# Patient Record
Sex: MALE | Race: WHITE | Employment: OTHER | URBAN - METROPOLITAN AREA
[De-identification: names, ages, dates, MRNs, and addresses within clinical notes are randomized per-mention and may not be internally consistent; named-entity substitution may affect disease eponyms.]

---

## 2017-05-11 ENCOUNTER — OFFICE VISIT (OUTPATIENT)
Dept: FAMILY MEDICINE CLINIC | Age: 57
End: 2017-05-11

## 2017-05-11 VITALS
TEMPERATURE: 96.9 F | BODY MASS INDEX: 28.19 KG/M2 | RESPIRATION RATE: 18 BRPM | DIASTOLIC BLOOD PRESSURE: 66 MMHG | WEIGHT: 186 LBS | HEIGHT: 68 IN | HEART RATE: 53 BPM | SYSTOLIC BLOOD PRESSURE: 108 MMHG | OXYGEN SATURATION: 96 %

## 2017-05-11 DIAGNOSIS — Z13.220 SCREENING FOR LIPID DISORDERS: ICD-10-CM

## 2017-05-11 DIAGNOSIS — Z86.79 HISTORY OF ABDOMINAL AORTIC ANEURYSM (AAA): ICD-10-CM

## 2017-05-11 DIAGNOSIS — F32.A DEPRESSION, UNSPECIFIED DEPRESSION TYPE: ICD-10-CM

## 2017-05-11 DIAGNOSIS — R60.0 BILATERAL EDEMA OF LOWER EXTREMITY: ICD-10-CM

## 2017-05-11 DIAGNOSIS — I10 ESSENTIAL HYPERTENSION: ICD-10-CM

## 2017-05-11 DIAGNOSIS — E05.90 HYPERTHYROIDISM: ICD-10-CM

## 2017-05-11 DIAGNOSIS — M15.9 OSTEOARTHRITIS OF MULTIPLE JOINTS, UNSPECIFIED OSTEOARTHRITIS TYPE: ICD-10-CM

## 2017-05-11 DIAGNOSIS — B37.0 ORAL THRUSH: ICD-10-CM

## 2017-05-11 DIAGNOSIS — Z12.5 SCREENING FOR PROSTATE CANCER: ICD-10-CM

## 2017-05-11 DIAGNOSIS — R00.1 BRADYCARDIA: Primary | ICD-10-CM

## 2017-05-11 DIAGNOSIS — G89.29 OTHER CHRONIC PAIN: ICD-10-CM

## 2017-05-11 DIAGNOSIS — F17.200 NICOTINE USE DISORDER: ICD-10-CM

## 2017-05-11 DIAGNOSIS — I48.91 ATRIAL FIBRILLATION, UNSPECIFIED TYPE (HCC): ICD-10-CM

## 2017-05-11 DIAGNOSIS — Z82.49 FH: CAD (CORONARY ARTERY DISEASE): ICD-10-CM

## 2017-05-11 DIAGNOSIS — Z82.69 FAMILY HISTORY OF SYSTEMIC LUPUS ERYTHEMATOSUS (SLE) IN MOTHER: ICD-10-CM

## 2017-05-11 DIAGNOSIS — F19.91 HISTORY OF DRUG USE: ICD-10-CM

## 2017-05-11 DIAGNOSIS — F10.11 H/O ALCOHOL ABUSE: ICD-10-CM

## 2017-05-11 RX ORDER — ATENOLOL 100 MG/1
100 TABLET ORAL 2 TIMES DAILY
Status: ON HOLD | COMMUNITY
End: 2017-10-20

## 2017-05-11 RX ORDER — DILTIAZEM HYDROCHLORIDE 180 MG/1
CAPSULE, COATED, EXTENDED RELEASE ORAL 2 TIMES DAILY
COMMUNITY
End: 2017-10-03 | Stop reason: SDUPTHER

## 2017-05-11 RX ORDER — DULOXETIN HYDROCHLORIDE 30 MG/1
30 CAPSULE, DELAYED RELEASE ORAL 2 TIMES DAILY
COMMUNITY
End: 2017-12-22 | Stop reason: SDUPTHER

## 2017-05-11 RX ORDER — BUDESONIDE AND FORMOTEROL FUMARATE DIHYDRATE 160; 4.5 UG/1; UG/1
2 AEROSOL RESPIRATORY (INHALATION) 2 TIMES DAILY
COMMUNITY
End: 2017-05-24 | Stop reason: SDUPTHER

## 2017-05-11 RX ORDER — MAG HYDROX/ALUMINUM HYD/SIMETH 400-400-40
SUSPENSION, ORAL (FINAL DOSE FORM) ORAL
COMMUNITY
End: 2017-08-10

## 2017-05-11 RX ORDER — METHIMAZOLE 5 MG/1
2.5 TABLET ORAL
COMMUNITY
End: 2017-10-20

## 2017-05-11 RX ORDER — OXYCODONE HYDROCHLORIDE 10 MG/1
TABLET ORAL 4 TIMES DAILY
COMMUNITY
End: 2017-06-02 | Stop reason: SDUPTHER

## 2017-05-11 RX ORDER — ALBUTEROL SULFATE 90 UG/1
AEROSOL, METERED RESPIRATORY (INHALATION)
COMMUNITY
End: 2017-07-19 | Stop reason: SDUPTHER

## 2017-05-11 RX ORDER — GABAPENTIN 300 MG/1
300 CAPSULE ORAL 2 TIMES DAILY
COMMUNITY
End: 2017-06-21 | Stop reason: SDUPTHER

## 2017-05-11 RX ORDER — MULTIVIT WITH MINERALS/HERBS
1 TABLET ORAL DAILY
COMMUNITY
End: 2021-02-01

## 2017-05-11 RX ORDER — NYSTATIN 100000 [USP'U]/ML
1 SUSPENSION ORAL 4 TIMES DAILY
Qty: 200 ML | Refills: 0 | Status: SHIPPED | OUTPATIENT
Start: 2017-05-11 | End: 2017-05-24

## 2017-05-11 NOTE — MR AVS SNAPSHOT
Visit Information Date & Time Provider Department Dept. Phone Encounter #  
 5/11/2017  2:00 PM DUANE Baumann [phone] [id number] Follow-up Instructions Return in about 3 weeks (around 6/1/2017) for Bradycardia. Your Appointments 6/1/2017 11:00 AM  
Follow Up with DUANE Baumann ([facility]) Appt Note: Return in about 3 weeks (around 6/1/2017) for Bradycardia. [employer] [address] [address]  
[phone]  
  
   
 [facility] [address] [address] Upcoming Health Maintenance Date Due Hepatitis C Screening 1960 DTaP/Tdap/Td series (1 - Tdap) 3/1/1979 FOBT Q 1 YEAR AGE 50-75 3/1/2008 INFLUENZA AGE 9 TO ADULT 8/1/2017 Allergies as of 5/11/2017  Review Complete On: 5/11/2017 By: Janis Virk, MIGUEL ANGEL, RT, NM, ARRT No Known Allergies Current Immunizations  Never Reviewed No immunizations on file. Not reviewed this visit You Were Diagnosed With   
  
 Codes Comments Bradycardia    -  Primary ICD-10-CM: R00.1 ICD-9-CM: 427.89 Atrial fibrillation, unspecified type ([facility]     ICD-10-CM: I48.91 
ICD-9-CM: 427.31 Osteoarthritis of multiple joints, unspecified osteoarthritis type     ICD-10-CM: M15.9 ICD-9-CM: 715.89 Other chronic pain     ICD-10-CM: G89.29 ICD-9-CM: 338.29 Hyperthyroidism     ICD-10-CM: E05.90 ICD-9-CM: 242.90 History of abdominal aortic aneurysm (AAA)     ICD-10-CM: Z86.79 
ICD-9-CM: V12.59 Oral thrush     ICD-10-CM: B37.0 ICD-9-CM: 112.0 Family history of systemic lupus erythematosus (SLE) in mother     ICD-10-CM: Z80.2 ICD-9-CM: V19.8 History of drug use ([facility]     ICD-10-CM: I84.117 ICD-9-CM: 305.93   
 H/O alcohol abuse     ICD-10-CM: Z87.898 ICD-9-CM: 305.03 Nicotine use disorder     ICD-10-CM: F17.200 ICD-9-CM: 305.1 Depression, unspecified depression type     ICD-10-CM: F32.9 ICD-9-CM: 166 Screening for lipid disorders     ICD-10-CM: Z13.220 ICD-9-CM: V77.91 Essential hypertension     ICD-10-CM: I10 
ICD-9-CM: 401.9 Vitals BP Pulse Temp Resp Height(growth percentile) Weight(growth percentile) 108/66 (BP 1 Location: Right arm, BP Patient Position: Sitting) (!) 53 96.9 °F (36.1 °C) (Oral) 18 5' 8\" (1.727 m) 186 lb (84.4 kg) SpO2 BMI Smoking Status 96% 28.28 kg/m2 Current Every Day Smoker Vitals History BMI and BSA Data Body Mass Index Body Surface Area  
 28.28 kg/m 2 2.01 m 2 Your Updated Medication List  
  
   
This list is accurate as of: 5/11/17  3:35 PM.  Always use your most recent med list.  
  
  
  
  
 atenolol 100 mg tablet Commonly known as:  TENORMIN Take 100 mg by mouth two (2) times a day. b complex vitamins tablet Take 1 Tab by mouth daily. CARTIA  mg ER capsule Generic drug:  dilTIAZem CD Take  by mouth two (2) times a day. CYMBALTA 30 mg capsule Generic drug:  DULoxetine Take 30 mg by mouth two (2) times a day.  
  
 gabapentin 300 mg capsule Commonly known as:  NEURONTIN Take 300 mg by mouth two (2) times a day. multivitamin, tx-iron-ca-min 9 mg iron-400 mcg Tab tablet Commonly known as:  THERA-M w/ IRON Take 1 Tab by mouth daily. oxyCODONE IR 10 mg Tab immediate release tablet Commonly known as:  Mary Carey Take  by mouth four (4) times daily. PROAIR HFA 90 mcg/actuation inhaler Generic drug:  albuterol Take  by inhalation. saw palmetto fruit 450 mg Cap Take  by mouth. SYMBICORT 160-4.5 mcg/actuation HFA inhaler Generic drug:  budesonide-formoterol Take 2 Puffs by inhalation two (2) times a day. TAPAZOLE 5 mg tablet Generic drug:  methIMAzole Take 2.5 mg by mouth every three (3) days. XARELTO 20 mg Tab tablet Generic drug:  rivaroxaban Take  by mouth daily. XTAMPZA ER 9 mg capsule Generic drug:  oxyCODONE ER Take 9 mg by mouth every twelve (12) hours. We Performed the Following AMB POC EKG ROUTINE W/ 12 LEADS, INTER & REP [70595 CPT(R)] Follow-up Instructions Return in about 3 weeks (around 6/1/2017) for Bradycardia. Patient Instructions Atrial Fibrillation: Care Instructions Your Care Instructions Atrial fibrillation is an irregular and often fast heartbeat. Treating this condition is important for several reasons. It can cause blood clots, which can travel from your heart to your brain and cause a stroke. If you have a fast heartbeat, you may feel lightheaded, dizzy, and weak. An irregular heartbeat can also increase your risk for heart failure. Atrial fibrillation is often the result of another heart condition, such as high blood pressure or coronary artery disease. Making changes to improve your heart condition will help you stay healthy and active. Follow-up care is a key part of your treatment and safety. Be sure to make and go to all appointments, and call your doctor if you are having problems. It's also a good idea to know your test results and keep a list of the medicines you take. How can you care for yourself at home? Medicines · Take your medicines exactly as prescribed. Call your doctor if you think you are having a problem with your medicine. You will get more details on the specific medicines your doctor prescribes. · If your doctor has given you a blood thinner to prevent a stroke, be sure you get instructions about how to take your medicine safely. Blood thinners can cause serious bleeding problems. · Do not take any vitamins, over-the-counter drugs, or herbal products without talking to your doctor first. 
Lifestyle changes · Do not smoke.  Smoking can increase your chance of a stroke and heart attack. If you need help quitting, talk to your doctor about stop-smoking programs and medicines. These can increase your chances of quitting for good. · Eat a heart-healthy diet. · Stay at a healthy weight. Lose weight if you need to. · Limit alcohol to 2 drinks a day for men and 1 drink a day for women. Too much alcohol can cause health problems. · Avoid colds and flu. Get a pneumococcal vaccine shot. If you have had one before, ask your doctor whether you need another dose. Get a flu shot every year. If you must be around people with colds or flu, wash your hands often. Activity · If your doctor recommends it, get more exercise. Walking is a good choice. Bit by bit, increase the amount you walk every day. Try for at least 30 minutes on most days of the week. You also may want to swim, bike, or do other activities. Your doctor may suggest that you join a cardiac rehabilitation program so that you can have help increasing your physical activity safely. · Start light exercise if your doctor says it is okay. Even a small amount will help you get stronger, have more energy, and manage stress. Walking is an easy way to get exercise. Start out by walking a little more than you did in the hospital. Gradually increase the amount you walk. · When you exercise, watch for signs that your heart is working too hard. You are pushing too hard if you cannot talk while you are exercising. If you become short of breath or dizzy or have chest pain, sit down and rest immediately. · Check your pulse regularly. Place two fingers on the artery at the palm side of your wrist, in line with your thumb. If your heartbeat seems uneven or fast, talk to your doctor. When should you call for help? Call 911 anytime you think you may need emergency care. For example, call if: 
· You have symptoms of a heart attack. These may include: ¨ Chest pain or pressure, or a strange feeling in the chest. 
¨ Sweating. ¨ Shortness of breath. ¨ Nausea or vomiting. ¨ Pain, pressure, or a strange feeling in the back, neck, jaw, or upper belly or in one or both shoulders or arms. ¨ Lightheadedness or sudden weakness. ¨ A fast or irregular heartbeat. After you call 911, the  may tell you to chew 1 adult-strength or 2 to 4 low-dose aspirin. Wait for an ambulance. Do not try to drive yourself. · You have symptoms of a stroke. These may include: 
¨ Sudden numbness, tingling, weakness, or loss of movement in your face, arm, or leg, especially on only one side of your body. ¨ Sudden vision changes. ¨ Sudden trouble speaking. ¨ Sudden confusion or trouble understanding simple statements. ¨ Sudden problems with walking or balance. ¨ A sudden, severe headache that is different from past headaches. · You passed out (lost consciousness). Call your doctor now or seek immediate medical care if: 
· You have new or increased shortness of breath. · You feel dizzy or lightheaded, or you feel like you may faint. · Your heart rate becomes irregular. · You can feel your heart flutter in your chest or skip heartbeats. Tell your doctor if these symptoms are new or worse. Watch closely for changes in your health, and be sure to contact your doctor if you have any problems. Where can you learn more? Go to http://jeannine-kain.info/. Enter U020 in the search box to learn more about \"Atrial Fibrillation: Care Instructions. \" Current as of: January 27, 2016 Content Version: 11.2 © 9870-7566 [employer]. Care instructions adapted under license by [employer] (which disclaims liability or warranty for this information). If you have questions about a medical condition or this instruction, always ask your healthcare professional. [place] [address] any warranty or liability for your use of this information. Introducing [facility]! Alla Cleverly introduces Mempile patient portal. Now you can access parts of your medical record, email your doctor's office, and request medication refills online. 1. In your internet browser, go to [email or web address] 2. Click on the First Time User? Click Here link in the Sign In box. You will see the New Member Sign Up page. 3. Enter your Mempile Access Code exactly as it appears below. You will not need to use this code after youve completed the sign-up process. If you do not sign up before the expiration date, you must request a new code. · Mempile Access Code: [email or web address] Expires: 8/9/2017  1:33 PM 
 
4. Enter the last four digits of your Social Security Number (xxxx) and Date of Birth (mm/dd/yyyy) as indicated and click Submit. You will be taken to the next sign-up page. 5. Create a Mempile ID. This will be your Mempile login ID and cannot be changed, so think of one that is secure and easy to remember. 6. Create a Mempile password. You can change your password at any time. 7. Enter your Password Reset Question and Answer. This can be used at a later time if you forget your password. 8. Enter your e-mail address. You will receive e-mail notification when new information is available in [address] 9. Click Sign Up. You can now view and download portions of your medical record. 10. Click the Download Summary menu link to download a portable copy of your medical information. If you have questions, please visit the Frequently Asked Questions section of the Mempile website. Remember, Mempile is NOT to be used for urgent needs. For medical emergencies, dial 911. Now available from your iPhone and Android! Please provide this summary of care documentation to your next provider. Your primary care clinician is listed as Zi Durbin. If you have any questions after today's visit, please call [phone].

## 2017-05-11 NOTE — PATIENT INSTRUCTIONS
Atrial Fibrillation: Care Instructions  Your Care Instructions    Atrial fibrillation is an irregular and often fast heartbeat. Treating this condition is important for several reasons. It can cause blood clots, which can travel from your heart to your brain and cause a stroke. If you have a fast heartbeat, you may feel lightheaded, dizzy, and weak. An irregular heartbeat can also increase your risk for heart failure. Atrial fibrillation is often the result of another heart condition, such as high blood pressure or coronary artery disease. Making changes to improve your heart condition will help you stay healthy and active. Follow-up care is a key part of your treatment and safety. Be sure to make and go to all appointments, and call your doctor if you are having problems. It's also a good idea to know your test results and keep a list of the medicines you take. How can you care for yourself at home? Medicines  · Take your medicines exactly as prescribed. Call your doctor if you think you are having a problem with your medicine. You will get more details on the specific medicines your doctor prescribes. · If your doctor has given you a blood thinner to prevent a stroke, be sure you get instructions about how to take your medicine safely. Blood thinners can cause serious bleeding problems. · Do not take any vitamins, over-the-counter drugs, or herbal products without talking to your doctor first.  Lifestyle changes  · Do not smoke. Smoking can increase your chance of a stroke and heart attack. If you need help quitting, talk to your doctor about stop-smoking programs and medicines. These can increase your chances of quitting for good. · Eat a heart-healthy diet. · Stay at a healthy weight. Lose weight if you need to. · Limit alcohol to 2 drinks a day for men and 1 drink a day for women. Too much alcohol can cause health problems. · Avoid colds and flu. Get a pneumococcal vaccine shot.  If you have had one before, ask your doctor whether you need another dose. Get a flu shot every year. If you must be around people with colds or flu, wash your hands often. Activity  · If your doctor recommends it, get more exercise. Walking is a good choice. Bit by bit, increase the amount you walk every day. Try for at least 30 minutes on most days of the week. You also may want to swim, bike, or do other activities. Your doctor may suggest that you join a cardiac rehabilitation program so that you can have help increasing your physical activity safely. · Start light exercise if your doctor says it is okay. Even a small amount will help you get stronger, have more energy, and manage stress. Walking is an easy way to get exercise. Start out by walking a little more than you did in the hospital. Gradually increase the amount you walk. · When you exercise, watch for signs that your heart is working too hard. You are pushing too hard if you cannot talk while you are exercising. If you become short of breath or dizzy or have chest pain, sit down and rest immediately. · Check your pulse regularly. Place two fingers on the artery at the palm side of your wrist, in line with your thumb. If your heartbeat seems uneven or fast, talk to your doctor. When should you call for help? Call 911 anytime you think you may need emergency care. For example, call if:  · You have symptoms of a heart attack. These may include:  ¨ Chest pain or pressure, or a strange feeling in the chest.  ¨ Sweating. ¨ Shortness of breath. ¨ Nausea or vomiting. ¨ Pain, pressure, or a strange feeling in the back, neck, jaw, or upper belly or in one or both shoulders or arms. ¨ Lightheadedness or sudden weakness. ¨ A fast or irregular heartbeat. After you call 911, the  may tell you to chew 1 adult-strength or 2 to 4 low-dose aspirin. Wait for an ambulance. Do not try to drive yourself. · You have symptoms of a stroke.  These may include:  ¨ Sudden numbness, tingling, weakness, or loss of movement in your face, arm, or leg, especially on only one side of your body. ¨ Sudden vision changes. ¨ Sudden trouble speaking. ¨ Sudden confusion or trouble understanding simple statements. ¨ Sudden problems with walking or balance. ¨ A sudden, severe headache that is different from past headaches. · You passed out (lost consciousness). Call your doctor now or seek immediate medical care if:  · You have new or increased shortness of breath. · You feel dizzy or lightheaded, or you feel like you may faint. · Your heart rate becomes irregular. · You can feel your heart flutter in your chest or skip heartbeats. Tell your doctor if these symptoms are new or worse. Watch closely for changes in your health, and be sure to contact your doctor if you have any problems. Where can you learn more? Go to http://jeannine-kain.info/. Enter U020 in the search box to learn more about \"Atrial Fibrillation: Care Instructions. \"  Current as of: January 27, 2016  Content Version: 11.2  © 6177-6324 [employer]. Care instructions adapted under license by "[employer] (which disclaims liability or warranty for this information). If you have questions about a medical condition or this instruction, always ask your healthcare professional. [address] any warranty or liability for your use of this information.

## 2017-05-11 NOTE — PROGRESS NOTES
HISTORY OF PRESENT ILLNESS  Gifty Ott is a 59 y.o. male. HPI  Gifty Ott is a 59 y.o. male who presents to the office today to establish care. He is new to the practice. He moved here from [place] with his wife to live closer to their daughter. He has multiple complaints today. He has a very long hx of chronic pain previously in pain management, DJD of multiple joints, bulging discs in lumbar spine, s/p multiple cervical and lumbar surgeries, hyperthyroidism, rate controlled Afib on Xarelto, AAA 3cm, depression. He has a hx of drug and alcohol abuse. He smokes cigarettes, 1 ppd. He was seeing Pain Management in [place] for 5 years, Dr. Simon Gonzalez. He has been optimally treated with Oxy ER and Oxy IR. This brings his pain level down to a 5/10, which he can live with. He needs a Pain Management clinic here. In office EKG shows bradycardia and Afib, on atenolol and cardizem and xarelto. His HR is quite low and does c/o occasional dizziness and fatigue. He says he was trying to take the trash bins outside and he was so out of breath, which concerned him. He has been experiencing LE edema for about 3 years. He cannot move much because of his chronic pain and DJD. He walks with a cane. He says he will drink a lot during the day but hardly will urinate. At night he will wake up 6 times to urinate. He does admit to drinking 3 Mt Dews in the morning, a couple Iced Teas, a Frapuccino in the afternoon and then juices and water the rest of the day. He has trouble falling asleep. He is overdue for his US of AAA. His brother  from a AAA in his 45s and this makes him very nervous. He has a very strong FH of CAD on his father's side. His mother is Bipolar and his son is Schizo and Bipolar. He does have a hx of Depression and taking cymbalta.        Chief Complaint   Patient presents with    Pain (Chronic)     Current Outpatient Prescriptions   Medication Sig    DULoxetine (CYMBALTA) 30 mg capsule Take 30 mg by mouth two (2) times a day.  methIMAzole (TAPAZOLE) 5 mg tablet Take 2.5 mg by mouth every three (3) days.  atenolol (TENORMIN) 100 mg tablet Take 100 mg by mouth two (2) times a day.  oxyCODONE ER (XTAMPZA ER) 9 mg capsule Take 9 mg by mouth every twelve (12) hours.  gabapentin (NEURONTIN) 300 mg capsule Take 300 mg by mouth two (2) times a day.  oxyCODONE IR (ROXICODONE) 10 mg tab immediate release tablet Take  by mouth four (4) times daily.  dilTIAZem CD (CARTIA XT) 180 mg ER capsule Take  by mouth two (2) times a day.  saw palmetto fruit 450 mg cap Take  by mouth.  b complex vitamins tablet Take 1 Tab by mouth daily.  multivitamin, tx-iron-ca-min (THERA-M W/ IRON) 9 mg iron-400 mcg tab tablet Take 1 Tab by mouth daily.  budesonide-formoterol (SYMBICORT) 160-4.5 mcg/actuation HFA inhaler Take 2 Puffs by inhalation two (2) times a day.  albuterol (PROAIR HFA) 90 mcg/actuation inhaler Take  by inhalation.  nystatin (MYCOSTATIN) 100,000 unit/mL suspension Take 5 mL by mouth four (4) times daily. swish and spit    rivaroxaban (XARELTO) 20 mg tab tablet Take 1 Tab by mouth daily (with breakfast). No current facility-administered medications for this visit.         No Known Allergies  Past Medical History:   Diagnosis Date    [facility]ism ([facility] [address]     Chronic pain     Degenerative disc disease, cervical     Degenerative disc disease, lumbar     Hypertension     Hypokalemia     Pneumonia 2016    caused permanent L lung problems    Thyroid disease      History   Smoking Status    Current Every Day Smoker    Packs/day: 1.00   Smokeless Tobacco    Former User     History   Alcohol Use No     Comment: sober x 5 years     Family History   Problem Relation Age of Onset    Heart Disease Father     Heart Disease Brother     Heart Disease Paternal Aunt     Heart Disease Paternal Uncle     Heart Disease Paternal Grandmother     Lupus Mother      Review of Systems Constitutional: Positive for malaise/fatigue. Negative for diaphoresis and fever. HENT:        Tongue pain   Respiratory: Positive for shortness of breath. Negative for cough and wheezing. Cardiovascular: Positive for leg swelling. Negative for chest pain, palpitations, orthopnea and PND. Gastrointestinal: Negative for abdominal pain, blood in stool, diarrhea, nausea and vomiting. Genitourinary: Negative for dysuria, hematuria and urgency. Nocturia   Musculoskeletal: Positive for back pain, joint pain, myalgias and neck pain. Neurological: Positive for dizziness. Negative for focal weakness and loss of consciousness. Psychiatric/Behavioral: Positive for depression. The patient has insomnia. The patient is not nervous/anxious. Hx drug and alcohol abuse     Visit Vitals    /66 (BP 1 Location: Right arm, BP Patient Position: Sitting)    Pulse (!) 53    Temp 96.9 °F (36.1 °C) (Oral)    Resp 18    Ht 5' 8\" (1.727 m)    Wt 186 lb (84.4 kg)    SpO2 96%    BMI 28.28 kg/m2     Physical Exam   Constitutional: He appears well-developed and well-nourished. Walking slowly with a cane. Appears older than stated age   HENT:   Mouth/Throat: Abnormal dentition. Thrush on tongue  Wears dentures (not at visit today)   Neck: Neck supple. No JVD present. No thyromegaly present. Very little ROM    Cardiovascular: An irregularly irregular rhythm present. Bradycardia present. Pulses:       Radial pulses are 2+ on the right side, and 2+ on the left side. Pulmonary/Chest: Effort normal and breath sounds normal.   Abdominal: Normal appearance, normal aorta and bowel sounds are normal. He exhibits no abdominal bruit. There is no hepatosplenomegaly. There is no tenderness. Lymphadenopathy:     He has no cervical adenopathy. Skin: Ecchymosis noted. Psychiatric: He has a normal mood and affect.  His speech is normal and behavior is normal. Thought content normal.   Nursing note and vitals reviewed. EKG reviewed in the office: Bradycardia with a rate of 52, Atrial fibrillation    ASSESSMENT and PLAN    ICD-10-CM ICD-9-CM    1. Bradycardia R00.1 427.89 AMB POC EKG ROUTINE W/ 12 LEADS, INTER & REP      CBC WITH AUTOMATED DIFF      METABOLIC PANEL, COMPREHENSIVE      TSH AND FREE T4      T3 TOTAL      MAGNESIUM   2. Atrial fibrillation, unspecified type (HCC) I48.91 427.31 AMB POC EKG ROUTINE W/ 12 LEADS, INTER & REP      CBC WITH AUTOMATED DIFF      METABOLIC PANEL, COMPREHENSIVE      TSH AND FREE T4      T3 TOTAL      MAGNESIUM      rivaroxaban (XARELTO) 20 mg tab tablet      2D ECHO COMPLETE ADULT (TTE) W OR WO CONTR   3. Osteoarthritis of multiple joints, unspecified osteoarthritis type M15.9 715.89 VITAMIN D, 25 HYDROXY      REFERRAL TO PAIN MANAGEMENT      REFERRAL TO NEUROSURGERY   4. Other chronic pain G89.29 338.29 VITAMIN D, 25 HYDROXY      REFERRAL TO PAIN MANAGEMENT   5. Hyperthyroidism E05.90 242.90 TSH AND FREE T4      T3 TOTAL   6. History of abdominal aortic aneurysm (AAA) Z86.79 V12.59 US EXAM SCREENING AAA   7. Oral thrush B37.0 112.0 nystatin (MYCOSTATIN) 100,000 unit/mL suspension   8. Family history of systemic lupus erythematosus (SLE) in mother Z80.2 V19.8 ANTINUCLEAR ANTIBODIES, IFA   9. History of drug use ([facility] [address] Z87.898 305.93    10. H/O alcohol abuse Z87.898 305.03    11. Nicotine use disorder F17.200 305.1    12. Depression, unspecified depression type F32.9 311 REFERRAL TO PSYCHIATRY   13. Screening for lipid disorders Z13.220 V77.91 LIPID PANEL   14. Essential hypertension I10 401.9    15. Bilateral edema of lower extremity R60.0 782.3 2D ECHO COMPLETE ADULT (TTE) W OR WO CONTR   16. FH: CAD (coronary artery disease) Z82.49 V17.3 CBC WITH AUTOMATED DIFF      METABOLIC PANEL, COMPREHENSIVE      TSH AND FREE T4      T3 TOTAL      LIPID PANEL   17.  Screening for prostate cancer Z12.5 V76.44 PSA SCREENING (SCREENING)      I am ordering labs, initiating referrals to specialists and ordering imaging studies. Echo to assess LV function and any wall motion abnormalities, Abdominal Aortic US  I will request records from his previous PCP in [place]. I have decreased Atenolol to 100mg once daily due to dizziness and fatigue. Will f/u in 2 weeks to recheck BP and HR and reassess symptoms. Continue Xarelto and Diltiazem. Treating Thrush. Make sure to keep dentures very clean, and always rinse mouth after inhaler use. Reviewed medication and side effects. Patient agrees with the plan and verbalizes understanding. Follow-up Disposition:  Return in about 3 weeks (around 6/1/2017) for Bradycardia.     Jane Rivera PA-C  5/11/2017

## 2017-05-11 NOTE — PROGRESS NOTES
Radha Dockery is a 59 y.o. male new pt establishing care. Chronic pain in R hip and knee. Trouble sleeping because he wakes up constantly to urinate. Swelling in the ankles and legs    Never had a colonoscopy    Just moved here from [place] about 3 weeks.

## 2017-05-12 DIAGNOSIS — Z86.79 HISTORY OF ABDOMINAL AORTIC ANEURYSM (AAA): Primary | ICD-10-CM

## 2017-05-24 ENCOUNTER — CLINICAL SUPPORT (OUTPATIENT)
Dept: FAMILY MEDICINE CLINIC | Age: 57
End: 2017-05-24

## 2017-05-24 ENCOUNTER — HOSPITAL ENCOUNTER (OUTPATIENT)
Dept: LAB | Age: 57
Discharge: HOME | End: 2017-05-24
Payer: COMMERCIAL

## 2017-05-24 ENCOUNTER — OFFICE VISIT (OUTPATIENT)
Dept: FAMILY MEDICINE CLINIC | Age: 57
End: 2017-05-24

## 2017-05-24 VITALS
BODY MASS INDEX: 27.46 KG/M2 | HEIGHT: 68 IN | OXYGEN SATURATION: 96 % | HEART RATE: 66 BPM | DIASTOLIC BLOOD PRESSURE: 90 MMHG | SYSTOLIC BLOOD PRESSURE: 128 MMHG | TEMPERATURE: 97.2 F | WEIGHT: 181.2 LBS | RESPIRATION RATE: 18 BRPM

## 2017-05-24 DIAGNOSIS — Z12.5 SCREENING FOR PROSTATE CANCER: ICD-10-CM

## 2017-05-24 DIAGNOSIS — Z82.69 FAMILY HISTORY OF SYSTEMIC LUPUS ERYTHEMATOSUS (SLE) IN MOTHER: ICD-10-CM

## 2017-05-24 DIAGNOSIS — Z13.220 SCREENING FOR LIPID DISORDERS: ICD-10-CM

## 2017-05-24 DIAGNOSIS — I48.91 ATRIAL FIBRILLATION, UNSPECIFIED TYPE (HCC): ICD-10-CM

## 2017-05-24 DIAGNOSIS — R00.1 BRADYCARDIA: ICD-10-CM

## 2017-05-24 DIAGNOSIS — M15.9 OSTEOARTHRITIS OF MULTIPLE JOINTS, UNSPECIFIED OSTEOARTHRITIS TYPE: ICD-10-CM

## 2017-05-24 DIAGNOSIS — Z82.49 FH: CAD (CORONARY ARTERY DISEASE): ICD-10-CM

## 2017-05-24 DIAGNOSIS — S30.861A: ICD-10-CM

## 2017-05-24 DIAGNOSIS — L29.8 PRURITUS OF GROIN IN MALE: ICD-10-CM
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
## 2017-05-24 DIAGNOSIS — G89.29 OTHER CHRONIC PAIN: ICD-10-CM
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
## 2017-05-24 DIAGNOSIS — E05.90 HYPERTHYROIDISM: ICD-10-CM

## 2017-05-24 DIAGNOSIS — R00.1 BRADYCARDIA: Primary | ICD-10-CM

## 2017-05-24 DIAGNOSIS — L08.9: ICD-10-CM

## 2017-05-24 DIAGNOSIS — I10 ESSENTIAL HYPERTENSION: ICD-10-CM

## 2017-05-24 LAB
ALBUMIN SERPL BCP-MCNC: 4.2 G/DL (ref 3.4–5)
ALBUMIN/GLOB SERPL: 1.3 {RATIO} (ref 0.8–1.7)
ALP SERPL-CCNC: 103 U/L (ref 45–117)
ALT SERPL-CCNC: 19 U/L (ref 16–61)
ANION GAP BLD CALC-SCNC: 8 MMOL/L (ref 3–18)
AST SERPL W P-5'-P-CCNC: 16 U/L (ref 15–37)
BASOPHILS # BLD AUTO: 0.1 K/UL (ref 0–0.06)
BASOPHILS # BLD: 1 % (ref 0–2)
BILIRUB SERPL-MCNC: 0.4 MG/DL (ref 0.2–1)
BUN SERPL-MCNC: 18 MG/DL (ref 7–18)
BUN/CREAT SERPL: 15 (ref 12–20)
CALCIUM SERPL-MCNC: 9.3 MG/DL (ref 8.5–10.1)
CHLORIDE SERPL-SCNC: 103 MMOL/L (ref 100–108)
CHOLEST SERPL-MCNC: 241 MG/DL
CO2 SERPL-SCNC: 28 MMOL/L (ref 21–32)
CREAT SERPL-MCNC: 1.23 MG/DL (ref 0.6–1.3)
DIFFERENTIAL METHOD BLD: ABNORMAL
EOSINOPHIL # BLD: 0.5 K/UL (ref 0–0.4)
EOSINOPHIL NFR BLD: 5 % (ref 0–5)
ERYTHROCYTE [DISTWIDTH] IN BLOOD BY AUTOMATED COUNT: 13.1 % (ref 11.6–14.5)
GLOBULIN SER CALC-MCNC: 3.3 G/DL (ref 2–4)
GLUCOSE SERPL-MCNC: 96 MG/DL (ref 74–99)
HCT VFR BLD AUTO: 49.4 % (ref 36–48)
HDLC SERPL-MCNC: 40 MG/DL (ref 40–60)
HDLC SERPL: 6 {RATIO} (ref 0–5)
HGB BLD-MCNC: 16 G/DL (ref 13–16)
LDLC SERPL CALC-MCNC: 170 MG/DL (ref 0–100)
LIPID PROFILE,FLP: ABNORMAL
LYMPHOCYTES # BLD AUTO: 31 % (ref 21–52)
LYMPHOCYTES # BLD: 3.1 K/UL (ref 0.9–3.6)
MAGNESIUM SERPL-MCNC: 2.3 MG/DL (ref 1.6–2.6)
MCH RBC QN AUTO: 30.8 PG (ref 24–34)
MCHC RBC AUTO-ENTMCNC: 32.4 G/DL (ref 31–37)
MCV RBC AUTO: 95.2 FL (ref 74–97)
MONOCYTES # BLD: 0.5 K/UL (ref 0.05–1.2)
MONOCYTES NFR BLD AUTO: 5 % (ref 3–10)
NEUTS SEG # BLD: 5.8 K/UL (ref 1.8–8)
NEUTS SEG NFR BLD AUTO: 58 % (ref 40–73)
PLATELET # BLD AUTO: 219 K/UL (ref 135–420)
PMV BLD AUTO: 11.3 FL (ref 9.2–11.8)
POTASSIUM SERPL-SCNC: 4.2 MMOL/L (ref 3.5–5.5)
PROT SERPL-MCNC: 7.5 G/DL (ref 6.4–8.2)
PSA SERPL-MCNC: 0.3 NG/ML (ref 0–4)
RBC # BLD AUTO: 5.19 M/UL (ref 4.7–5.5)
SODIUM SERPL-SCNC: 139 MMOL/L (ref 136–145)
T4 FREE SERPL-MCNC: 1.1 NG/DL (ref 0.7–1.5)
TRIGL SERPL-MCNC: 155 MG/DL (ref ?–150)
TSH SERPL DL<=0.05 MIU/L-ACNC: 2.21 UIU/ML (ref 0.36–3.74)
VLDLC SERPL CALC-MCNC: 31 MG/DL
WBC # BLD AUTO: 9.9 K/UL (ref 4.6–13.2)

## 2017-05-24 PROCEDURE — 80053 COMPREHEN METABOLIC PANEL: CPT | Performed by: PHYSICIAN ASSISTANT

## 2017-05-24 PROCEDURE — 84480 ASSAY TRIIODOTHYRONINE (T3): CPT | Performed by: PHYSICIAN ASSISTANT

## 2017-05-24 PROCEDURE — 84439 ASSAY OF FREE THYROXINE: CPT | Performed by: PHYSICIAN ASSISTANT

## 2017-05-24 PROCEDURE — 80061 LIPID PANEL: CPT | Performed by: PHYSICIAN ASSISTANT

## 2017-05-24 PROCEDURE — 84153 ASSAY OF PSA TOTAL: CPT | Performed by: PHYSICIAN ASSISTANT

## 2017-05-24 PROCEDURE — 82306 VITAMIN D 25 HYDROXY: CPT | Performed by: PHYSICIAN ASSISTANT

## 2017-05-24 PROCEDURE — 86038 ANTINUCLEAR ANTIBODIES: CPT | Performed by: PHYSICIAN ASSISTANT

## 2017-05-24 PROCEDURE — 83735 ASSAY OF MAGNESIUM: CPT | Performed by: PHYSICIAN ASSISTANT

## 2017-05-24 PROCEDURE — 85025 COMPLETE CBC W/AUTO DIFF WBC: CPT | Performed by: PHYSICIAN ASSISTANT

## 2017-05-24 RX ORDER — CEPHALEXIN 500 MG/1
500 CAPSULE ORAL 2 TIMES DAILY
Qty: 20 CAP | Refills: 0 | Status: SHIPPED | OUTPATIENT
Start: 2017-05-24 | End: 2017-06-03

## 2017-05-24 RX ORDER — BUDESONIDE AND FORMOTEROL FUMARATE DIHYDRATE 160; 4.5 UG/1; UG/1
2 AEROSOL RESPIRATORY (INHALATION) 2 TIMES DAILY
Qty: 1 INHALER | Refills: 2 | Status: SHIPPED | OUTPATIENT
Start: 2017-05-24 | End: 2018-01-15 | Stop reason: SDUPTHER

## 2017-05-24 RX ORDER — NYSTATIN 100000 U/G
CREAM TOPICAL 2 TIMES DAILY
Qty: 15 G | Refills: 0 | Status: SHIPPED | OUTPATIENT
Start: 2017-05-24 | End: 2017-06-20

## 2017-05-24 NOTE — PROGRESS NOTES
Brnaden Mccoy is a 59 y.o. male here this morning with his daughter for labs only. He tolerated well and left showing no s/s of distress.

## 2017-05-24 NOTE — PROGRESS NOTES
Fili Daniesl is a 59 y.o. male here this morning with c/o tick bite that was first noticed 1 week ago that is on his groin. The tick bit him 4 times and has been removed. All 4 bites are open from scratching. 2 are on the top inner thigh and 2 on the scrotum. The 2 on his legs have red circles and a darker dot. Learning assessment previously completed. 1. Have you been to the ER, urgent care clinic or hospitalized since your last visit? no     2. Have you seen or consulted any other health care providers outside of the [address] [place] [address] since your last visit (Include any pap smears or colon screening)? no     Do you have an Advanced Directive? yes    Would you like information on Advanced Directives?  no

## 2017-05-24 NOTE — PROGRESS NOTES
HISTORY OF PRESENT ILLNESS  Jarrod Sheffield is a 59 y.o. male. HPI  Jarrod Sheffield is a 59 y.o. male who presents to the office today for tick bite. He comes in today with c/o insect bites. This occurred 1.5 weeks ago. He was itching in his groin and noticed a tick crawling up his thigh. He then saw 4-5 bite marks on his inner thigh and scrotum. He says the bite marks are swollen and red, tender to touch, and extremely itchy. His dogs get ticks often and currently have tick prevention medication on them. [place] also [place], in a house that has been vacant for over one year, so he is questioning possibly a spider bite. Denies fevers, chills, bulls eye rash, worsening joint or muscle pain. Chief Complaint   Patient presents with    Insect Bite       Current Outpatient Prescriptions on File Prior to Visit   Medication Sig Dispense Refill    DULoxetine (CYMBALTA) 30 mg capsule Take 30 mg by mouth two (2) times a day.  methIMAzole (TAPAZOLE) 5 mg tablet Take 2.5 mg by mouth every three (3) days.  atenolol (TENORMIN) 100 mg tablet Take 100 mg by mouth daily.  oxyCODONE ER (XTAMPZA ER) 9 mg capsule Take 10 mg by mouth every twelve (12) hours.  gabapentin (NEURONTIN) 300 mg capsule Take 300 mg by mouth two (2) times a day.  oxyCODONE IR (ROXICODONE) 10 mg tab immediate release tablet Take  by mouth four (4) times daily.  dilTIAZem CD (CARTIA XT) 180 mg ER capsule Take  by mouth two (2) times a day.  saw palmetto fruit 450 mg cap Take  by mouth.  b complex vitamins tablet Take 1 Tab by mouth daily.  multivitamin, tx-iron-ca-min (THERA-M W/ IRON) 9 mg iron-400 mcg tab tablet Take 1 Tab by mouth daily.  budesonide-formoterol (SYMBICORT) 160-4.5 mcg/actuation HFA inhaler Take 2 Puffs by inhalation two (2) times a day.  albuterol (PROAIR HFA) 90 mcg/actuation inhaler Take  by inhalation.       rivaroxaban (XARELTO) 20 mg tab tablet Take 1 Tab by mouth daily (with breakfast). 30 Tab 11     No current facility-administered medications on file prior to visit. No Known Allergies  Past Medical History:   Diagnosis Date    [facility]ism ([facility] [address]     Chronic pain     Degenerative disc disease, cervical     Degenerative disc disease, lumbar     Hypertension     Hypokalemia     Pneumonia 2016    caused permanent L lung problems    Thyroid disease      History   Smoking Status    Current Every Day Smoker    Packs/day: 1.00   Smokeless Tobacco    Former User     History   Alcohol Use No     Comment: sober x 5 years     Family History   Problem Relation Age of Onset    Heart Disease Father     Heart Disease Brother     Heart Disease Paternal Aunt     Heart Disease Paternal Uncle     Heart Disease Paternal Grandmother     Lupus Mother      Review of Systems   Constitutional: Negative for chills, fever and malaise/fatigue. Gastrointestinal: Negative for abdominal pain, diarrhea, nausea and vomiting. Musculoskeletal: Positive for back pain, joint pain and myalgias. Chronic pain not worse than usual   Skin: Positive for itching. Negative for rash. groin   Neurological: Negative for headaches. Endo/Heme/Allergies: Does not bruise/bleed easily. Visit Vitals    /90    Pulse 66    Temp 97.2 °F (36.2 °C) (Oral)    Resp 18    Ht 5' 8\" (1.727 m)    Wt 181 lb 3.2 oz (82.2 kg)    SpO2 96%    BMI 27.55 kg/m2     Physical Exam   Constitutional: He is oriented to person, place, and time. He appears well-developed and well-nourished. Slow gait walking with cane   Cardiovascular: Normal rate. Pulmonary/Chest: Effort normal. No respiratory distress. Genitourinary: Testes normal and penis normal.       Right testis shows no mass and no tenderness. Left testis shows no mass and no tenderness. No penile erythema. Genitourinary Comments: 4 bites noted. Two on scrotum and one on each inner thigh. Scrotum lesions healing well.  Two lesions on inner thigh appear red surrounding bite, tender to touch, hard induration under scab. No drainage could be appreciated. Warm to touch   Neurological: He is alert and oriented to person, place, and time. Nursing note and vitals reviewed. ASSESSMENT and PLAN    ICD-10-CM ICD-9-CM    1. Tick bite, initial encounter W57. XXXA 919.4      E906.4    2. Nonvenomous insect bite of groin with infection, initial encounter S30.861A 911.5 cephALEXin (KEFLEX) 500 mg capsule    L08.9 E906.4     W57. XXXA     3. Essential hypertension I10 401.9    4. Pruritus of groin in male L29.8 698.9 nystatin (MYCOSTATIN) topical cream      Started him on abx for infection secondary to insect bite. If there is no improvement, then he will return to the office in 1-2 weeks for I&D. BP has increased since decreasing BB at last visit. Since his symptoms of dizziness and fatigue have not improved, he will go back on BB twice daily to keep BP and HR low with his hx of AAA. Reviewed medication and side effects. Patient agrees with the plan and verbalizes understanding. Follow-up Disposition:  Return in about 2 weeks (around 6/7/2017) for HTN.     Ruma Devries PA-C  5/24/2017

## 2017-05-24 NOTE — PATIENT INSTRUCTIONS
Tick Bite: Care Instructions  Your Care Instructions    Ticks are small spiderlike animals. They bite to fasten themselves onto your skin and feed on your blood. Ticks can carry diseases. But most ticks do not carry diseases, and most tick bites do not cause serious health problems. Some people may have an allergic reaction to a tick bite. This reaction may be mild, with symptoms like itching and swelling. In rare cases, a severe allergic reaction may occur. Most of the time, all you need to do for a tick bite is relieve any symptoms you may have. Follow-up care is a key part of your treatment and safety. Be sure to make and go to all appointments, and call your doctor if you are having problems. It's also a good idea to know your test results and keep a list of the medicines you take. How can you care for yourself at home? · Put ice or a cold pack on the bite for 15 to 20 minutes once an hour. Put a thin cloth between the ice and your skin. · Try an over-the-counter medicine to relieve itching, redness, swelling, and pain. Be safe with medicines. Read and follow all instructions on the label. ¨ Take an antihistamine medicine, such as chlorpheniramine (Chlor-Trimeton) or diphenhydramine (Benadryl). These medicines may help relieve itching, redness, and swelling. ¨ Use a spray of local anesthetic that contains benzocaine, such as Solarcaine. It may help relieve pain. If your skin reacts to the spray, stop using it. ¨ Put calamine lotion on the skin. It may help relieve itching. To avoid tick bites  · Avoid ticks:  ¨ Learn where ticks are found in your community, and stay away from those areas if possible. ¨ Cover as much of your body as possible when you work or play in grassy or wooded areas. ¨ Use insect repellents, such as products containing DEET. You can spray them on your skin. ¨ Take steps to control ticks on your property if you live in an area where Lyme disease occurs.  Clear leaves, brush, tall grasses, woodpiles, and stone fences from around your house and the edges of your yard or garden. This may help get rid of ticks. · When you come in from outdoors, check your body for ticks, including your groin, head, and underarms. The ticks may be about the size of a sesame seed. If no one else can help you check for ticks on your scalp, comb your hair with a fine-tooth comb. · If you find a tick, remove it quickly. Use tweezers to grasp the tick as close to its mouth (the part in your skin) as possible. Slowly pull the tick straight out--do not twist or yank--until its mouth releases from your skin. · Ticks can come into your house on clothing, outdoor gear, and pets. These ticks can fall off and attach to you. ¨ Check your clothing and outdoor gear. Remove any ticks you find. Then put your clothing in a clothes dryer on high heat for 1 hour to kill any ticks that might remain. ¨ Check your pets for ticks after they have been outdoors. · When hiking in the woods, carry a small dry jar or ziplock bag. If you find a tick on your body, remove the tick and put it in the jar or bag. Store the container in the freezer so you can give it to your doctor if symptoms develop. The tick can be tested to learn whether it is carrying the bacteria that cause Lyme disease. When should you call for help? Call 911 anytime you think you may need emergency care. For example, call if:  · You have symptoms of a severe allergic reaction. These may include:  ¨ Sudden raised, red areas (hives) all over your body. ¨ Swelling of the throat, mouth, lips, or tongue. ¨ Trouble breathing. ¨ Passing out (losing consciousness). Or you may feel very lightheaded or suddenly feel weak, confused, or restless. Call your doctor now or seek immediate medical care if:  · You have signs of infection, such as:  ¨ Increased pain, swelling, warmth, or redness around the bite. ¨ Red streaks leading from the bite.   ¨ Pus draining from the bite. ¨ A fever. Watch closely for changes in your health, and be sure to contact your doctor if:  · You develop a new rash. · You have joint pain. · You are very tired. · You have flu-like symptoms. · You have symptoms for more than 1 week. Where can you learn more? Go to http://jeannine-kain.info/. Enter T295 in the search box to learn more about \"Tick Bite: Care Instructions. \"  Current as of: May 27, 2016  Content Version: 11.2  © 6088-4534 [employer]. Care instructions adapted under license by [employer] (which disclaims liability or warranty for this information). If you have questions about a medical condition or this instruction, always ask your healthcare professional. [address] any warranty or liability for your use of this information.

## 2017-05-25 LAB
25(OH)D3 SERPL-MCNC: 22.3 NG/ML (ref 30–100)
ANA TITR SER IF: NEGATIVE {TITER}
T3 SERPL-MCNC: 142 NG/DL (ref 71–180)

## 2017-06-02 ENCOUNTER — TELEPHONE (OUTPATIENT)
Dept: FAMILY MEDICINE CLINIC | Age: 57
End: 2017-06-02

## 2017-06-02 ENCOUNTER — OFFICE VISIT (OUTPATIENT)
Dept: FAMILY MEDICINE CLINIC | Age: 57
End: 2017-06-02

## 2017-06-02 VITALS — WEIGHT: 180 LBS | HEIGHT: 68 IN | BODY MASS INDEX: 27.28 KG/M2

## 2017-06-02 DIAGNOSIS — G89.29 OTHER CHRONIC PAIN: ICD-10-CM

## 2017-06-02 DIAGNOSIS — E78.2 MIXED HYPERLIPIDEMIA: ICD-10-CM

## 2017-06-02 DIAGNOSIS — L73.9 FOLLICULITIS: ICD-10-CM

## 2017-06-02 DIAGNOSIS — I48.20 CHRONIC ATRIAL FIBRILLATION (HCC): ICD-10-CM

## 2017-06-02 DIAGNOSIS — M15.9 OSTEOARTHRITIS OF MULTIPLE JOINTS, UNSPECIFIED OSTEOARTHRITIS TYPE: Primary | ICD-10-CM

## 2017-06-02 DIAGNOSIS — I71.40 ABDOMINAL AORTIC ANEURYSM (AAA) WITHOUT RUPTURE: ICD-10-CM

## 2017-06-02 DIAGNOSIS — I10 ESSENTIAL HYPERTENSION: ICD-10-CM

## 2017-06-02 DIAGNOSIS — E55.9 VITAMIN D INSUFFICIENCY: ICD-10-CM

## 2017-06-02 DIAGNOSIS — F17.200 NICOTINE USE DISORDER: ICD-10-CM

## 2017-06-02 RX ORDER — OXYCODONE HYDROCHLORIDE 10 MG/1
10 TABLET ORAL
Qty: 45 TAB | Refills: 0 | Status: SHIPPED | OUTPATIENT
Start: 2017-06-02 | End: 2017-06-02 | Stop reason: SDUPTHER

## 2017-06-02 RX ORDER — SIMVASTATIN 40 MG/1
40 TABLET, FILM COATED ORAL
Qty: 30 TAB | Refills: 2 | Status: SHIPPED | OUTPATIENT
Start: 2017-06-02 | End: 2017-08-08

## 2017-06-02 RX ORDER — BACITRACIN ZINC 500 UNIT/G
OINTMENT (GRAM) TOPICAL 3 TIMES DAILY
Qty: 15 G | Refills: 0 | Status: SHIPPED | OUTPATIENT
Start: 2017-06-02 | End: 2017-06-20

## 2017-06-02 RX ORDER — ERGOCALCIFEROL 1.25 MG/1
50000 CAPSULE ORAL
Qty: 12 CAP | Refills: 0 | Status: SHIPPED | OUTPATIENT
Start: 2017-06-02 | End: 2018-05-29

## 2017-06-02 RX ORDER — OXYCODONE HYDROCHLORIDE 10 MG/1
10 TABLET ORAL
Qty: 120 TAB | Refills: 0 | Status: SHIPPED | OUTPATIENT
Start: 2017-06-02 | End: 2017-07-06 | Stop reason: SDUPTHER

## 2017-06-02 NOTE — TELEPHONE ENCOUNTER
Pt receives one of his prescriptions from [facility] in [place]. [phone]. The med is Xtampza ER 12HR 9 MG CAP ARAMIS. A refill was called in, left message, it was for QTY: 60, 30 day supply with no refills.

## 2017-06-02 NOTE — LETTER
Name:.Jeffery Cathryne Heimlich NLQ:[birth year] MR #:[id number] Provider Name:Juju Irby PA-C  
*QBYV-464* BSMG-491 (5/16) Page 1 of 5 Initial [facility] CONTROLLED SUBSTANCE AGREEMENT I may be prescribed medications that are controlled substances as part  of my treatment plan for management of my medical condition(s). The goal of my treatment plan is to maintain and/or improve my health and wellbeing. Because controlled substances have an increased risk of abuse or harm, continual re-evaluation is needed determine if the goals of my treatment plan are being met for my safety and the safety of others. Emily Alcantar  am entering into this Controlled Substance Agreement with my provider, Aida Stinson PA-C at [facility] . I understand that successful treatment requires mutual trust and honesty between me and my provider. I understand that there are state and federal laws and regulations which apply to the medications that my provider may prescribe that must be followed. I understand there are risks and benefits ts of taking the medicines that my provider may prescribe. I understand and agree that following this Agreement is necessary in continuing my provider-patient relationship and success of my treatment plan. As a part of my treatment plan, I agree to the following: COMMUNICATION: 
 
1. I will communicate fully with my provider about my medical condition(s), including the effect on my daily life and how well my medications are helping. I will tell my provider all of the medications that I take for any reason, including medications I receive from another health care provider, and will notify my provider about all issues, problems or concerns, including any side effects, which may be related to my medications. I understand that this information allows my provider to adjust my treatment plan to help manage my medical condition.  I understand that this information will become part of my permanent medical record. 2. I will notify my provider if I have a history of alcohol/drug misuse/addiction or if I have had treatment for alcohol/drug addiction in the past, or if I have a new problem with or concern about alcohol/drug use/addiction, because this increases the likelihood of high risk behaviors and may lead to serious medical conditions. 3. Females Only: I will notify my provider if I am or become pregnant, or if I intend to become pregnant, or if I intend to breastfeed. I understand that communication of these issues with my provider is important, due to possible effects my medication could have on an unborn fetus or breastfeeding child. Name:.Madhav Timmons EBB:[birth year] MR #:[id number] Provider Name:Juju Mathew PA-C  
*GWDH-035* BSMG-491 (5/16) Page 2 of 5 Initial [employer] MISUSE OF MEDICATIONS / DRUGS: 
 
1. I agree to take all controlled substances as prescribed, and will not misuse or abuse any controlled substances prescribed by my provider. For my safety, I will not increase the amount of medicine I take without first talking with and getting permission from my provider. 2. If I have a medical emergency, another health care provider may prescribe me medication. If I seek emergency treatment, I will notify my provider within seventy-two (72) hours. 3. I understand that my provider may discuss my use and/or possible misuse/abuse of controlled substances and alcohol, as appropriate, with any health care provider involved in my care, pharmacist or legal authority. ILLEGAL DRUGS: 
 
1. I will not use illegal drugs of any kind, including but not limited to marijuana, heroin, cocaine, or any prescription drug which is not prescribed to me. DRUG DIVERSION / PRESCRIPTION FRAUD: 
 
1. I will not share, sell, trade, give away, or otherwise misuse my prescriptions or medications. 2. I will not alter any prescriptions provided to me by my provider. SINGLE PROVIDER: 
 
1. I agree that all controlled substances that I take will be prescribed only by my provider (or his/her covering provider) under this Agreement. This agreement does not prevent me from seeking emergency medical treatment or receiving pain management related to a surgery. PROTECTING MEDICATIONS: 
 
1. I am responsible for keeping my prescriptions and medications in a safe and secure place including safeguarding them from loss or theft. I understand that lost, stolen or damaged/destroyed prescriptions or medications will not be replaced. Name:.Madhav Jade [facility]:[birth year] MR #:[id number] Provider Name:Juju Talbert PA-C  
*LGXA-218* BSMG-491 (5/16) Page 3 of 5 Initial [employer] PRESCRIPTION RENEWALS/REFILLS: 
 
1. I will follow my controlled substance medication schedule as prescribed by my provider. 2. I understand and agree that I will make any requests for renewals or refills of my prescriptions only at the time of an office visit or during my providers regular office hours subject to the prescription refill requirements of the individual practice. 3. I understand that my provider may not call in prescriptions for controlled substances to my pharmacy. 4. I understand that my provider may adjust or discontinue these medications as deemed appropriate for my medical treatment plan. This Agreement does not guarantee the prescription of controlled medications. 5. I agree that if my medications are adjusted or discontinued, I will properly dispose of any remaining medications. I understand that I will be required to dispose of any remaining controlled medications prior to being provided with any prescriptions for other controlled medications.  
 
 
1. I authorize my provider and my pharmacy to cooperate fully with any local, state, or federal law enforcement agency in the investigation of any possible misuse, sale, or other diversion of my controlled substance prescriptions or medications. RISKS: 
 
 
1. I understand that if I do not adhere to this Agreement in any way, my provider may change my prescriptions, stop prescribing controlled substances or end our provider-patient relationship. 2. If my provider decides to stop prescribing medication, or decides to end our provider-patient relationship,my provider may require that I taper my medications slowly. If necessary, my provider may also provide a prescription for other medications to treat my withdrawal symptoms. UNDERSTANDING THIS AGREEMENT: 
 
I understand that my provider may adjust or stop my prescriptions for controlled substances based on my medical condition and my treatment plan. I understand that this Agreement does not guarantee that I will be prescribed medications or controlled substances. I understand that controlled substances may be just one part 
of my treatment plan. My initial on each page and my signature below shows that I have read each page of this Agreement, I have had an opportunity to ask questions, and all of my questions have been answered to my satisfaction by my provider. By signing below, I agree to comply with this Agreement, and I understand that if I do not follow the Agreements listed above, my provider may stop 
 
 
 
_________________________________________  Date/Time 6/2/2017 12:03 PM   
             (Patient Signature)

## 2017-06-02 NOTE — PROGRESS NOTES
HISTORY OF PRESENT ILLNESS  Barbara Farmer is a 59 y.o. male. HPI  Barbara Farmer is a 59 y.o. male who presents to the office today for test results follow up. He comes in today to review the results of his labs, Echo and AAA US. Thyroid levels are well controlled, CBC and CMP look good. Lipid panel was reviewed and Total cholesterol 241, , HDL 40. He has never been on a statin. Echo showed an EF of 67%, mild LVH, biatrial dilitation, and RVSP 27mmHg. He continues to smoke cigarettes and has been eating more fast food than normal.     AAA: States 6 months ago it was measuring 3.3cm. He is very concerned because his brother  of a ruptured AAA in his 42's. He takes atenolol 100mg BID with good control of BP and HR. His blood pressure and HR are usually lower than today's readings. Indication: History of abdominal aortic aneurysm.     IMPRESSION: 4.2 cm saccular distal aortic aneurysm.     Comment: Sonography of the retroperitoneum was performed.     The aorta harbors a 4 x 4.2 cm saccular distal infrarenal abdominal aortic  aneurysm. There is mural thrombus and plaquing within the wall. The iliac  arteries are patent. The inferior vena cava is patent.     The right kidney measures 10.7 and the left 11.1 cm in the craniocaudal  dimension. The bladder measures 45.7 cc. The prostate is of normal size       Chief Complaint   Patient presents with    Hypertension       Current Outpatient Prescriptions on File Prior to Visit   Medication Sig Dispense Refill    cephALEXin (KEFLEX) 500 mg capsule Take 1 Cap by mouth two (2) times a day for 10 days. 20 Cap 0    nystatin (MYCOSTATIN) topical cream Apply  to affected area two (2) times a day. 15 g 0    budesonide-formoterol (SYMBICORT) 160-4.5 mcg/actuation HFA inhaler Take 2 Puffs by inhalation two (2) times a day. 1 Inhaler 2    DULoxetine (CYMBALTA) 30 mg capsule Take 30 mg by mouth two (2) times a day.       methIMAzole (TAPAZOLE) 5 mg tablet Take 2.5 mg by mouth every three (3) days.  atenolol (TENORMIN) 100 mg tablet Take 100 mg by mouth two (2) times a day.  oxyCODONE ER (XTAMPZA ER) 9 mg capsule Take 10 mg by mouth every twelve (12) hours.  gabapentin (NEURONTIN) 300 mg capsule Take 300 mg by mouth two (2) times a day.  dilTIAZem CD (CARTIA XT) 180 mg ER capsule Take  by mouth two (2) times a day.  saw palmetto fruit 450 mg cap Take  by mouth.  b complex vitamins tablet Take 1 Tab by mouth daily.  multivitamin, tx-iron-ca-min (THERA-M W/ IRON) 9 mg iron-400 mcg tab tablet Take 1 Tab by mouth daily.  albuterol (PROAIR HFA) 90 mcg/actuation inhaler Take  by inhalation.  rivaroxaban (XARELTO) 20 mg tab tablet Take 1 Tab by mouth daily (with breakfast). 30 Tab 11     No current facility-administered medications on file prior to visit. No Known Allergies  Past Medical History:   Diagnosis Date    Alcoholism ([facility] [address]     Chronic pain     Degenerative disc disease, cervical     Degenerative disc disease, lumbar     Hypertension     Hypokalemia     Pneumonia 2016    caused permanent L lung problems    Thyroid disease      History   Smoking Status    Current Every Day Smoker    Packs/day: 1.00   Smokeless Tobacco    Former User     History   Alcohol Use No     Comment: sober x 5 years     Family History   Problem Relation Age of Onset    Heart Disease Father     Heart Disease Brother      AAA-  in his 42's    Heart Disease Paternal Aunt     Heart Disease Paternal Uncle     Heart Disease Paternal Grandmother     Lupus Mother      Review of Systems   Constitutional: Positive for malaise/fatigue. Negative for diaphoresis and fever. Chronic   HENT:        Tongue pain   Respiratory: Positive for shortness of breath. Negative for cough, hemoptysis and wheezing. Cardiovascular: Positive for leg swelling. Negative for chest pain, palpitations, orthopnea and PND.    Gastrointestinal: Negative for abdominal pain, blood in stool, constipation, diarrhea, nausea and vomiting. Genitourinary:        Nocturia   Musculoskeletal: Positive for back pain, joint pain, myalgias and neck pain. Chronic joint pain   Skin: Positive for rash. Neurological: Positive for dizziness. Negative for focal weakness and loss of consciousness. Endo/Heme/Allergies: Bruises/bleeds easily. On xarelto   Psychiatric/Behavioral: Positive for depression. The patient has insomnia. The patient is not nervous/anxious. Hx drug and alcohol abuse     Visit Vitals    BP (P) 129/69 (BP 1 Location: Right arm, BP Patient Position: Sitting)    Pulse (P) 72    Temp (P) 97.4 °F (36.3 °C) (Oral)    Resp (P) 18    Ht 5' 8\" (1.727 m)    Wt 180 lb (81.6 kg)    SpO2 (P) 96%    BMI 27.37 kg/m2     Physical Exam   Constitutional: He is oriented to person, place, and time. He appears well-developed and well-nourished. No distress. Slow walking with cane   HENT:   Mouth/Throat: Abnormal dentition. Thrush on tongue  Wears dentures (not at visit today)   Neck: Neck supple. No JVD present. No thyromegaly present. Very little ROM    Cardiovascular: Normal rate. An irregularly irregular rhythm present. Pulses:       Radial pulses are 2+ on the right side, and 2+ on the left side. Pulmonary/Chest: Effort normal and breath sounds normal.   Abdominal: Normal appearance. He exhibits no abdominal bruit and no pulsatile midline mass. There is no hepatosplenomegaly. There is no tenderness. Musculoskeletal:   Trace LE edema   Neurological: He is alert and oriented to person, place, and time. Skin: Ecchymosis and rash noted. Rash is pustular. Few small red pustular lesions on inner thigh, no drainage, induration or warmth   Psychiatric: He has a normal mood and affect. His speech is normal and behavior is normal. Thought content normal.   Nursing note and vitals reviewed.     Lab Results   Component Value Date/Time Cholesterol, total 241 05/24/2017 08:05 AM    HDL Cholesterol 40 05/24/2017 08:05 AM    LDL, calculated 170 05/24/2017 08:05 AM    VLDL, calculated 31 05/24/2017 08:05 AM    Triglyceride 155 05/24/2017 08:05 AM    CHOL/HDL Ratio 6.0 05/24/2017 08:05 AM     ASSESSMENT and PLAN    ICD-10-CM ICD-9-CM    1. Osteoarthritis of multiple joints, unspecified osteoarthritis type M15.9 715.89 oxyCODONE IR (ROXICODONE) 10 mg tab immediate release tablet      DISCONTINUED: oxyCODONE IR (ROXICODONE) 10 mg tab immediate release tablet   2. Other chronic pain G89.29 338.29 oxyCODONE IR (ROXICODONE) 10 mg tab immediate release tablet      DISCONTINUED: oxyCODONE IR (ROXICODONE) 10 mg tab immediate release tablet   3. Vitamin D insufficiency E55.9 268.9 ergocalciferol (ERGOCALCIFEROL) 50,000 unit capsule   4. Folliculitis P70.6 621.3 bacitracin (BACITRACIN) ointment   5. Abdominal aortic aneurysm (AAA) without rupture (HCC) I71.4 441.4 REFERRAL TO VASCULAR SURGERY   6. Nicotine use disorder F17.200 305.1    7. Essential hypertension I10 401.9    8. Chronic atrial fibrillation (HCC) I48.2 427.31    9. Mixed hyperlipidemia E78.2 272.2 simvastatin (ZOCOR) 40 mg tablet      Will refer him to Vascular for enlarging AAA. He has previous study results at home, and he will bring those to his vascular appt. Continue with BB for good blood pressure and HR control. Do not exert yourself which could result in increased BP and HR. Also counseled on smoking cessation. This is speeding up the process of cardiovascular disease and damage, in addition to his FH. I am starting him on zocor for HLD. His estimated 10-year ASCVD risk is 21%, with a lifetime risk of 69%. Referral to pain management has already been started. He signed a pain management contract today. He understands that I will only refill his pain medication until he can be seen and managed by PM. Also, we are following up on the referral to a Neurosurgeon.   Reviewed medication and side effects. Patient agrees with the plan and verbalizes understanding.      Follow-up Disposition:  Return in about 1 month (around 7/2/2017) for chronic pain, AAA, med eval.    Gilberto Shah PA-C  6/2/2017

## 2017-06-02 NOTE — PROGRESS NOTES
Fili Daniels is a 59 y.o. male here this morning for a follow up on his HTN and heart rate. He says he now takes Atenolol BID which has helped. He also says there are a couple of bites on his legs that are still bothering him. Learning assessment previously completed. 1. Have you been to the ER, urgent care clinic or hospitalized since your last visit? no    2. Have you seen or consulted any other health care providers outside of the [address] [place] [address] since your last visit (Include any pap smears or colon screening)? no      Do you have an Advanced Directive? yes    Would you like information on Advanced Directives?  yes

## 2017-06-02 NOTE — PATIENT INSTRUCTIONS
Abdominal Aortic Aneurysm: Care Instructions  Your Care Instructions  An abdominal aortic aneurysm is a stretched and bulging area of the aorta. The aorta is the large blood vessel that takes oxygen-rich blood from the heart to the rest of the body. This type of aneurysm is in the belly, where the aorta takes blood to the lower body. If an aneurysm gets too big, it can cause serious problems. A bulging aorta is weak and can burst, or rupture. This causes life-threatening bleeding. If your doctor has determined that your aneurysm is small and not growing fast, it is safe to watch the aneurysm carefully and wait on surgery. If the aneurysm is larger, surgery may be the safest choice. In some cases, your doctor may be able to put in a type of graft, called a stent, to fix the aneurysm without doing major surgery. Follow-up care is a key part of your treatment and safety. Be sure to make and go to all appointments, and call your doctor if you are having problems. It's also a good idea to know your test results and keep a list of the medicines you take. How can you care for yourself at home? · Take your medicines exactly as prescribed. Call your doctor if you think you are having a problem with your medicine. You may take a medicine to lower your blood pressure. This lowers stress on your aorta. If you have high cholesterol, you also may take a statin medicine. · Follow a heart-healthy diet. ¨ Eat lots of fruits and vegetables, whole grains, fish, and low-fat or nonfat dairy foods. ¨ Eat lean meats. Limit saturated fat and avoid trans fat. ¨ Limit processed food, sodium, alcohol, and sweets. · If your doctor recommends it, get more exercise. Walking is a good choice. Bit by bit, increase the amount you walk every day. Try for at least 30 minutes on most days of the week. · Talk to your doctor about when you can do more active workouts. · Manage your weight.  Being at a healthy weight will not likely change your aortic aneurysm, but it may lower the chance of complications if you ever need surgery. · Do not smoke or allow others to smoke around you. Smoking can make your condition worse. If you need help quitting, talk to your doctor about stop-smoking programs and medicines. These can increase your chances of quitting for good. When should you call for help? Call 911 anytime you think you may need emergency care. For example, call if:  · You have severe pain in your belly, back, or chest.  · You passed out (lost consciousness). · You have severe trouble breathing. Call your doctor now or seek immediate medical care if:  · You are dizzy or lightheaded, or you feel like you may faint. · One or both feet change color, are painful, feel cool, or burn or tingle. Watch closely for changes in your health, and be sure to contact your doctor if you have any problems. Where can you learn more? Go to http://jeannine-kain.info/. Enter W053 in the search box to learn more about \"Abdominal Aortic Aneurysm: Care Instructions. \"  Current as of: July 21, 2016  Content Version: 11.2  © 5835-2511 [employer]. Care instructions adapted under license by [employer] (which disclaims liability or warranty for this information). If you have questions about a medical condition or this instruction, always ask your healthcare professional. [address] any warranty or liability for your use of this information.

## 2017-06-05 ENCOUNTER — TELEPHONE (OUTPATIENT)
Dept: FAMILY MEDICINE CLINIC | Age: 57
End: 2017-06-05

## 2017-06-05 NOTE — TELEPHONE ENCOUNTER
Spoke with pt's daughter Lyndon Lucero), she states that Mr. Devon Wylie is already taking up to 4 benadryl per night trying to sleep and he has tried Melatonin in the past. She states he is scared to go to sleep because he is scared he will die in his sleep. She would like for us to relay this message to Cheikh Vásquez with the understanding that she will not get the message until tomorrow. Will call her back once Juju responds.  She can be reached at [phone]

## 2017-06-05 NOTE — TELEPHONE ENCOUNTER
Daughter called stating patient just found out that his AAA has gotten larger. This has caused patient increased anxiety and is now having a hard time sleeping at night. Daughter called asking if there is anything that can be called in to the pharmacy for him? He has an appointment with the vascular surgeon on Thursday. She is aware that Lore Pandey is out of the office today and the message will be sent to another provider for review.

## 2017-06-06 ENCOUNTER — TELEPHONE (OUTPATIENT)
Dept: FAMILY MEDICINE CLINIC | Age: 57
End: 2017-06-06

## 2017-06-06 DIAGNOSIS — G47.9 SLEEPING DIFFICULTY: Primary | ICD-10-CM

## 2017-06-06 NOTE — TELEPHONE ENCOUNTER
Will send in a prescription for Belsomra for sleep. Only take as prescribed since he is already taking Opioids for pain. Do not take benadryl in addition to this.

## 2017-06-08 ENCOUNTER — HOSPITAL ENCOUNTER (OUTPATIENT)
Dept: CT IMAGING | Age: 57
Discharge: HOME | End: 2017-06-08
Attending: PHYSICIAN ASSISTANT
Payer: COMMERCIAL

## 2017-06-08 ENCOUNTER — OFFICE VISIT (OUTPATIENT)
Dept: VASCULAR SURGERY | Age: 57
End: 2017-06-08

## 2017-06-08 VITALS
WEIGHT: 180 LBS | DIASTOLIC BLOOD PRESSURE: 70 MMHG | RESPIRATION RATE: 22 BRPM | HEART RATE: 86 BPM | SYSTOLIC BLOOD PRESSURE: 122 MMHG | BODY MASS INDEX: 27.28 KG/M2 | HEIGHT: 68 IN

## 2017-06-08 DIAGNOSIS — F17.200 NICOTINE USE DISORDER: ICD-10-CM

## 2017-06-08 DIAGNOSIS — Z79.01 CURRENT USE OF ANTICOAGULANT THERAPY: ICD-10-CM

## 2017-06-08 DIAGNOSIS — I67.1 SACCULAR ANEURYSM: ICD-10-CM

## 2017-06-08 DIAGNOSIS — I71.40 AAA (ABDOMINAL AORTIC ANEURYSM) WITHOUT RUPTURE: Primary | ICD-10-CM

## 2017-06-08 DIAGNOSIS — I48.20 CHRONIC ATRIAL FIBRILLATION (HCC): ICD-10-CM

## 2017-06-08 DIAGNOSIS — Z82.49 FAMILY HISTORY OF ABDOMINAL AORTIC ANEURYSM: ICD-10-CM

## 2017-06-08 DIAGNOSIS — I71.40 AAA (ABDOMINAL AORTIC ANEURYSM) WITHOUT RUPTURE: ICD-10-CM

## 2017-06-08 PROCEDURE — 74011636320 HC RX REV CODE- 636/320: Performed by: PHYSICIAN ASSISTANT

## 2017-06-08 PROCEDURE — 75635 CT ANGIO ABDOMINAL ARTERIES: CPT

## 2017-06-08 RX ADMIN — IOPAMIDOL 100 ML: 755 INJECTION, SOLUTION INTRAVENOUS at 17:00

## 2017-06-08 NOTE — PROGRESS NOTES
Fili Daniels    Chief Complaint   Patient presents with    New Patient    Abdominal Aortic Aneurysm       History and Physical    Mr Karen Brown is referred by his PCP for evaluation of AAA  He just moved here from [place] about 4 weeks ago and established care here  He had known AAA. It had last been studied in [place] about 18 months ago and was told it was about 3.1 cm. He was not familiar with terms to know if it was saccular or fusiform  Recent ultrasound is characterizing saccular 4.2 cm aneurysm  He has been very anxious hearing of this and has not slept in days  His worry also stems from 5 close relatives who  of ruptured aneurysms, including his father in his early 45s and a brother in his early 46s    Past Medical History:   Diagnosis Date    [facility] [address]     Chronic pain     Degenerative disc disease, cervical     Degenerative disc disease, lumbar     Hypertension     Hypokalemia     Pneumonia 2016    caused permanent L lung problems    Thyroid disease      Patient Active Problem List   Diagnosis Code    Atrial fibrillation ([facility] [address] I48.91    Osteoarthritis of multiple joints M15.9    Chronic pain G89.29    Hyperthyroidism E05.90    History of abdominal aortic aneurysm (AAA) Z86.79    Family history of systemic lupus erythematosus (SLE) in mother Z80.2    History of drug use ([facility] [address] Z87.898    H/O alcohol abuse Z87.898    Nicotine use disorder F17.200    Depression F32.9    Essential hypertension I10    FH: CAD (coronary artery disease) Z82.49     Past Surgical History:   Procedure Laterality Date    HX ORTHOPAEDIC      neck fracture    HX ORTHOPAEDIC      lumbar fusion     Current Outpatient Prescriptions   Medication Sig Dispense Refill    suvorexant (BELSOMRA) 10 mg tablet Take on 30 minutes prior to bedtime as needed for insomnia. 10 Tab 0    bacitracin (BACITRACIN) ointment Apply  to affected area three (3) times daily.  15 g 0    ergocalciferol (ERGOCALCIFEROL) 50,000 unit capsule Take 1 Cap by mouth every seven (7) days. 12 Cap 0    oxyCODONE IR (ROXICODONE) 10 mg tab immediate release tablet Take 1 Tab by mouth every six (6) hours as needed. Max Daily Amount: 40 mg. 120 Tab 0    simvastatin (ZOCOR) 40 mg tablet Take 1 Tab by mouth nightly. 30 Tab 2    oxyCODONE ER (XTAMPZA ER) 9 mg capsule Take 1 Cap by mouth every twelve (12) hours. Max Daily Amount: 18 mg. Indications: Chronic Pain 60 Cap 0    nystatin (MYCOSTATIN) topical cream Apply  to affected area two (2) times a day. 15 g 0    budesonide-formoterol (SYMBICORT) 160-4.5 mcg/actuation HFA inhaler Take 2 Puffs by inhalation two (2) times a day. 1 Inhaler 2    DULoxetine (CYMBALTA) 30 mg capsule Take 30 mg by mouth two (2) times a day.  methIMAzole (TAPAZOLE) 5 mg tablet Take 2.5 mg by mouth every three (3) days.  atenolol (TENORMIN) 100 mg tablet Take 100 mg by mouth two (2) times a day.  gabapentin (NEURONTIN) 300 mg capsule Take 300 mg by mouth two (2) times a day.  dilTIAZem CD (CARTIA XT) 180 mg ER capsule Take  by mouth two (2) times a day.  saw palmetto fruit 450 mg cap Take  by mouth.  b complex vitamins tablet Take 1 Tab by mouth daily.  multivitamin, tx-iron-ca-min (THERA-M W/ IRON) 9 mg iron-400 mcg tab tablet Take 1 Tab by mouth daily.  albuterol (PROAIR HFA) 90 mcg/actuation inhaler Take  by inhalation.  rivaroxaban (XARELTO) 20 mg tab tablet Take 1 Tab by mouth daily (with breakfast). 30 Tab 11     No Known Allergies  Social History     Social History    Marital status:      Spouse name: N/A    Number of children: N/A    Years of education: N/A     Occupational History    Not on file.      Social History Main Topics    Smoking status: Current Every Day Smoker     Packs/day: 1.00    Smokeless tobacco: Former User    Alcohol use No      Comment: sober x 5 years    Drug use: No      Comment: former drug use    Sexual activity: Not on file     Other Topics Concern    Not on file     Social History Narrative      Family History   Problem Relation Age of Onset    Heart Disease Father     Heart Disease Brother      AAA-  in his 42's    Heart Disease Paternal Aunt     Heart Disease Paternal Uncle     Heart Disease Paternal Grandmother     Lupus Mother        Review of Systems    Review of Systems - History obtained from the patient  General ROS: negative  Psychological ROS: positive for - anxiety  Ophthalmic ROS: negative  Respiratory ROS: positive for - wheezing  Cardiovascular ROS: negative  Gastrointestinal ROS: negative  Musculoskeletal ROS: chronic back problems  Neurological ROS: radiculopathy from chronic back problems  Dermatological ROS: negative  Vascular ROS: claudication vs pseudoclaudication? Physical Exam:    Visit Vitals    /70 (BP 1 Location: Left arm, BP Patient Position: Sitting)    Pulse 86    Resp 22    Ht 5' 8\" (1.727 m)    Wt 180 lb (81.6 kg)    BMI 27.37 kg/m2      General:  Alert, cooperative, no distress. Head:  Normocephalic, without obvious abnormality, atraumatic. Eyes:    Conjunctivae/corneas clear. Pupils equal, round, reactive to light. Extraocular movements intact. Neck:         No bruits, anterior neck scars from prior surgery   Lungs:   Expiratory wheezing   Heart:  Regular rate and rhythm, S1, S2 normal   Extremities: Extremities normal, atraumatic, no cyanosis or edema. Pulses: Diminished pulses   Skin: Skin color, texture, turgor normal. No rashes or lesions. Vascular studies:  IMPRESSION: 4.2 cm saccular distal aortic aneurysm.     Comment: Sonography of the retroperitoneum was performed.     The aorta harbors a 4 x 4.2 cm saccular distal infrarenal abdominal aortic  aneurysm. There is mural thrombus and plaquing within the wall. The iliac  arteries are patent. The inferior vena cava is patent.     The right kidney measures 10.7 and the left 11.1 cm in the craniocaudal  dimension.  The bladder measures 45.7 cc. The prostate is of normal size. Impression and Plan:  1. AAA (abdominal aortic aneurysm) without rupture ([address]    2. Saccular aneurysm    3. Nicotine use disorder    4. Family history of abdominal aortic aneurysm    5. Chronic atrial fibrillation (HCC)    6. Current use of anticoagulant therapy      Orders Placed This Encounter    CTA ABD ART W RUNOFF W WO CONT     Definitely concerned about described increase in size in 18 months  And, given family history, as well as described saccular aneurysm, we do need better imaging and will get stat CTA with runoff - I include the runoff to eval for any distal aneurysms, but also given described concern of claudication vs pseudoclaudication, can evaluate distal perfusion. He is a long term tobacco smoker, and so certainly has risks for PAD. But priority is the AAA. If criteria for endovascular repair, we could likely do this urgently. Will have him follow up with Dr Nancy Salas on Monday for surgical planning following the CTA  Literature provided with description of what endovascular repair is, along with brief discussion of post op recovery. Further details pending visit Monday     Of note, he does take xarelto for a fib  I suggested he take his last dose on Sunday in case able to go to surgery as soon as Tuesday    MANISHA Donovan    Portions of this note have been created using voice recognition software.

## 2017-06-08 NOTE — MR AVS SNAPSHOT
Visit Information Date & Time Provider Department Dept. Phone Encounter #  
 6/8/2017  3:00 PM MANISHA Nguyen [facility] [phone] Your Appointments 6/12/2017  1:00 PM  
Follow Up with Gay Camilo MD  
[address] [place] [address] and [facility]ascular Specialists [facility]) Appt Note: AAA DISCUSS PER WILLA  DOUBLE BOOK PER KARLY [address] [facility] [address] [facility]  
[phone] [address]  
  
    
 6/29/2017 11:00 AM  
ROUTINE CARE with Ruma Devries PA-C [address] [facility] Appt Note: Return in about 1 month (around 7/2/2017) for chronic pain, AAA, med eval.  
 [place] [address] [address] [address]  
[phone]  
  
   
 [facility] [address] [address] Upcoming Health Maintenance Date Due Hepatitis C Screening 1960 Pneumococcal 19-64 Medium Risk (1 of 1 - PPSV23) 3/1/1977 DTaP/Tdap/Td series (1 - Tdap) 3/1/1979 FOBT Q 1 YEAR AGE 50-75 3/1/2008 INFLUENZA AGE 9 TO ADULT 8/1/2017 Allergies as of 6/8/2017  Review Complete On: 6/8/2017 By: River Marcum LPN No Known Allergies Current Immunizations  Never Reviewed No immunizations on file. Not reviewed this visit You Were Diagnosed With   
  
 Codes Comments AAA (abdominal aortic aneurysm) without rupture (HCC)    -  Primary ICD-10-CM: I71.4 ICD-9-CM: 441.4 Saccular aneurysm     ICD-10-CM: I67.1 ICD-9-CM: 437.3 Vitals BP Pulse Resp Height(growth percentile) Weight(growth percentile) BMI  
 122/70 (BP 1 Location: Left arm, BP Patient Position: Sitting) 86 22 5' 8\" (1.727 m) 180 lb (81.6 kg) 27.37 kg/m2 Smoking Status Current Every Day Smoker Vitals History BMI and BSA Data  Body Mass Index Body Surface Area  
 27.37 kg/m 2 1.98 m 2  
  
  
 Preferred Pharmacy Pharmacy Name Phone [phone]. [phone] Your Updated Medication List  
  
   
This list is accurate as of: 6/8/17  3:29 PM.  Always use your most recent med list.  
  
  
  
  
 atenolol 100 mg tablet Commonly known as:  TENORMIN Take 100 mg by mouth two (2) times a day. b complex vitamins tablet Take 1 Tab by mouth daily. bacitracin ointment Commonly known as:  BACITRACIN Apply  to affected area three (3) times daily. budesonide-formoterol 160-4.5 mcg/actuation HFA inhaler Commonly known as:  SYMBICORT Take 2 Puffs by inhalation two (2) times a day. CARTIA  mg ER capsule Generic drug:  dilTIAZem CD Take  by mouth two (2) times a day. CYMBALTA 30 mg capsule Generic drug:  DULoxetine Take 30 mg by mouth two (2) times a day. ergocalciferol 50,000 unit capsule Commonly known as:  ERGOCALCIFEROL Take 1 Cap by mouth every seven (7) days. gabapentin 300 mg capsule Commonly known as:  NEURONTIN Take 300 mg by mouth two (2) times a day. multivitamin, tx-iron-ca-min 9 mg iron-400 mcg Tab tablet Commonly known as:  THERA-M w/ IRON Take 1 Tab by mouth daily. nystatin topical cream  
Commonly known as:  MYCOSTATIN Apply  to affected area two (2) times a day. oxyCODONE ER 9 mg capsule Commonly known as:  Loura Dandy ER Take 1 Cap by mouth every twelve (12) hours. Max Daily Amount: 18 mg. Indications: Chronic Pain  
  
 oxyCODONE IR 10 mg Tab immediate release tablet Commonly known as:  Thressa Michaela Take 1 Tab by mouth every six (6) hours as needed. Max Daily Amount: 40 mg. PROAIR HFA 90 mcg/actuation inhaler Generic drug:  albuterol Take  by inhalation. rivaroxaban 20 mg Tab tablet Commonly known as:  Daralyn Edison Take 1 Tab by mouth daily (with breakfast). saw palmetto fruit 450 mg Cap Take  by mouth. simvastatin 40 mg tablet Commonly known as:  ZOCOR Take 1 Tab by mouth nightly. suvorexant 10 mg tablet Commonly known as:  Lafradha Lack Take on 30 minutes prior to bedtime as needed for insomnia. TAPAZOLE 5 mg tablet Generic drug:  methIMAzole Take 2.5 mg by mouth every three (3) days. To-Do List   
 06/08/2017 Imaging:  CTA ABD ART W RUNOFF W WO CONT   
  
 06/08/2017 5:00 PM  
  Appointment with [facility] CT RM 1 at [facility] RAD CT ([phone]) DIET RESTRICTIONS  Nothing to eat or drink 4 hours prior to study May have water to take meds  GENERAL INSTRUCTIONS  If you were given medications to take for a contrast allergy prior to having this study, please arrange to have someone drive you to your appointment. If you have had a creatinine level drawn within the past 30 days, please bring most recent results to your appt. This study does not require you to drink contrast prior to your study. MEDICATIONS  Bring a complete list of all medications you are currently taking to include prescriptions, over-the-counter meds, herbals, vitamins & any dietary supplements. OUTSIDE FILMS  Bring outside films, CDs, and reports related to the study with you on the day of your exam.  QUESTIONS  Notify the CT Department if you have any questions concerning your study. Marino Butler 36 - [phone] [facility] - [phone] [facility]! Dear Radha Thompson: 
Thank you for requesting a fitmob account. Our records indicate that you already have an active fitmob account. You can access your account anytime at [email or web address] Did you know that you can access your hospital and ER discharge instructions at any time in fitmob? You can also review all of your test results from your hospital stay or ER visit. Additional Information If you have questions, please visit the Frequently Asked Questions section of the [employer] website at [email or web address] Remember, [employer] is NOT to be used for urgent needs. For medical emergencies, dial 911. Now available from your iPhone and Android! Please provide this summary of care documentation to your next provider. Your primary care clinician is listed as Tito Burch. If you have any questions after today's visit, please call [phone].

## 2017-06-12 ENCOUNTER — HOSPITAL ENCOUNTER (OUTPATIENT)
Dept: LAB | Age: 57
Discharge: HOME | End: 2017-06-12
Payer: COMMERCIAL

## 2017-06-12 ENCOUNTER — ANESTHESIA EVENT (OUTPATIENT)
Dept: CARDIOTHORACIC SURGERY | Age: 57
DRG: 269 | End: 2017-06-12
Payer: COMMERCIAL

## 2017-06-12 ENCOUNTER — OFFICE VISIT (OUTPATIENT)
Dept: VASCULAR SURGERY | Age: 57
End: 2017-06-12

## 2017-06-12 ENCOUNTER — HOSPITAL ENCOUNTER (OUTPATIENT)
Dept: GENERAL RADIOLOGY | Age: 57
Discharge: HOME | End: 2017-06-12
Payer: COMMERCIAL

## 2017-06-12 ENCOUNTER — TELEPHONE (OUTPATIENT)
Dept: FAMILY MEDICINE CLINIC | Age: 57
End: 2017-06-12

## 2017-06-12 VITALS
BODY MASS INDEX: 27.28 KG/M2 | SYSTOLIC BLOOD PRESSURE: 124 MMHG | HEIGHT: 68 IN | HEART RATE: 88 BPM | WEIGHT: 180 LBS | DIASTOLIC BLOOD PRESSURE: 70 MMHG | RESPIRATION RATE: 18 BRPM

## 2017-06-12 DIAGNOSIS — I71.40 AAA (ABDOMINAL AORTIC ANEURYSM) WITHOUT RUPTURE: Primary | ICD-10-CM

## 2017-06-12 DIAGNOSIS — I71.40 AAA (ABDOMINAL AORTIC ANEURYSM) WITHOUT RUPTURE: ICD-10-CM

## 2017-06-12 DIAGNOSIS — I48.19 PERSISTENT ATRIAL FIBRILLATION (HCC): ICD-10-CM

## 2017-06-12 LAB
ANION GAP BLD CALC-SCNC: 5 MMOL/L (ref 3–18)
BUN SERPL-MCNC: 18 MG/DL (ref 7–18)
BUN/CREAT SERPL: 14 (ref 12–20)
CALCIUM SERPL-MCNC: 9.3 MG/DL (ref 8.5–10.1)
CHLORIDE SERPL-SCNC: 104 MMOL/L (ref 100–108)
CO2 SERPL-SCNC: 32 MMOL/L (ref 21–32)
CREAT SERPL-MCNC: 1.3 MG/DL (ref 0.6–1.3)
ERYTHROCYTE [DISTWIDTH] IN BLOOD BY AUTOMATED COUNT: 12.5 % (ref 11.6–14.5)
GLUCOSE SERPL-MCNC: 90 MG/DL (ref 74–99)
HCT VFR BLD AUTO: 43.2 % (ref 36–48)
HGB BLD-MCNC: 14.6 G/DL (ref 13–16)
MCH RBC QN AUTO: 31.1 PG (ref 24–34)
MCHC RBC AUTO-ENTMCNC: 33.8 G/DL (ref 31–37)
MCV RBC AUTO: 92.1 FL (ref 74–97)
PLATELET # BLD AUTO: 178 K/UL (ref 135–420)
PMV BLD AUTO: 12.2 FL (ref 9.2–11.8)
POTASSIUM SERPL-SCNC: 4.3 MMOL/L (ref 3.5–5.5)
RBC # BLD AUTO: 4.69 M/UL (ref 4.7–5.5)
SODIUM SERPL-SCNC: 141 MMOL/L (ref 136–145)
WBC # BLD AUTO: 9.4 K/UL (ref 4.6–13.2)

## 2017-06-12 PROCEDURE — 80048 BASIC METABOLIC PNL TOTAL CA: CPT | Performed by: SURGERY

## 2017-06-12 PROCEDURE — 71020 XR CHEST PA LAT: CPT

## 2017-06-12 PROCEDURE — 36415 COLL VENOUS BLD VENIPUNCTURE: CPT | Performed by: SURGERY

## 2017-06-12 PROCEDURE — 85027 COMPLETE CBC AUTOMATED: CPT | Performed by: SURGERY

## 2017-06-12 NOTE — TELEPHONE ENCOUNTER
Called daughter back. She states her dad had an appointment with [facility] on Thursday and was told he needs a stint ASAP. He has a meeting with Dr. Carrie Clark today and will have surgery either today or tomorrow. Daughter just wanted us to know.

## 2017-06-12 NOTE — PROGRESS NOTES
Cathryne Check    Chief Complaint   Patient presents with    Abdominal Aortic Aneurysm       History and Physical    Cathryne Check is a 59 y.o. male who is found to have a 4.6 cm abdominal aortic aneurysm. He has had 5 relatives of his brothers uncles and father that have  from either abdominal aortic aneurysm or brain aneurysm 3 AAA's 2 brain aneurysms. All passed away before 60. He does have occasional sharp abdominal pain which is increased in frequency over the past year. No back pain that is not chronic. Patient does have bilateral foot pain but this is more electric in nature and seems to be more from his numerous back surgeries. He also has atrial fibrillation which is chronic takes Xarelto for this but he stopped that for the past 48 hours. In hopes of getting immediate surgery. No fevers or chills no rest pain. Past Medical History:   Diagnosis Date    Alcoholism ([facility] [address]     Chronic pain     Degenerative disc disease, cervical     Degenerative disc disease, lumbar     Hypertension     Hypokalemia     Pneumonia 2016    caused permanent L lung problems    Thyroid disease      Past Surgical History:   Procedure Laterality Date    HX ORTHOPAEDIC      neck fracture    HX ORTHOPAEDIC      lumbar fusion     Patient Active Problem List   Diagnosis Code    Atrial fibrillation (HCC) I48.91    Osteoarthritis of multiple joints M15.9    Chronic pain G89.29    Hyperthyroidism E05.90    History of abdominal aortic aneurysm (AAA) Z86.79    Family history of systemic lupus erythematosus (SLE) in mother Z80.2    History of drug use ([facility] [address] Z87.898    H/O alcohol abuse Z87.898    Nicotine use disorder F17.200    Depression F32.9    Essential hypertension I10    FH: CAD (coronary artery disease) Z82.49     Current Outpatient Prescriptions   Medication Sig Dispense Refill    suvorexant (BELSOMRA) 10 mg tablet Take on 30 minutes prior to bedtime as needed for insomnia.  10 Tab 0    bacitracin (BACITRACIN) ointment Apply  to affected area three (3) times daily. 15 g 0    ergocalciferol (ERGOCALCIFEROL) 50,000 unit capsule Take 1 Cap by mouth every seven (7) days. 12 Cap 0    oxyCODONE IR (ROXICODONE) 10 mg tab immediate release tablet Take 1 Tab by mouth every six (6) hours as needed. Max Daily Amount: 40 mg. 120 Tab 0    simvastatin (ZOCOR) 40 mg tablet Take 1 Tab by mouth nightly. 30 Tab 2    oxyCODONE ER (XTAMPZA ER) 9 mg capsule Take 1 Cap by mouth every twelve (12) hours. Max Daily Amount: 18 mg. Indications: Chronic Pain 60 Cap 0    nystatin (MYCOSTATIN) topical cream Apply  to affected area two (2) times a day. 15 g 0    budesonide-formoterol (SYMBICORT) 160-4.5 mcg/actuation HFA inhaler Take 2 Puffs by inhalation two (2) times a day. 1 Inhaler 2    DULoxetine (CYMBALTA) 30 mg capsule Take 30 mg by mouth two (2) times a day.  methIMAzole (TAPAZOLE) 5 mg tablet Take 2.5 mg by mouth every three (3) days.  atenolol (TENORMIN) 100 mg tablet Take 100 mg by mouth two (2) times a day.  gabapentin (NEURONTIN) 300 mg capsule Take 300 mg by mouth two (2) times a day.  dilTIAZem CD (CARTIA XT) 180 mg ER capsule Take  by mouth two (2) times a day.  saw palmetto fruit 450 mg cap Take  by mouth.  b complex vitamins tablet Take 1 Tab by mouth daily.  multivitamin, tx-iron-ca-min (THERA-M W/ IRON) 9 mg iron-400 mcg tab tablet Take 1 Tab by mouth daily.  albuterol (PROAIR HFA) 90 mcg/actuation inhaler Take  by inhalation.  rivaroxaban (XARELTO) 20 mg tab tablet Take 1 Tab by mouth daily (with breakfast). 30 Tab 11     No Known Allergies  Social History     Social History    Marital status:      Spouse name: N/A    Number of children: N/A    Years of education: N/A     Occupational History    Not on file.      Social History Main Topics    Smoking status: Current Every Day Smoker     Packs/day: 1.00    Smokeless tobacco: Former User    Alcohol use No      Comment: sober x 5 years    Drug use: No      Comment: former drug use    Sexual activity: Not on file     Other Topics Concern    Not on file     Social History Narrative      Family History   Problem Relation Age of Onset    Heart Disease Father     Heart Disease Brother      AAA-  in his 42's    Heart Disease Paternal Aunt     Heart Disease Paternal Uncle     Heart Disease Paternal Grandmother     Lupus Mother        Physical Exam:    Visit Vitals    /70 (BP 1 Location: Left arm, BP Patient Position: Sitting)    Pulse 88    Resp 18    Ht 5' 8\" (1.727 m)    Wt 180 lb (81.6 kg)    BMI 27.37 kg/m2      General: Well-appearing male in no acute distress  HEENT: EOMI no scleral icterus is noted  Pulmonary: No increased work of breathing is noted  Extremities: Warm and well-perfused bilaterally 2+ femoral pulses bilaterally  Neuro: Cranial nerves II through XII grossly intact    Impression and Plan:  Tracie Ling is a 59 y.o. male with abdominal aortic aneurysm which is 4.6 cm in size is normal aorta is 1.7 cm in size he is more than double his normal aortic size. Given this we have recommended repair. Patient is very adamant about repair especially given his extensive family history of rupture. We go over all the risks and benefits of the surgery including but not limited to bleeding infection damage to adjacent structures MI stroke and death as well as leg dysfunction and kidney dysfunction. I do believe that we can perform this percutaneously we talked about hospital stay as well as follow-up and need for further surgery given the endovascular repair. He wants this done as soon as possible as he does have trouble with sleeping and a lot of anxiety especially given all of the people in his family that have  from aneurysms. We will put him on the OR schedule for tomorrow. We reviewed the plan with the patient and the patient understands.   We also gave the patient appropriate instructions on their disease process and when to call back. Follow-up Disposition: Not on [address] [facility], [place]    PLEASE NOTE:  This document has been produced using voice recognition software. Unrecognized errors in transcription may be present.

## 2017-06-12 NOTE — MR AVS SNAPSHOT
Visit Information Date & Time Provider Department Dept. Phone Encounter #  
 6/12/2017  1:00 PM Marichuy Frank, [facility] [phone] [id number] Your Appointments 6/26/2017 11:45 AM  
HOSPITAL DISCHARGE with Marichuy Frank MD  
[facility] Appt Note: DENTON Abraham [address], [address]  
[phone] [address] [place] [address]  
  
    
 6/29/2017 11:00 AM  
ROUTINE CARE with Minna Hallman PA-C [address] [facility] ([facility]) Appt Note: Return in about 1 month (around 7/2/2017) for chronic pain, AAA, med eval.  
 [facility] [address] [address]  
[phone]  
  
   
 [facility] [address] [address] Upcoming Health Maintenance Date Due Hepatitis C Screening 1960 Pneumococcal 19-64 Medium Risk (1 of 1 - PPSV23) 3/1/1977 DTaP/Tdap/Td series (1 - Tdap) 3/1/1979 FOBT Q 1 YEAR AGE 50-75 3/1/2008 INFLUENZA AGE 9 TO ADULT 8/1/2017 Allergies as of 6/12/2017  Review Complete On: 6/12/2017 By: Melvin Palomo RN No Known Allergies Current Immunizations  Never Reviewed No immunizations on file. Not reviewed this visit You Were Diagnosed With   
  
 Codes Comments AAA (abdominal aortic aneurysm) without rupture (HCC)    -  Primary ICD-10-CM: I71.4 ICD-9-CM: 610. 4 Vitals BP Pulse Resp Height(growth percentile) Weight(growth percentile) BMI  
 124/70 (BP 1 Location: Left arm, BP Patient Position: Sitting) 88 18 5' 8\" (1.727 m) 180 lb (81.6 kg) 27.37 kg/m2 Smoking Status Current Every Day Smoker BMI and BSA Data Body Mass Index Body Surface Area  
 27.37 kg/m 2 1.98 m 2 Preferred Pharmacy Pharmacy Name Phone [facility] [address] [facility]. [phone] Your Updated Medication List  
  
   
This list is accurate as of: 6/12/17  1:55 PM.  Always use your most recent med list.  
  
  
  
  
 atenolol 100 mg tablet Commonly known as:  TENORMIN Take 100 mg by mouth two (2) times a day. b complex vitamins tablet Take 1 Tab by mouth daily. bacitracin ointment Commonly known as:  BACITRACIN Apply  to affected area three (3) times daily. budesonide-formoterol 160-4.5 mcg/actuation HFA inhaler Commonly known as:  SYMBICORT Take 2 Puffs by inhalation two (2) times a day. CARTIA  mg ER capsule Generic drug:  dilTIAZem CD Take  by mouth two (2) times a day. CYMBALTA 30 mg capsule Generic drug:  DULoxetine Take 30 mg by mouth two (2) times a day. ergocalciferol 50,000 unit capsule Commonly known as:  ERGOCALCIFEROL Take 1 Cap by mouth every seven (7) days. gabapentin 300 mg capsule Commonly known as:  NEURONTIN Take 300 mg by mouth two (2) times a day. multivitamin, tx-iron-ca-min 9 mg iron-400 mcg Tab tablet Commonly known as:  THERA-M w/ IRON Take 1 Tab by mouth daily. nystatin topical cream  
Commonly known as:  MYCOSTATIN Apply  to affected area two (2) times a day. oxyCODONE ER 9 mg capsule Commonly known as:  Nicolasa Alert ER Take 1 Cap by mouth every twelve (12) hours. Max Daily Amount: 18 mg. Indications: Chronic Pain  
  
 oxyCODONE IR 10 mg Tab immediate release tablet Commonly known as:  Michaell Lunch Take 1 Tab by mouth every six (6) hours as needed. Max Daily Amount: 40 mg. PROAIR HFA 90 mcg/actuation inhaler Generic drug:  albuterol Take  by inhalation. rivaroxaban 20 mg Tab tablet Commonly known as:  Charo First Take 1 Tab by mouth daily (with breakfast). saw palmetto fruit 450 mg Cap Take  by mouth. simvastatin 40 mg tablet Commonly known as:  ZOCOR Take 1 Tab by mouth nightly. suvorexant 10 mg tablet Commonly known as:  Sarahharish Elisabeth Take on 30 minutes prior to bedtime as needed for insomnia. TAPAZOLE 5 mg tablet Generic drug:  methIMAzole Take 2.5 mg by mouth every three (3) days. To-Do List   
 Around 07/12/2017 Lab:  CBC W/O DIFF Around 07/12/2017 Lab:  METABOLIC PANEL, BASIC Around 07/12/2017 Imaging:  XR CHEST PA LAT Introducing hospitals & [facility] SERVICES! Dear Abhishek Donnelly: 
Thank you for requesting a [employer] account. Our records indicate that you already have an active [employer] account. You can access your account anytime at [email or web address] Did you know that you can access your hospital and ER discharge instructions at any time in [employer]? You can also review all of your test results from your hospital stay or ER visit. Additional Information If you have questions, please visit the Frequently Asked Questions section of the [employer] website at [email or web address] Remember, [employer] is NOT to be used for urgent needs. For medical emergencies, dial 911. Now available from your iPhone and Android! Please provide this summary of care documentation to your next provider. Your primary care clinician is listed as Keegan Moyer. If you have any questions after today's visit, please call [phone].

## 2017-06-13 ENCOUNTER — HOSPITAL ENCOUNTER (INPATIENT)
Age: 57
LOS: 1 days | Discharge: HOME | DRG: 269 | End: 2017-06-14
Attending: SURGERY | Admitting: SURGERY
Payer: COMMERCIAL

## 2017-06-13 ENCOUNTER — ANESTHESIA (OUTPATIENT)
Dept: CARDIOTHORACIC SURGERY | Age: 57
DRG: 269 | End: 2017-06-13
Payer: COMMERCIAL

## 2017-06-13 ENCOUNTER — APPOINTMENT (OUTPATIENT)
Dept: GENERAL RADIOLOGY | Age: 57
DRG: 269 | End: 2017-06-13
Attending: SURGERY
Payer: COMMERCIAL

## 2017-06-13 PROBLEM — I71.40 AAA (ABDOMINAL AORTIC ANEURYSM) WITHOUT RUPTURE: Status: ACTIVE | Noted: 2017-06-13

## 2017-06-13 LAB
ABO + RH BLD: NORMAL
BLOOD GROUP ANTIBODIES SERPL: NORMAL
SPECIMEN EXP DATE BLD: NORMAL

## 2017-06-13 PROCEDURE — C1894 INTRO/SHEATH, NON-LASER: HCPCS | Performed by: SURGERY

## 2017-06-13 PROCEDURE — C1760 CLOSURE DEV, VASC: HCPCS | Performed by: SURGERY

## 2017-06-13 PROCEDURE — 74011250636 HC RX REV CODE- 250/636: Performed by: NURSE ANESTHETIST, CERTIFIED REGISTERED

## 2017-06-13 PROCEDURE — 75953 XR ENDOVASCULAR PLACMNT AAA: CPT

## 2017-06-13 PROCEDURE — 77030003390 HC NDL ANGI MRTM -A: Performed by: SURGERY

## 2017-06-13 PROCEDURE — 77030010509 HC AIRWY LMA MSK TELE -A: Performed by: ANESTHESIOLOGY

## 2017-06-13 PROCEDURE — 77030002986 HC SUT PROL J&J -A: Performed by: SURGERY

## 2017-06-13 PROCEDURE — 04VC3DZ RESTRICTION OF RIGHT COMMON ILIAC ARTERY WITH INTRALUMINAL DEVICE, PERCUTANEOUS APPROACH: ICD-10-PCS | Performed by: SURGERY

## 2017-06-13 PROCEDURE — 77030018719 HC DRSG PTCH ANTIMIC J&J -A: Performed by: ANESTHESIOLOGY

## 2017-06-13 PROCEDURE — C1725 CATH, TRANSLUMIN NON-LASER: HCPCS | Performed by: SURGERY

## 2017-06-13 PROCEDURE — 77030019896 HC KT ARTERIAL LN TELE -B: Performed by: SURGERY

## 2017-06-13 PROCEDURE — C1753 CATH, INTRAVAS ULTRASOUND: HCPCS | Performed by: SURGERY

## 2017-06-13 PROCEDURE — C1769 GUIDE WIRE: HCPCS | Performed by: SURGERY

## 2017-06-13 PROCEDURE — 74011636320 HC RX REV CODE- 636/320: Performed by: SURGERY

## 2017-06-13 PROCEDURE — C1892 INTRO/SHEATH,FIXED,PEEL-AWAY: HCPCS | Performed by: SURGERY

## 2017-06-13 PROCEDURE — 77030011640 HC PAD GRND REM COVD -A: Performed by: SURGERY

## 2017-06-13 PROCEDURE — 77030008544 HC TBNG MON PRSS MRTM -B: Performed by: ANESTHESIOLOGY

## 2017-06-13 PROCEDURE — 77030034103 HC GRFT ENDVAS EXT XCLDR WLGO -I3: Performed by: SURGERY

## 2017-06-13 PROCEDURE — 74011000250 HC RX REV CODE- 250

## 2017-06-13 PROCEDURE — 77030002996 HC SUT SLK J&J -A: Performed by: SURGERY

## 2017-06-13 PROCEDURE — 03HY32Z INSERTION OF MONITORING DEVICE INTO UPPER ARTERY, PERCUTANEOUS APPROACH: ICD-10-PCS | Performed by: ANESTHESIOLOGY

## 2017-06-13 PROCEDURE — 77030005401 HC CATH RAD ARRO -A: Performed by: ANESTHESIOLOGY

## 2017-06-13 PROCEDURE — C1751 CATH, INF, PER/CENT/MIDLINE: HCPCS | Performed by: SURGERY

## 2017-06-13 PROCEDURE — 74011250637 HC RX REV CODE- 250/637: Performed by: SURGERY

## 2017-06-13 PROCEDURE — 77030008584 HC TOOL GDWRE DEV TERU -A: Performed by: SURGERY

## 2017-06-13 PROCEDURE — 74011250636 HC RX REV CODE- 250/636: Performed by: SURGERY

## 2017-06-13 PROCEDURE — 74011250636 HC RX REV CODE- 250/636

## 2017-06-13 PROCEDURE — B41D1ZZ FLUOROSCOPY OF AORTA AND BILATERAL LOWER EXTREMITY ARTERIES USING LOW OSMOLAR CONTRAST: ICD-10-PCS | Performed by: SURGERY

## 2017-06-13 PROCEDURE — 77030020782 HC GWN BAIR PAWS FLX 3M -B: Performed by: SURGERY

## 2017-06-13 PROCEDURE — 77030028837 HC SYR ANGI PWR INJ COEU -A: Performed by: SURGERY

## 2017-06-13 PROCEDURE — B440ZZ3 ULTRASONOGRAPHY OF ABDOMINAL AORTA, INTRAVASCULAR: ICD-10-PCS | Performed by: SURGERY

## 2017-06-13 PROCEDURE — 77030034850: Performed by: SURGERY

## 2017-06-13 PROCEDURE — 77030020268 HC MISC GENERAL SUPPLY: Performed by: SURGERY

## 2017-06-13 PROCEDURE — 77030013797 HC KT TRNSDUC PRSSR EDWD -A: Performed by: SURGERY

## 2017-06-13 PROCEDURE — B44HZZ3 ULTRASONOGRAPHY OF BILATERAL LOWER EXTREMITY ARTERIES, INTRAVASCULAR: ICD-10-PCS | Performed by: SURGERY

## 2017-06-13 PROCEDURE — C1768 GRAFT, VASCULAR: HCPCS | Performed by: SURGERY

## 2017-06-13 PROCEDURE — C1887 CATHETER, GUIDING: HCPCS | Performed by: SURGERY

## 2017-06-13 PROCEDURE — C1874 STENT, COATED/COV W/DEL SYS: HCPCS | Performed by: SURGERY

## 2017-06-13 PROCEDURE — 76010000197 HC CV SURG 1.5 TO 2 HR INTENSV-TIER 1: Performed by: SURGERY

## 2017-06-13 PROCEDURE — 77030004565 HC CATH ANGI DX TMPO CARD -B: Performed by: SURGERY

## 2017-06-13 PROCEDURE — 65620000000 HC RM CCU GENERAL

## 2017-06-13 PROCEDURE — 77030010514 HC APPL CLP LIG COVD -B: Performed by: SURGERY

## 2017-06-13 PROCEDURE — 04V03D6 RESTRICT ABD AORTA, BIFURC, W INTRALUM DEV, PERC: ICD-10-PCS | Performed by: SURGERY

## 2017-06-13 PROCEDURE — 77030018673: Performed by: SURGERY

## 2017-06-13 PROCEDURE — 74011250636 HC RX REV CODE- 250/636: Performed by: PHYSICIAN ASSISTANT

## 2017-06-13 PROCEDURE — 77030002933 HC SUT MCRYL J&J -A: Performed by: SURGERY

## 2017-06-13 PROCEDURE — 76060000034 HC ANESTHESIA 1.5 TO 2 HR: Performed by: SURGERY

## 2017-06-13 PROCEDURE — 74011250637 HC RX REV CODE- 250/637: Performed by: NURSE ANESTHETIST, CERTIFIED REGISTERED

## 2017-06-13 PROCEDURE — 77030018836 HC SOL IRR NACL ICUM -A: Performed by: SURGERY

## 2017-06-13 PROCEDURE — 86900 BLOOD TYPING SEROLOGIC ABO: CPT | Performed by: SURGERY

## 2017-06-13 DEVICE — GRAFT ENDOPROS L14CM DST DIA12MM CONTRALATERAL LEG W/ C3: Type: IMPLANTABLE DEVICE | Site: GROIN | Status: FUNCTIONAL

## 2017-06-13 DEVICE — IMPLANTABLE DEVICE: Type: IMPLANTABLE DEVICE | Site: ABDOMEN | Status: FUNCTIONAL

## 2017-06-13 DEVICE — IMPLANTABLE DEVICE: Type: IMPLANTABLE DEVICE | Site: GROIN | Status: FUNCTIONAL

## 2017-06-13 RX ORDER — CEFAZOLIN SODIUM 2 G/50ML
2 SOLUTION INTRAVENOUS EVERY 8 HOURS
Status: DISCONTINUED | OUTPATIENT
Start: 2017-06-13 | End: 2017-06-13

## 2017-06-13 RX ORDER — LIDOCAINE HYDROCHLORIDE 10 MG/ML
INJECTION, SOLUTION EPIDURAL; INFILTRATION; INTRACAUDAL; PERINEURAL
Status: DISCONTINUED
Start: 2017-06-13 | End: 2017-06-13

## 2017-06-13 RX ORDER — MIDAZOLAM HYDROCHLORIDE 1 MG/ML
INJECTION, SOLUTION INTRAMUSCULAR; INTRAVENOUS AS NEEDED
Status: DISCONTINUED | OUTPATIENT
Start: 2017-06-13 | End: 2017-06-13 | Stop reason: HOSPADM

## 2017-06-13 RX ORDER — SODIUM CHLORIDE 0.9 % (FLUSH) 0.9 %
5-10 SYRINGE (ML) INJECTION EVERY 8 HOURS
Status: DISCONTINUED | OUTPATIENT
Start: 2017-06-13 | End: 2017-06-14 | Stop reason: HOSPADM

## 2017-06-13 RX ORDER — IODIXANOL 320 MG/ML
INJECTION, SOLUTION INTRAVASCULAR
Status: DISCONTINUED
Start: 2017-06-13 | End: 2017-06-13

## 2017-06-13 RX ORDER — LIDOCAINE HYDROCHLORIDE 20 MG/ML
INJECTION, SOLUTION EPIDURAL; INFILTRATION; INTRACAUDAL; PERINEURAL AS NEEDED
Status: DISCONTINUED | OUTPATIENT
Start: 2017-06-13 | End: 2017-06-13 | Stop reason: HOSPADM

## 2017-06-13 RX ORDER — DILTIAZEM HYDROCHLORIDE 180 MG/1
180 CAPSULE, COATED, EXTENDED RELEASE ORAL 2 TIMES DAILY
Status: DISCONTINUED | OUTPATIENT
Start: 2017-06-13 | End: 2017-06-14 | Stop reason: HOSPADM

## 2017-06-13 RX ORDER — SODIUM CHLORIDE 0.9 % (FLUSH) 0.9 %
5-10 SYRINGE (ML) INJECTION AS NEEDED
Status: DISCONTINUED | OUTPATIENT
Start: 2017-06-13 | End: 2017-06-14 | Stop reason: HOSPADM

## 2017-06-13 RX ORDER — INSULIN LISPRO 100 [IU]/ML
INJECTION, SOLUTION INTRAVENOUS; SUBCUTANEOUS ONCE
Status: DISCONTINUED | OUTPATIENT
Start: 2017-06-13 | End: 2017-06-13 | Stop reason: HOSPADM

## 2017-06-13 RX ORDER — GABAPENTIN 300 MG/1
300 CAPSULE ORAL 2 TIMES DAILY
Status: DISCONTINUED | OUTPATIENT
Start: 2017-06-13 | End: 2017-06-14 | Stop reason: HOSPADM

## 2017-06-13 RX ORDER — SODIUM CHLORIDE, SODIUM LACTATE, POTASSIUM CHLORIDE, CALCIUM CHLORIDE 600; 310; 30; 20 MG/100ML; MG/100ML; MG/100ML; MG/100ML
INJECTION, SOLUTION INTRAVENOUS
Status: DISCONTINUED | OUTPATIENT
Start: 2017-06-13 | End: 2017-06-13 | Stop reason: HOSPADM

## 2017-06-13 RX ORDER — HEPARIN SODIUM 1000 [USP'U]/ML
INJECTION, SOLUTION INTRAVENOUS; SUBCUTANEOUS AS NEEDED
Status: DISCONTINUED | OUTPATIENT
Start: 2017-06-13 | End: 2017-06-13 | Stop reason: HOSPADM

## 2017-06-13 RX ORDER — OXYCODONE HYDROCHLORIDE 5 MG/1
10 TABLET ORAL
Status: DISCONTINUED | OUTPATIENT
Start: 2017-06-13 | End: 2017-06-14 | Stop reason: HOSPADM

## 2017-06-13 RX ORDER — ZOLPIDEM TARTRATE 5 MG/1
10 TABLET ORAL
Status: DISCONTINUED | OUTPATIENT
Start: 2017-06-13 | End: 2017-06-13

## 2017-06-13 RX ORDER — SODIUM CHLORIDE 9 MG/ML
INJECTION, SOLUTION INTRAVENOUS
Status: DISCONTINUED | OUTPATIENT
Start: 2017-06-13 | End: 2017-06-13 | Stop reason: HOSPADM

## 2017-06-13 RX ORDER — NALOXONE HYDROCHLORIDE 0.4 MG/ML
0.4 INJECTION, SOLUTION INTRAMUSCULAR; INTRAVENOUS; SUBCUTANEOUS AS NEEDED
Status: DISCONTINUED | OUTPATIENT
Start: 2017-06-13 | End: 2017-06-14 | Stop reason: HOSPADM

## 2017-06-13 RX ORDER — HEPARIN SODIUM 5000 [USP'U]/ML
5000 INJECTION, SOLUTION INTRAVENOUS; SUBCUTANEOUS EVERY 8 HOURS
Status: COMPLETED | OUTPATIENT
Start: 2017-06-13 | End: 2017-06-14

## 2017-06-13 RX ORDER — LIDOCAINE HYDROCHLORIDE 10 MG/ML
0.1 INJECTION, SOLUTION EPIDURAL; INFILTRATION; INTRACAUDAL; PERINEURAL AS NEEDED
Status: DISCONTINUED | OUTPATIENT
Start: 2017-06-13 | End: 2017-06-13 | Stop reason: HOSPADM

## 2017-06-13 RX ORDER — KETOROLAC TROMETHAMINE 30 MG/ML
INJECTION, SOLUTION INTRAMUSCULAR; INTRAVENOUS AS NEEDED
Status: DISCONTINUED | OUTPATIENT
Start: 2017-06-13 | End: 2017-06-13 | Stop reason: HOSPADM

## 2017-06-13 RX ORDER — ATENOLOL 50 MG/1
100 TABLET ORAL 2 TIMES DAILY
Status: DISCONTINUED | OUTPATIENT
Start: 2017-06-13 | End: 2017-06-14 | Stop reason: HOSPADM

## 2017-06-13 RX ORDER — MORPHINE SULFATE 10 MG/ML
INJECTION, SOLUTION INTRAMUSCULAR; INTRAVENOUS AS NEEDED
Status: DISCONTINUED | OUTPATIENT
Start: 2017-06-13 | End: 2017-06-13 | Stop reason: HOSPADM

## 2017-06-13 RX ORDER — IBUPROFEN 200 MG
1 TABLET ORAL DAILY
Status: DISCONTINUED | OUTPATIENT
Start: 2017-06-14 | End: 2017-06-13

## 2017-06-13 RX ORDER — SODIUM CHLORIDE 0.9 % (FLUSH) 0.9 %
5-10 SYRINGE (ML) INJECTION EVERY 8 HOURS
Status: DISCONTINUED | OUTPATIENT
Start: 2017-06-13 | End: 2017-06-13 | Stop reason: SDUPTHER

## 2017-06-13 RX ORDER — IODIXANOL 320 MG/ML
INJECTION, SOLUTION INTRAVASCULAR AS NEEDED
Status: DISCONTINUED | OUTPATIENT
Start: 2017-06-13 | End: 2017-06-13 | Stop reason: HOSPADM

## 2017-06-13 RX ORDER — ONDANSETRON 2 MG/ML
INJECTION INTRAMUSCULAR; INTRAVENOUS AS NEEDED
Status: DISCONTINUED | OUTPATIENT
Start: 2017-06-13 | End: 2017-06-13 | Stop reason: HOSPADM

## 2017-06-13 RX ORDER — DULOXETIN HYDROCHLORIDE 30 MG/1
30 CAPSULE, DELAYED RELEASE ORAL 2 TIMES DAILY
Status: DISCONTINUED | OUTPATIENT
Start: 2017-06-13 | End: 2017-06-14 | Stop reason: HOSPADM

## 2017-06-13 RX ORDER — SODIUM CHLORIDE 0.9 % (FLUSH) 0.9 %
5-10 SYRINGE (ML) INJECTION EVERY 8 HOURS
Status: DISCONTINUED | OUTPATIENT
Start: 2017-06-13 | End: 2017-06-13 | Stop reason: HOSPADM

## 2017-06-13 RX ORDER — HEPARIN SODIUM 1000 [USP'U]/ML
INJECTION, SOLUTION INTRAVENOUS; SUBCUTANEOUS
Status: DISCONTINUED
Start: 2017-06-13 | End: 2017-06-13

## 2017-06-13 RX ORDER — SODIUM CHLORIDE 0.9 % (FLUSH) 0.9 %
5-10 SYRINGE (ML) INJECTION AS NEEDED
Status: DISCONTINUED | OUTPATIENT
Start: 2017-06-13 | End: 2017-06-13 | Stop reason: SDUPTHER

## 2017-06-13 RX ORDER — SODIUM CHLORIDE 0.9 % (FLUSH) 0.9 %
5-10 SYRINGE (ML) INJECTION AS NEEDED
Status: DISCONTINUED | OUTPATIENT
Start: 2017-06-13 | End: 2017-06-13 | Stop reason: HOSPADM

## 2017-06-13 RX ORDER — SODIUM CHLORIDE, SODIUM LACTATE, POTASSIUM CHLORIDE, CALCIUM CHLORIDE 600; 310; 30; 20 MG/100ML; MG/100ML; MG/100ML; MG/100ML
100 INJECTION, SOLUTION INTRAVENOUS CONTINUOUS
Status: DISCONTINUED | OUTPATIENT
Start: 2017-06-13 | End: 2017-06-14 | Stop reason: HOSPADM

## 2017-06-13 RX ORDER — HEPARIN SODIUM 200 [USP'U]/100ML
INJECTION, SOLUTION INTRAVENOUS
Status: DISCONTINUED
Start: 2017-06-13 | End: 2017-06-13

## 2017-06-13 RX ORDER — CEFAZOLIN SODIUM 2 G/50ML
2 SOLUTION INTRAVENOUS ONCE
Status: COMPLETED | OUTPATIENT
Start: 2017-06-13 | End: 2017-06-13

## 2017-06-13 RX ORDER — MORPHINE SULFATE 2 MG/ML
2 INJECTION, SOLUTION INTRAMUSCULAR; INTRAVENOUS
Status: DISCONTINUED | OUTPATIENT
Start: 2017-06-13 | End: 2017-06-13

## 2017-06-13 RX ORDER — SIMVASTATIN 40 MG/1
40 TABLET, FILM COATED ORAL
Status: DISCONTINUED | OUTPATIENT
Start: 2017-06-13 | End: 2017-06-14 | Stop reason: HOSPADM

## 2017-06-13 RX ORDER — SODIUM CHLORIDE, SODIUM LACTATE, POTASSIUM CHLORIDE, CALCIUM CHLORIDE 600; 310; 30; 20 MG/100ML; MG/100ML; MG/100ML; MG/100ML
75 INJECTION, SOLUTION INTRAVENOUS CONTINUOUS
Status: DISCONTINUED | OUTPATIENT
Start: 2017-06-13 | End: 2017-06-13 | Stop reason: HOSPADM

## 2017-06-13 RX ORDER — ACETAMINOPHEN 325 MG/1
650 TABLET ORAL
Status: DISCONTINUED | OUTPATIENT
Start: 2017-06-13 | End: 2017-06-14 | Stop reason: HOSPADM

## 2017-06-13 RX ORDER — ONDANSETRON 2 MG/ML
4 INJECTION INTRAMUSCULAR; INTRAVENOUS
Status: DISCONTINUED | OUTPATIENT
Start: 2017-06-13 | End: 2017-06-14 | Stop reason: HOSPADM

## 2017-06-13 RX ORDER — IBUPROFEN 200 MG
1 TABLET ORAL DAILY
Status: DISCONTINUED | OUTPATIENT
Start: 2017-06-13 | End: 2017-06-14 | Stop reason: HOSPADM

## 2017-06-13 RX ORDER — FAMOTIDINE 20 MG/1
20 TABLET, FILM COATED ORAL ONCE
Status: COMPLETED | OUTPATIENT
Start: 2017-06-13 | End: 2017-06-13

## 2017-06-13 RX ORDER — PROPOFOL 10 MG/ML
INJECTION, EMULSION INTRAVENOUS AS NEEDED
Status: DISCONTINUED | OUTPATIENT
Start: 2017-06-13 | End: 2017-06-13 | Stop reason: HOSPADM

## 2017-06-13 RX ORDER — HYDROMORPHONE HYDROCHLORIDE 1 MG/ML
1 INJECTION, SOLUTION INTRAMUSCULAR; INTRAVENOUS; SUBCUTANEOUS
Status: DISCONTINUED | OUTPATIENT
Start: 2017-06-13 | End: 2017-06-14 | Stop reason: HOSPADM

## 2017-06-13 RX ORDER — BUDESONIDE AND FORMOTEROL FUMARATE DIHYDRATE 160; 4.5 UG/1; UG/1
2 AEROSOL RESPIRATORY (INHALATION)
Status: DISCONTINUED | OUTPATIENT
Start: 2017-06-13 | End: 2017-06-14 | Stop reason: HOSPADM

## 2017-06-13 RX ADMIN — FAMOTIDINE 20 MG: 20 TABLET, FILM COATED ORAL at 11:05

## 2017-06-13 RX ADMIN — HEPARIN SODIUM 7000 UNITS: 1000 INJECTION, SOLUTION INTRAVENOUS; SUBCUTANEOUS at 12:46

## 2017-06-13 RX ADMIN — PROPOFOL 50 MG: 10 INJECTION, EMULSION INTRAVENOUS at 12:29

## 2017-06-13 RX ADMIN — MIDAZOLAM HYDROCHLORIDE 2 MG: 1 INJECTION, SOLUTION INTRAMUSCULAR; INTRAVENOUS at 12:04

## 2017-06-13 RX ADMIN — ATENOLOL 100 MG: 50 TABLET ORAL at 22:12

## 2017-06-13 RX ADMIN — SODIUM CHLORIDE, SODIUM LACTATE, POTASSIUM CHLORIDE, AND CALCIUM CHLORIDE 100 ML/HR: 600; 310; 30; 20 INJECTION, SOLUTION INTRAVENOUS at 14:16

## 2017-06-13 RX ADMIN — Medication 10 ML: at 14:16

## 2017-06-13 RX ADMIN — DILTIAZEM HYDROCHLORIDE 180 MG: 180 CAPSULE, COATED, EXTENDED RELEASE ORAL at 17:24

## 2017-06-13 RX ADMIN — HEPARIN SODIUM 5000 UNITS: 5000 INJECTION, SOLUTION INTRAVENOUS; SUBCUTANEOUS at 21:36

## 2017-06-13 RX ADMIN — HEPARIN SODIUM 5000 UNITS: 5000 INJECTION, SOLUTION INTRAVENOUS; SUBCUTANEOUS at 14:24

## 2017-06-13 RX ADMIN — Medication 10 ML: at 11:05

## 2017-06-13 RX ADMIN — ONDANSETRON 4 MG: 2 INJECTION INTRAMUSCULAR; INTRAVENOUS at 14:55

## 2017-06-13 RX ADMIN — KETOROLAC TROMETHAMINE 30 MG: 30 INJECTION, SOLUTION INTRAMUSCULAR; INTRAVENOUS at 13:55

## 2017-06-13 RX ADMIN — SIMVASTATIN 40 MG: 40 TABLET, FILM COATED ORAL at 21:39

## 2017-06-13 RX ADMIN — MORPHINE SULFATE 10 MG: 10 INJECTION, SOLUTION INTRAMUSCULAR; INTRAVENOUS at 12:04

## 2017-06-13 RX ADMIN — CEFAZOLIN SODIUM 2 G: 2 SOLUTION INTRAVENOUS at 12:23

## 2017-06-13 RX ADMIN — SODIUM CHLORIDE: 9 INJECTION, SOLUTION INTRAVENOUS at 12:08

## 2017-06-13 RX ADMIN — Medication 2 MG: at 14:24

## 2017-06-13 RX ADMIN — GABAPENTIN 300 MG: 300 CAPSULE ORAL at 17:25

## 2017-06-13 RX ADMIN — ONDANSETRON 4 MG: 2 INJECTION INTRAMUSCULAR; INTRAVENOUS at 13:55

## 2017-06-13 RX ADMIN — LIDOCAINE HYDROCHLORIDE 100 MG: 20 INJECTION, SOLUTION EPIDURAL; INFILTRATION; INTRACAUDAL; PERINEURAL at 12:08

## 2017-06-13 RX ADMIN — SODIUM CHLORIDE, SODIUM LACTATE, POTASSIUM CHLORIDE, CALCIUM CHLORIDE: 600; 310; 30; 20 INJECTION, SOLUTION INTRAVENOUS at 12:04

## 2017-06-13 RX ADMIN — SODIUM CHLORIDE, SODIUM LACTATE, POTASSIUM CHLORIDE, AND CALCIUM CHLORIDE 75 ML/HR: 600; 310; 30; 20 INJECTION, SOLUTION INTRAVENOUS at 11:06

## 2017-06-13 RX ADMIN — SODIUM CHLORIDE, SODIUM LACTATE, POTASSIUM CHLORIDE, AND CALCIUM CHLORIDE 100 ML/HR: 600; 310; 30; 20 INJECTION, SOLUTION INTRAVENOUS at 17:22

## 2017-06-13 RX ADMIN — PROPOFOL 100 MG: 10 INJECTION, EMULSION INTRAVENOUS at 12:08

## 2017-06-13 RX ADMIN — DULOXETINE HYDROCHLORIDE 30 MG: 30 CAPSULE, DELAYED RELEASE ORAL at 17:25

## 2017-06-13 NOTE — ANESTHESIA PROCEDURE NOTES
Arterial Line Placement    Start time: 6/13/2017 12:15 PM  End time: 6/13/2017 12:21 PM  Performed by: Shaq Gaston  Authorized by: Shaq Gaston     Pre-Procedure  Indications:  Arterial pressure monitoring and blood sampling  Preanesthetic Checklist: patient identified, risks and benefits discussed, anesthesia consent, site marked, patient being monitored, timeout performed and patient being monitored      Procedure:   Prep:  Alcohol and chlorhexidine  Seldinger Technique?: Yes    Orientation:  Right  Location:  Radial artery  Catheter size:  20 G  Number of attempts:  1  Cont Cardiac Output Sensor: No      Assessment:   Post-procedure:  Line secured and sterile dressing applied  Patient Tolerance:  Patient tolerated the procedure well with no immediate complications  Comment:   Arterial Line Procedure Note    Patient: Cathmarvne Check MRN: [id number]  SSN: [id number]   YOB: 1960  Age: 59 y.o. Sex: male   Cardiovascular Function/Vital Signs  [unfilled]  Referring physician:   Cyndie Aguilar MD  Indication:Hemodynamic monitoring    Risks, benefits, alternatives explained and patient agrees to proceed. left wrist placed on armboard. Time out performed, correct patient, side, site, and procedure verified. 7-Step Sterility Protocol followed. (cap, mask sterile gown, sterile gloves, large sterile sheet, hand hygiene, 2% chlorhexidine for cutaneous antisepsis). Active live time ultrasound used with sterile technique, including sterile gel and sterile probe cover. Left radial artery cannulated x 1 attempt(s)with  20 gauge arrow cathter. Flash seen, wire advanced easily. Arterial cannulation confirmed with arterial waveform. Biopatch applied and secured with sterile Tegaderm.        Jearl Bloch, MD  6/13/2017  12:33 PM

## 2017-06-13 NOTE — IP AVS SNAPSHOT
[employer] 
 
 
 [address] [facility] [address] 
[phone] Patient: Shiloh Mckinney MRN: [id number] SPJ:[id number] You are allergic to the following No active allergies Recent Documentation Weight BMI Smoking Status 81.6 kg 27.37 kg/m2 Current Every Day Smoker Emergency Contacts Name Discharge Info Relation Home Work Mobile Demetrice Nicole  Spouse [3] [phone] About your hospitalization You were admitted on:  June 13, 2017 You last received care in the:  [address] [facility] You were discharged on:  June 14, 2017 Unit phone number:  [phone] Why you were hospitalized Your primary diagnosis was:  Not on File Your diagnoses also included:  Aaa (Abdominal Aortic Aneurysm) Without Rupture (Hcc) Providers Seen During Your Hospitalizations Provider Role Specialty Primary office phone Kathleen Lindsey MD Attending Provider Vascular Surgery [phone] Your Primary Care Physician (PCP) Primary Care Physician Office Phone Office Fax Radha Jarquin [phone] Follow-up Information Follow up With Details Comments Contact Info Vy Soni PA-C Go on 6/20/2017 @ 3:00 for follow up [facility] JIM 1 
[facility] [address] [address] [phone] Your Appointments Tuesday June 20, 2017  3:00 PM EDT TRANSITIONAL CARE MANAGEMENT with Vy Soni PA-C [address] [facility] ([address] [facility]) [facility] [address] [address]  
[phone] Monday June 26, 2017 11:45 AM EDT HOSPITAL DISCHARGE with MD [facility] [address] [place] [address]  
[phone] Thursday June 29, 2017 11:00 AM EDT ROUTINE CARE with Vy Soin PA-C  
 [address] [facility] ([address] [place] [address]) [facility] [address] [facility] [address] [address]  
[phone] Current Discharge Medication List  
  
CONTINUE these medications which have CHANGED Dose & Instructions Dispensing Information Comments Morning Noon Evening Bedtime * oxyCODONE IR 10 mg Tab immediate release tablet Commonly known as:  Robyn Romeo What changed:  Another medication with the same name was added. Make sure you understand how and when to take each. Your last dose was: Your next dose is:    
   
   
 Dose:  10 mg Take 1 Tab by mouth every six (6) hours as needed. Max Daily Amount: 40 mg.  
 Quantity:  120 Tab Refills:  0  
     
   
   
   
  
 * oxyCODONE IR 10 mg Tab immediate release tablet Commonly known as:  Robyn Romeo What changed: You were already taking a medication with the same name, and this prescription was added. Make sure you understand how and when to take each. Your last dose was: Your next dose is:    
   
   
 Dose:  10 mg Take 1 Tab by mouth every six (6) hours as needed (moderate pain). Max Daily Amount: 40 mg.  
 Quantity:  30 Tab Refills:  0  
     
   
   
   
  
 * Notice: This list has 2 medication(s) that are the same as other medications prescribed for you. Read the directions carefully, and ask your doctor or other care provider to review them with you. CONTINUE these medications which have NOT CHANGED Dose & Instructions Dispensing Information Comments Morning Noon Evening Bedtime  
 atenolol 100 mg tablet Commonly known as:  TENORMIN Your last dose was: Your next dose is:    
   
   
 Dose:  100 mg Take 100 mg by mouth two (2) times a day. Refills:  0  
     
   
   
   
  
 b complex vitamins tablet Your last dose was: Your next dose is:    
   
   
 Dose:  1 Tab Take 1 Tab by mouth daily. Refills:  0 bacitracin ointment Commonly known as:  BACITRACIN Your last dose was: Your next dose is:    
   
   
 Apply  to affected area three (3) times daily. Quantity:  15 g Refills:  0  
     
   
   
   
  
 budesonide-formoterol 160-4.5 mcg/actuation HFA inhaler Commonly known as:  SYMBICORT Your last dose was: Your next dose is:    
   
   
 Dose:  2 Puff Take 2 Puffs by inhalation two (2) times a day. Quantity:  1 Inhaler Refills:  2 CARTIA  mg ER capsule Generic drug:  dilTIAZem CD Your last dose was: Your next dose is: Take  by mouth two (2) times a day. Refills:  0  
     
   
   
   
  
 CYMBALTA 30 mg capsule Generic drug:  DULoxetine Your last dose was: Your next dose is:    
   
   
 Dose:  30 mg Take 30 mg by mouth two (2) times a day. Refills:  0  
     
   
   
   
  
 ergocalciferol 50,000 unit capsule Commonly known as:  ERGOCALCIFEROL Your last dose was: Your next dose is:    
   
   
 Dose:  [address] Units Take 1 Cap by mouth every seven (7) days. Quantity:  12 Cap Refills:  0  
     
   
   
   
  
 gabapentin 300 mg capsule Commonly known as:  NEURONTIN Your last dose was: Your next dose is:    
   
   
 Dose:  300 mg Take 300 mg by mouth two (2) times a day. Refills:  0  
     
   
   
   
  
 multivitamin, tx-iron-ca-min 9 mg iron-400 mcg Tab tablet Commonly known as:  THERA-M w/ IRON Your last dose was: Your next dose is:    
   
   
 Dose:  1 Tab Take 1 Tab by mouth daily. Refills:  0  
     
   
   
   
  
 nystatin topical cream  
Commonly known as:  MYCOSTATIN Your last dose was: Your next dose is:    
   
   
 Apply  to affected area two (2) times a day. Quantity:  15 g Refills:  0  
     
   
   
   
  
 oxyCODONE ER 9 mg capsule Commonly known as:  Ross Proud ER  
   
 Your last dose was: Your next dose is:    
   
   
 Dose:  9 mg Take 1 Cap by mouth every twelve (12) hours. Max Daily Amount: 18 mg. Indications: Chronic Pain Quantity:  60 Cap Refills:  0 PROAIR HFA 90 mcg/actuation inhaler Generic drug:  albuterol Your last dose was: Your next dose is: Take  by inhalation. Refills:  0  
     
   
   
   
  
 rivaroxaban 20 mg Tab tablet Commonly known as:  Mindi Eduardo Your last dose was: Your next dose is:    
   
   
 Dose:  20 mg Take 1 Tab by mouth daily (with breakfast). Quantity:  30 Tab Refills:  11  
     
   
   
   
  
 saw palmetto fruit 450 mg Cap Your last dose was: Your next dose is: Take  by mouth. Refills:  0  
     
   
   
   
  
 simvastatin 40 mg tablet Commonly known as:  ZOCOR Your last dose was: Your next dose is:    
   
   
 Dose:  40 mg Take 1 Tab by mouth nightly. Quantity:  30 Tab Refills:  2  
     
   
   
   
  
 suvorexant 10 mg tablet Commonly known as:  Cathalene Ivanman Your last dose was: Your next dose is: Take on 30 minutes prior to bedtime as needed for insomnia. Quantity:  10 Tab Refills:  0  
     
   
   
   
  
 TAPAZOLE 5 mg tablet Generic drug:  methIMAzole Your last dose was: Your next dose is:    
   
   
 Dose:  2.5 mg Take 2.5 mg by mouth every three (3) days. Refills:  0 Where to Get Your Medications Information on where to get these meds will be given to you by the nurse or doctor. ! Ask your nurse or doctor about these medications  
  oxyCODONE IR 10 mg Tab immediate release tablet Discharge Instructions DISCHARGE SUMMARY from Nurse The following personal items are in your possession at time of discharge: 
 
Dental Appliances: Lowers, Uppers Visual Aid: Glasses, With patient PATIENT INSTRUCTIONS: 
 
 
F-face looks uneven A-arms unable to move or move unevenly S-speech slurred or non-existent T-time-call 911 as soon as signs and symptoms begin-DO NOT go Back to bed or wait to see if you get better-TIME IS BRAIN. Warning Signs of HEART ATTACK Call 911 if you have these symptoms: 
? Chest discomfort. Most heart attacks involve discomfort in the center of the chest that lasts more than a few minutes, or that goes away and comes back. It can feel like uncomfortable pressure, squeezing, fullness, or pain. ? Discomfort in other areas of the upper body. Symptoms can include pain or discomfort in one or both arms, the back, neck, jaw, or stomach. ? Shortness of breath with or without chest discomfort. ? Other signs may include breaking out in a cold sweat, nausea, or lightheadedness. Don't wait more than five minutes to call [address]! Fast action can save your life. Calling 911 is almost always the fastest way to get lifesaving treatment. Emergency Medical Services staff can begin treatment when they arrive  up to an hour sooner than if someone gets to the hospital by car. The discharge information has been reviewed with the patient and caregiver. The patient and caregiver verbalized understanding. Discharge medications reviewed with the patient and caregiver and appropriate educational materials and side effects teaching were provided. Discharge Instructions Attachments/References MEFS - OXYCODONE, RAPID RELEASE (ETH-OXYDOSE, OXY IR, ROXICODONE) - (BY MOUTH) (ENGLISH) AORTIC ANEURYSM ABDOMINAL OR THORACIC: ENDOVASCULAR REPAIR: POST-OP (ENGLISH) Discharge Orders None [facility] Announcement We are excited to announce that we are making your provider's discharge notes available to you in [employer]. You will see these notes when they are completed and signed by the physician that discharged you from your recent hospital stay. If you have any questions or concerns about any information you see in [employer], please call the Health Information Department where you were seen or reach out to your Primary Care Provider for more information about your plan of care. Introducing hospitals & HEALTH SERVICES! Dear Huey Dillard: 
Thank you for requesting a [employer] account. Our records indicate that you already have an active [employer] account. You can access your account anytime at [email or web address] Did you know that you can access your hospital and ER discharge instructions at any time in [employer]? You can also review all of your test results from your hospital stay or ER visit. Additional Information If you have questions, please visit the Frequently Asked Questions section of the [employer] website at [email or web address] Remember, [employer] is NOT to be used for urgent needs. For medical emergencies, dial 911. Now available from your iPhone and Android! General Information Please provide this summary of care documentation to your next provider. Patient Signature:  ____________________________________________________________ Date:  ____________________________________________________________  
  
Onel Weems Provider Signature:  ____________________________________________________________ Date:  ____________________________________________________________ More Information Oxycodone, Rapid Release (ETH-Oxydose, Oxy IR, Roxicodone) - (By mouth) Why this medicine is used:  
Treats moderate to severe pain. This medicine is a narcotic pain reliever. Contact a nurse or doctor right away if you have: · Fast or slow heart beat, shallow breathing, blue lips, skin or fingernails · Anxiety, restlessness, fever, sweating, muscle spasms, twitching, seeing or hearing things that are not there · Extreme weakness, shallow breathing, slow heartbeat · Severe confusion, lightheadedness, dizziness, fainting · Sweating or cold, clammy skin, seizures · Severe constipation, stomach pain, nausea, vomiting Common side effects: · Mild constipation · Sleepiness, tiredness © 2017 [address] Information is for End User's use only and may not be sold, redistributed or otherwise used for commercial purposes. Endovascular Aortic Aneurysm Repair: What to Expect at Home Your Recovery Endovascular aortic aneurysm repair is a procedure to fix a weak and bulging section of the aorta. The aorta is the large blood vessel (artery) that carries blood from the heart through the belly to the rest of the body. The doctor put a man-made tube called a graft inside the aneurysm. Blood will pass through the graft in the aorta without pushing on the aneurysm. You can expect the cuts (incisions) in your groin to be sore for 1 to 2 weeks. If you have stitches or staples in your incisions, the doctor may need to take them out. You may feel more tired than usual for 1 to 2 weeks after surgery. You may be able to do many of your usual activities after 1 to 2 weeks. But you will probably need up to 4 weeks to fully recover. Strenuous activities will not hurt the graft in your aorta, but they may cause problems with the incisions in your groin. You can be more active when your groin is no longer sore. This care sheet gives you a general idea about how long it will take for you to recover. But each person recovers at a different pace. Follow the steps below to get better as quickly as possible. How can you care for yourself at home? Activity · Rest when you feel tired. Getting enough sleep will help you recover. · Try to walk each day. Start by walking a little more than you did the day before. Bit by bit, increase the amount you walk. Walking boosts blood flow and helps prevent pneumonia and constipation. · Avoid strenuous activities, such as bicycle riding, jogging, weight lifting, or aerobic exercise. Your doctor will tell you when it's okay to do strenuous activity. · Ask your doctor when you can drive again. · You will probably need to take at least 1 to 2 weeks off from work. It depends on the type of work you do and how you feel. · You may shower as usual. Pat the incisions dry. Do not take a bath for the first 2 weeks, or until your doctor tells you it is okay. Diet · You can eat your normal diet. If your stomach is upset, try bland, low-fat foods like plain rice, broiled chicken, toast, and yogurt. · Drink plenty of fluids (unless your doctor tells you not to). · You may notice that your bowel movements are not regular right after your surgery. This is common. Try to avoid constipation and straining with bowel movements. You may want to take a fiber supplement every day. If you have not had a bowel movement after a couple of days, ask your doctor about taking a mild laxative. Medicines · Your doctor will tell you if and when you can restart your medicines. He or she will also give you instructions about taking any new medicines. · If you take blood thinners, such as warfarin (Coumadin), clopidogrel (Plavix), or aspirin, be sure to talk to your doctor. He or she will tell you if and when to start taking those medicines again. Make sure that you understand exactly what your doctor wants you to do. · Be safe with medicines. Take pain medicines exactly as directed. ¨ If the doctor gave you a prescription medicine for pain, take it as prescribed.  
¨ If you are not taking a prescription pain medicine, ask your doctor if you can take an over-the-counter medicine. ¨ Do not take two or more pain medicines at the same time unless the doctor told you to. Many pain medicines have acetaminophen, which is Tylenol. Too much acetaminophen (Tylenol) can be harmful. · If you think your pain medicine is making you sick to your stomach: 
¨ Take your medicine after meals (unless your doctor has told you not to). ¨ Ask your doctor for a different pain medicine. · If your doctor prescribed antibiotics, take them as directed. Do not stop taking them just because you feel better. You need to take the full course of antibiotics. Incision care · If you have strips of tape on the incisions, leave the tape on for a week or until it falls off. · Wash the area daily with water and pat it dry. Other cleaning products, such as hydrogen peroxide, can make the wounds heal more slowly. You may cover the area with a gauze bandage if it weeps or rubs against clothing. Change the bandage every day. · Keep the area clean and dry. Follow-up care is a key part of your treatment and safety. Be sure to make and go to all appointments, and call your doctor if you are having problems. It's also a good idea to know your test results and keep a list of the medicines you take. When should you call for help? Call 911 anytime you think you may need emergency care. For example, call if: 
· You passed out (lost consciousness). · You have severe trouble breathing. · You have sudden chest pain and shortness of breath, or you cough up blood or foamy, pink mucus. · You have a lump that is getting bigger under your skin where the incisions were made in your groin. · You have severe pain in your belly. · You have chest pain or pressure. This may occur with: ¨ Sweating. ¨ Shortness of breath. ¨ Nausea or vomiting. ¨ Pain that spreads from the chest to the neck, jaw, or one or both shoulders or arms. ¨ Dizziness or lightheadedness. ¨ A fast or uneven pulse. After calling 911, chew 1 adult-strength or 2 to 4 low-dose aspirin. Wait for an ambulance. Do not try to drive yourself. Call your doctor now or seek immediate medical care if: 
· You have new or increased shortness of breath. · You are dizzy or lightheaded, or you feel like you may faint. · You are sick to your stomach or cannot keep fluids down. · You have pain that does not get better after you take pain medicine. · You have a fever over 100°F. 
· You have loose stitches, or one of your incisions comes open. · Bright red blood has soaked through the bandage over your incisions. · You have signs of infection, such as: 
¨ Increased pain, swelling, warmth, or redness. ¨ Red streaks leading from the incisions. ¨ Pus draining from the incisions. ¨ Swollen lymph nodes in your neck, armpits, or groin. ¨ A fever. · You have signs of a blood clot, such as: 
¨ Pain in your calf, back of the knee, thigh, or groin. ¨ Redness and swelling in your leg or groin. Watch closely for any changes in your health, and be sure to contact your doctor if: 
· You have sudden weight gain, such as 3 pounds or more in 2 to 3 days. · You have increased swelling in your legs, ankles, or feet. Where can you learn more? Go to http://jeannine-kain.info/. Enter [phone] in the search box to learn more about \"Endovascular Aortic Aneurysm Repair: What to Expect at Home. \" Current as of: July 21, 2016 Content Version: 11.2 © 3282-4565 [employer]. Care instructions adapted under license by [employer] (which disclaims liability or warranty for this information). If you have questions about a medical condition or this instruction, always ask your healthcare professional. [place] [address] any warranty or liability for your use of this information.

## 2017-06-13 NOTE — ROUTINE PROCESS
1350: Received pt from [place] [address], oral airway in place, pt sedated from anesthesia unable to follow commands. Pt in afib with rates varying from 48-50s. Pulses palpable to bilat lower extremities and bilat groin sites well approximated with no signs or symptoms of hematoma. Will continue to monitor. 1410: Oral airway removed, pt lifting head off pillow and moving all extremities x4. Will continue to monitor. 1515: Report given and care assumed by [facility] . Vital signs stable, pt A&O x4 and sleeping with family at bedside.

## 2017-06-13 NOTE — BRIEF OP NOTE
BRIEF OPERATIVE NOTE    Date of Procedure: 6/13/2017   Preoperative Diagnosis: AAA (abdominal aortic aneurysm) without rupture (HCC) [I71.4]  Postoperative Diagnosis: AAA (abdominal aortic aneurysm) without rupture ([address] [I71.4]    Procedure(s):  AORTIC ABDOMINAL ANEURYSM REPAIR ENDOVASCULAR (EVAR)  Surgeon(s) and Role:     * Linda Kraus MD - Primary         Assistant Staff:       Surgical Staff:  Circ-1: Boogie Hernandez RN; Linh Olvera RN  Circ-2: Arlin Flor RN; Marialuisa Huynh RN  Radiology Technician: George Bolden  Scrub Tech-1: Agnes Arita  Scrub Tech-2: Harsh Adams  Surg Asst-1: [place] [address]  Event Time In   Incision Start 1235   Incision Close      Anesthesia: General   Estimated Blood Loss: 100mL  Specimens: * No specimens in log *   Findings: aaa   Complications: none  Implants:   Implant Name Type Inv.  Item Serial No.  Lot No. LRB No. Used Action   STENT [employer] [facility] AAA [id number] -- EXCLUDER C3 - [id number]  [address] [place] [address] AAA [id number] -- EXCLUDER C3 [id number] [employer]  N/A 1 Implanted   GRAFT ENDV EXCLUDER [id number] --  - [id number]  GRAFT ENDV EXCLUDER [id number] --  [id number] [employer]  N/A 1 Implanted   GRAFT EXTDR ILIAC [id number] -- EXCLUDER - [id number]   GRAFT EXTDR ILIAC [id number] -- Lorena Devries [id number] [employer]   N/A 1 Implanted

## 2017-06-13 NOTE — ANESTHESIA PREPROCEDURE EVALUATION
Anesthetic History   No history of anesthetic complications            Review of Systems / Medical History  Patient summary reviewed and pertinent labs reviewed    Pulmonary          Smoker         Neuro/Psych         Psychiatric history     Cardiovascular    Hypertension        Dysrhythmias : atrial fibrillation  PAD    Exercise tolerance: <4 METS  Comments: AAA   GI/Hepatic/Renal           Liver disease     Endo/Other      Hypothyroidism: well controlled  Blood dyscrasia     Other Findings   Comments:   Risk Factors for Postoperative nausea/vomiting:       History of postoperative nausea/vomiting? NO       Female? NO       Motion sickness? NO       Intended opioid administration for postoperative analgesia? YES      Smoking Abstinence  Current Smoker? YES  Elective Surgery? YES  Seen preoperatively by anesthesiologist or proxy prior to day of surgery? YES  Pt abstained from smoking 24 hours prior to anesthesia?  NO           Physical Exam    Airway  Mallampati: II  TM Distance: 4 - 6 cm  Neck ROM: decreased range of motion   Mouth opening: Normal     Cardiovascular  Regular rate and rhythm,  S1 and S2 normal,  no murmur, click, rub, or gallop             Dental    Dentition: Edentulous     Pulmonary  Breath sounds clear to auscultation               Abdominal  GI exam deferred       Other Findings            Anesthetic Plan    ASA: 3  Anesthesia type: general    Monitoring Plan: Arterial line      Induction: Intravenous  Anesthetic plan and risks discussed with: Patient and Family

## 2017-06-13 NOTE — H&P (VIEW-ONLY)
Adalgisa Shirley    Chief Complaint   Patient presents with    Abdominal Aortic Aneurysm       History and Physical    Adalgisa Shirley is a 59 y.o. male who is found to have a 4.6 cm abdominal aortic aneurysm. He has had 5 relatives of his brothers uncles and father that have  from either abdominal aortic aneurysm or brain aneurysm 3 AAA's 2 brain aneurysms. All passed away before 60. He does have occasional sharp abdominal pain which is increased in frequency over the past year. No back pain that is not chronic. Patient does have bilateral foot pain but this is more electric in nature and seems to be more from his numerous back surgeries. He also has atrial fibrillation which is chronic takes Xarelto for this but he stopped that for the past 48 hours. In hopes of getting immediate surgery. No fevers or chills no rest pain. Past Medical History:   Diagnosis Date    Alcoholism ([facility] [address]     Chronic pain     Degenerative disc disease, cervical     Degenerative disc disease, lumbar     Hypertension     Hypokalemia     Pneumonia 2016    caused permanent L lung problems    Thyroid disease      Past Surgical History:   Procedure Laterality Date    HX ORTHOPAEDIC      neck fracture    HX ORTHOPAEDIC      lumbar fusion     Patient Active Problem List   Diagnosis Code    Atrial fibrillation (HCC) I48.91    Osteoarthritis of multiple joints M15.9    Chronic pain G89.29    Hyperthyroidism E05.90    History of abdominal aortic aneurysm (AAA) Z86.79    Family history of systemic lupus erythematosus (SLE) in mother Z80.2    History of drug use ([address] Z87.898    H/O alcohol abuse Z87.898    Nicotine use disorder F17.200    Depression F32.9    Essential hypertension I10    FH: CAD (coronary artery disease) Z82.49     Current Outpatient Prescriptions   Medication Sig Dispense Refill    suvorexant (BELSOMRA) 10 mg tablet Take on 30 minutes prior to bedtime as needed for insomnia.  10 Tab 0    bacitracin (BACITRACIN) ointment Apply  to affected area three (3) times daily. 15 g 0    ergocalciferol (ERGOCALCIFEROL) 50,000 unit capsule Take 1 Cap by mouth every seven (7) days. 12 Cap 0    oxyCODONE IR (ROXICODONE) 10 mg tab immediate release tablet Take 1 Tab by mouth every six (6) hours as needed. Max Daily Amount: 40 mg. 120 Tab 0    simvastatin (ZOCOR) 40 mg tablet Take 1 Tab by mouth nightly. 30 Tab 2    oxyCODONE ER (XTAMPZA ER) 9 mg capsule Take 1 Cap by mouth every twelve (12) hours. Max Daily Amount: 18 mg. Indications: Chronic Pain 60 Cap 0    nystatin (MYCOSTATIN) topical cream Apply  to affected area two (2) times a day. 15 g 0    budesonide-formoterol (SYMBICORT) 160-4.5 mcg/actuation HFA inhaler Take 2 Puffs by inhalation two (2) times a day. 1 Inhaler 2    DULoxetine (CYMBALTA) 30 mg capsule Take 30 mg by mouth two (2) times a day.  methIMAzole (TAPAZOLE) 5 mg tablet Take 2.5 mg by mouth every three (3) days.  atenolol (TENORMIN) 100 mg tablet Take 100 mg by mouth two (2) times a day.  gabapentin (NEURONTIN) 300 mg capsule Take 300 mg by mouth two (2) times a day.  dilTIAZem CD (CARTIA XT) 180 mg ER capsule Take  by mouth two (2) times a day.  saw palmetto fruit 450 mg cap Take  by mouth.  b complex vitamins tablet Take 1 Tab by mouth daily.  multivitamin, tx-iron-ca-min (THERA-M W/ IRON) 9 mg iron-400 mcg tab tablet Take 1 Tab by mouth daily.  albuterol (PROAIR HFA) 90 mcg/actuation inhaler Take  by inhalation.  rivaroxaban (XARELTO) 20 mg tab tablet Take 1 Tab by mouth daily (with breakfast). 30 Tab 11     No Known Allergies  Social History     Social History    Marital status:      Spouse name: N/A    Number of children: N/A    Years of education: N/A     Occupational History    Not on file.      Social History Main Topics    Smoking status: Current Every Day Smoker     Packs/day: 1.00    Smokeless tobacco: Former User    Alcohol use No      Comment: sober x 5 years    Drug use: No      Comment: former drug use    Sexual activity: Not on file     Other Topics Concern    Not on file     Social History Narrative      Family History   Problem Relation Age of Onset    Heart Disease Father     Heart Disease Brother      AAA-  in his 42's    Heart Disease Paternal Aunt     Heart Disease Paternal Uncle     Heart Disease Paternal Grandmother     Lupus Mother        Physical Exam:    Visit Vitals    /70 (BP 1 Location: Left arm, BP Patient Position: Sitting)    Pulse 88    Resp 18    Ht 5' 8\" (1.727 m)    Wt 180 lb (81.6 kg)    BMI 27.37 kg/m2      General: Well-appearing male in no acute distress  HEENT: EOMI no scleral icterus is noted  Pulmonary: No increased work of breathing is noted  Extremities: Warm and well-perfused bilaterally 2+ femoral pulses bilaterally  Neuro: Cranial nerves II through XII grossly intact    Impression and Plan:  Michael Collins is a 59 y.o. male with abdominal aortic aneurysm which is 4.6 cm in size is normal aorta is 1.7 cm in size he is more than double his normal aortic size. Given this we have recommended repair. Patient is very adamant about repair especially given his extensive family history of rupture. We go over all the risks and benefits of the surgery including but not limited to bleeding infection damage to adjacent structures MI stroke and death as well as leg dysfunction and kidney dysfunction. I do believe that we can perform this percutaneously we talked about hospital stay as well as follow-up and need for further surgery given the endovascular repair. He wants this done as soon as possible as he does have trouble with sleeping and a lot of anxiety especially given all of the people in his family that have  from aneurysms. We will put him on the OR schedule for tomorrow. We reviewed the plan with the patient and the patient understands.   We also gave the patient appropriate instructions on their disease process and when to call back. Follow-up Disposition: Not on [facility], MD    PLEASE NOTE:  This document has been produced using voice recognition software. Unrecognized errors in transcription may be present.

## 2017-06-13 NOTE — IP AVS SNAPSHOT
Current Discharge Medication List  
  
CONTINUE these medications which have CHANGED Dose & Instructions Dispensing Information Comments Morning Noon Evening Bedtime * oxyCODONE IR 10 mg Tab immediate release tablet Commonly known as:  Marguerite Lal What changed:  Another medication with the same name was added. Make sure you understand how and when to take each. Your last dose was: Your next dose is:    
   
   
 Dose:  10 mg Take 1 Tab by mouth every six (6) hours as needed. Max Daily Amount: 40 mg.  
 Quantity:  120 Tab Refills:  0  
     
   
   
   
  
 * oxyCODONE IR 10 mg Tab immediate release tablet Commonly known as:  Marguerite Lal What changed: You were already taking a medication with the same name, and this prescription was added. Make sure you understand how and when to take each. Your last dose was: Your next dose is:    
   
   
 Dose:  10 mg Take 1 Tab by mouth every six (6) hours as needed (moderate pain). Max Daily Amount: 40 mg.  
 Quantity:  30 Tab Refills:  0  
     
   
   
   
  
 * Notice: This list has 2 medication(s) that are the same as other medications prescribed for you. Read the directions carefully, and ask your doctor or other care provider to review them with you. CONTINUE these medications which have NOT CHANGED Dose & Instructions Dispensing Information Comments Morning Noon Evening Bedtime  
 atenolol 100 mg tablet Commonly known as:  TENORMIN Your last dose was: Your next dose is:    
   
   
 Dose:  100 mg Take 100 mg by mouth two (2) times a day. Refills:  0  
     
   
   
   
  
 b complex vitamins tablet Your last dose was: Your next dose is:    
   
   
 Dose:  1 Tab Take 1 Tab by mouth daily. Refills:  0  
     
   
   
   
  
 bacitracin ointment Commonly known as:  BACITRACIN Your last dose was: Your next dose is: Apply  to affected area three (3) times daily. Quantity:  15 g Refills:  0  
     
   
   
   
  
 budesonide-formoterol 160-4.5 mcg/actuation HFA inhaler Commonly known as:  SYMBICORT Your last dose was: Your next dose is:    
   
   
 Dose:  2 Puff Take 2 Puffs by inhalation two (2) times a day. Quantity:  1 Inhaler Refills:  2 CARTIA  mg ER capsule Generic drug:  dilTIAZem CD Your last dose was: Your next dose is: Take  by mouth two (2) times a day. Refills:  0  
     
   
   
   
  
 CYMBALTA 30 mg capsule Generic drug:  DULoxetine Your last dose was: Your next dose is:    
   
   
 Dose:  30 mg Take 30 mg by mouth two (2) times a day. Refills:  0  
     
   
   
   
  
 ergocalciferol 50,000 unit capsule Commonly known as:  ERGOCALCIFEROL Your last dose was: Your next dose is:    
   
   
 Dose:  87260 Units Take 1 Cap by mouth every seven (7) days. Quantity:  12 Cap Refills:  0  
     
   
   
   
  
 gabapentin 300 mg capsule Commonly known as:  NEURONTIN Your last dose was: Your next dose is:    
   
   
 Dose:  300 mg Take 300 mg by mouth two (2) times a day. Refills:  0  
     
   
   
   
  
 multivitamin, tx-iron-ca-min 9 mg iron-400 mcg Tab tablet Commonly known as:  THERA-M w/ IRON Your last dose was: Your next dose is:    
   
   
 Dose:  1 Tab Take 1 Tab by mouth daily. Refills:  0  
     
   
   
   
  
 nystatin topical cream  
Commonly known as:  MYCOSTATIN Your last dose was: Your next dose is:    
   
   
 Apply  to affected area two (2) times a day. Quantity:  15 g Refills:  0  
     
   
   
   
  
 oxyCODONE ER 9 mg capsule Commonly known as:  Mindi Jose C ER Your last dose was: Your next dose is:    
   
   
 Dose:  9 mg Take 1 Cap by mouth every twelve (12) hours. Max Daily Amount: 18 mg. Indications: Chronic Pain Quantity:  60 Cap Refills:  0 PROAIR HFA 90 mcg/actuation inhaler Generic drug:  albuterol Your last dose was: Your next dose is: Take  by inhalation. Refills:  0  
     
   
   
   
  
 rivaroxaban 20 mg Tab tablet Commonly known as:  Silvestre Forman Your last dose was: Your next dose is:    
   
   
 Dose:  20 mg Take 1 Tab by mouth daily (with breakfast). Quantity:  30 Tab Refills:  11  
     
   
   
   
  
 saw palmetto fruit 450 mg Cap Your last dose was: Your next dose is: Take  by mouth. Refills:  0  
     
   
   
   
  
 simvastatin 40 mg tablet Commonly known as:  ZOCOR Your last dose was: Your next dose is:    
   
   
 Dose:  40 mg Take 1 Tab by mouth nightly. Quantity:  30 Tab Refills:  2  
     
   
   
   
  
 suvorexant 10 mg tablet Commonly known as:  Haley Arambula Your last dose was: Your next dose is: Take on 30 minutes prior to bedtime as needed for insomnia. Quantity:  10 Tab Refills:  0  
     
   
   
   
  
 TAPAZOLE 5 mg tablet Generic drug:  methIMAzole Your last dose was: Your next dose is:    
   
   
 Dose:  2.5 mg Take 2.5 mg by mouth every three (3) days. Refills:  0 Where to Get Your Medications Information on where to get these meds will be given to you by the nurse or doctor. ! Ask your nurse or doctor about these medications  
  oxyCODONE IR 10 mg Tab immediate release tablet

## 2017-06-13 NOTE — INTERVAL H&P NOTE
H&P Update:  Marina Talbot was seen and examined. History and physical has been reviewed. The patient has been examined.  There have been no significant clinical changes since the completion of the originally dated History and Physical.    Signed By: Aundrea Douglas MD     June 13, 2017 10:56 AM

## 2017-06-13 NOTE — OP NOTES
[address]    Name:  Haim Moore  MR#:  [id number]  :  1960  Account #:  [de-identified]  Date of Adm:  2017  Date of Surgery:  2017      ATTENDING PHYSICIAN: Juan Ramon Rae MD    PREOPERATIVE DIAGNOSIS: Abdominal aortic aneurysm. POSTOPERATIVE DIAGNOSIS: Abdominal aortic aneurysm. PROCEDURES PERFORMED  1. Ultrasound-guided access common femoral arteries bilateral.  2. Aortogram.  3. Catheter in aorta. 4. Intravascular ultrasound of aorta, bilateral common iliac arteries and  external iliac arteries. 5. Endovascular aortic aneurysm repair performed with a bifurcated  modular device. 6. Distal extension of right common iliac artery with another stent. 7. Arterial closure of the right common femoral artery with 2 ProGlide  sutures. 8. Left common femoral artery arterial closure with 2 ProGlide sutures  and a 6-German Angio-Seal.    INDICATIONS FOR PROCEDURE: The patient is a 76-year-old  gentleman with a 4.6 cm abdominal aortic aneurysm. The patient was  recommended for repair. The patient was given the risks and benefits  of the procedure including but not limited to bleeding, infection,  damage to adjacent structures, MI, stroke, and death, as well as loss  of lower extremity, kidney dysfunction. The patient is understanding of  all the risks and underwent the procedure. DESCRIPTION OF PROCEDURE: The patient was correctly identified  in the precath area and taken to the cath lab in stable condition. The  patient had a pre-incision timeout prior to any incision. The patient was  prepped and draped in normal sterile fashion according CDC  guidelines aseptic technique. The patient also had preoperative  antibiotics prior to any incision. We then were able to use an  ultrasound to visualize the bilateral common femoral arteries. A picture  was taken and kept with the patient's chart.  We then took a single  longitudinal needle and gained access into the common femoral artery. Once the needle tip was identified within the artery, a Bentson wire  was then placed. We then made a small skin incision using 11  blade. Placed a 6-[place] short sheath dissected down to the artery  percutaneously on both sides and then placed our ProGlide at 10 and  2 and then placed an 8-[place] sheath in. On the left side we then  performed our IVUS imaging. We then were able to identify where the  renal arteries were. We then upsized on the right side to a 16-[place]  sheath over Amplatz Super Stiff wire and then placed up our main  body device. This was placed with a [place] C3 23 x 14 x 16 graft. This  was then deployed at the level of the renals. We then were able to gain  access from the left side with an angled Glidewire and Barrera  catheter. We then confirmed that we were in the appropriate  positioning with a ContraFlush catheter and IVUS imaging. We then  placed an Amplatz Super Stiff wire on this side and then upsized to a  12-[place] sheath. We then placed our limb on that side with a 12 x 14. Once that was completed, we then completed our deployment on the  right side. Used IVUS imaging on this side to determine where the  gastric artery was, determine the length to the stent and then we  decided on extending with a 10 x 7 extension on the right side. Once  all this was done using a Q50 balloon on both sides we were able to  balloon out the aorta and both limbs. We then on the right side placed  our ContraFlush catheter in place, a shot an aortogram. This showed  bilateral renal arteries are patent without stenosis. The aneurysm is  excluded. Bilateral common iliac arteries, external iliac arteries and  internal iliac arteries are patent, without stenosis. The patient does  have a late type 2 endoleak from lumbar arteries, most prominent on  the left side, as well as an PIPER. Hopefully, these will continue to  dissipate as time goes on.  We then went ahead and placed a Bentson  wire in place on both sides. We were able to remove the sheath,  closed the ProGlide on the right side. We were able to remove our wire  without any difficulty, held pressure for about a minute and had good  hemostasis on the left side. There was still a decent amount of oozing. A 6-Yoruba Angio-Seal was then placed and 2 minutes of manual  compression was held with good hemostasis. The patient tolerated the  procedure well, was then taken to the ICU in stable condition. CULTURES: None. SPECIMENS REMOVED: None. DRAINS: None. ESTIMATED BLOOD LOSS: 100 mL.     ANESTHESIA: MD MALGORAZTA Lantigua / MAHESH  D:  06/13/2017   15:26  T:  06/13/2017   16:56  Job #:  [id number]

## 2017-06-14 VITALS
DIASTOLIC BLOOD PRESSURE: 71 MMHG | RESPIRATION RATE: 25 BRPM | BODY MASS INDEX: 27.37 KG/M2 | WEIGHT: 180 LBS | OXYGEN SATURATION: 97 % | SYSTOLIC BLOOD PRESSURE: 134 MMHG | HEART RATE: 66 BPM | TEMPERATURE: 98.3 F

## 2017-06-14 LAB
ANION GAP BLD CALC-SCNC: 6 MMOL/L (ref 3–18)
BUN SERPL-MCNC: 15 MG/DL (ref 7–18)
BUN/CREAT SERPL: 15 (ref 12–20)
CALCIUM SERPL-MCNC: 8.4 MG/DL (ref 8.5–10.1)
CHLORIDE SERPL-SCNC: 106 MMOL/L (ref 100–108)
CO2 SERPL-SCNC: 30 MMOL/L (ref 21–32)
CREAT SERPL-MCNC: 1 MG/DL (ref 0.6–1.3)
ERYTHROCYTE [DISTWIDTH] IN BLOOD BY AUTOMATED COUNT: 12.3 % (ref 11.6–14.5)
GLUCOSE SERPL-MCNC: 115 MG/DL (ref 74–99)
HCT VFR BLD AUTO: 39.8 % (ref 36–48)
HGB BLD-MCNC: 13.3 G/DL (ref 13–16)
MCH RBC QN AUTO: 30.8 PG (ref 24–34)
MCHC RBC AUTO-ENTMCNC: 33.4 G/DL (ref 31–37)
MCV RBC AUTO: 92.1 FL (ref 74–97)
PLATELET # BLD AUTO: 143 K/UL (ref 135–420)
PMV BLD AUTO: 11.8 FL (ref 9.2–11.8)
POTASSIUM SERPL-SCNC: 4.1 MMOL/L (ref 3.5–5.5)
RBC # BLD AUTO: 4.32 M/UL (ref 4.7–5.5)
SODIUM SERPL-SCNC: 142 MMOL/L (ref 136–145)
WBC # BLD AUTO: 10 K/UL (ref 4.6–13.2)

## 2017-06-14 PROCEDURE — 80048 BASIC METABOLIC PNL TOTAL CA: CPT | Performed by: SURGERY

## 2017-06-14 PROCEDURE — 85027 COMPLETE CBC AUTOMATED: CPT | Performed by: SURGERY

## 2017-06-14 PROCEDURE — 74011250637 HC RX REV CODE- 250/637: Performed by: SURGERY

## 2017-06-14 PROCEDURE — 74011250636 HC RX REV CODE- 250/636: Performed by: PHYSICIAN ASSISTANT

## 2017-06-14 RX ORDER — OXYCODONE HYDROCHLORIDE 10 MG/1
10 TABLET ORAL
Qty: 30 TAB | Refills: 0 | Status: SHIPPED | OUTPATIENT
Start: 2017-06-14 | End: 2017-06-26 | Stop reason: SDUPTHER

## 2017-06-14 RX ORDER — OXYCODONE AND ACETAMINOPHEN 10; 325 MG/1; MG/1
1 TABLET ORAL
Qty: 30 TAB | Refills: 0 | Status: SHIPPED | OUTPATIENT
Start: 2017-06-14 | End: 2017-06-20

## 2017-06-14 RX ADMIN — ACETAMINOPHEN 650 MG: 325 TABLET ORAL at 01:17

## 2017-06-14 RX ADMIN — DULOXETINE HYDROCHLORIDE 30 MG: 30 CAPSULE, DELAYED RELEASE ORAL at 08:14

## 2017-06-14 RX ADMIN — HEPARIN SODIUM 5000 UNITS: 5000 INJECTION, SOLUTION INTRAVENOUS; SUBCUTANEOUS at 06:05

## 2017-06-14 RX ADMIN — GABAPENTIN 300 MG: 300 CAPSULE ORAL at 08:14

## 2017-06-14 RX ADMIN — BUDESONIDE AND FORMOTEROL FUMARATE DIHYDRATE 2 PUFF: 160; 4.5 AEROSOL RESPIRATORY (INHALATION) at 08:00

## 2017-06-14 RX ADMIN — ATENOLOL 100 MG: 50 TABLET ORAL at 08:14

## 2017-06-14 RX ADMIN — DILTIAZEM HYDROCHLORIDE 180 MG: 180 CAPSULE, COATED, EXTENDED RELEASE ORAL at 08:14

## 2017-06-14 RX ADMIN — RIVAROXABAN 20 MG: 20 TABLET, FILM COATED ORAL at 08:14

## 2017-06-14 NOTE — DISCHARGE SUMMARY
Physician Discharge Summary     Patient ID:  Adalgisa Shirley  [id number]  59 y.o.  1960    Admit date: 6/13/2017    Discharge date: 6/14/2017      Admitting Physician: Marilee Salamanca MD     Discharge Physician: Marilee Salamanca MD     Admission Diagnoses: AAA (abdominal aortic aneurysm) without rupture [facility]) [I71.4]    Discharge Diagnoses: No discharge information exists for this patient. Procedures for this admission: Procedure(s):  AORTIC ABDOMINAL ANEURYSM REPAIR ENDOVASCULAR (EVAR)    Discharged Condition: stable    Hospital Course: ISRAEL Shirley  went to the operating room and underwent endovascular repair of his enlarging abdominal aortic aneurysm. Otherwise, he has done well, and his recovery in the cardiovascular intensive care unit has been uneventful. On the morning after surgery, the patient is awake, alert, and oriented. Hemodynamically stable. Lower extremities show no evidence of ischemia. The  abdomen is soft and non tender. Laboratory studies are unremarkable. After ambulating and taking breakfast this morning, he should be ready for hospital discharge. Discharge Exam: GEN: alert, cooperative, no distress, appears stated age  LUNG: clear to auscultation bilaterally  HEART: regular rate and rhythm, S1, S2 normal, no murmur, click, rub or gallop  ABDOMEN:  no change  EXTREMITIES:   warm and well perfused with no edema  Incision:  b/l groins c/d/i    Disposition: home    Patient Instructions:   Current Discharge Medication List      CONTINUE these medications which have CHANGED    Details   !! oxyCODONE IR (ROXICODONE) 10 mg tab immediate release tablet Take 1 Tab by mouth every six (6) hours as needed (moderate pain). Max Daily Amount: 40 mg.  Qty: 30 Tab, Refills: 0       !! - Potential duplicate medications found. Please discuss with provider.       CONTINUE these medications which have NOT CHANGED    Details   suvorexant (BELSOMRA) 10 mg tablet Take on 30 minutes prior to bedtime as needed for insomnia. Qty: 10 Tab, Refills: 0    Associated Diagnoses: Sleeping difficulty      ergocalciferol (ERGOCALCIFEROL) 50,000 unit capsule Take 1 Cap by mouth every seven (7) days. Qty: 12 Cap, Refills: 0    Associated Diagnoses: Vitamin D insufficiency      !! oxyCODONE IR (ROXICODONE) 10 mg tab immediate release tablet Take 1 Tab by mouth every six (6) hours as needed. Max Daily Amount: 40 mg.  Qty: 120 Tab, Refills: 0    Associated Diagnoses: Osteoarthritis of multiple joints, unspecified osteoarthritis type; Other chronic pain      simvastatin (ZOCOR) 40 mg tablet Take 1 Tab by mouth nightly. Qty: 30 Tab, Refills: 2    Associated Diagnoses: Mixed hyperlipidemia      oxyCODONE ER (XTAMPZA ER) 9 mg capsule Take 1 Cap by mouth every twelve (12) hours. Max Daily Amount: 18 mg. Indications: Chronic Pain  Qty: 60 Cap, Refills: 0    Associated Diagnoses: Osteoarthritis of multiple joints, unspecified osteoarthritis type; Other chronic pain      budesonide-formoterol (SYMBICORT) 160-4.5 mcg/actuation HFA inhaler Take 2 Puffs by inhalation two (2) times a day. Qty: 1 Inhaler, Refills: 2      DULoxetine (CYMBALTA) 30 mg capsule Take 30 mg by mouth two (2) times a day. methIMAzole (TAPAZOLE) 5 mg tablet Take 2.5 mg by mouth every three (3) days. atenolol (TENORMIN) 100 mg tablet Take 100 mg by mouth two (2) times a day.      gabapentin (NEURONTIN) 300 mg capsule Take 300 mg by mouth two (2) times a day. dilTIAZem CD (CARTIA XT) 180 mg ER capsule Take  by mouth two (2) times a day. b complex vitamins tablet Take 1 Tab by mouth daily. multivitamin, tx-iron-ca-min (THERA-M W/ IRON) 9 mg iron-400 mcg tab tablet Take 1 Tab by mouth daily. albuterol (PROAIR HFA) 90 mcg/actuation inhaler Take  by inhalation. bacitracin (BACITRACIN) ointment Apply  to affected area three (3) times daily. Qty: 15 g, Refills: 0    Associated Diagnoses:  Folliculitis      nystatin (MYCOSTATIN) topical cream Apply  to affected area two (2) times a day. Qty: 15 g, Refills: 0    Associated Diagnoses: Pruritus of groin in male      saw palmetto fruit 450 mg cap Take  by mouth.      rivaroxaban (XARELTO) 20 mg tab tablet Take 1 Tab by mouth daily (with breakfast). Qty: 30 Tab, Refills: 11    Associated Diagnoses: Atrial fibrillation, unspecified type ([address]       ! ! - Potential duplicate medications found. Please discuss with provider. Reference discharge instructions as provided by nursing for diet, labs, medications, activity, wound care and any outpatient referrals.     Follow-up with dr amaral's office in 10-14 days     Signed:  Cm Patel  6/14/2017  8:08 AM

## 2017-06-14 NOTE — PROGRESS NOTES
VSS. Afebrile. UOP adequate. AVS reviewed and signed. Spouse at bedside. All questions answered. Will discharge.

## 2017-06-14 NOTE — DISCHARGE INSTRUCTIONS
DISCHARGE SUMMARY from Nurse    The following personal items are in your possession at time of discharge:    Dental Appliances: Lowers, Uppers  Visual Aid: Glasses, With patient                            PATIENT INSTRUCTIONS:    After general anesthesia or intravenous sedation, for 24 hours or while taking prescription Narcotics:  · Limit your activities  · Do not drive and operate hazardous machinery  · Do not make important personal or business decisions  · Do  not drink alcoholic beverages  · If you have not urinated within 8 hours after discharge, please contact your surgeon on call. Report the following to your surgeon:  · Excessive pain, swelling, redness or odor of or around the surgical area  · Temperature over 100.5  · Nausea and vomiting lasting longer than 4 hours or if unable to take medications  · Any signs of decreased circulation or nerve impairment to extremity: change in color, persistent  numbness, tingling, coldness or increase pain  · Any questions        What to do at Home:  Recommended activity: Activity as tolerated and no driving for today. *  Please give a list of your current medications to your Primary Care Provider. *  Please update this list whenever your medications are discontinued, doses are      changed, or new medications (including over-the-counter products) are added. *  Please carry medication information at all times in case of emergency situations. These are general instructions for a healthy lifestyle:    No smoking/ No tobacco products/ Avoid exposure to second hand smoke    Surgeon General's Warning:  Quitting smoking now greatly reduces serious risk to your health.     Obesity, smoking, and sedentary lifestyle greatly increases your risk for illness    A healthy diet, regular physical exercise & weight monitoring are important for maintaining a healthy lifestyle    You may be retaining fluid if you have a history of heart failure or if you experience any of the following symptoms:  Weight gain of 3 pounds or more overnight or 5 pounds in a week, increased swelling in our hands or feet or shortness of breath while lying flat in bed. Please call your doctor as soon as you notice any of these symptoms; do not wait until your next office visit. Recognize signs and symptoms of STROKE:    F-face looks uneven    A-arms unable to move or move unevenly    S-speech slurred or non-existent    T-time-call 911 as soon as signs and symptoms begin-DO NOT go       Back to bed or wait to see if you get better-TIME IS BRAIN. Warning Signs of HEART ATTACK     Call 911 if you have these symptoms:   Chest discomfort. Most heart attacks involve discomfort in the center of the chest that lasts more than a few minutes, or that goes away and comes back. It can feel like uncomfortable pressure, squeezing, fullness, or pain.  Discomfort in other areas of the upper body. Symptoms can include pain or discomfort in one or both arms, the back, neck, jaw, or stomach.  Shortness of breath with or without chest discomfort.  Other signs may include breaking out in a cold sweat, nausea, or lightheadedness. Don't wait more than five minutes to call 911 - MINUTES MATTER! Fast action can save your life. Calling 911 is almost always the fastest way to get lifesaving treatment. Emergency Medical Services staff can begin treatment when they arrive -- up to an hour sooner than if someone gets to the hospital by car. The discharge information has been reviewed with the patient and caregiver. The patient and caregiver verbalized understanding. Discharge medications reviewed with the patient and caregiver and appropriate educational materials and side effects teaching were provided.

## 2017-06-14 NOTE — ANESTHESIA POSTPROCEDURE EVALUATION
Post-Anesthesia Evaluation and Assessment    Patient: Milton Sheehan MRN: [id number]  SSN: [id number]    YOB: 1960  Age: 59 y.o. Sex: male       Cardiovascular Function/Vital Signs  Visit Vitals    /67    Pulse 62    Temp 36.6 °C (97.9 °F)    Resp 14    Wt 81.6 kg (180 lb)    SpO2 98%    BMI 27.37 kg/m2       Patient is status post general anesthesia for Procedure(s):  AORTIC ABDOMINAL ANEURYSM REPAIR ENDOVASCULAR (EVAR). Nausea/Vomiting: None    Postoperative hydration reviewed and adequate. Pain:  Pain Scale 1: Numeric (0 - 10) (06/13/17 1600)  Pain Intensity 1: 0 (06/13/17 1600)   Managed    Neurological Status:   Neuro (WDL): Within Defined Limits (06/13/17 1038)  Neuro  Neurologic State: Alert (06/13/17 1600)  Orientation Level: Oriented X4 (06/13/17 1600)  Cognition: Follows commands (06/13/17 1600)  Speech: Clear (06/13/17 1600)  Assessment L Pupil: Brisk;Round (06/13/17 1600)  Size L Pupil (mm): 3 (06/13/17 1600)  Assessment R Pupil: Brisk;Round (06/13/17 1600)  Size R Pupil (mm): 3 (06/13/17 1600)  LUE Motor Response: Purposeful (06/13/17 1600)  LLE Motor Response: Purposeful (06/13/17 1600)  RUE Motor Response: Purposeful (06/13/17 1600)  RLE Motor Response: Purposeful (06/13/17 1600)   At baseline    Mental Status and Level of Consciousness: Arousable    Pulmonary Status:   O2 Device: Nasal cannula (06/13/17 1500)   Adequate oxygenation and airway patent    Complications related to anesthesia: None    Post-anesthesia assessment completed.  No concerns    Signed By: Edilia Veliz MD     June 13, 2017

## 2017-06-15 ENCOUNTER — TELEPHONE (OUTPATIENT)
Dept: FAMILY MEDICINE CLINIC | Age: 57
End: 2017-06-15

## 2017-06-15 NOTE — TELEPHONE ENCOUNTER
Patient was given a savings card for Symbicort on 6-. I received a prior auth from pharmacy so I called the number to [address] this morning to let them know. I spoke with Ivonne Murcia and he is going to call the pharmacy and try to get this matter handled. He told me if I don't hear back from him, that means everything is ok. If I do hear back from him, then more information is required.

## 2017-06-20 ENCOUNTER — OFFICE VISIT (OUTPATIENT)
Dept: FAMILY MEDICINE CLINIC | Age: 57
End: 2017-06-20

## 2017-06-20 VITALS
BODY MASS INDEX: 27.68 KG/M2 | RESPIRATION RATE: 18 BRPM | WEIGHT: 182.6 LBS | DIASTOLIC BLOOD PRESSURE: 55 MMHG | SYSTOLIC BLOOD PRESSURE: 117 MMHG | OXYGEN SATURATION: 97 % | TEMPERATURE: 97.3 F | HEIGHT: 68 IN | HEART RATE: 50 BPM

## 2017-06-20 DIAGNOSIS — G89.29 OTHER CHRONIC PAIN: ICD-10-CM

## 2017-06-20 DIAGNOSIS — I71.40 AAA (ABDOMINAL AORTIC ANEURYSM) WITHOUT RUPTURE: Primary | ICD-10-CM

## 2017-06-20 DIAGNOSIS — G47.9 SLEEPING DIFFICULTY: ICD-10-CM

## 2017-06-20 DIAGNOSIS — Z98.890 S/P AAA (ABDOMINAL AORTIC ANEURYSM) REPAIR: ICD-10-CM

## 2017-06-20 DIAGNOSIS — K59.00 CONSTIPATION, UNSPECIFIED CONSTIPATION TYPE: ICD-10-CM

## 2017-06-20 DIAGNOSIS — I48.19 PERSISTENT ATRIAL FIBRILLATION (HCC): ICD-10-CM

## 2017-06-20 DIAGNOSIS — Z86.79 S/P AAA (ABDOMINAL AORTIC ANEURYSM) REPAIR: ICD-10-CM

## 2017-06-20 NOTE — MR AVS SNAPSHOT
Visit Information Date & Time Provider Department Dept. Phone Encounter #  
 6/20/2017  3:00 PM Bekah Sanchez PA-C [address] [phone] [id number] Follow-up Instructions Return in about 1 month (around 7/20/2017) for pain, sleep disorder. Your Appointments 6/26/2017 11:45 AM  
HOSPITAL DISCHARGE with Adelita Curiel MD  
[address] [place] [address] and [facility] [address] [place] [address]) Appt Note: DC EVAR  
 [address]  
[phone] [address] [place] [address]  
  
    
 6/29/2017 11:00 AM  
ROUTINE CARE with Bekah Sanchez PA-C [address] ([facility]) Appt Note: Return in about 1 month (around 7/2/2017) for chronic pain, AAA, med eval.  
 [place] [address] [address] [address]  
[phone]  
  
   
 [facility] [address] [address] Upcoming Health Maintenance Date Due Hepatitis C Screening 1960 Pneumococcal 19-64 Medium Risk (1 of 1 - PPSV23) 3/1/1977 DTaP/Tdap/Td series (1 - Tdap) 3/1/1979 FOBT Q 1 YEAR AGE 50-75 3/1/2008 INFLUENZA AGE 9 TO ADULT 8/1/2017 Allergies as of 6/20/2017  Review Complete On: 6/20/2017 By: Juliane Rodriguez, MIGUEL ANGEL, RT, NM, ARRT No Known Allergies Current Immunizations  Never Reviewed No immunizations on file. Not reviewed this visit You Were Diagnosed With   
  
 Codes Comments Sleeping difficulty     ICD-10-CM: G47.9 ICD-9-CM: 780.50 Vitals BP Pulse Temp Resp Height(growth percentile) Weight(growth percentile) 117/55 (BP 1 Location: Left arm, BP Patient Position: Sitting) (!) 50 97.3 °F (36.3 °C) (Oral) 18 5' 8\" (1.727 m) 182 lb 9.6 oz (82.8 kg) SpO2 BMI Smoking Status 97% 27.76 kg/m2 Current Every Day Smoker Vitals History BMI and BSA Data Body Mass Index Body Surface Area 27.76 kg/m 2 1.99 m 2 Preferred Pharmacy Pharmacy Name Phone [phone] [phone] Your Updated Medication List  
  
   
This list is accurate as of: 6/20/17  3:57 PM.  Always use your most recent med list.  
  
  
  
  
 atenolol 100 mg tablet Commonly known as:  TENORMIN Take 100 mg by mouth two (2) times a day. b complex vitamins tablet Take 1 Tab by mouth daily. bacitracin ointment Commonly known as:  BACITRACIN Apply  to affected area three (3) times daily. budesonide-formoterol 160-4.5 mcg/actuation HFA inhaler Commonly known as:  SYMBICORT Take 2 Puffs by inhalation two (2) times a day. CARTIA  mg ER capsule Generic drug:  dilTIAZem CD Take  by mouth two (2) times a day. CYMBALTA 30 mg capsule Generic drug:  DULoxetine Take 30 mg by mouth two (2) times a day. ergocalciferol 50,000 unit capsule Commonly known as:  ERGOCALCIFEROL Take 1 Cap by mouth every seven (7) days. gabapentin 300 mg capsule Commonly known as:  NEURONTIN Take 300 mg by mouth two (2) times a day. multivitamin, tx-iron-ca-min 9 mg iron-400 mcg Tab tablet Commonly known as:  THERA-M w/ IRON Take 1 Tab by mouth daily. nystatin topical cream  
Commonly known as:  MYCOSTATIN Apply  to affected area two (2) times a day. oxyCODONE ER 9 mg capsule Commonly known as:  Alexa Garza ER Take 1 Cap by mouth every twelve (12) hours. Max Daily Amount: 18 mg. Indications: Chronic Pain * oxyCODONE IR 10 mg Tab immediate release tablet Commonly known as:  Clista Mate Take 1 Tab by mouth every six (6) hours as needed. Max Daily Amount: 40 mg.  
  
 * oxyCODONE IR 10 mg Tab immediate release tablet Commonly known as:  Clista Mate Take 1 Tab by mouth every six (6) hours as needed (moderate pain). Max Daily Amount: 40 mg. oxyCODONE-acetaminophen  mg per tablet Commonly known as:  PERCOCET 10 Take 1 Tab by mouth every four (4) hours as needed for Pain. Max Daily Amount: 6 Tabs. PROAIR HFA 90 mcg/actuation inhaler Generic drug:  albuterol Take  by inhalation. rivaroxaban 20 mg Tab tablet Commonly known as:  Yan Connors Take 1 Tab by mouth daily (with breakfast). saw palmetto fruit 450 mg Cap Take  by mouth. simvastatin 40 mg tablet Commonly known as:  ZOCOR Take 1 Tab by mouth nightly. suvorexant 10 mg tablet Commonly known as:  Eden Hopkins Take on 30 minutes prior to bedtime as needed for insomnia. TAPAZOLE 5 mg tablet Generic drug:  methIMAzole Take 2.5 mg by mouth every three (3) days. * Notice: This list has 2 medication(s) that are the same as other medications prescribed for you. Read the directions carefully, and ask your doctor or other care provider to review them with you. Prescriptions Printed Refills  
 suvorexant (BELSOMRA) 10 mg tablet 0 Sig: Take on 30 minutes prior to bedtime as needed for insomnia. Class: Print Follow-up Instructions Return in about 1 month (around 7/20/2017) for pain, sleep disorder. Patient Instructions Endovascular Aortic Aneurysm Repair: What to Expect at Home Your Recovery Endovascular aortic aneurysm repair is a procedure to fix a weak and bulging section of the aorta. The aorta is the large blood vessel (artery) that carries blood from the heart through the belly to the rest of the body. The doctor put a man-made tube called a graft inside the aneurysm. Blood will pass through the graft in the aorta without pushing on the aneurysm. You can expect the cuts (incisions) in your groin to be sore for 1 to 2 weeks. If you have stitches or staples in your incisions, the doctor may need to take them out. You may feel more tired than usual for 1 to 2 weeks after surgery.  You may be able to do many of your usual activities after 1 to 2 weeks. But you will probably need up to 4 weeks to fully recover. Strenuous activities will not hurt the graft in your aorta, but they may cause problems with the incisions in your groin. You can be more active when your groin is no longer sore. This care sheet gives you a general idea about how long it will take for you to recover. But each person recovers at a different pace. Follow the steps below to get better as quickly as possible. How can you care for yourself at home? Activity · Rest when you feel tired. Getting enough sleep will help you recover. · Try to walk each day. Start by walking a little more than you did the day before. Bit by bit, increase the amount you walk. Walking boosts blood flow and helps prevent pneumonia and constipation. · Avoid strenuous activities, such as bicycle riding, jogging, weight lifting, or aerobic exercise. Your doctor will tell you when it's okay to do strenuous activity. · Ask your doctor when you can drive again. · You will probably need to take at least 1 to 2 weeks off from work. It depends on the type of work you do and how you feel. · You may shower as usual. Pat the incisions dry. Do not take a bath for the first 2 weeks, or until your doctor tells you it is okay. Diet · You can eat your normal diet. If your stomach is upset, try bland, low-fat foods like plain rice, broiled chicken, toast, and yogurt. · Drink plenty of fluids (unless your doctor tells you not to). · You may notice that your bowel movements are not regular right after your surgery. This is common. Try to avoid constipation and straining with bowel movements. You may want to take a fiber supplement every day. If you have not had a bowel movement after a couple of days, ask your doctor about taking a mild laxative. Medicines · Your doctor will tell you if and when you can restart your medicines.  He or she will also give you instructions about taking any new medicines. · If you take blood thinners, such as warfarin (Coumadin), clopidogrel (Plavix), or aspirin, be sure to talk to your doctor. He or she will tell you if and when to start taking those medicines again. Make sure that you understand exactly what your doctor wants you to do. · Be safe with medicines. Take pain medicines exactly as directed. ¨ If the doctor gave you a prescription medicine for pain, take it as prescribed. ¨ If you are not taking a prescription pain medicine, ask your doctor if you can take an over-the-counter medicine. ¨ Do not take two or more pain medicines at the same time unless the doctor told you to. Many pain medicines have acetaminophen, which is Tylenol. Too much acetaminophen (Tylenol) can be harmful. · If you think your pain medicine is making you sick to your stomach: 
¨ Take your medicine after meals (unless your doctor has told you not to). ¨ Ask your doctor for a different pain medicine. · If your doctor prescribed antibiotics, take them as directed. Do not stop taking them just because you feel better. You need to take the full course of antibiotics. Incision care · If you have strips of tape on the incisions, leave the tape on for a week or until it falls off. · Wash the area daily with water and pat it dry. Other cleaning products, such as hydrogen peroxide, can make the wounds heal more slowly. You may cover the area with a gauze bandage if it weeps or rubs against clothing. Change the bandage every day. · Keep the area clean and dry. Follow-up care is a key part of your treatment and safety. Be sure to make and go to all appointments, and call your doctor if you are having problems. It's also a good idea to know your test results and keep a list of the medicines you take. When should you call for help? Call 911 anytime you think you may need emergency care. For example, call if: · You passed out (lost consciousness). · You have severe trouble breathing. · You have sudden chest pain and shortness of breath, or you cough up blood or foamy, pink mucus. · You have a lump that is getting bigger under your skin where the incisions were made in your groin. · You have severe pain in your belly. · You have chest pain or pressure. This may occur with: ¨ Sweating. ¨ Shortness of breath. ¨ Nausea or vomiting. ¨ Pain that spreads from the chest to the neck, jaw, or one or both shoulders or arms. ¨ Dizziness or lightheadedness. ¨ A fast or uneven pulse. After calling 911, chew 1 adult-strength or 2 to 4 low-dose aspirin. Wait for an ambulance. Do not try to drive yourself. Call your doctor now or seek immediate medical care if: 
· You have new or increased shortness of breath. · You are dizzy or lightheaded, or you feel like you may faint. · You are sick to your stomach or cannot keep fluids down. · You have pain that does not get better after you take pain medicine. · You have a fever over 100°F. 
· You have loose stitches, or one of your incisions comes open. · Bright red blood has soaked through the bandage over your incisions. · You have signs of infection, such as: 
¨ Increased pain, swelling, warmth, or redness. ¨ Red streaks leading from the incisions. ¨ Pus draining from the incisions. ¨ Swollen lymph nodes in your neck, armpits, or groin. ¨ A fever. · You have signs of a blood clot, such as: 
¨ Pain in your calf, back of the knee, thigh, or groin. ¨ Redness and swelling in your leg or groin. Watch closely for any changes in your health, and be sure to contact your doctor if: 
· You have sudden weight gain, such as 3 pounds or more in 2 to 3 days. · You have increased swelling in your legs, ankles, or feet. Where can you learn more? Go to http://kayla.info/.  
Enter L455 in the search box to learn more about \"Endovascular Aortic Aneurysm Repair: What to Expect at Home. \" Current as of: March 20, 2017 Content Version: 11.3 © 9728-6594 [employer]. Care instructions adapted under license by [employer] (which disclaims liability or warranty for this information). If you have questions about a medical condition or this instruction, always ask your healthcare professional. [address] any warranty or liability for your use of this information. Introducing [facility]! Dear Indra Ornelas: 
Thank you for requesting a appssavvy account. Our records indicate that you already have an active appssavvy account. You can access your account anytime at [email or web address] Did you know that you can access your hospital and ER discharge instructions at any time in appssavvy? You can also review all of your test results from your hospital stay or ER visit. Additional Information If you have questions, please visit the Frequently Asked Questions section of the appssavvy website at [email or web address] Remember, appssavvy is NOT to be used for urgent needs. For medical emergencies, dial 911. Now available from your iPhone and Android! Please provide this summary of care documentation to your next provider. Your primary care clinician is listed as Angely Alejandar. If you have any questions after today's visit, please call [phone].

## 2017-06-20 NOTE — PROGRESS NOTES
HISTORY OF PRESENT ILLNESS  Susan Lanes is a 59 y.o. male. HPI  Susan Lanes is a 59 y.o. male who presents to the office today for AAA repair. Mr. Sharri Fisher is s/p AAA repair by Dr. Daiana Orozco on 6/13/2017. Details are described below. He was discharged on 6/14/2017in stable condition. He states he was having pain in both groins following discharge, but this has been improving. Because of this pain, He has needed to take 6 IR gregory, instead of his daily max of 4 tabs. So he will run out of medication prior to his next refill. There is bruising at the incision sites, but no pulsating masses or LE pain, numbness or swelling. He has restarted his medication, including Xarelto. He has a f/u appt with Dr. Khari Celis office on Monday 6/26/17. He can sleep much better at night since the surgery because he is not nervous anymore. He also states the Azalee Mix works well and has completely stopped using benadryl as a sleep aid. He has not heard from pain management yet. Last documentation shows they wanted his previous office notes from his Pain Management doctor in [place], along with prior imaging and lab results. My office has already sent this request.      Chief Complaint   Patient presents with    Abdominal Aortic Aneurysm     Current Outpatient Prescriptions on File Prior to Visit   Medication Sig Dispense Refill    ergocalciferol (ERGOCALCIFEROL) 50,000 unit capsule Take 1 Cap by mouth every seven (7) days. 12 Cap 0    oxyCODONE IR (ROXICODONE) 10 mg tab immediate release tablet Take 1 Tab by mouth every six (6) hours as needed. Max Daily Amount: 40 mg. 120 Tab 0    simvastatin (ZOCOR) 40 mg tablet Take 1 Tab by mouth nightly. 30 Tab 2    oxyCODONE ER (XTAMPZA ER) 9 mg capsule Take 1 Cap by mouth every twelve (12) hours. Max Daily Amount: 18 mg. Indications: Chronic Pain 60 Cap 0    budesonide-formoterol (SYMBICORT) 160-4.5 mcg/actuation HFA inhaler Take 2 Puffs by inhalation two (2) times a day.  1 Inhaler 2    DULoxetine (CYMBALTA) 30 mg capsule Take 30 mg by mouth two (2) times a day.  methIMAzole (TAPAZOLE) 5 mg tablet Take 2.5 mg by mouth every three (3) days.  atenolol (TENORMIN) 100 mg tablet Take 100 mg by mouth two (2) times a day.  gabapentin (NEURONTIN) 300 mg capsule Take 300 mg by mouth two (2) times a day.  dilTIAZem CD (CARTIA XT) 180 mg ER capsule Take  by mouth two (2) times a day.  saw palmetto fruit 450 mg cap Take  by mouth.  b complex vitamins tablet Take 1 Tab by mouth daily.  multivitamin, tx-iron-ca-min (THERA-M W/ IRON) 9 mg iron-400 mcg tab tablet Take 1 Tab by mouth daily.  albuterol (PROAIR HFA) 90 mcg/actuation inhaler Take  by inhalation.  rivaroxaban (XARELTO) 20 mg tab tablet Take 1 Tab by mouth daily (with breakfast). 30 Tab 11    oxyCODONE IR (ROXICODONE) 10 mg tab immediate release tablet Take 1 Tab by mouth every six (6) hours as needed (moderate pain). Max Daily Amount: 40 mg. 30 Tab 0     No current facility-administered medications on file prior to visit.       No Known Allergies  Past Medical History:   Diagnosis Date    AAA (abdominal aortic aneurysm) (HCC)     Alcoholism (HCC)     Atrial fibrillation (HCC)     Bradycardia     Chronic pain     Degenerative disc disease, cervical     Degenerative disc disease, lumbar     Hypertension     Hypokalemia     Pneumonia 2016    caused permanent L lung problems    Thyroid disease      History   Smoking Status    Current Every Day Smoker    Packs/day: 1.00   Smokeless Tobacco    Former User     History   Alcohol Use No     Comment: sober x 5 years     Family History   Problem Relation Age of Onset    Heart Disease Father     Heart Disease Brother      AAA-  in his 42's    Heart Disease Paternal Aunt     Heart Disease Paternal Uncle     Heart Disease Paternal Grandmother     Lupus Mother        Review of Systems   Constitutional: Negative for fever and malaise/fatigue. Respiratory: Negative for cough and shortness of breath. Cardiovascular: Negative for chest pain, palpitations, claudication and leg swelling. Pain at location of femoral incisions   Gastrointestinal: Positive for constipation. Negative for abdominal pain, blood in stool, nausea and vomiting. Musculoskeletal: Positive for joint pain. Chronic joint pain   Neurological: Negative for dizziness. Endo/Heme/Allergies: Does not bruise/bleed easily. Visit Vitals    /55 (BP 1 Location: Left arm, BP Patient Position: Sitting)    Pulse (!) 50    Temp 97.3 °F (36.3 °C) (Oral)    Resp 18    Ht 5' 8\" (1.727 m)    Wt 182 lb 9.6 oz (82.8 kg)    SpO2 97%    BMI 27.76 kg/m2     Physical Exam   Constitutional: He is oriented to person, place, and time. He appears well-developed and well-nourished. No distress. Slow walking with cane   HENT:   Mouth/Throat: Abnormal dentition. Malick Hails on tongue  Wears dentures (not at visit today)   Cardiovascular: Normal heart sounds. An irregularly irregular rhythm present. Bradycardia present. Pulses:       Radial pulses are 2+ on the right side, and 2+ on the left side. Pulmonary/Chest: Effort normal. No respiratory distress. He has wheezes. He has no rales. Abdominal: Normal appearance. He exhibits no abdominal bruit and no pulsatile midline mass. There is no hepatosplenomegaly. Musculoskeletal:   Trace LE edema   Neurological: He is alert and oriented to person, place, and time. Skin: Skin is warm and intact. Ecchymosis noted. Psychiatric: He has a normal mood and affect. His speech is normal and behavior is normal. Thought content normal.   Nursing note and vitals reviewed. Operative Report- Dr. Gabriella Dunlap 6/13/2017    PROCEDURES PERFORMED  1. Ultrasound-guided access common femoral arteries bilateral.  2. Aortogram.  3. Catheter in aorta.   4. Intravascular ultrasound of aorta, bilateral common iliac arteries and  external iliac arteries. 5. Endovascular aortic aneurysm repair performed with a bifurcated  modular device. 6. Distal extension of right common iliac artery with another stent. 7. Arterial closure of the right common femoral artery with 2 ProGlide  sutures. 8. Left common femoral artery arterial closure with 2 ProGlide sutures  and a 6-Maori Angio-Seal.    ASSESSMENT and PLAN    ICD-10-CM ICD-9-CM    1. AAA (abdominal aortic aneurysm) without rupture (HCC) I71.4 441.4    2. S/P AAA (abdominal aortic aneurysm) repair Z98.890 V45.89     Z86.79     3. Sleeping difficulty G47.9 780.50 suvorexant (BELSOMRA) 10 mg tablet   4. Other chronic pain G89.29 338.29    5. Persistent atrial fibrillation (HCC) I48.1 427.31    6. Constipation, unspecified constipation type K59.00 564.00       Healing well from AAA repair. Has f/u with vascular on Monday 6/26/17. I informed him that I cannot refill his Deanna prescription early. Will continue belsomra. I'm glad he does not need to take benadryl. Increase water and fiber intake to avoid straining with bowel movements and help improve constipation. Continue current medication regimen, including xarelto daily for atrial fibrillation. Will follow up on records request from Pain Management. Reviewed medication and side effects. Patient agrees with the plan and verbalizes understanding. Follow-up Disposition:  Return in about 1 month (around 7/20/2017) for pain, sleep disorder.     Ander Luciano PA-C  6/20/2017

## 2017-06-20 NOTE — PATIENT INSTRUCTIONS
Endovascular Aortic Aneurysm Repair: What to Expect at Home  Your Recovery  Endovascular aortic aneurysm repair is a procedure to fix a weak and bulging section of the aorta. The aorta is the large blood vessel (artery) that carries blood from the heart through the belly to the rest of the body. The doctor put a man-made tube called a graft inside the aneurysm. Blood will pass through the graft in the aorta without pushing on the aneurysm. You can expect the cuts (incisions) in your groin to be sore for 1 to 2 weeks. If you have stitches or staples in your incisions, the doctor may need to take them out. You may feel more tired than usual for 1 to 2 weeks after surgery. You may be able to do many of your usual activities after 1 to 2 weeks. But you will probably need up to 4 weeks to fully recover. Strenuous activities will not hurt the graft in your aorta, but they may cause problems with the incisions in your groin. You can be more active when your groin is no longer sore. This care sheet gives you a general idea about how long it will take for you to recover. But each person recovers at a different pace. Follow the steps below to get better as quickly as possible. How can you care for yourself at home? Activity  · Rest when you feel tired. Getting enough sleep will help you recover. · Try to walk each day. Start by walking a little more than you did the day before. Bit by bit, increase the amount you walk. Walking boosts blood flow and helps prevent pneumonia and constipation. · Avoid strenuous activities, such as bicycle riding, jogging, weight lifting, or aerobic exercise. Your doctor will tell you when it's okay to do strenuous activity. · Ask your doctor when you can drive again. · You will probably need to take at least 1 to 2 weeks off from work. It depends on the type of work you do and how you feel. · You may shower as usual. Pat the incisions dry.  Do not take a bath for the first 2 weeks, or until your doctor tells you it is okay. Diet  · You can eat your normal diet. If your stomach is upset, try bland, low-fat foods like plain rice, broiled chicken, toast, and yogurt. · Drink plenty of fluids (unless your doctor tells you not to). · You may notice that your bowel movements are not regular right after your surgery. This is common. Try to avoid constipation and straining with bowel movements. You may want to take a fiber supplement every day. If you have not had a bowel movement after a couple of days, ask your doctor about taking a mild laxative. Medicines  · Your doctor will tell you if and when you can restart your medicines. He or she will also give you instructions about taking any new medicines. · If you take blood thinners, such as warfarin (Coumadin), clopidogrel (Plavix), or aspirin, be sure to talk to your doctor. He or she will tell you if and when to start taking those medicines again. Make sure that you understand exactly what your doctor wants you to do. · Be safe with medicines. Take pain medicines exactly as directed. ¨ If the doctor gave you a prescription medicine for pain, take it as prescribed. ¨ If you are not taking a prescription pain medicine, ask your doctor if you can take an over-the-counter medicine. ¨ Do not take two or more pain medicines at the same time unless the doctor told you to. Many pain medicines have acetaminophen, which is Tylenol. Too much acetaminophen (Tylenol) can be harmful. · If you think your pain medicine is making you sick to your stomach:  ¨ Take your medicine after meals (unless your doctor has told you not to). ¨ Ask your doctor for a different pain medicine. · If your doctor prescribed antibiotics, take them as directed. Do not stop taking them just because you feel better. You need to take the full course of antibiotics.   Incision care  · If you have strips of tape on the incisions, leave the tape on for a week or until it falls off.  · Wash the area daily with water and pat it dry. Other cleaning products, such as hydrogen peroxide, can make the wounds heal more slowly. You may cover the area with a gauze bandage if it weeps or rubs against clothing. Change the bandage every day. · Keep the area clean and dry. Follow-up care is a key part of your treatment and safety. Be sure to make and go to all appointments, and call your doctor if you are having problems. It's also a good idea to know your test results and keep a list of the medicines you take. When should you call for help? Call 911 anytime you think you may need emergency care. For example, call if:  · You passed out (lost consciousness). · You have severe trouble breathing. · You have sudden chest pain and shortness of breath, or you cough up blood or foamy, pink mucus. · You have a lump that is getting bigger under your skin where the incisions were made in your groin. · You have severe pain in your belly. · You have chest pain or pressure. This may occur with:  ¨ Sweating. ¨ Shortness of breath. ¨ Nausea or vomiting. ¨ Pain that spreads from the chest to the neck, jaw, or one or both shoulders or arms. ¨ Dizziness or lightheadedness. ¨ A fast or uneven pulse. After calling 911, chew 1 adult-strength or 2 to 4 low-dose aspirin. Wait for an ambulance. Do not try to drive yourself. Call your doctor now or seek immediate medical care if:  · You have new or increased shortness of breath. · You are dizzy or lightheaded, or you feel like you may faint. · You are sick to your stomach or cannot keep fluids down. · You have pain that does not get better after you take pain medicine. · You have a fever over 100°F.  · You have loose stitches, or one of your incisions comes open. · Bright red blood has soaked through the bandage over your incisions. · You have signs of infection, such as:  ¨ Increased pain, swelling, warmth, or redness.   ¨ Red streaks leading from the incisions. ¨ Pus draining from the incisions. ¨ Swollen lymph nodes in your neck, armpits, or groin. ¨ A fever. · You have signs of a blood clot, such as:  ¨ Pain in your calf, back of the knee, thigh, or groin. ¨ Redness and swelling in your leg or groin. Watch closely for any changes in your health, and be sure to contact your doctor if:  · You have sudden weight gain, such as 3 pounds or more in 2 to 3 days. · You have increased swelling in your legs, ankles, or feet. Where can you learn more? Go to http://jeannine-kain.info/. Enter [phone] in the search box to learn more about \"Endovascular Aortic Aneurysm Repair: What to Expect at Home. \"  Current as of: March 20, 2017  Content Version: 11.3  © 0134-6229 [employer]. Care instructions adapted under license by [employer] (which disclaims liability or warranty for this information). If you have questions about a medical condition or this instruction, always ask your healthcare professional. [place] [address] any warranty or liability for your use of this information.

## 2017-06-20 NOTE — PROGRESS NOTES
Kiki Ramos is a 59 y.o. male here for follow up post surgery on AAA. Pain level is about an 8 right now. Was constipated due to holding it x 4 days before his surgery but it seems to be resolving now.

## 2017-06-21 RX ORDER — GABAPENTIN 300 MG/1
300 CAPSULE ORAL 2 TIMES DAILY
Qty: 60 CAP | Refills: 2 | Status: SHIPPED | OUTPATIENT
Start: 2017-06-21 | End: 2017-08-08

## 2017-06-26 ENCOUNTER — OFFICE VISIT (OUTPATIENT)
Dept: VASCULAR SURGERY | Age: 57
End: 2017-06-26

## 2017-06-26 VITALS
WEIGHT: 182 LBS | HEIGHT: 68 IN | DIASTOLIC BLOOD PRESSURE: 68 MMHG | BODY MASS INDEX: 27.58 KG/M2 | SYSTOLIC BLOOD PRESSURE: 122 MMHG | HEART RATE: 60 BPM | RESPIRATION RATE: 16 BRPM

## 2017-06-26 DIAGNOSIS — I71.40 AAA (ABDOMINAL AORTIC ANEURYSM) WITHOUT RUPTURE: Primary | ICD-10-CM

## 2017-06-26 RX ORDER — OXYCODONE HYDROCHLORIDE 10 MG/1
10 TABLET ORAL
Qty: 40 TAB | Refills: 0 | Status: SHIPPED | OUTPATIENT
Start: 2017-06-26 | End: 2017-08-10

## 2017-06-26 RX ORDER — OXYCODONE HYDROCHLORIDE 10 MG/1
10 TABLET ORAL
Qty: 40 TAB | Refills: 0 | Status: SHIPPED | OUTPATIENT
Start: 2017-06-26 | End: 2017-06-26 | Stop reason: ALTCHOICE

## 2017-06-26 NOTE — PROGRESS NOTES
Moreno Benton    Chief Complaint   Patient presents with    Surgical Follow-up       History and Physical    Moreno Benton is a 59 y.o. male who is now 2 weeks status post endovascular aortic aneurysm repair. Patient has been doing quite well overall. He does have some slight discomfort in bilateral groins but overall seems to be doing well. He did have some constipation after the surgery but this is since resolved. He was unfortunately never able to get any opioids after his surgery. Due to some paperwork mess up. So patient does need to have another opioid prescription. Past Medical History:   Diagnosis Date    AAA (abdominal aortic aneurysm) (HCC)     Alcoholism ([facility] [address]     Atrial fibrillation (formerly [facility])     Bradycardia     Chronic pain     Degenerative disc disease, cervical     Degenerative disc disease, lumbar     Hypertension     Hypokalemia     Pneumonia 2016    caused permanent L lung problems    Thyroid disease      Past Surgical History:   Procedure Laterality Date    HX ORTHOPAEDIC      neck fracture    HX ORTHOPAEDIC      lumbar fusion     Patient Active Problem List   Diagnosis Code    Atrial fibrillation (HCC) I48.91    Osteoarthritis of multiple joints M15.9    Chronic pain G89.29    Hyperthyroidism E05.90    History of abdominal aortic aneurysm (AAA) Z86.79    Family history of systemic lupus erythematosus (SLE) in mother Z80.2    History of drug use ([facility] [address] Z87.898    H/O alcohol abuse Z87.898    Nicotine use disorder F17.200    Depression F32.9    Essential hypertension I10    FH: CAD (coronary artery disease) Z80.55    AAA (abdominal aortic aneurysm) without rupture (formerly [facility]) I71.4     Current Outpatient Prescriptions   Medication Sig Dispense Refill    oxyCODONE IR (ROXICODONE) 10 mg tab immediate release tablet Take 1 Tab by mouth every six (6) hours as needed (moderate pain).  Max Daily Amount: 40 mg. 40 Tab 0    gabapentin (NEURONTIN) 300 mg capsule Take 1 Cap by mouth two (2) times a day. 60 Cap 2    suvorexant (BELSOMRA) 10 mg tablet Take on 30 minutes prior to bedtime as needed for insomnia. 30 Tab 0    ergocalciferol (ERGOCALCIFEROL) 50,000 unit capsule Take 1 Cap by mouth every seven (7) days. 12 Cap 0    oxyCODONE IR (ROXICODONE) 10 mg tab immediate release tablet Take 1 Tab by mouth every six (6) hours as needed. Max Daily Amount: 40 mg. 120 Tab 0    simvastatin (ZOCOR) 40 mg tablet Take 1 Tab by mouth nightly. 30 Tab 2    oxyCODONE ER (XTAMPZA ER) 9 mg capsule Take 1 Cap by mouth every twelve (12) hours. Max Daily Amount: 18 mg. Indications: Chronic Pain 60 Cap 0    budesonide-formoterol (SYMBICORT) 160-4.5 mcg/actuation HFA inhaler Take 2 Puffs by inhalation two (2) times a day. 1 Inhaler 2    DULoxetine (CYMBALTA) 30 mg capsule Take 30 mg by mouth two (2) times a day.  methIMAzole (TAPAZOLE) 5 mg tablet Take 2.5 mg by mouth every three (3) days.  atenolol (TENORMIN) 100 mg tablet Take 100 mg by mouth two (2) times a day.  dilTIAZem CD (CARTIA XT) 180 mg ER capsule Take  by mouth two (2) times a day.  saw palmetto fruit 450 mg cap Take  by mouth.  b complex vitamins tablet Take 1 Tab by mouth daily.  multivitamin, tx-iron-ca-min (THERA-M W/ IRON) 9 mg iron-400 mcg tab tablet Take 1 Tab by mouth daily.  albuterol (PROAIR HFA) 90 mcg/actuation inhaler Take  by inhalation.  rivaroxaban (XARELTO) 20 mg tab tablet Take 1 Tab by mouth daily (with breakfast). 30 Tab 11     No Known Allergies  Social History     Social History    Marital status:      Spouse name: N/A    Number of children: N/A    Years of education: N/A     Occupational History    Not on file.      Social History Main Topics    Smoking status: Current Every Day Smoker     Packs/day: 1.00    Smokeless tobacco: Former User    Alcohol use No      Comment: sober x 5 years    Drug use: No      Comment: former drug use    Sexual activity: Not on file Other Topics Concern    Not on file     Social History Narrative      Family History   Problem Relation Age of Onset    Heart Disease Father     Heart Disease Brother      AAA-  in his 42's    Heart Disease Paternal Aunt     Heart Disease Paternal Uncle     Heart Disease Paternal Grandmother     Lupus Mother        Physical Exam:    Visit Vitals    /68 (BP 1 Location: Left arm, BP Patient Position: Sitting)    Pulse 60    Resp 16    Ht 5' 8\" (1.727 m)    Wt 182 lb (82.6 kg)    BMI 27.67 kg/m2      General: Well-appearing male in no acute distress  HEENT: EOMI no scleral icterus is noted patient is wearing eyeglasses  Pulmonary: No increased work of breathing is noted  Extremities: Warm and well-perfused bilaterally the wounds are well-healed  Neuro: Cranial nerves II through XII are grossly intact    Impression and Plan:  Thom Soto is a 59 y.o. male who is now status post endovascular aortic aneurysm repair. Patient is doing quite well overall. He needs a repeat CTA in 2 weeks this will be to evaluate his stent. Depending on what that shows will depend on his future follow-up. We reviewed the plan with the patient and the patient understands. We also gave the patient appropriate instructions on their disease process and when to call back. Follow-up Disposition: Not on [facility], [place]    PLEASE NOTE:  This document has been produced using voice recognition software. Unrecognized errors in transcription may be present.

## 2017-07-06 DIAGNOSIS — G89.29 OTHER CHRONIC PAIN: ICD-10-CM

## 2017-07-06 DIAGNOSIS — M15.9 OSTEOARTHRITIS OF MULTIPLE JOINTS, UNSPECIFIED OSTEOARTHRITIS TYPE: ICD-10-CM

## 2017-07-06 RX ORDER — OXYCODONE HYDROCHLORIDE 10 MG/1
10 TABLET ORAL
Qty: 120 TAB | Refills: 0 | Status: SHIPPED | OUTPATIENT
Start: 2017-07-06 | End: 2017-08-10

## 2017-07-06 NOTE — TELEPHONE ENCOUNTER
Patient will be out on the 10 or 11 and they know [facility] is out of the office until Tuesday.

## 2017-07-07 ENCOUNTER — HOSPITAL ENCOUNTER (OUTPATIENT)
Dept: LAB | Age: 57
Discharge: HOME | End: 2017-07-07
Payer: COMMERCIAL

## 2017-07-07 ENCOUNTER — CLINICAL SUPPORT (OUTPATIENT)
Dept: FAMILY MEDICINE CLINIC | Age: 57
End: 2017-07-07

## 2017-07-07 DIAGNOSIS — G89.29 OTHER CHRONIC PAIN: ICD-10-CM

## 2017-07-07 DIAGNOSIS — G89.29 OTHER CHRONIC PAIN: Primary | ICD-10-CM

## 2017-07-07 PROCEDURE — 80307 DRUG TEST PRSMV CHEM ANLYZR: CPT | Performed by: FAMILY MEDICINE

## 2017-07-10 ENCOUNTER — HOSPITAL ENCOUNTER (OUTPATIENT)
Dept: CT IMAGING | Age: 57
Discharge: HOME | End: 2017-07-10
Attending: SURGERY
Payer: COMMERCIAL

## 2017-07-10 DIAGNOSIS — I71.40 AAA (ABDOMINAL AORTIC ANEURYSM) WITHOUT RUPTURE: ICD-10-CM

## 2017-07-10 LAB — CREAT UR-MCNC: 1.2 MG/DL (ref 0.6–1.3)

## 2017-07-10 PROCEDURE — 74011636320 HC RX REV CODE- 636/320: Performed by: SURGERY

## 2017-07-10 PROCEDURE — 82565 ASSAY OF CREATININE: CPT

## 2017-07-10 PROCEDURE — 74174 CTA ABD&PLVS W/CONTRAST: CPT

## 2017-07-10 RX ADMIN — IOPAMIDOL 100 ML: 755 INJECTION, SOLUTION INTRAVENOUS at 08:22

## 2017-07-12 ENCOUNTER — OFFICE VISIT (OUTPATIENT)
Dept: VASCULAR SURGERY | Age: 57
End: 2017-07-12

## 2017-07-12 VITALS
HEIGHT: 68 IN | BODY MASS INDEX: 27.58 KG/M2 | WEIGHT: 182 LBS | RESPIRATION RATE: 20 BRPM | DIASTOLIC BLOOD PRESSURE: 70 MMHG | HEART RATE: 64 BPM | SYSTOLIC BLOOD PRESSURE: 132 MMHG

## 2017-07-12 DIAGNOSIS — I71.40 AAA (ABDOMINAL AORTIC ANEURYSM) WITHOUT RUPTURE: Primary | ICD-10-CM

## 2017-07-12 DIAGNOSIS — I73.9 PAD (PERIPHERAL ARTERY DISEASE) (HCC): ICD-10-CM

## 2017-07-12 NOTE — PROGRESS NOTES
Zenon Castellon    Chief Complaint   Patient presents with    Surgical Follow-up       History and Physical    Zenon Castellon is a 59 y.o. male who is now ~1 month status post endovascular aortic aneurysm repair. Patient has been doing quite well overall. He denies any pain outside of his chronic back and neck pain. He is a chronic pain patient due to long history of back issues and multiple surgeries. He states the bruising at the bilateral groin incisions has resolved and incisions are healing well. No fever/chills. He presents today after f/u CTA scan which showed endograft is patent with no signs of endoleak. Past Medical History:   Diagnosis Date    [facility] aortic aneurysm) [facility])     Alcoholism ([facility]     Atrial fibrillation ([facility])     Bradycardia     Chronic pain     Degenerative disc disease, cervical     Degenerative disc disease, lumbar     Hypertension     Hypokalemia     Pneumonia 2016    caused permanent L lung problems    Thyroid disease      Past Surgical History:   Procedure Laterality Date    HX ORTHOPAEDIC      neck fracture    HX ORTHOPAEDIC      lumbar fusion     Patient Active Problem List   Diagnosis Code    Atrial fibrillation (HCC) I48.91    Osteoarthritis of multiple joints M15.9    Chronic pain G89.29    Hyperthyroidism E05.90    History of abdominal aortic aneurysm (AAA) Z86.79    Family history of systemic lupus erythematosus (SLE) in mother Z80.2    History of drug use ([facility] [facility] Z87.898    H/O alcohol abuse Z87.898    Nicotine use disorder F17.200    Depression F32.9    Essential hypertension I10    FH: CAD (coronary artery disease) Z80.55    AAA (abdominal aortic aneurysm) without rupture ([facility]) I71.4     Current Outpatient Prescriptions   Medication Sig Dispense Refill    oxyCODONE IR (ROXICODONE) 10 mg tab immediate release tablet Take 1 Tab by mouth every six (6) hours as needed.  Max Daily Amount: 40 mg. 120 Tab 0    oxyCODONE IR (Monica Bryant) 10 mg tab immediate release tablet Take 1 Tab by mouth every four (4) hours as needed. Max Daily Amount: 60 mg. 40 Tab 0    gabapentin (NEURONTIN) 300 mg capsule Take 1 Cap by mouth two (2) times a day. 60 Cap 2    suvorexant (BELSOMRA) 10 mg tablet Take on 30 minutes prior to bedtime as needed for insomnia. 30 Tab 0    ergocalciferol (ERGOCALCIFEROL) 50,000 unit capsule Take 1 Cap by mouth every seven (7) days. 12 Cap 0    simvastatin (ZOCOR) 40 mg tablet Take 1 Tab by mouth nightly. 30 Tab 2    oxyCODONE ER (XTAMPZA ER) 9 mg capsule Take 1 Cap by mouth every twelve (12) hours. Max Daily Amount: 18 mg. Indications: Chronic Pain 60 Cap 0    budesonide-formoterol (SYMBICORT) 160-4.5 mcg/actuation HFA inhaler Take 2 Puffs by inhalation two (2) times a day. 1 Inhaler 2    DULoxetine (CYMBALTA) 30 mg capsule Take 30 mg by mouth two (2) times a day.  methIMAzole (TAPAZOLE) 5 mg tablet Take 2.5 mg by mouth every three (3) days.  atenolol (TENORMIN) 100 mg tablet Take 100 mg by mouth two (2) times a day.  dilTIAZem CD (CARTIA XT) 180 mg ER capsule Take  by mouth two (2) times a day.  saw palmetto fruit 450 mg cap Take  by mouth.  b complex vitamins tablet Take 1 Tab by mouth daily.  multivitamin, tx-iron-ca-min (THERA-M W/ IRON) 9 mg iron-400 mcg tab tablet Take 1 Tab by mouth daily.  albuterol (PROAIR HFA) 90 mcg/actuation inhaler Take  by inhalation.  rivaroxaban (XARELTO) 20 mg tab tablet Take 1 Tab by mouth daily (with breakfast). 30 Tab 11     No Known Allergies  Social History     Social History    Marital status:      Spouse name: N/A    Number of children: N/A    Years of education: N/A     Occupational History    Not on file.      Social History Main Topics    Smoking status: Current Every Day Smoker     Packs/day: 1.00    Smokeless tobacco: Former User    Alcohol use No      Comment: sober x 5 years    Drug use: No      Comment: former drug use    Sexual activity: Not on file     Other Topics Concern    Not on file     Social History Narrative      Family History   Problem Relation Age of Onset    Heart Disease Father     Heart Disease Brother      AAA-  in his 42's    Heart Disease Paternal Aunt     Heart Disease Paternal Uncle     Heart Disease Paternal Grandmother     Lupus Mother        Physical Exam:    Visit Vitals    /70 (BP 1 Location: Left arm, BP Patient Position: Sitting)    Pulse 64    Resp 20    Ht 5' 8\" (1.727 m)    Wt 182 lb (82.6 kg)    BMI 27.67 kg/m2      General: Well-appearing male in no acute distress. Pt is in wheelchair today. HEENT: EOMI no scleral icterus is noted patient is wearing eyeglasses  Pulmonary: No increased work of breathing is noted  Extremities: Warm and well-perfused bilaterally. No significant BLE edema. Neuro: Cranial nerves II through XII are grossly intact    Impression and Plan:  Judie Moore is a 59 y.o. male who is now status post endovascular aortic aneurysm repair. Patient is doing quite well overall. CTA shows graft is patent with no signs of endoleak. Discussed we will switch him over to ultrasounds at this point for continued surveillance. He will f/u in 6 months with f/u US. Sooner as needed. We reviewed the plan with the patient and his daughter. Both express understanding and agree. Follow-up Disposition:  Return in about 6 months (around 2018). [address], [address]    PLEASE NOTE:  This document has been produced using voice recognition software. Unrecognized errors in transcription may be present.

## 2017-07-15 LAB
DRUGS UR: NORMAL
PDF, 451356: NORMAL

## 2017-07-17 DIAGNOSIS — G47.9 SLEEPING DIFFICULTY: ICD-10-CM

## 2017-07-17 RX ORDER — IBUPROFEN 800 MG/1
800 TABLET ORAL
OUTPATIENT
Start: 2017-07-17

## 2017-07-17 RX ORDER — ALBUTEROL SULFATE 90 UG/1
AEROSOL, METERED RESPIRATORY (INHALATION)
Qty: 1 INHALER | OUTPATIENT
Start: 2017-07-17

## 2017-07-17 NOTE — TELEPHONE ENCOUNTER
Call from pt's daughter, she states pt has had swelling in feet and ankles and it is sore for him to walk, wants to know if he should be on a fluid pill. She is also requesting 800mg Motrin because he is currenlty taking 4 200mg Mortin at a time.

## 2017-07-18 ENCOUNTER — TELEPHONE (OUTPATIENT)
Dept: FAMILY MEDICINE CLINIC | Age: 57
End: 2017-07-18

## 2017-07-18 NOTE — TELEPHONE ENCOUNTER
Daughter is wondering if the med he is taking for his hyperthyroidism may be causing extreme swelling that starts right below the knee to his feet. She states there is redness and she is concerned about blood clots following having stents put in. She also says his hands are swollen too. He is taking Motrin which does seem to help. Please advise.

## 2017-07-18 NOTE — PROGRESS NOTES
Please call and get an updated list of all current medications he is taking, including anything over the counter

## 2017-07-19 RX ORDER — IBUPROFEN 800 MG/1
800 TABLET ORAL
OUTPATIENT
Start: 2017-07-19

## 2017-07-19 RX ORDER — ALBUTEROL SULFATE 90 UG/1
2 AEROSOL, METERED RESPIRATORY (INHALATION)
Qty: 1 INHALER | Refills: 1 | Status: SHIPPED | OUTPATIENT
Start: 2017-07-19

## 2017-07-19 NOTE — TELEPHONE ENCOUNTER
Patient's daughter states the OTC Motrin is helping. She would like a script called in for 1 800 mg tab to cut down on the number of pills he takes.

## 2017-07-19 NOTE — TELEPHONE ENCOUNTER
Spoke with daughter. She says his legs are better if he stays off of them. She was advised if she has concerns of blood clot to go to the ED or to call Dr. César Reece office to see if they can get him in sooner. He currently has a 6 month follow up scheduled.

## 2017-07-20 RX ORDER — NYSTATIN 100000 [USP'U]/ML
1 SUSPENSION ORAL 4 TIMES DAILY
Qty: 150 ML | Refills: 0 | Status: SHIPPED | OUTPATIENT
Start: 2017-07-20 | End: 2017-08-08

## 2017-07-20 NOTE — TELEPHONE ENCOUNTER
Taking any NSAID in addition to the xarelto can increase risk of bleed. He is already taking IR oxycodone for break through pain.

## 2017-07-25 ENCOUNTER — TELEPHONE (OUTPATIENT)
Dept: FAMILY MEDICINE CLINIC | Age: 57
End: 2017-07-25

## 2017-07-25 NOTE — TELEPHONE ENCOUNTER
Patient's spouse Zachery Kelle) called to see if her , Codie Leonard, could have a referral to a pediatrist. Please call the patient back when possible.

## 2017-07-26 DIAGNOSIS — B35.1 ONYCHOMYCOSIS: Primary | ICD-10-CM

## 2017-07-31 DIAGNOSIS — M15.9 OSTEOARTHRITIS OF MULTIPLE JOINTS, UNSPECIFIED OSTEOARTHRITIS TYPE: ICD-10-CM

## 2017-07-31 DIAGNOSIS — G89.29 OTHER CHRONIC PAIN: ICD-10-CM

## 2017-08-01 ENCOUNTER — TELEPHONE (OUTPATIENT)
Dept: FAMILY MEDICINE CLINIC | Age: 57
End: 2017-08-01

## 2017-08-01 NOTE — TELEPHONE ENCOUNTER
Please Ronda Pino let the patient know that his oxycodone was filled on the 8 th of July and it was a 30-day prescription, so it will run out on the 7 th, therefore it is too soon and Hilda Lerma is back in the office on the 8 th. Also I spoke with Hilda Lerma this morning; she told me that patient has a pain contract in the chart and she explained to him that all medication found in the UD test must match those prescribed to him otherwise the contract will be void. The result of the Urine Drug test showed that he had Amphetamine and Oxazepam in his system and none of those medications are on the medication list he provided or the list received from his local pharmacy. Therefore I can not refill his medication. He will wait to see Hilda Lerma when she comes back on Tuesday. Thank you.

## 2017-08-01 NOTE — TELEPHONE ENCOUNTER
Spoke to pt's wife and made her aware of all this. She states that she will speak to her  when he wakes up to see what he has to say about it.

## 2017-08-01 NOTE — TELEPHONE ENCOUNTER
Pt called stated jet had conversations earlier today and wanted to talk to you more, pt didn't want to go into detail of what he wanted talk about. He just asked for you to call him.

## 2017-08-01 NOTE — TELEPHONE ENCOUNTER
Pt called. Gave him the message as well. He does not know what could be showing up because he does not take anything other than what's on his med list. He has taken sudafed and benadryl, explained to pt that neither of these are in the drug category that we are referring to. He admits that he took half of one of his daughter's aderrall's but he does not know what could be the issue with the Oxazepam because he doesn't take anything else. Advised pt that I understand his frustration but there isn't anything I can do in regards to this. He will have to wait until Angel Frank comes back in the office, even then I cannot promise that she will give any refills. Pt states he doesn't know what to do, he is scared that he is going to go through withdrawals and would like to know if he can be given anything to prevent withdrawals.

## 2017-08-02 ENCOUNTER — TELEPHONE (OUTPATIENT)
Dept: FAMILY MEDICINE CLINIC | Age: 57
End: 2017-08-02

## 2017-08-02 NOTE — TELEPHONE ENCOUNTER
Daughter called stating that when her father moved here in April, she gave him one of her Adderall 30 mg and cut it into 1/4. She thought is would help with the business of the move. She was unaware that the patient took 1/4 the day before his urine drug screen. I asked about the Oxazepam and she told me that he had previously been on Xanax for sleep but she was almost certain he took his last one in March. The daughter is very concerned that her father will go into withdraws and with his heart condition, she is worried about him having a possible heart attack. I advised daughter if things get too bad for her father between now and Tuesday when Toya Dinero gets back in the office, to take him to the ER for evaluation. She expressed verbal understanding.

## 2017-08-03 ENCOUNTER — TELEPHONE (OUTPATIENT)
Dept: FAMILY MEDICINE CLINIC | Age: 57
End: 2017-08-03

## 2017-08-03 ENCOUNTER — DOCUMENTATION ONLY (OUTPATIENT)
Dept: FAMILY MEDICINE CLINIC | Age: 57
End: 2017-08-03

## 2017-08-03 NOTE — TELEPHONE ENCOUNTER
Patient's daughter called asking if her dad can be prescribed Clonidine to help with his withdraws. He started with the cold sweats last night. She knows she can't give him Ibuprofen but wanted to know if he can take Tylenol. She was advised that he could have no more than 4,000 mg in a 24 hour period. She expressed verbal understanding. She is aware that I will call be back about the Clonidine.

## 2017-08-08 ENCOUNTER — OFFICE VISIT (OUTPATIENT)
Dept: FAMILY MEDICINE CLINIC | Age: 57
End: 2017-08-08

## 2017-08-08 VITALS
SYSTOLIC BLOOD PRESSURE: 147 MMHG | BODY MASS INDEX: 27.13 KG/M2 | DIASTOLIC BLOOD PRESSURE: 86 MMHG | WEIGHT: 179 LBS | HEART RATE: 67 BPM | TEMPERATURE: 98.2 F | HEIGHT: 68 IN | OXYGEN SATURATION: 100 % | RESPIRATION RATE: 18 BRPM

## 2017-08-08 DIAGNOSIS — M15.9 OSTEOARTHRITIS OF MULTIPLE JOINTS, UNSPECIFIED OSTEOARTHRITIS TYPE: ICD-10-CM

## 2017-08-08 DIAGNOSIS — G89.29 OTHER CHRONIC PAIN: Primary | ICD-10-CM

## 2017-08-08 DIAGNOSIS — R82.5 POSITIVE URINE DRUG SCREEN: ICD-10-CM

## 2017-08-08 RX ORDER — CLONIDINE HYDROCHLORIDE 0.1 MG/1
TABLET ORAL 2 TIMES DAILY
COMMUNITY
End: 2017-10-20

## 2017-08-08 RX ORDER — ASCORBIC ACID 250 MG
240 TABLET ORAL DAILY
COMMUNITY
End: 2018-05-29

## 2017-08-08 RX ORDER — LORAZEPAM 0.5 MG/1
TABLET ORAL
COMMUNITY
End: 2017-08-10

## 2017-08-08 RX ORDER — ACETAMINOPHEN 500 MG
1000 TABLET ORAL
COMMUNITY
End: 2017-10-20

## 2017-08-08 RX ORDER — PROMETHAZINE HYDROCHLORIDE 25 MG/1
25 TABLET ORAL
COMMUNITY
End: 2017-08-10

## 2017-08-08 RX ORDER — GABAPENTIN 300 MG/1
300 CAPSULE ORAL 3 TIMES DAILY
Qty: 90 CAP | Refills: 1 | Status: SHIPPED | OUTPATIENT
Start: 2017-08-08 | End: 2017-10-03 | Stop reason: SDUPTHER

## 2017-08-08 RX ORDER — FISH OIL/DHA/EPA 1200-144MG
1 CAPSULE ORAL DAILY
COMMUNITY
End: 2021-02-01

## 2017-08-08 RX ORDER — ZINC GLUCONATE 50 MG
1 TABLET ORAL DAILY
COMMUNITY
End: 2018-05-29

## 2017-08-08 RX ORDER — CETIRIZINE HCL 10 MG
10 TABLET ORAL DAILY
COMMUNITY
End: 2017-11-29

## 2017-08-08 RX ORDER — HYDROXYZINE PAMOATE 25 MG/1
25 CAPSULE ORAL
COMMUNITY
End: 2017-08-08

## 2017-08-08 RX ORDER — DIPHENHYDRAMINE HCL 25 MG
25 CAPSULE ORAL
COMMUNITY
End: 2017-10-20

## 2017-08-08 NOTE — PATIENT INSTRUCTIONS
Developing a Pain Management Plan: Care Instructions  Your Care Instructions  Some diseases and injuries can cause pain that lasts a long time. You don't need to live with uncontrolled pain. A pain management plan helps you find ways to control pain with side effects you can live with. There are things you can do to help with pain. Only you know how much pain you feel. Constant pain can make you depressed. It can cause stress and make it hard for you to eat and sleep. But there are ways to control the pain. They can help you stay active, improve your mood, and heal faster. Your plan can include many types of pain control. You may take prescription or over-the-counter drugs. You can also try physical treatments and behavioral methods. Some medical treatments also help with pain. For example, radiation can be used to reduce pain from bone cancer. You and your doctor will work to make your plan. Be sure to read it often. Change it if your pain is not under control. Follow-up care is a key part of your treatment and safety. Be sure to make and go to all appointments, and call your doctor if you are having problems. It's also a good idea to know your test results and keep a list of the medicines you take. How can you care for yourself at home? Physical treatments  · Be safe with medicines. Take pain medicines exactly as directed. ¨ If the doctor gave you a prescription medicine for pain, take it exactly as prescribed. ¨ If you are not taking a prescription pain medicine, ask your doctor if you can take an over-the-counter medicine. · If your pain medicine causes side effects such as constipation or nausea, you may need to take other medicines for those problems. Talk to your doctor about any side effects you have. · Hot or cold compresses applied directly to the skin can help sore muscles. Put ice or a cold pack on the painful area for 10 to 20 minutes at a time.  Put a thin cloth between the ice and your skin. You may find that it helps to switch between cold and heat. Try a heating pad set on low or a warm cloth on the area for 10 to 15 minutes at a time. · Hydrotherapy uses flowing water to relax muscles. You may want to sit in a hot tub or a steam bath. Or use a shower or a sitz bath to help your pain. · Massage therapy is rubbing the soft tissues of the body. It eases tension and pain. It also improves blood flow and helps you relax. · Transcutaneous electrical nerve stimulation (TENS) uses a gentle electric current applied to the skin for pain relief. You can use TENS at home after you learn how. · Acupuncture is a form of traditional [facility] medicine. It uses very thin needles inserted into certain points of the body. It is done by a person with special training (acupuncturist). · If you get physical therapy, make sure to do any home exercises or stretching your therapist has prescribed. · Stay as active as you can. Try to get some physical activity every day. Behavioral treatments  · Biofeedback teaches you to control a body function that you normally don't think about. These are things like skin temperature, heart rate, and blood pressure. At first, you will use a machine to give you feedback on the function you want to control. Then a therapist will teach you what to do next. Over time, you can stop using the machine. · Breathing techniques can help you relax and get rid of tension. · Guided imagery is a series of thoughts and images that can focus your attention away from your pain. When you do it, you use all your senses to help your body respond as though what you are imagining is real.  · Hypnosis is a state of focused concentration that makes you less aware of your surroundings. It may cause your brain to release chemicals that relieve pain. You can have a therapist help you through hypnosis. Or you can learn to use it on yourself. · Cognitive behavioral therapy is a type of counseling.  It helps you change your thought patterns. Changes in your thoughts can help change your behavior and the way you perceive your body. Other treatments and ideas  · Aromatherapy uses the scent of oils obtained from plants to help you relax or to relieve stress. · Meditation focuses your attention to give a clear awareness of your life. You sit quietly, focus on one image or sound, and breathe deeply. · Yoga uses stretching and exercises (called postures) to reduce stress and improve flexibility and health. · Keep track of your pain in a pain diary. This can help you understand how the things you do affect your pain. · In rare cases, your doctor may advise you to get a nerve block to help with pain. A nerve block is a shot of medicine to interrupt pain signals to your brain. · Some hospitals have special pain clinics or centers. Your doctor may refer you to a pain clinic or center. Or you can ask your doctor about them. Where can you learn more? Go to http://jeannine-kain.info/. Enter U874 in the search box to learn more about \"Developing a Pain Management Plan: Care Instructions. \"  Current as of: October 14, 2016  Content Version: 11.3  © 4008-1822 "[employer]. Care instructions adapted under license by [employer] (which disclaims liability or warranty for this information). If you have questions about a medical condition or this instruction, always ask your healthcare professional. [address] any warranty or liability for your use of this information.

## 2017-08-08 NOTE — PROGRESS NOTES
HISTORY OF PRESENT ILLNESS  Iain Poole is a 59 y.o. male. HPI  Iain Poole is a 59 y.o. male who presents to the office today for chronic pain. Mr Khai Webb comes in today to discuss the results of his urine drug screen. He tested positive for amphetamines and benzo's. He admits that he did take 1/4 tablet of adderall which is not prescribed to him, but is his daughter's prescription. He denies Benzo use, and says that it was his Gabapentin that caused the positive benzo result. This communication occurred while I was out of the office all last week. Our NP declined oxycodone refills due to the discrepancy in his urine drug screen compared to his updated medication list. She did prescribe him a withdrawal cocktail, and his daughter states this worked very well for him. His last dose of Oxy ER was last Wednesday and he took his last Oxy IR yesterday. He says yesterday he started to feel better, but obviously pain has increased. Today he is asking to repeat his urine drug screen because he has not taken any medication outside of his current med list since his last drug screen. He and his daughter have done research online and have found that Gabapentin can cause Benzo's to come back positive in a UDS, however, my office called [facility] lab last week where the urine drug screen was performed and they informed us that Gabapentin will not cause Benzo to come back positive. We confirmed they had an updated medication list of his current meds and it was accurate. Today we reviewed the pain contract Mr. Khai Webb signed back in June. He was already referred to pain management and has his initial appointment at the end of this month. Chief Complaint   Patient presents with    Hypertension     Current Outpatient Prescriptions   Medication Sig    cloNIDine HCl (CATAPRES) 0.1 mg tablet Take  by mouth two (2) times a day.     acetaminophen (TYLENOL EXTRA STRENGTH) 500 mg tablet Take 1,000 mg by mouth every six (6) hours as needed for Pain.  TURMERIC, BULK, by Does Not Apply route.  ascorbic acid, vitamin C, (VITAMIN C) 250 mg tablet Take 240 mg by mouth.  potassium 99 mg tablet Take 99 mg by mouth daily.  Magnesium Oxide 500 mg cap Take  by mouth.  fish oil-dha-epa 1,200-144-216 mg cap Take  by mouth daily.  cetirizine (ZYRTEC) 10 mg tablet Take  by mouth.  gabapentin (NEURONTIN) 300 mg capsule Take 1 Cap by mouth three (3) times daily.  diphenhydrAMINE (BENADRYL) 25 mg capsule Take 25 mg by mouth every six (6) hours as needed.  albuterol (PROAIR HFA) 90 mcg/actuation inhaler Take 2 Puffs by inhalation every four (4) hours as needed for Wheezing.  ergocalciferol (ERGOCALCIFEROL) 50,000 unit capsule Take 1 Cap by mouth every seven (7) days.  budesonide-formoterol (SYMBICORT) 160-4.5 mcg/actuation HFA inhaler Take 2 Puffs by inhalation two (2) times a day.  DULoxetine (CYMBALTA) 30 mg capsule Take 30 mg by mouth two (2) times a day.  methIMAzole (TAPAZOLE) 5 mg tablet Take 2.5 mg by mouth every three (3) days.  atenolol (TENORMIN) 100 mg tablet Take 100 mg by mouth two (2) times a day.  dilTIAZem CD (CARTIA XT) 180 mg ER capsule Take  by mouth two (2) times a day.  b complex vitamins tablet Take 1 Tab by mouth daily.  multivitamin, tx-iron-ca-min (THERA-M W/ IRON) 9 mg iron-400 mcg tab tablet Take 1 Tab by mouth daily.  rivaroxaban (XARELTO) 20 mg tab tablet Take 1 Tab by mouth daily (with breakfast). No current facility-administered medications for this visit.         No Known Allergies  Past Medical History:   Diagnosis Date    AAA (abdominal aortic aneurysm) (HCC)     Alcoholism (HCC)     Atrial fibrillation (HCC)     Bradycardia     Chronic pain     Degenerative disc disease, cervical     Degenerative disc disease, lumbar     Hypertension     Hypokalemia     Pneumonia 2016    caused permanent L lung problems    Thyroid disease      History Smoking Status    Current Every Day Smoker    Packs/day: 1.00   Smokeless Tobacco    Former User     History   Alcohol Use No     Comment: sober x 5 years     Family History   Problem Relation Age of Onset    Heart Disease Father     Heart Disease Brother      AAA-  in his 42's    Heart Disease Paternal Aunt     Heart Disease Paternal Uncle     Heart Disease Paternal Grandmother     Lupus Mother      Review of Systems   Constitutional: Negative for chills, diaphoresis, fever and malaise/fatigue. Eyes: Negative for blurred vision. Respiratory: Negative for shortness of breath. Cardiovascular: Negative for chest pain and palpitations. Gastrointestinal: Negative for abdominal pain, diarrhea, nausea and vomiting. Musculoskeletal: Positive for back pain, joint pain and myalgias. Negative for falls. Chronic pain   Skin: Negative for itching. Neurological: Negative for dizziness, tremors, seizures, loss of consciousness, weakness and headaches. Psychiatric/Behavioral: Negative for suicidal ideas. The patient is not nervous/anxious and does not have insomnia. Sleeping much better     Visit Vitals    /86 (BP 1 Location: Right arm, BP Patient Position: Sitting)    Pulse 67    Temp 98.2 °F (36.8 °C) (Oral)    Resp 18    Ht 5' 8\" (1.727 m)    Wt 179 lb (81.2 kg)    SpO2 100%    BMI 27.22 kg/m2     Physical Exam   Constitutional: He is oriented to person, place, and time. He appears well-developed and well-nourished. Walking slowly with cane   Cardiovascular: Normal rate. Pulmonary/Chest: Effort normal. No respiratory distress. Neurological: He is alert and oriented to person, place, and time. Psychiatric: He has a normal mood and affect. His behavior is normal. Thought content normal.   Nursing note and vitals reviewed. ASSESSMENT and PLAN    ICD-10-CM ICD-9-CM    1. Other chronic pain G89.29 338.29 gabapentin (NEURONTIN) 300 mg capsule   2.  Positive urine drug screen R82.5 796.0    3. Osteoarthritis of multiple joints, unspecified osteoarthritis type M15.9 715.89       I will call our lab again and speak with someone who will go through each medication on his med list and compare these to his UDS results. I will call Mr. Radha Dsouza as soon as this is accomplished. I will not order a repeat UDS today because this would not be a random test.  was reviewed and no prescriptions from outside providers. At this time I will not refill any controlled substances. I have increased gabapentin to 300mg TID. He needs to avoid NSAID use since he is taking a blood thinner as this will increase his risk of bleed. I have spent over 25 minutes in face to face time with this pt in discussion with respect to the aforementioned problems, diagnoses and management plans. >50% of the time was spent counseling and coordinating care. Reviewed medication and side effects. Patient agrees with the plan and verbalizes understanding. Follow-up Disposition:  Return if symptoms worsen or fail to improve.     Demetri Coker PA-C  8/8/2017

## 2017-08-08 NOTE — PROGRESS NOTES
Palma Turner is a 59 y.o. male here today for a follow up on his HTN and pain. Learning assessment previously completed. 1. Have you been to the ER, urgent care clinic or hospitalized since your last visit? no    2. Have you seen or consulted any other health care providers outside of the [address] [place] [address] since your last visit (Include any pap smears or colon screening)? no     Do you have an Advanced Directive? yes    Would you like information on Advanced Directives?  no

## 2017-10-03 DIAGNOSIS — G89.29 OTHER CHRONIC PAIN: ICD-10-CM

## 2017-10-04 RX ORDER — GABAPENTIN 300 MG/1
300 CAPSULE ORAL 3 TIMES DAILY
Qty: 90 CAP | Refills: 1 | Status: SHIPPED | OUTPATIENT
Start: 2017-10-04 | End: 2017-12-14

## 2017-10-04 RX ORDER — DILTIAZEM HYDROCHLORIDE 180 MG/1
180 CAPSULE, COATED, EXTENDED RELEASE ORAL 2 TIMES DAILY
Qty: 60 CAP | Refills: 1 | Status: SHIPPED | OUTPATIENT
Start: 2017-10-04 | End: 2017-12-22 | Stop reason: SDUPTHER

## 2017-10-05 NOTE — TELEPHONE ENCOUNTER
Spoke with pt's wife, pt does take medication BID. Also, wife states that she and her daughter believe that pt may have narcissistic personality disorder. They are trying to get him into a facility to help him, if they do then they will not be moving out of state as originally planned.

## 2017-10-08 PROBLEM — S36.09XA RUPTURED SPLEEN: Status: ACTIVE | Noted: 2017-10-08

## 2017-10-09 ENCOUNTER — TELEPHONE (OUTPATIENT)
Dept: FAMILY MEDICINE CLINIC | Age: 57
End: 2017-10-09

## 2017-10-25 ENCOUNTER — HOME HEALTH ADMISSION (OUTPATIENT)
Dept: HOME HEALTH SERVICES | Facility: HOME HEALTH | Age: 57
End: 2017-10-25
Payer: COMMERCIAL

## 2017-10-26 ENCOUNTER — TELEPHONE (OUTPATIENT)
Dept: FAMILY MEDICINE CLINIC | Age: 57
End: 2017-10-26

## 2017-10-26 NOTE — TELEPHONE ENCOUNTER
Call from nurse  stating that pt has been put on Pradaxa because it is believed that the xarelto contributed to his ruptured spleen. Pradaxa needs a PA, the prescribing physician cannot do it because he is a hospitalist. PA phone number is [phone]. Called and medication was approved for 12 months, 10/26/17-10/26/18. Will make pt aware of this.

## 2017-10-27 ENCOUNTER — OFFICE VISIT (OUTPATIENT)
Dept: FAMILY MEDICINE CLINIC | Age: 57
End: 2017-10-27

## 2017-10-27 ENCOUNTER — HOSPITAL ENCOUNTER (OUTPATIENT)
Dept: LAB | Age: 57
Discharge: HOME | End: 2017-10-27
Payer: COMMERCIAL

## 2017-10-27 VITALS
WEIGHT: 180.4 LBS | HEART RATE: 58 BPM | DIASTOLIC BLOOD PRESSURE: 50 MMHG | RESPIRATION RATE: 18 BRPM | OXYGEN SATURATION: 100 % | BODY MASS INDEX: 25.83 KG/M2 | SYSTOLIC BLOOD PRESSURE: 142 MMHG | HEIGHT: 70 IN | TEMPERATURE: 98.8 F

## 2017-10-27 DIAGNOSIS — Z87.891 HISTORY OF NICOTINE USE: ICD-10-CM

## 2017-10-27 DIAGNOSIS — R56.9 SEIZURE (HCC): ICD-10-CM

## 2017-10-27 DIAGNOSIS — D62 ANEMIA DUE TO BLOOD LOSS, ACUTE: ICD-10-CM

## 2017-10-27 DIAGNOSIS — I48.19 PERSISTENT ATRIAL FIBRILLATION (HCC): ICD-10-CM

## 2017-10-27 DIAGNOSIS — E05.90 HYPERTHYROIDISM: ICD-10-CM

## 2017-10-27 DIAGNOSIS — S36.09XA RUPTURED SPLEEN: ICD-10-CM

## 2017-10-27 DIAGNOSIS — Z90.81 S/P SPLENECTOMY: ICD-10-CM

## 2017-10-27 DIAGNOSIS — K59.00 CONSTIPATION, UNSPECIFIED CONSTIPATION TYPE: ICD-10-CM

## 2017-10-27 DIAGNOSIS — S36.09XA RUPTURED SPLEEN: Primary | ICD-10-CM

## 2017-10-27 LAB
ALBUMIN SERPL-MCNC: 3.5 G/DL (ref 3.4–5)
ALBUMIN/GLOB SERPL: 1 {RATIO} (ref 0.8–1.7)
ALP SERPL-CCNC: 94 U/L (ref 45–117)
ALT SERPL-CCNC: 34 U/L (ref 16–61)
ANION GAP SERPL CALC-SCNC: 9 MMOL/L (ref 3–18)
AST SERPL-CCNC: 16 U/L (ref 15–37)
BASOPHILS # BLD: 0.1 K/UL (ref 0–0.06)
BASOPHILS NFR BLD: 1 % (ref 0–2)
BILIRUB SERPL-MCNC: 0.3 MG/DL (ref 0.2–1)
BUN SERPL-MCNC: 13 MG/DL (ref 7–18)
BUN/CREAT SERPL: 14 (ref 12–20)
CALCIUM SERPL-MCNC: 9.5 MG/DL (ref 8.5–10.1)
CHLORIDE SERPL-SCNC: 104 MMOL/L (ref 100–108)
CO2 SERPL-SCNC: 29 MMOL/L (ref 21–32)
CREAT SERPL-MCNC: 0.93 MG/DL (ref 0.6–1.3)
DIFFERENTIAL METHOD BLD: ABNORMAL
EOSINOPHIL # BLD: 0.3 K/UL (ref 0–0.4)
EOSINOPHIL NFR BLD: 2 % (ref 0–5)
ERYTHROCYTE [DISTWIDTH] IN BLOOD BY AUTOMATED COUNT: 15.7 % (ref 11.6–14.5)
GLOBULIN SER CALC-MCNC: 3.4 G/DL (ref 2–4)
GLUCOSE SERPL-MCNC: 85 MG/DL (ref 74–99)
HCT VFR BLD AUTO: 37.4 % (ref 36–48)
HGB BLD-MCNC: 11.6 G/DL (ref 13–16)
LYMPHOCYTES # BLD: 2.3 K/UL (ref 0.9–3.6)
LYMPHOCYTES NFR BLD: 19 % (ref 21–52)
MCH RBC QN AUTO: 29.7 PG (ref 24–34)
MCHC RBC AUTO-ENTMCNC: 31 G/DL (ref 31–37)
MCV RBC AUTO: 95.7 FL (ref 74–97)
MONOCYTES # BLD: 0.8 K/UL (ref 0.05–1.2)
MONOCYTES NFR BLD: 6 % (ref 3–10)
NEUTS SEG # BLD: 9.1 K/UL (ref 1.8–8)
NEUTS SEG NFR BLD: 72 % (ref 40–73)
PLATELET # BLD AUTO: 756 K/UL (ref 135–420)
PMV BLD AUTO: 10.1 FL (ref 9.2–11.8)
POTASSIUM SERPL-SCNC: 4.3 MMOL/L (ref 3.5–5.5)
PROT SERPL-MCNC: 6.9 G/DL (ref 6.4–8.2)
RBC # BLD AUTO: 3.91 M/UL (ref 4.7–5.5)
SODIUM SERPL-SCNC: 142 MMOL/L (ref 136–145)
T4 FREE SERPL-MCNC: 1.1 NG/DL (ref 0.7–1.5)
TSH SERPL DL<=0.05 MIU/L-ACNC: 0.95 UIU/ML (ref 0.36–3.74)
WBC # BLD AUTO: 12.7 K/UL (ref 4.6–13.2)

## 2017-10-27 PROCEDURE — 85025 COMPLETE CBC W/AUTO DIFF WBC: CPT | Performed by: PHYSICIAN ASSISTANT

## 2017-10-27 PROCEDURE — 80053 COMPREHEN METABOLIC PANEL: CPT | Performed by: PHYSICIAN ASSISTANT

## 2017-10-27 PROCEDURE — 84439 ASSAY OF FREE THYROXINE: CPT | Performed by: PHYSICIAN ASSISTANT

## 2017-10-27 NOTE — MR AVS SNAPSHOT
Visit Information Date & Time Provider Department Dept. Phone Encounter #  
 10/27/2017 10:00 AM Lesli Poe PA-C [address] [phone] [id number] Follow-up Instructions Return in about 1 month (around 11/27/2017) for s/p hospital f/u splenectomy. Your Appointments 1/15/2018  9:00 AM  
PROCEDURE with [facility] IM[facility] 2 [facility] ([facility]) Appt Note: aaa evar 6 months wild 1212 [facility] [address] [facility] [address]  
[phone] 1212 [facility] 47 [facility]  
  
    
 1/15/2018 10:00 AM  
PROCEDURE with [facility] NONIMAGING [facility] ([facility]) Appt Note: cody 6 months wild [facility] [facility] [address] [facility] [address]  
[phone] 1212 [facility], [address] [place] [address]  
  
    
 1/29/2018  9:00 AM  
Office Visit with Cm [facility] ([facility]) Appt Note: 6 month fu after studies with prep [facility] [facility] [address] [facility] [address]  
[phone] [facility], [place] [address] [facility] [address] Upcoming Health Maintenance Date Due DTaP/Tdap/Td series (1 - Tdap) 3/1/1979 FOBT Q 1 YEAR AGE 50-75 3/1/2008 Allergies as of 10/27/2017  Review Complete On: 10/27/2017 By: Lesli Poe PA-C No Known Allergies Current Immunizations  Reviewed on 10/26/2017 Name Date Influenza Vaccine (Quad) PF 10/20/2017 10:43 AM  
 Pneumococcal Conjugate (PCV-13) 10/20/2017  1:28 PM  
  
 Not reviewed this visit You Were Diagnosed With   
  
 Codes Comments Ruptured spleen    -  Primary ICD-10-CM: M14.09AT ICD-9-CM: 289.59 S/P splenectomy     ICD-10-CM: Z90.81 ICD-9-CM: V45.79 Persistent atrial fibrillation (HCC)     ICD-10-CM: I48.1 ICD-9-CM: 427.31   
 Hyperthyroidism     ICD-10-CM: E05.90 ICD-9-CM: 242.90 Anemia due to blood loss, acute     ICD-10-CM: D62 
ICD-9-CM: 285.1 Constipation, unspecified constipation type     ICD-10-CM: K59.00 ICD-9-CM: 564.00 Seizure ([address]     ICD-10-CM: R56.9 ICD-9-CM: 780.39 Vitals BP Pulse Temp Resp Height(growth percentile) Weight(growth percentile) 142/50 (BP 1 Location: Left arm, BP Patient Position: Sitting) (!) 58 98.8 °F (37.1 °C) (Oral) 18 5' 10\" (1.778 m) 180 lb 6.4 oz (81.8 kg) SpO2 BMI Smoking Status 100% 25.88 kg/m2 Former Smoker Vitals History BMI and BSA Data Body Mass Index Body Surface Area  
 25.88 kg/m 2 2.01 m 2 Preferred Pharmacy Pharmacy Name Phone [address] [phone] Your Updated Medication List  
  
   
This list is accurate as of: 10/27/17 11:22 AM.  Always use your most recent med list.  
  
  
  
  
 albuterol 90 mcg/actuation inhaler Commonly known as:  PROAIR HFA Take 2 Puffs by inhalation every four (4) hours as needed for Wheezing. atenolol 100 mg tablet Commonly known as:  TENORMIN Take 1 Tab by mouth daily. b complex vitamins tablet Take 1 Tab by mouth daily. budesonide-formoterol 160-4.5 mcg/actuation Hfaa Commonly known as:  SYMBICORT Take 2 Puffs by inhalation two (2) times a day. CENTRUM SILVER MEN PO Take  by mouth. CYMBALTA 30 mg capsule Generic drug:  DULoxetine Take 30 mg by mouth two (2) times a day. dabigatran etexilate 150 mg capsule Commonly known as:  PRADAXA Take 1 Cap by mouth every twelve (12) hours. dilTIAZem  mg ER capsule Commonly known as:  CARTIA XT Take 1 Cap by mouth two (2) times a day. ergocalciferol 50,000 unit capsule Commonly known as:  ERGOCALCIFEROL Take 1 Cap by mouth every seven (7) days. fish oil-dha-epa 1,200-144-216 mg Cap Take  by mouth daily. gabapentin 300 mg capsule Commonly known as:  NEURONTIN Take 1 Cap by mouth three (3) times daily. levETIRAcetam 750 mg tablet Commonly known as:  KEPPRA Take 2 Tabs by mouth two (2) times a day. Magnesium Oxide 500 mg Cap Take  by mouth. nicotine 21 mg/24 hr  
Commonly known as:  NICODERM CQ  
1 Patch by TransDERmal route daily for 30 days. oxyCODONE-acetaminophen 5-325 mg per tablet Commonly known as:  PERCOCET Take 1 Tab by mouth every four (4) hours as needed. Max Daily Amount: 6 Tabs. pediatric multivitamins chewable tablet Take 2 Tabs by mouth daily. potassium 99 mg tablet Take 99 mg by mouth daily. VITAMIN C 250 mg tablet Generic drug:  ascorbic acid (vitamin C) Take 240 mg by mouth. ZyrTEC 10 mg tablet Generic drug:  cetirizine Take  by mouth. Follow-up Instructions Return in about 1 month (around 11/27/2017) for s/p hospital f/u splenectomy. To-Do List   
 10/27/2017 Lab:  CBC WITH AUTOMATED DIFF   
  
 10/27/2017 Lab:  METABOLIC PANEL, COMPREHENSIVE   
  
 10/27/2017 Lab:  TSH AND FREE T4   
  
 10/30/2017 To Be Determined Appointment with Jose Maria Soriano RN at [address] [place] [address] Patient Instructions Splenectomy: What to Expect at [facility] Your Recovery After a splenectomy, you are likely to have pain for several days. You may also feel like you have the flu. You may have a low fever and feel tired and nauseated. This is common. You should feel better after a few days and will probably feel much better in about a week. The spleen helps protect against infections. Now that your spleen has been removed, you will need to be careful to prevent certain infections. This care sheet gives you a general idea about how long it will take for you to recover. But each person recovers at a different pace.  Follow the steps below to get better as quickly as possible. How can you care for yourself at home? Activity ? · Rest when you feel tired. Getting enough sleep will help you recover. ? · Try to walk each day. Start by walking a little more than you did the day before. Bit by bit, increase the amount you walk. Walking boosts blood flow and helps prevent pneumonia and constipation. ? · Avoid strenuous activities, such as biking, jogging, weight lifting, or aerobic exercise, until your doctor says it is okay. ? · Avoid lifting anything that would make you strain. This may include heavy grocery bags and milk containers, a heavy briefcase or backpack, cat litter or dog food bags, a vacuum , or a child. ? · Ask your doctor when you can drive again. ? · You will probably need to take 4 to 6 weeks off from work. It depends on the type of work you do and how you feel. ? · You may shower 24 to 48 hours after surgery, if your doctor says it is okay. Pat the cut (incision) dry. Do not take a bath for the first 2 weeks, or until your doctor tells you it is okay. Diet ? · Eat several small meals each day. Slowly increase the amount you eat. It's common to feel full quickly after having this surgery. ? · If your stomach is upset, try bland, low-fat foods like plain rice, broiled chicken, toast, and yogurt. ? · Your doctor may tell you to take iron supplements. ? · Drink plenty of fluids to avoid becoming dehydrated. ? · You may notice that your bowel movements are not regular right after your surgery. This is common. Avoid constipation and straining with bowel movements. You may want to take a fiber supplement every day. If you have not had a bowel movement after a couple of days, ask your doctor about taking a mild laxative. Medicines ? · Your doctor will tell you if and when you can restart your medicines. He or she will also give you instructions about taking any new medicines. ? · If you take blood thinners, such as warfarin (Coumadin), clopidogrel (Plavix), or aspirin, be sure to talk to your doctor. He or she will tell you if and when to start taking those medicines again. Make sure that you understand exactly what your doctor wants you to do. ? · Take pain medicines exactly as directed. ¨ If the doctor gave you a prescription medicine for pain, take it as prescribed. ¨ If you are not taking a prescription pain medicine, ask your doctor if you can take an over-the-counter medicine. ? · You will need to take antibiotics for a while after surgery. Do not stop taking them just because you feel better. You need to take the full course of antibiotics. ? · You may need blood transfusions if the number of blood cells is too low. Incision care ? · If you have strips of tape on the incision, leave the tape on for a week or until it falls off. Or follow your doctor's instructions for removing the tape. ? · Wash the area daily with warm, soapy water and pat it dry. Don't use hydrogen peroxide or alcohol, which can slow healing. ? · Take showers instead of baths for the next 2 weeks. Other instructions ? · You need to take steps to avoid infections. Without a spleen, you have a higher chance of getting very sick with some infections. ¨ Get all the vaccinations your doctor recommends. ¨ Do not travel to areas where you could get serious infections such as malaria. ¨ Avoid contact with people who are sick. ¨ Wash your hands often. ? · You will get a medical alert card letting health professionals know about your splenectomy. Carry this with you. It will tell health care workers that you do not have a spleen, in case you need emergency care. Follow-up care is a key part of your treatment and safety. Be sure to make and go to all appointments, and call your doctor if you are having problems.  It's also a good idea to know your test results and keep a list of the medicines you take. When should you call for help? Call 911 anytime you think you may need emergency care. For example, call if: 
? · You passed out (lost consciousness). ? · You are short of breath. ?Call your doctor now or seek immediate medical care if: 
? · You have pain that does not get better after you take pain medicine. ? · You have loose stitches, or your incision comes open. ? · You have signs of infection, such as: 
¨ Increased pain, swelling, warmth, or redness. ¨ Red streaks leading from the incision. ¨ Pus draining from the incision. ¨ A fever. ? · You are sick to your stomach and cannot drink fluids or keep them down. ? · You have signs of a blood clot in your leg (called a deep vein thrombosis), such as: 
¨ Pain in your calf, back of the knee, thigh, or groin. ¨ Redness and swelling in your leg or groin. ? · Bright red blood has soaked through the bandage. ? · You cannot pass stools or gas. ? Watch closely for changes in your health, and be sure to contact your doctor if you have any problems. Where can you learn more? Go to http://jeannine-kain.info/. Enter A634 in the search box to learn more about \"Splenectomy: What to Expect at Home. \" Current as of: May 12, 2017 Content Version: 11.4 © 4046-8639 [employer]. Care instructions adapted under license by [employer] (which disclaims liability or warranty for this information). If you have questions about a medical condition or this instruction, always ask your healthcare professional. [place] [address] any warranty or liability for your use of this information. Surgery: What to Expect at [facility] Your Recovery This care sheet gives you a general idea about how long it will take for you to recover from your surgery. But each person recovers at a different pace.  
This care sheet gives you a general idea about how long it will take for you to recover from your surgery. But each person recovers at a different pace. How can you care for yourself at home? Activity ? · Allow your body to heal. Don't move quickly or lift anything heavy until you are feeling better. ? · Rest when you feel tired. ? · Your doctor may give you specific instructions on when you can do your normal activities again, such as driving and going back to work. ? · Be active. Walking is a good choice. Diet ? · You can eat your normal diet when you feel well. If your stomach is upset, try bland, low-fat foods like plain rice, broiled chicken, toast, and yogurt. ? · If your bowel movements are not regular right after surgery, try to avoid constipation and straining. Drink plenty of water. Your doctor may suggest fiber, a stool softener, or a mild laxative. Medicines ? · Your doctor will tell you if and when you can restart your medicines. He or she will also give you instructions about taking any new medicines. ? · If you take blood thinners, such as warfarin (Coumadin), clopidogrel (Plavix), or aspirin, be sure to talk to your doctor. He or she will tell you if and when to start taking those medicines again. Make sure that you understand exactly what your doctor wants you to do. ? · Be safe with medicines. Read and follow all instructions on the label. ¨ If the doctor gave you a prescription medicine for pain, take it as prescribed. ¨ If you are not taking a prescription pain medicine, ask your doctor if you can take an over-the-counter medicine. Incision care ? If your doctor told you how to care for your cut (incision), follow your doctor's instructions. If you did not get instructions, follow this general advice: 
? · You will have a dressing over the cut. A dressing helps the incision heal and protects it. Your doctor will tell you how to take care of this.   
? · If you have strips of tape on the cut the doctor made, leave the tape on for a week or until it falls off.  
? · If you had stitches or staples, your doctor will tell you when to come back to have them removed. ? · If you have skin adhesive on the cut, leave it on until it falls off. Skin adhesive is also called liquid stitches. ? · Change the bandage every day. ? · Wash the area daily with warm water, and pat it dry. Don't use hydrogen peroxide or alcohol. They can slow healing. ? · You may cover the area with a gauze bandage if it oozes fluid or rubs against clothing. ? · You may shower 24 to 48 hours after surgery. Pat the incision dry. Don't swim or take a bath for the first 2 weeks, or until your doctor tells you it is okay. Follow-up care is a key part of your treatment and safety. Be sure to make and go to all appointments, and call your doctor if you are having problems. It's also a good idea to know your test results and keep a list of the medicines you take. When should you call for help? Call 911 anytime you think you may need emergency care. For example, call if: 
? · You passed out (lost consciousness). ? · You are short of breath. ?Call your doctor now or seek immediate medical care if: 
? · You have pain that does not get better after you take pain medicine. ? · You cannot pass stool or gas. ? · You are sick to your stomach and cannot drink fluids. ? · You have loose stitches, or your incision comes open. ? · You have signs of a blood clot in your leg (called a deep vein thrombosis), such as: 
¨ Pain in your calf, back of the knee, thigh, or groin. ¨ Redness and swelling in your leg or groin. ? · You have signs of infection, such as: 
¨ Increased pain, swelling, warmth, or redness. ¨ Red streaks leading from the incision. ¨ Pus draining from the incision. ¨ A fever. ? · Bright red blood has soaked through your bandage. ? Watch closely for any changes in your health, and be sure to contact your doctor if you have any problems. Where can you learn more? Go to http://jeannine-kain.info/. Enter D694 in the search box to learn more about \"Surgery: What to Expect at Home. \" Current as of: May 12, 2017 Content Version: 11.4 © 7828-0815 [employer]. Care instructions adapted under license by [employer] (which disclaims liability or warranty for this information). If you have questions about a medical condition or this instruction, always ask your healthcare professional. [address] any warranty or liability for your use of this information. Introducing [facility]! Dear Kay Chadwick: 
Thank you for requesting a ZOOM TV account. Our records indicate that you already have an active ZOOM TV account. You can access your account anytime at [email or web address] Did you know that you can access your hospital and ER discharge instructions at any time in ZOOM TV? You can also review all of your test results from your hospital stay or ER visit. Additional Information If you have questions, please visit the Frequently Asked Questions section of the ZOOM TV website at [email or web address] Remember, ZOOM TV is NOT to be used for urgent needs. For medical emergencies, dial 911. Now available from your iPhone and Android! Please provide this summary of care documentation to your next provider. Your primary care clinician is listed as Louisa Barney. If you have any questions after today's visit, please call [phone].

## 2017-10-27 NOTE — PATIENT INSTRUCTIONS
Splenectomy: What to Expect at [address] [place] [address]    After a splenectomy, you are likely to have pain for several days. You may also feel like you have the flu. You may have a low fever and feel tired and nauseated. This is common. You should feel better after a few days and will probably feel much better in about a week. The spleen helps protect against infections. Now that your spleen has been removed, you will need to be careful to prevent certain infections. This care sheet gives you a general idea about how long it will take for you to recover. But each person recovers at a different pace. Follow the steps below to get better as quickly as possible. How can you care for yourself at home? Activity  ? · Rest when you feel tired. Getting enough sleep will help you recover. ? · Try to walk each day. Start by walking a little more than you did the day before. Bit by bit, increase the amount you walk. Walking boosts blood flow and helps prevent pneumonia and constipation. ? · Avoid strenuous activities, such as biking, jogging, weight lifting, or aerobic exercise, until your doctor says it is okay. ? · Avoid lifting anything that would make you strain. This may include heavy grocery bags and milk containers, a heavy briefcase or backpack, cat litter or dog food bags, a vacuum , or a child. ? · Ask your doctor when you can drive again. ? · You will probably need to take 4 to 6 weeks off from work. It depends on the type of work you do and how you feel. ? · You may shower 24 to 48 hours after surgery, if your doctor says it is okay. Pat the cut (incision) dry. Do not take a bath for the first 2 weeks, or until your doctor tells you it is okay. Diet  ? · Eat several small meals each day. Slowly increase the amount you eat. It's common to feel full quickly after having this surgery. ? · If your stomach is upset, try bland, low-fat foods like plain rice, broiled chicken, toast, and yogurt. ? · Your doctor may tell you to take iron supplements. ? · Drink plenty of fluids to avoid becoming dehydrated. ? · You may notice that your bowel movements are not regular right after your surgery. This is common. Avoid constipation and straining with bowel movements. You may want to take a fiber supplement every day. If you have not had a bowel movement after a couple of days, ask your doctor about taking a mild laxative. Medicines  ? · Your doctor will tell you if and when you can restart your medicines. He or she will also give you instructions about taking any new medicines. ? · If you take blood thinners, such as warfarin (Coumadin), clopidogrel (Plavix), or aspirin, be sure to talk to your doctor. He or she will tell you if and when to start taking those medicines again. Make sure that you understand exactly what your doctor wants you to do. ? · Take pain medicines exactly as directed. ¨ If the doctor gave you a prescription medicine for pain, take it as prescribed. ¨ If you are not taking a prescription pain medicine, ask your doctor if you can take an over-the-counter medicine. ? · You will need to take antibiotics for a while after surgery. Do not stop taking them just because you feel better. You need to take the full course of antibiotics. ? · You may need blood transfusions if the number of blood cells is too low. Incision care  ? · If you have strips of tape on the incision, leave the tape on for a week or until it falls off. Or follow your doctor's instructions for removing the tape. ? · Wash the area daily with warm, soapy water and pat it dry. Don't use hydrogen peroxide or alcohol, which can slow healing. ? · Take showers instead of baths for the next 2 weeks. Other instructions  ? · You need to take steps to avoid infections. Without a spleen, you have a higher chance of getting very sick with some infections. ¨ Get all the vaccinations your doctor recommends.   ¨ Do not travel to areas where you could get serious infections such as malaria. ¨ Avoid contact with people who are sick. ¨ Wash your hands often. ? · You will get a medical alert card letting health professionals know about your splenectomy. Carry this with you. It will tell health care workers that you do not have a spleen, in case you need emergency care. Follow-up care is a key part of your treatment and safety. Be sure to make and go to all appointments, and call your doctor if you are having problems. It's also a good idea to know your test results and keep a list of the medicines you take. When should you call for help? Call 911 anytime you think you may need emergency care. For example, call if:  ? · You passed out (lost consciousness). ? · You are short of breath. ?Call your doctor now or seek immediate medical care if:  ? · You have pain that does not get better after you take pain medicine. ? · You have loose stitches, or your incision comes open. ? · You have signs of infection, such as:  ¨ Increased pain, swelling, warmth, or redness. ¨ Red streaks leading from the incision. ¨ Pus draining from the incision. ¨ A fever. ? · You are sick to your stomach and cannot drink fluids or keep them down. ? · You have signs of a blood clot in your leg (called a deep vein thrombosis), such as:  ¨ Pain in your calf, back of the knee, thigh, or groin. ¨ Redness and swelling in your leg or groin. ? · Bright red blood has soaked through the bandage. ? · You cannot pass stools or gas. ? Watch closely for changes in your health, and be sure to contact your doctor if you have any problems. Where can you learn more? Go to http://jeannine-kain.info/. Enter W825 in the search box to learn more about \"Splenectomy: What to Expect at Home. \"  Current as of: May 12, 2017  Content Version: 11.4  © 4281-0548 [employer].  Care instructions adapted under license by [facility] (which disclaims liability or warranty for this information). If you have questions about a medical condition or this instruction, always ask your healthcare professional. [address] any warranty or liability for your use of this information. Surgery: What to Expect at Home  Your Recovery  This care sheet gives you a general idea about how long it will take for you to recover from your surgery. But each person recovers at a different pace. This care sheet gives you a general idea about how long it will take for you to recover from your surgery. But each person recovers at a different pace. How can you care for yourself at home? Activity  ? · Allow your body to heal. Don't move quickly or lift anything heavy until you are feeling better. ? · Rest when you feel tired. ? · Your doctor may give you specific instructions on when you can do your normal activities again, such as driving and going back to work. ? · Be active. Walking is a good choice. Diet  ? · You can eat your normal diet when you feel well. If your stomach is upset, try bland, low-fat foods like plain rice, broiled chicken, toast, and yogurt. ? · If your bowel movements are not regular right after surgery, try to avoid constipation and straining. Drink plenty of water. Your doctor may suggest fiber, a stool softener, or a mild laxative. Medicines  ? · Your doctor will tell you if and when you can restart your medicines. He or she will also give you instructions about taking any new medicines. ? · If you take blood thinners, such as warfarin (Coumadin), clopidogrel (Plavix), or aspirin, be sure to talk to your doctor. He or she will tell you if and when to start taking those medicines again. Make sure that you understand exactly what your doctor wants you to do. ? · Be safe with medicines. Read and follow all instructions on the label.   ¨ If the doctor gave you a prescription medicine for pain, take it as prescribed. ¨ If you are not taking a prescription pain medicine, ask your doctor if you can take an over-the-counter medicine. Incision care  ? If your doctor told you how to care for your cut (incision), follow your doctor's instructions. If you did not get instructions, follow this general advice:  ? · You will have a dressing over the cut. A dressing helps the incision heal and protects it. Your doctor will tell you how to take care of this. ? · If you have strips of tape on the cut the doctor made, leave the tape on for a week or until it falls off.   ? · If you had stitches or staples, your doctor will tell you when to come back to have them removed. ? · If you have skin adhesive on the cut, leave it on until it falls off. Skin adhesive is also called liquid stitches. ? · Change the bandage every day. ? · Wash the area daily with warm water, and pat it dry. Don't use hydrogen peroxide or alcohol. They can slow healing. ? · You may cover the area with a gauze bandage if it oozes fluid or rubs against clothing. ? · You may shower 24 to 48 hours after surgery. Pat the incision dry. Don't swim or take a bath for the first 2 weeks, or until your doctor tells you it is okay. Follow-up care is a key part of your treatment and safety. Be sure to make and go to all appointments, and call your doctor if you are having problems. It's also a good idea to know your test results and keep a list of the medicines you take. When should you call for help? Call 911 anytime you think you may need emergency care. For example, call if:  ? · You passed out (lost consciousness). ? · You are short of breath. ?Call your doctor now or seek immediate medical care if:  ? · You have pain that does not get better after you take pain medicine. ? · You cannot pass stool or gas. ? · You are sick to your stomach and cannot drink fluids. ? · You have loose stitches, or your incision comes open.    ? · You have signs of a blood clot in your leg (called a deep vein thrombosis), such as:  ¨ Pain in your calf, back of the knee, thigh, or groin. ¨ Redness and swelling in your leg or groin. ? · You have signs of infection, such as:  ¨ Increased pain, swelling, warmth, or redness. ¨ Red streaks leading from the incision. ¨ Pus draining from the incision. ¨ A fever. ? · Bright red blood has soaked through your bandage. ? Watch closely for any changes in your health, and be sure to contact your doctor if you have any problems. Where can you learn more? Go to http://jeannine-kain.info/. Enter C853 in the search box to learn more about \"Surgery: What to Expect at Home. \"  Current as of: May 12, 2017  Content Version: 11.4  © 7042-7302 [employer]. Care instructions adapted under license by [employer] (which disclaims liability or warranty for this information). If you have questions about a medical condition or this instruction, always ask your healthcare professional. [address] any warranty or liability for your use of this information.

## 2017-10-27 NOTE — PROGRESS NOTES
HISTORY OF PRESENT ILLNESS  Konstantin Gale is a 59 y.o. male. HPI  Konstantin Gale is a 59 y.o. male who presents to the office today for hospital follow up. Mr. Opal Arnold is here today for hospital follow up. Reviewed hospital notes and study results. He was admitted to [facility] on 10/8/2017 for abdominal pain and was found to be in hypovolemic shock secondary to ruptured spleen. He underwent emergent splenectomy. He was transfused with several units of FFP and PRBC due to being on xarelto. During hospitalization he developed seizures and started on keppra. Neuro was consulted and he was dx with simple and complex partial seizures. He was eventually restarted on anticoagulation with pradaxa. He was discharged to SNF on 10/20/2017. His WBCs have been trending down. He is doing PT and OT and PT will start coming to his house on Monday. He says he is feeling better each day, but still very tired. He is eating more, 3 meals a day and snacking in between. He has increased his water intake. He is only taking one percocet daily and really does not want to take any narcotics based on his past hx. He comes in today with his wife and daughter. They have several questions. Does he need to be back on his thyroid medication since this was discontinued in the hospital. He was started on Vitamin D and he has not started this yet. Should he be seeing Neuro since he is now Dx with seizures and taking keppra. He has no f/u scheduled with anyone. He denies seizure activity since d/c, but is still having a hard time expressing himself and feels like he is thinking faster than he can talk. Chief Complaint   Patient presents with   [facility] Follow Up       Current Outpatient Prescriptions on File Prior to Visit   Medication Sig Dispense Refill    atenolol (TENORMIN) 100 mg tablet Take 1 Tab by mouth daily. 30 Tab 0    dabigatran etexilate (PRADAXA) 150 mg capsule Take 1 Cap by mouth every twelve (12) hours.  60 Cap 3    levETIRAcetam (KEPPRA) 750 mg tablet Take 2 Tabs by mouth two (2) times a day. 60 Tab 0    nicotine (NICODERM CQ) 21 mg/24 hr 1 Patch by TransDERmal route daily for 30 days. 30 Patch 0    oxyCODONE-acetaminophen (PERCOCET) 5-325 mg per tablet Take 1 Tab by mouth every four (4) hours as needed. Max Daily Amount: 6 Tabs. 10 Tab 0    pediatric multivitamins chewable tablet Take 2 Tabs by mouth daily. 60 Tab 0    gabapentin (NEURONTIN) 300 mg capsule Take 1 Cap by mouth three (3) times daily. 90 Cap 1    dilTIAZem CD (CARTIA XT) 180 mg ER capsule Take 1 Cap by mouth two (2) times a day. 60 Cap 1    ascorbic acid, vitamin C, (VITAMIN C) 250 mg tablet Take 240 mg by mouth.  potassium 99 mg tablet Take 99 mg by mouth daily.  Magnesium Oxide 500 mg cap Take  by mouth.  fish oil-dha-epa 1,200-144-216 mg cap Take  by mouth daily.  cetirizine (ZYRTEC) 10 mg tablet Take  by mouth.  albuterol (PROAIR HFA) 90 mcg/actuation inhaler Take 2 Puffs by inhalation every four (4) hours as needed for Wheezing. 1 Inhaler 1    ergocalciferol (ERGOCALCIFEROL) 50,000 unit capsule Take 1 Cap by mouth every seven (7) days. 12 Cap 0    budesonide-formoterol (SYMBICORT) 160-4.5 mcg/actuation HFA inhaler Take 2 Puffs by inhalation two (2) times a day. 1 Inhaler 2    DULoxetine (CYMBALTA) 30 mg capsule Take 30 mg by mouth two (2) times a day.  b complex vitamins tablet Take 1 Tab by mouth daily. No current facility-administered medications on file prior to visit.       No Known Allergies  Past Medical History:   Diagnosis Date    AAA (abdominal aortic aneurysm) (HCC)     Alcoholism (HCC)     Atrial fibrillation (HCC)     Bradycardia     Chronic pain     Degenerative disc disease, cervical     Degenerative disc disease, lumbar     Hypertension     Hypokalemia     Pneumonia 2016    caused permanent L lung problems    Thyroid disease      History   Smoking Status    Former Smoker    Quit date: 2017   Smokeless Tobacco    Former User     History   Alcohol Use No     Comment: sober x 5 years     Family History   Problem Relation Age of Onset    Heart Disease Father     Heart Disease Brother      AAA-  in his 42's    Heart Disease Paternal Aunt     Heart Disease Paternal Uncle     Heart Disease Paternal Grandmother     Lupus Mother      Review of Systems   Constitutional: Positive for malaise/fatigue. Negative for chills, diaphoresis, fever and weight loss. Respiratory: Negative for cough and shortness of breath. Cardiovascular: Negative for chest pain, palpitations and leg swelling. Gastrointestinal: Positive for abdominal pain, constipation and heartburn. Negative for blood in stool, diarrhea, nausea and vomiting. Constipation much better with stool softeners. Heartburn only after taking afternoon supplements, but resolves with drinking more water. Musculoskeletal: Positive for back pain. Chronic   Skin: Negative for rash. Neurological: Positive for weakness. Negative for dizziness, speech change, focal weakness, seizures, loss of consciousness and headaches. Endo/Heme/Allergies: Does not bruise/bleed easily. Psychiatric/Behavioral: Negative for hallucinations, memory loss, substance abuse and suicidal ideas. The patient is not nervous/anxious and does not have insomnia. Visit Vitals    /50 (BP 1 Location: Left arm, BP Patient Position: Sitting)    Pulse (!) 58    Temp 98.8 °F (37.1 °C) (Oral)    Resp 18    Ht 5' 10\" (1.778 m)    Wt 180 lb 6.4 oz (81.8 kg)    SpO2 100%    BMI 25.88 kg/m2     Physical Exam   Constitutional: He is oriented to person, place, and time. He appears well-developed. No distress. Pale appearing, walking slowly but without assistance   Cardiovascular: An irregularly irregular rhythm present. Bradycardia present. No murmur heard. Pulses:       Radial pulses are 2+ on the right side, and 2+ on the left side. Pulmonary/Chest: Effort normal and breath sounds normal. He has no wheezes. He has no rhonchi. He has no rales. Abdominal: There is generalized tenderness. Appropriately tender s/p splenectomy. Incision healing very nicely, no drainage, very little ecchymosis. Musculoskeletal: He exhibits no edema. Neurological: He is alert and oriented to person, place, and time. Skin: He is not diaphoretic. Psychiatric: He has a normal mood and affect. His speech is normal and behavior is normal. Thought content normal. Cognition and memory are normal. He expresses no homicidal and no suicidal ideation. Nursing note and vitals reviewed. Lab Results   Component Value Date/Time    WBC 20.0 10/20/2017 07:27 AM    HGB 9.5 10/20/2017 07:27 AM    HCT 28.5 10/20/2017 07:27 AM    PLATELET 472 03/55/6977 07:27 AM    MCV 90.8 10/20/2017 07:27 AM   patient reports last WBC when he left SNF was 14. Lab Results   Component Value Date/Time    Sodium 141 10/17/2017 05:15 AM    Potassium 3.9 10/17/2017 05:15 AM    Chloride 107 10/17/2017 05:15 AM    CO2 25 10/17/2017 05:15 AM    Anion gap 6 06/14/2017 06:27 AM    Glucose 89 10/17/2017 05:15 AM    BUN 11 10/17/2017 05:15 AM    Creatinine 0.9 10/17/2017 05:15 AM    BUN/Creatinine ratio 15 06/14/2017 06:27 AM    GFR est AA >60 10/17/2017 05:15 AM    GFR est non-AA >60 10/17/2017 05:15 AM    Calcium 8.7 10/17/2017 05:15 AM    Bilirubin, total 0.6 10/17/2017 05:15 AM    AST (SGOT) 35 10/17/2017 05:15 AM    Alk. phosphatase 72 10/17/2017 05:15 AM    Protein, total 6.7 10/17/2017 05:15 AM    Albumin 2.8 10/17/2017 05:15 AM    Globulin 3.3 05/24/2017 08:05 AM    A-G Ratio 1.3 05/24/2017 08:05 AM    ALT (SGPT) 166 10/17/2017 05:15 AM     Lab Results   Component Value Date/Time    TSH 3.050 10/15/2017 02:55 AM     ASSESSMENT and PLAN    ICD-10-CM ICD-9-CM    1. Ruptured spleen S36. 09XA 289.59 CBC WITH AUTOMATED DIFF      METABOLIC PANEL, COMPREHENSIVE      TSH AND FREE T4   2. S/P splenectomy Z90.81 V45.79 CBC WITH AUTOMATED DIFF      METABOLIC PANEL, COMPREHENSIVE      TSH AND FREE T4   3. Persistent atrial fibrillation (HCC) I48.1 427.31 CBC WITH AUTOMATED DIFF      METABOLIC PANEL, COMPREHENSIVE      TSH AND FREE T4   4. Hyperthyroidism E05.90 242.90 CBC WITH AUTOMATED DIFF      METABOLIC PANEL, COMPREHENSIVE      TSH AND FREE T4   5. Anemia due to blood loss, acute D62 285.1 CBC WITH AUTOMATED DIFF      METABOLIC PANEL, COMPREHENSIVE      TSH AND FREE T4   6. Constipation, unspecified constipation type K59.00 564.00    7. Seizure ([address] R56.9 780.39 CBC WITH AUTOMATED DIFF      METABOLIC PANEL, COMPREHENSIVE      TSH AND FREE T4      REFERRAL TO NEUROLOGY   8. History of nicotine use Z87.891 V15.82       I am very happy to see Mr Rubina Kang and his family today in the office. He has a long road to recovery, and we discussed this in his visit. It is nice to hear that he does not want to take any pain meds other than tylenol. Will recheck his labs today. He will continue all medication he was prescribed at hospital discharge. Pradaxa has been approved by insurance, so continue this daily. No NSAIDs! PT to come to his house on Monday. Afib with rate control on anticoagulation. No change. I am initiating a referral to Neurology since he does not have follow up. Continue Keppra. Follow up with surgery is in 2 weeks. He is healing well. Continue stool softeners to avoid straining. Continue with smoking cessation using nicotine patches. He is doing this along with his wife and daughter! Reviewed medication and side effects. Patient agrees with the plan and verbalizes understanding. I have spent 60 minutes in face to face time with this pt and family in discussion with respect to the aforementioned problems, diagnoses and management plans. >50% of the time was spent counseling and coordinating care.       Follow-up Disposition:  Return in about 1 month (around 11/27/2017) for [facility] f/u splenectomy.     Debo Schulte PA-C  10/27/2017

## 2017-10-27 NOTE — PROGRESS NOTES
Aurea Corbett is a 59 y.o. male here today for a hospital follow up. Learning assessment previously completed. 1. Have you been to the ER, urgent care clinic or hospitalized since your last visit? [facility]    2. Have you seen or consulted any other health care providers outside of the [address] [place] [address] since your last visit (Include any pap smears or colon screening)? Yes during hospitalization      Do you have an Advanced Directive? Yes    Would you like information on Advanced Directives?  no

## 2017-10-30 ENCOUNTER — HOME CARE VISIT (OUTPATIENT)
Dept: SCHEDULING | Facility: HOME HEALTH | Age: 57
End: 2017-10-30
Payer: COMMERCIAL

## 2017-10-30 VITALS
TEMPERATURE: 97.2 F | OXYGEN SATURATION: 95 % | RESPIRATION RATE: 18 BRPM | SYSTOLIC BLOOD PRESSURE: 112 MMHG | HEART RATE: 65 BPM | DIASTOLIC BLOOD PRESSURE: 80 MMHG

## 2017-10-30 PROCEDURE — 400013 HH SOC

## 2017-10-30 PROCEDURE — G0299 HHS/HOSPICE OF RN EA 15 MIN: HCPCS

## 2017-11-01 ENCOUNTER — HOME CARE VISIT (OUTPATIENT)
Dept: HOME HEALTH SERVICES | Facility: HOME HEALTH | Age: 57
End: 2017-11-01
Payer: COMMERCIAL

## 2017-11-01 ENCOUNTER — TELEPHONE (OUTPATIENT)
Dept: FAMILY MEDICINE CLINIC | Age: 57
End: 2017-11-01

## 2017-11-01 NOTE — TELEPHONE ENCOUNTER
Daughter called asking if it is ok for him to take Melatonin 6 mg as he is having a hard time sleeping. Also he is c/o headache since being extubated. He can't take ibuprofen but the Tylenol isn't working. Is there anything else he can take?

## 2017-11-02 ENCOUNTER — HOME CARE VISIT (OUTPATIENT)
Dept: HOME HEALTH SERVICES | Facility: HOME HEALTH | Age: 57
End: 2017-11-02
Payer: COMMERCIAL

## 2017-11-15 ENCOUNTER — OFFICE VISIT (OUTPATIENT)
Dept: NEUROLOGY | Age: 57
End: 2017-11-15

## 2017-11-15 VITALS
OXYGEN SATURATION: 96 % | TEMPERATURE: 97 F | BODY MASS INDEX: 25.77 KG/M2 | WEIGHT: 180 LBS | HEART RATE: 52 BPM | RESPIRATION RATE: 16 BRPM | DIASTOLIC BLOOD PRESSURE: 100 MMHG | HEIGHT: 70 IN | SYSTOLIC BLOOD PRESSURE: 150 MMHG

## 2017-11-15 DIAGNOSIS — R56.9 SEIZURES (HCC): ICD-10-CM

## 2017-11-15 DIAGNOSIS — I48.91 ATRIAL FIBRILLATION, UNSPECIFIED TYPE (HCC): ICD-10-CM

## 2017-11-15 DIAGNOSIS — G89.4 CHRONIC PAIN SYNDROME: ICD-10-CM

## 2017-11-15 DIAGNOSIS — R56.9 SEIZURES (HCC): Primary | ICD-10-CM

## 2017-11-15 DIAGNOSIS — I10 ESSENTIAL HYPERTENSION: ICD-10-CM

## 2017-11-15 RX ORDER — CHOLECALCIFEROL (VITAMIN D3) 125 MCG
CAPSULE ORAL 2 TIMES DAILY
COMMUNITY
End: 2021-02-01

## 2017-11-15 NOTE — PROGRESS NOTES
[facility]             [address] [place] [address] Dr Goodman, [address]    11/15/2017    HPI:  Andrei Velasquez is a 59 y.o., right handed,   male, who presents with history of seizures during hospitalization. Patient was emergently hospitalized for splenic rupture. He was hospitalized on the eighth. He had been on Neurontin for chronic neuropathic pain this was stopped and on the 12th as well as the 13th he was observed to have seizure activity. He and his daughter describes some stuttering than left sided facial drooping then patient was unable to speak then had tonic-clonic activity initially lasting up to 30 seconds started on Keppra then the next day started back on Neurontin with resolution of seizures he is off the [employer] and back on the Neurontin without further episodes. Patient reports that he did not lose consciousness workup to date has been several EEGs with the last one being normal the MRI of the brain showed evidence of small vessel disease not acute. He has felt better every day but notes the recovery is slow and still has significant pain in feet neck and surgical incision area    Current Outpatient Prescriptions   Medication Sig Dispense Refill    cholecalciferol, vitamin D3, (VITAMIN D3) 2,000 unit tab Take  by mouth two (2) times a day.  MULTIVIT-MIN/FA/LYCOPEN/LUTEIN (CENTRUM SILVER MEN PO) Take 1 Tab by mouth daily.  atenolol (TENORMIN) 100 mg tablet Take 1 Tab by mouth daily. 30 Tab 0    dabigatran etexilate (PRADAXA) 150 mg capsule Take 1 Cap by mouth every twelve (12) hours. 60 Cap 3    gabapentin (NEURONTIN) 300 mg capsule Take 1 Cap by mouth three (3) times daily. 90 Cap 1    dilTIAZem CD (CARTIA XT) 180 mg ER capsule Take 1 Cap by mouth two (2) times a day. 60 Cap 1    ascorbic acid, vitamin C, (VITAMIN C) 250 mg tablet Take 240 mg by mouth daily.  potassium 99 mg tablet Take 99 mg by mouth daily.       Magnesium Oxide 500 mg cap Take 1 Tab by mouth daily.  fish oil-dha-epa 1,200-144-216 mg cap Take 1 Tab by mouth daily.  cetirizine (ZYRTEC) 10 mg tablet Take 10 mg by mouth daily.  albuterol (PROAIR HFA) 90 mcg/actuation inhaler Take 2 Puffs by inhalation every four (4) hours as needed for Wheezing. 1 Inhaler 1    budesonide-formoterol (SYMBICORT) 160-4.5 mcg/actuation HFA inhaler Take 2 Puffs by inhalation two (2) times a day. 1 Inhaler 2    DULoxetine (CYMBALTA) 30 mg capsule Take 30 mg by mouth two (2) times a day.  b complex vitamins tablet Take 1 Tab by mouth daily.  levETIRAcetam (KEPPRA) 750 mg tablet Take 2 Tabs by mouth two (2) times a day. 60 Tab 0    nicotine (NICODERM CQ) 21 mg/24 hr 1 Patch by TransDERmal route daily for 30 days. 30 Patch 0    oxyCODONE-acetaminophen (PERCOCET) 5-325 mg per tablet Take 1 Tab by mouth every four (4) hours as needed. Max Daily Amount: 6 Tabs. (Patient taking differently: Take 1 Tab by mouth every four (4) hours as needed for Pain.) 10 Tab 0    pediatric multivitamins chewable tablet Take 2 Tabs by mouth daily. 60 Tab 0    ergocalciferol (ERGOCALCIFEROL) 50,000 unit capsule Take 1 Cap by mouth every seven (7) days.  12 Cap 0       Past Medical History:   Diagnosis Date    AAA (abdominal aortic aneurysm) (HCC)     Alcoholism (HCC)     Atrial fibrillation (HCC)     Bradycardia     Chronic pain     Degenerative disc disease, cervical     Degenerative disc disease, lumbar     Hypertension     Hypokalemia     Pneumonia 2016    caused permanent L lung problems    Thyroid disease        Past Surgical History:   Procedure Laterality Date    ABDOMEN SURGERY PROC UNLISTED  10/08/2017    spleenectomy    HX ORTHOPAEDIC      neck fracture    HX ORTHOPAEDIC      lumbar fusion     Family History   Problem Relation Age of Onset    Heart Disease Father     Heart Disease Brother      AAA-  in his 42's    Heart Disease Paternal Aunt     Heart Disease Paternal Uncle     Heart Disease Paternal Grandmother     Lupus Mother      No Known Allergies    Review of Systems:   Review of Systems - History obtained from child, chart review and the patient  General ROS: negative  Psychological ROS: positive for - memory difficulties  ENT ROS: negative  Hematological and Lymphatic ROS: negative  Endocrine ROS: negative  Respiratory ROS: no cough, shortness of breath, or wheezing  Cardiovascular ROS: no chest pain or dyspnea on exertion  Gastrointestinal ROS: positive for - abdominal pain  Genito-Urinary ROS: no dysuria, trouble voiding, or hematuria  Musculoskeletal ROS: positive for - gait disturbance, joint pain and pain in hand - bilateral, hip - bilateral, knee - bilateral and neck - lower  Neurological ROS: positive for - gait disturbance, impaired coordination/balance and memory loss  Dermatological ROS: negative    PHYSICAL EXAMINATION:    Visit Vitals    BP (!) 150/100    Pulse (!) 52    Temp 97 °F (36.1 °C) (Oral)    Resp 16    Ht 5' 10\" (1.778 m)    Wt 81.6 kg (180 lb)    SpO2 96%    BMI 25.83 kg/m2     General:  Well defined, nourished, and groomed individual in no acute distress. Neck: Supple, nontender, thyroid within normal limits, no JVD, no bruits, no pain with resistance to active range of motion. Heart: Regular rate and rhythm, no murmurs, rub, or gallop. Normal S1S2. Lungs:  Clear to auscultation bilaterally with equal chest expansion, no cough, no wheeze  Musculoskeletal:  Extremities revealed no edema and had full range of motion of joints. Psych:  Good mood and normal affect    NEUROLOGICAL EXAMINATION:     Mental Status:   Alert and oriented to person, place, and time with recent and remote memory intact. Attention span and concentration are normal. Speech is fluent with a full fund of knowledge. Cranial Nerves:    II, III, IV, VI:  Visual acuity grossly intact.  Visual fields are normal.    Pupils are equal, round, and reactive to light and accommodation. Extra-ocular movements are full and fluid. Fundoscopic exam was not well visualized , no ptosis or nystagmus. V-XII: Hearing is grossly intact. Facial features are symmetric, with normal sensation and strength. The palate rises symmetrically and the tongue protrudes midline. Sternocleidomastoids 5/5. Motor Examination: Normal tone, bulk, and strength,muscle strength consistent with effort except distally  No cogwheel rigidity or clonus present. Sensory exam:  Normal throughout to pinprick, temperature,but decreased vibration sense. Coordination:  Heel-to-shin limited due to pain. Finger to nose and rapid arm movement testing was normal.   No resting or intention tremor    Gait and Station: antalgic  walking on toes, heels, and tandem walking deferred. No  pronator drift. No muscle wasting or fasiculations noted. Reflexes:  DTRs absent throughout. Toes downgoing. Assessment and Plan:   Beverly Anderson is a 59 y.o. right handed male whose history and physical are consistent with seizures provoked by neurontin withdrawal.  Beverly Anderson who has risk factors including chronic use neurontin. history of drug use,small vessel disease    Diagnoses and all orders for this visit:    1. Seizures ([address]  -     EEG AWAKE AND ASLEEP; Future    2. Atrial fibrillation, unspecified type ([address]  -     EEG AWAKE AND ASLEEP; Future    3. Essential hypertension  -     EEG AWAKE AND ASLEEP; Future    4. Chronic pain syndrome  -     EEG AWAKE AND ASLEEP; Future      Follow-up Disposition:  Return in about 6 weeks (around 12/27/2017). Reviewed  Extensive workup from [facility] in [address] work up planned need for vit b12 supplementation  I spent 60 minutes with the patient in face-to-face consultation, of which greater than 50% was spent in counseling and coordination of care as described above.

## 2017-11-15 NOTE — MR AVS SNAPSHOT
Visit Information Date & Time Provider Department Dept. Phone Encounter #  
 11/15/2017  9:00 AM Mera Christianson MD [address] at [address] [facility] [id number] Follow-up Instructions Return in about 6 weeks (around 12/27/2017). Your Appointments 11/28/2017 10:00 AM  
Follow Up with Shaista Goetz PA-C [address] [facility] ([address]) Appt Note: Return in about 1 month (around 11/27/2017) for s/p hospital f/u splenectomy. [facility] [address] [address]  
[phone]  
  
   
 [facility] [address] [facility]  
  
    
 1/15/2018  9:00 AM  
PROCEDURE with [facility] 2 [facility] ([address]) Appt Note: aaa evar 6 months wild [address] [facility] [address] [facility] [address]  
[phone] [address] [facility] [address] [facility]  
  
    
 1/15/2018 10:00 AM  
PROCEDURE with [facility] NONIMAGING [facility] ([address]) Appt Note: cody 6 months wild [address] [facility] [address] [facility] [address]  
[phone] [facility] [address]  
  
    
 1/29/2018  9:00 AM  
Office Visit with Cm Madrigal [facility] ([address]) Appt Note: 6 month fu after studies with prep [address] [facility] [address] [facility] [address]  
[phone] [address] [facility] [address] [facility]  
  
    
 2/2/2018  9:45 AM  
Follow Up with Mera Christianson MD  
[address] at [address] [facility] [address]) Appt Note: 1 month fu  EEG  
 [address] [address] [address] [facility] [address] [facility] [address] [facility] [address] Upcoming Health Maintenance Date Due DTaP/Tdap/Td series (1 - Tdap) 3/1/1979 FOBT Q 1 YEAR AGE 50-75 3/1/2008 Pneumococcal 19-64 Highest Risk (2 of 3 - PPSV23) 12/15/2017 Allergies as of 11/15/2017  Review Complete On: 11/15/2017 By: Zhou Rooney MD  
 No Known Allergies Current Immunizations  Reviewed on 10/26/2017 Name Date Influenza Vaccine (Quad) PF 10/20/2017 10:43 AM  
 Pneumococcal Conjugate (PCV-13) 10/20/2017  1:28 PM  
  
 Not reviewed this visit You Were Diagnosed With   
  
 Codes Comments Seizures ([facility] [[email or web address] 75.)    -  Primary ICD-10-CM: R56.9 ICD-9-CM: 780.39 Atrial fibrillation, unspecified type ([facility] [email or web address] 75.)     ICD-10-CM: I48.91 
ICD-9-CM: 427.31 Essential hypertension     ICD-10-CM: I10 
ICD-9-CM: 401.9 Chronic pain syndrome     ICD-10-CM: G89.4 ICD-9-CM: 338. 4 Vitals BP Pulse Temp Resp Height(growth percentile) Weight(growth percentile) (!) 150/100 (!) 52 97 °F (36.1 °C) (Oral) 16 5' 10\" (1.778 m) 180 lb (81.6 kg) SpO2 BMI Smoking Status 96% 25.83 kg/m2 Former Smoker BMI and BSA Data Body Mass Index Body Surface Area  
 25.83 kg/m 2 2.01 m 2 Preferred Pharmacy Pharmacy Name Phone [facility], [address] [place] [address] [phone] Your Updated Medication List  
  
   
This list is accurate as of: 11/15/17 10:11 AM.  Always use your most recent med list.  
  
  
  
  
 albuterol 90 mcg/actuation inhaler Commonly known as:  PROAIR HFA Take 2 Puffs by inhalation every four (4) hours as needed for Wheezing. atenolol 100 mg tablet Commonly known as:  TENORMIN Take 1 Tab by mouth daily. b complex vitamins tablet Take 1 Tab by mouth daily. budesonide-formoterol 160-4.5 mcg/actuation Hfaa Commonly known as:  SYMBICORT Take 2 Puffs by inhalation two (2) times a day. CENTRUM SILVER MEN PO Take 1 Tab by mouth daily. CYMBALTA 30 mg capsule Generic drug:  DULoxetine Take 30 mg by mouth two (2) times a day. dabigatran etexilate 150 mg capsule Commonly known as:  PRADAXA Take 1 Cap by mouth every twelve (12) hours. dilTIAZem  mg ER capsule Commonly known as:  CARTIA XT Take 1 Cap by mouth two (2) times a day. ergocalciferol 50,000 unit capsule Commonly known as:  ERGOCALCIFEROL Take 1 Cap by mouth every seven (7) days. fish oil-dha-epa 1,200-144-216 mg Cap Take 1 Tab by mouth daily. gabapentin 300 mg capsule Commonly known as:  NEURONTIN Take 1 Cap by mouth three (3) times daily. Magnesium Oxide 500 mg Cap Take 1 Tab by mouth daily. nicotine 21 mg/24 hr  
Commonly known as:  NICODERM CQ  
1 Patch by TransDERmal route daily for 30 days. oxyCODONE-acetaminophen 5-325 mg per tablet Commonly known as:  PERCOCET Take 1 Tab by mouth every four (4) hours as needed. Max Daily Amount: 6 Tabs. pediatric multivitamins chewable tablet Take 2 Tabs by mouth daily. potassium 99 mg tablet Take 99 mg by mouth daily. VITAMIN C 250 mg tablet Generic drug:  ascorbic acid (vitamin C) Take 240 mg by mouth daily. VITAMIN D3 2,000 unit Tab Generic drug:  cholecalciferol (vitamin D3) Take  by mouth two (2) times a day. ZyrTEC 10 mg tablet Generic drug:  cetirizine Take 10 mg by mouth daily. Follow-up Instructions Return in about 6 weeks (around 12/27/2017). To-Do List   
 11/15/2017 Neurology:  EEG AWAKE AND ASLEEP Introducing [facility] SERVICES! Dear Gisele Coto: 
Thank you for requesting a [employer] account. Our records indicate that you already have an active [employer] account. You can access your account anytime at [email or web address] Did you know that you can access your hospital and ER discharge instructions at any time in [employer]? You can also review all of your test results from your hospital stay or ER visit. Additional Information If you have questions, please visit the Frequently Asked Questions section of the [employer] website at [email or web address] Remember, [facility] is NOT to be used for urgent needs. For medical emergencies, dial 911. Now available from your iPhone and Android! Please provide this summary of care documentation to your next provider. Your primary care clinician is listed as Shey ePpper. If you have any questions after today's visit, please call [phone].

## 2017-11-22 RX ORDER — DABIGATRAN ETEXILATE 150 MG/1
150 CAPSULE ORAL EVERY 12 HOURS
Qty: 60 CAP | Refills: 11 | Status: SHIPPED | OUTPATIENT
Start: 2017-11-22 | End: 2018-01-10 | Stop reason: SDUPTHER

## 2017-11-28 ENCOUNTER — OFFICE VISIT (OUTPATIENT)
Dept: FAMILY MEDICINE CLINIC | Age: 57
End: 2017-11-28

## 2017-11-28 VITALS
SYSTOLIC BLOOD PRESSURE: 127 MMHG | BODY MASS INDEX: 26.11 KG/M2 | TEMPERATURE: 98.7 F | WEIGHT: 182.4 LBS | RESPIRATION RATE: 18 BRPM | OXYGEN SATURATION: 98 % | DIASTOLIC BLOOD PRESSURE: 74 MMHG | HEART RATE: 64 BPM | HEIGHT: 70 IN

## 2017-11-28 DIAGNOSIS — Z12.11 SCREENING FOR COLON CANCER: ICD-10-CM

## 2017-11-28 DIAGNOSIS — Z23 ENCOUNTER FOR IMMUNIZATION: ICD-10-CM

## 2017-11-28 DIAGNOSIS — I10 ESSENTIAL HYPERTENSION: ICD-10-CM

## 2017-11-28 DIAGNOSIS — G89.4 CHRONIC PAIN SYNDROME: ICD-10-CM

## 2017-11-28 DIAGNOSIS — R56.9 SEIZURE (HCC): ICD-10-CM

## 2017-11-28 DIAGNOSIS — I48.20 CHRONIC ATRIAL FIBRILLATION (HCC): ICD-10-CM

## 2017-11-28 DIAGNOSIS — Z90.81 S/P SPLENECTOMY: Primary | ICD-10-CM

## 2017-11-28 RX ORDER — MAGNESIUM 200 MG
1000 TABLET ORAL 2 TIMES DAILY
COMMUNITY
End: 2018-05-29

## 2017-11-28 NOTE — PROGRESS NOTES
Sanford Willard is a 59 y.o. male here today for a follow up on his splenectomy. He says he is getting stronger every day. Learning assessment previously completed. 1. Have you been to the ER, urgent care clinic or hospitalized since your last visit? no     2. Have you seen or consulted any other health care providers outside of the [address] [place] [address] since your last visit (Include any pap smears or colon screening)? Neuro     Do you have an Advanced Directive? Yes    Would you like information on Advanced Directives?  no

## 2017-11-28 NOTE — PATIENT INSTRUCTIONS
Meningococcal Polysaccharide Vaccine, Diphtheria Conjugate (By injection)   Meningococcal Polysaccharide Vaccine, Diphtheria Conjugate (eh-DDVB-be-emile-marcus qym-cg-DKL-a-ride VAX-een, dif-THEER-ee-a SUT-pig-peph)  Prevents meningitis (meningococcal infection). Brand Name(s): Menactra   There may be other brand names for this medicine. When This Medicine Should Not Be Used: This vaccine may not be right for everyone. You should not receive this vaccine if you have had a severe allergic reaction to meningococcal or diphtheria vaccines. How to Use This Medicine:   Injectable  · A nurse or other trained health professional will give this vaccine as a shot into a muscle. · Adults and children older than 3years of age usually receive this vaccine just once. Children younger of 3years of age will need repeat shots. · It is very important for your child to receive all of the shots for the vaccine. If your child misses a dose, call your child's doctor for another appointment. · You should receive a vaccine information statement. Read the information carefully before this vaccine is given. Ask your doctor if you have any questions. Drugs and Foods to Avoid:   Ask your doctor or pharmacist before using any other medicine, including over-the-counter medicines, vitamins, and herbal products. · Make sure your doctor knows if you are using medicines that weaken your immune system, such as cancer medicines, radiation treatment, or steroids. · Tell your doctor about all other vaccines you have recently received, including a flu shot. Warnings While Using This Medicine:   · Tell your doctor if you are pregnant or breastfeeding, if you have a weak immune system, or if you have a history of Guillain-Barré syndrome. · This vaccine may not protect everyone who receives it.   · This vaccine may cause the following problems:  ¨ Apnea (breathing problems) in premature infants  ¨ Guillain-Barré syndrome  Possible Side Effects While Using This Medicine:   Call your doctor right away if you notice any of these side effects:  · Allergic reaction: Itching or hives, swelling in your face or hands, swelling or tingling in your mouth or throat, chest tightness, trouble breathing  · Fainting or dizziness  · Fever or chills  · Weakness, numbness, or tingling, especially in the legs  If you notice these less serious side effects, talk with your doctor:   · Crying or irritability  · Diarrhea, loss of appetite, or vomiting  · Headache, drowsiness, or sleepiness  · Joint or muscle pain  · Redness, pain, itching, burning, swelling, tenderness, or a lump under the skin where the shot was given  If you notice other side effects that you think are caused by this medicine, tell your doctor. Call your doctor for medical advice about side effects. You may report side effects to FDA at 1-937-FDA-1377  © 2017 [address]  Information is for End User's use only and may not be sold, redistributed or otherwise used for commercial purposes. The above information is an  only. It is not intended as medical advice for individual conditions or treatments. Talk to your doctor, nurse or pharmacist before following any medical regimen to see if it is safe and effective for you. Meningococcal ACWY Vaccines - MenACWY and MPSV4: What You Need to Know  Why get vaccinated? Meningococcal disease is a serious illness caused by a type of bacteria called Neisseria meningitidis. It can lead to meningitis (infection of the lining of the brain and spinal cord) and infections of the blood. Meningococcal disease often occurs without warning-even among people who are otherwise healthy. Meningococcal disease can spread from person to person through close contact (coughing or kissing) or lengthy contact, especially among people living in the same household. There are at least 12 types of N. meningitidis, called \"serogroups. \" Serogroups A, B, C, W, and Y cause most meningococcal disease. Anyone can get meningococcal disease, but certain people are at increased risk, including:  · Infants younger than 3year old. · Adolescents and young adults 12 through 21years old. · People with certain medical conditions that affect the immune system. · Microbiologists who routinely work with isolates of N. meningitidis. · People at risk because of an outbreak in their community. Even when it is treated, meningococcal disease kills 10 to 15 infected people out of 100. And of those who survive, about 10 to 20 out of every 100 will suffer disabilities such as hearing loss, brain damage, kidney damage, amputations, nervous system problems, or severe scars from skin grafts. Meningococcal ACWY vaccines can help prevent meningococcal disease caused by serogroups A, C, W, and Y. A different meningococcal vaccine is available to help protect against serogroup B. Meningococcal ACWY vaccines  There are two kinds of meningococcal vaccines licensed by the Food and Drug Administration (FDA) for protection against serogroups A, C, W, and Y: meningococcal conjugate vaccine (MenACWY) and meningococcal polysaccharide vaccine (MPSV4). Two doses of MenACWY are routinely recommended for adolescents 6 through 25years old: the first dose at 6or 15years old, with a booster dose at age 12. Some adolescents, including those with HIV, should get additional doses. Ask your health care provider for more information.   In addition to routine vaccination for adolescents, MenACWY vaccine is also recommended for certain groups of people:  · People at risk because of a serogroup A, C, W, or Y meningococcal disease outbreak  · Anyone whose spleen is damaged or has been removed  · Anyone with a rare immune system condition called \"persistent complement component deficiency\"  · Anyone taking a drug called eculizumab (also called Soliris®)  · Microbiologists who routinely work with isolates of N. meningitidis  · Anyone traveling to, or living in, a part of the world where meningococcal disease is common, such as parts of [place]  · [employer] freshmen living in dormitories  · [address] recruits  Children between 2 and 22 months old and people with certain medical conditions need multiple doses for adequate protection. Ask your health care provider about the number and timing of doses and the need for booster doses. MenACWY is the preferred vaccine for people in these groups who are 2 months through 54years old, have received MenACWY previously, or anticipate requiring multiple doses. MPSV4 is recommended for adults older than 55 who anticipate requiring only a single dose (travelers, or during community outbreaks). Some people should not get this vaccine  Tell the person who is giving you the vaccine:  · If you have any severe, life-threatening allergies. If you have ever had a life-threatening allergic reaction after a previous dose of meningococcal ACWY vaccine, or if you have a severe allergy to any part of this vaccine, you should not get this vaccine. Your provider can tell you about the vaccine's ingredients. · If you are pregnant or breastfeeding. There is not very much information about the potential risks of this vaccine for a pregnant woman or breastfeeding mother. It should be used during pregnancy only if clearly needed. If you have a mild illness, such as a cold, you can probably get the vaccine today. If you are moderately or severely ill, you should probably wait until you recover. Your doctor can advise you. Risks of a vaccine reaction  With any medicine, including vaccines, there is a chance of side effects. These are usually mild and go away on their own within a few days, but serious reactions are also possible. As many as half of the people who get meningococcal ACWY vaccine have mild problems following vaccination, such as redness or soreness where the shot was given.  If these problems occur, they usually last for 1 or 2 days. They are more common after MenACWY than after MPSV4. A small percentage of people who receive the vaccine develop a mild fever. Problems that could happen after any injected vaccine:  · People sometimes faint after a medical procedure, including vaccination. Sitting or lying down for about 15 minutes can help prevent fainting, and injuries caused by a fall. Tell your doctor if you feel dizzy or have vision changes or ringing in the ears. · Some people get severe pain in the shoulder and have difficulty moving the arm where a shot was given. This happens very rarely. · Any medication can cause a severe allergic reaction. Such reactions from a vaccine are very rare, estimated at about 1 in a million doses, and would happen within a few minutes to a few hours after the vaccination. As with any medicine, there is a very remote chance of a vaccine causing a serious injury or death. The safety of vaccines is always being monitored. For more information, visit: www.cdc.gov/vaccinesafety/. What if there is a serious reaction? What should I look for? · Look for anything that concerns you, such as signs of a severe allergic reaction, very high fever, or behavior changes. Signs of a severe allergic reaction can include hives, swelling of the face and throat, difficulty breathing, a fast heartbeat, dizziness, and weakness-usually within a few minutes to a few hours after the vaccination. What should I do? · If you think it is a severe allergic reaction or other emergency that can't wait, call 911 or get the person to the nearest hospital. Otherwise, call your doctor. · Afterward, the reaction should be reported to the Vaccine Adverse Event Reporting System (VAERS). Your doctor should file this report, or you can do it yourself through the VAERS website at www.vaers. [facility].gov, or by calling 5-112.655.2500. VAERS does not give medical advice.   The [employer] Injury Compensation Program  The National Vaccine Injury Compensation Program (VICP) is a federal program that was created to compensate people who may have been injured by certain vaccines. Persons who believe they may have been injured by a vaccine can learn about the program and about filing a claim by calling 5-504.938.4494 or visiting the [employer] website at www.[email or web address].gov/vaccinecompensation. There is a time limit to file a claim for compensation. How can I learn more? · Ask your health care provider. · Call your local or state health department. · Contact the Centers for Disease Control and Prevention (CDC):  ¨ Call 3-495.473.2366 (1-800-CDC-INFO) or  ¨ Visit CDC's website at www.cdc.gov/vaccines  Vaccine Information Statement  Meningococcal ACWY Vaccines  03-  42 U. The[address] 069AM-20  Department of Health and Human Services  Centers for Disease Control and Prevention  Many Vaccine Information Statements are available in Japanese and other languages. See www.immunize.org/vis. Hojas de Información Sobre Vacunas están disponibles en español y en muchos otros idiomas. Visite www.immunize.org/vis. Care instructions adapted under license by [employer] (which disclaims liability or warranty for this information). If you have questions about a medical condition or this instruction, always ask your healthcare professional. [address] any warranty or liability for your use of this information.

## 2017-11-28 NOTE — PROGRESS NOTES
HISTORY OF PRESENT ILLNESS  Sandy Hoskins is a 59 y.o. male. HPI  Sandy Hoskins is a 59 y.o. male who presents to the office today for HTN, seizures, s/p splenectomy. He comes in today for routine follow up. He continues to heal well s/p emergent splenectomy on 10/8/2017. He was experiencing pain along the sternum but this has finally resolved. He is no longer needing pain medication. He is just treating pain with Tylenol as needed. He still needs the meningococcal vaccine since he did not get this prior to hospital discharge. He went to Neuro for seizures. He was continued on gabapentin and d/c keppra. They also ordered an EEG. He says his memory is improving each day. He has not had a seizure since hospitalization. He is walking much better in the office today. He is dealing with his chronic back pain well with no controlled substances. He is in great spirits. He has no concerns at today's visit. Chief Complaint   Patient presents with    Immunization/Injection       Current Outpatient Prescriptions on File Prior to Visit   Medication Sig Dispense Refill    dabigatran etexilate (PRADAXA) 150 mg capsule Take 1 Cap by mouth every twelve (12) hours. 60 Cap 11    cholecalciferol, vitamin D3, (VITAMIN D3) 2,000 unit tab Take  by mouth two (2) times a day.  MULTIVIT-MIN/FA/LYCOPEN/LUTEIN (CENTRUM SILVER MEN PO) Take 1 Tab by mouth daily.  atenolol (TENORMIN) 100 mg tablet Take 1 Tab by mouth daily. 30 Tab 0    gabapentin (NEURONTIN) 300 mg capsule Take 1 Cap by mouth three (3) times daily. 90 Cap 1    dilTIAZem CD (CARTIA XT) 180 mg ER capsule Take 1 Cap by mouth two (2) times a day. 60 Cap 1    ascorbic acid, vitamin C, (VITAMIN C) 250 mg tablet Take 240 mg by mouth daily.  potassium 99 mg tablet Take 99 mg by mouth daily.  Magnesium Oxide 500 mg cap Take 1 Tab by mouth daily.  fish oil-dha-epa 1,200-144-216 mg cap Take 1 Tab by mouth daily.       albuterol (PROAIR HFA) 90 mcg/actuation inhaler Take 2 Puffs by inhalation every four (4) hours as needed for Wheezing. 1 Inhaler 1    ergocalciferol (ERGOCALCIFEROL) 50,000 unit capsule Take 1 Cap by mouth every seven (7) days. 12 Cap 0    budesonide-formoterol (SYMBICORT) 160-4.5 mcg/actuation HFA inhaler Take 2 Puffs by inhalation two (2) times a day. 1 Inhaler 2    DULoxetine (CYMBALTA) 30 mg capsule Take 30 mg by mouth two (2) times a day.  b complex vitamins tablet Take 1 Tab by mouth daily. No current facility-administered medications on file prior to visit. No Known Allergies  Past Medical History:   Diagnosis Date    AAA (abdominal aortic aneurysm) (HCC)     Alcoholism (HCC)     Atrial fibrillation (HCC)     Bradycardia     Chronic pain     Degenerative disc disease, cervical     Degenerative disc disease, lumbar     Hypertension     Hypokalemia     Pneumonia     caused permanent L lung problems    Thyroid disease      History   Smoking Status    Former Smoker    Quit date: 2017   Smokeless Tobacco    Former User     History   Alcohol Use No     Comment: sober x 5 years     Family History   Problem Relation Age of Onset    Heart Disease Father     Heart Disease Brother      AAA-  in his 42's    Heart Disease Paternal Aunt     Heart Disease Paternal Uncle     Heart Disease Paternal Grandmother     Lupus Mother      Review of Systems   Constitutional: Positive for malaise/fatigue. Negative for chills, diaphoresis, fever and weight loss. Fatigue improving more with time   Respiratory: Negative for cough and shortness of breath. Cardiovascular: Negative for chest pain, palpitations and leg swelling. Gastrointestinal: Negative for abdominal pain, blood in stool, constipation, diarrhea, nausea and vomiting. Musculoskeletal: Positive for back pain. Chronic   Skin: Negative for rash. Neurological: Negative for dizziness, seizures and headaches.    Endo/Heme/Allergies: Does not bruise/bleed easily. Psychiatric/Behavioral: Negative for memory loss, substance abuse and suicidal ideas. The patient is not nervous/anxious. Visit Vitals    /74 (BP 1 Location: Right arm, BP Patient Position: Sitting)    Pulse 64    Temp 98.7 °F (37.1 °C) (Oral)    Resp 18    Ht 5' 10\" (1.778 m)    Wt 182 lb 6.4 oz (82.7 kg)    SpO2 98%    BMI 26.17 kg/m2     Physical Exam   Constitutional: He is oriented to person, place, and time. He appears well-developed and well-nourished. No distress. Walking well without a cane. Cardiovascular: An irregularly irregular rhythm present. Bradycardia present. No murmur heard. Pulses:       Radial pulses are 2+ on the right side, and 2+ on the left side. Pulmonary/Chest: Effort normal and breath sounds normal. No respiratory distress. He has no wheezes. He has no rhonchi. He has no rales. Abdominal: There is no tenderness. Incision healed nicely   Musculoskeletal: He exhibits no edema. Neurological: He is alert and oriented to person, place, and time. Skin: He is not diaphoretic. Psychiatric: He has a normal mood and affect. His speech is normal and behavior is normal. Thought content normal. Cognition and memory are normal. He expresses no homicidal and no suicidal ideation. Nursing note and vitals reviewed. ASSESSMENT and PLAN    ICD-10-CM ICD-9-CM    1. S/P splenectomy Z90.81 V45.79 MENINGOCOCCAL (MENVEO) CONJUGATE VACCINE, SEROGROUPS A, C, Y AND W-135 (TETRAVALENT), IM      CANCELED: MENINGOCOCCAL (MENACTRA) CONJUG VACCINE IM   2. Chronic atrial fibrillation (HCC) I48.2 427.31    3. Chronic pain syndrome G89.4 338.4    4. Essential hypertension I10 401.9    5. Encounter for immunization Z23 V03.89 MENINGOCOCCAL (MENVEO) CONJUGATE VACCINE, SEROGROUPS A, C, Y AND W-135 (TETRAVALENT), IM      CANCELED: MENINGOCOCCAL (MENACTRA) CONJUG VACCINE IM   6.  Screening for colon cancer Z12.11 V76.51 OCCULT BLOOD, IMMUNOASSAY (FIT)   7. Seizure ([facility] [address] R56.9 780.39      Meningococcal vaccine today. He is doing very well s/p emergent splenectomy. BP is well controlled on current regimen. Afib is rate controlled and AC with Pradaxa. He continues gabapentin. Neuro d/c Keppra and ordered an EEG. He continues to have chronic pain, but it is manageable with Tylenol and I am very proud of him that he is dealing with this so well and avoiding controlled substances. He understands not to take NSAIDs since he is taking Pradaxa. Given FIT test today. Reviewed medication and side effects. Patient agrees with the plan and verbalizes understanding. Follow-up Disposition:  Return in about 3 months (around 2/28/2018) for hyperthyroidism, afib.     Marcelino Gottron, PA-C  11/28/2017

## 2017-12-06 ENCOUNTER — TELEPHONE (OUTPATIENT)
Dept: FAMILY MEDICINE CLINIC | Age: 57
End: 2017-12-06
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
## 2017-12-14 DIAGNOSIS — G89.4 CHRONIC PAIN SYNDROME: Primary | ICD-10-CM

## 2017-12-14 RX ORDER — GABAPENTIN 400 MG/1
400 CAPSULE ORAL 3 TIMES DAILY
Qty: 90 CAP | Refills: 1 | Status: SHIPPED | OUTPATIENT
Start: 2017-12-14 | End: 2018-01-10 | Stop reason: SDUPTHER

## 2017-12-14 NOTE — TELEPHONE ENCOUNTER
Wife called to check on Gabapentin. Per Judith Fu, he cannot increase to 4 x a day but she will increase dosage 3 x a day, so it will now be 400mg 3 times daily instead of 300mg 3 times daily. She voiced understanding, pt was with her and he voiced understanding as well. They want to start getting meds sent to [facility] mail order pharmacy due to insurance changes on co-pays. She will call us in January to have refills sent there.

## 2017-12-14 NOTE — TELEPHONE ENCOUNTER
I can increase the dose to 400mg TID.  Also, please contact DMV to see what needs to be done for a replacement tag

## 2018-01-10 DIAGNOSIS — G89.4 CHRONIC PAIN SYNDROME: ICD-10-CM

## 2018-01-10 NOTE — TELEPHONE ENCOUNTER
Patient will run out before mail order is sent to his home. Please send a months supply to hold him over until it arrives.

## 2018-01-11 RX ORDER — DABIGATRAN ETEXILATE 150 MG/1
150 CAPSULE ORAL EVERY 12 HOURS
Qty: 180 CAP | Refills: 3 | Status: SHIPPED | OUTPATIENT
Start: 2018-01-11 | End: 2018-07-18 | Stop reason: SDUPTHER

## 2018-01-11 RX ORDER — DULOXETIN HYDROCHLORIDE 30 MG/1
30 CAPSULE, DELAYED RELEASE ORAL 2 TIMES DAILY
Qty: 180 CAP | Refills: 1 | Status: SHIPPED | OUTPATIENT
Start: 2018-01-11 | End: 2018-06-01 | Stop reason: SDUPTHER

## 2018-01-11 RX ORDER — DILTIAZEM HYDROCHLORIDE 180 MG/1
180 CAPSULE, COATED, EXTENDED RELEASE ORAL 2 TIMES DAILY
Qty: 60 CAP | Refills: 2 | Status: SHIPPED | OUTPATIENT
Start: 2018-01-11 | End: 2018-05-29

## 2018-01-11 RX ORDER — DILTIAZEM HYDROCHLORIDE 180 MG/1
180 CAPSULE, COATED, EXTENDED RELEASE ORAL 2 TIMES DAILY
Qty: 180 CAP | Refills: 3 | Status: SHIPPED | OUTPATIENT
Start: 2018-01-11 | End: 2018-09-25 | Stop reason: SDUPTHER

## 2018-01-11 RX ORDER — GABAPENTIN 400 MG/1
400 CAPSULE ORAL 3 TIMES DAILY
Qty: 270 CAP | Refills: 1 | Status: SHIPPED | OUTPATIENT
Start: 2018-01-11 | End: 2018-07-31 | Stop reason: SDUPTHER

## 2018-01-11 RX ORDER — ATENOLOL 100 MG/1
100 TABLET ORAL DAILY
Qty: 90 TAB | Refills: 1 | Status: SHIPPED | OUTPATIENT
Start: 2018-01-11 | End: 2018-07-11 | Stop reason: SDUPTHER

## 2018-01-25 ENCOUNTER — DOCUMENTATION ONLY (OUTPATIENT)
Dept: NEUROLOGY | Age: 58
End: 2018-01-25

## 2018-02-15 ENCOUNTER — PATIENT OUTREACH (OUTPATIENT)
Dept: FAMILY MEDICINE CLINIC | Age: 58
End: 2018-02-15

## 2018-03-19 ENCOUNTER — PATIENT OUTREACH (OUTPATIENT)
Dept: FAMILY MEDICINE CLINIC | Age: 58
End: 2018-03-19

## 2018-03-20 ENCOUNTER — HOSPITAL ENCOUNTER (OUTPATIENT)
Dept: LAB | Age: 58
Discharge: HOME | End: 2018-03-20
Payer: COMMERCIAL

## 2018-03-20 DIAGNOSIS — Z12.11 SCREENING FOR COLON CANCER: ICD-10-CM

## 2018-03-20 PROCEDURE — 82274 ASSAY TEST FOR BLOOD FECAL: CPT | Performed by: PHYSICIAN ASSISTANT

## 2018-03-24 LAB — HEMOCCULT STL QL IA: POSITIVE

## 2018-03-27 DIAGNOSIS — R19.5 POSITIVE FIT (FECAL IMMUNOCHEMICAL TEST): Primary | ICD-10-CM

## 2018-03-29 ENCOUNTER — TELEPHONE (OUTPATIENT)
Dept: FAMILY MEDICINE CLINIC | Age: 58
End: 2018-03-29

## 2018-03-29 NOTE — PROGRESS NOTES
Spoke with patient's daughter. She is aware of lab results and that a referral has been placed to GI. She expressed verbal understanding.

## 2018-03-29 NOTE — TELEPHONE ENCOUNTER
Left message on patients daughter's voicemail to return call to the office. I attempted to call patient yesterday and left a message for him to return my call. His FIT test came back positive and a referral has been sent to Vandana's office. Please make her aware.

## 2018-04-17 ENCOUNTER — PATIENT OUTREACH (OUTPATIENT)
Dept: FAMILY MEDICINE CLINIC | Age: 58
End: 2018-04-17

## 2018-04-17 NOTE — PROGRESS NOTES
[facility] health screening:    Glad to see Mr Ganga Joyce completed his fit testing but sorry to see it was positive. Also made note of f/u colonoscopy referral placed with Dr. Vivek Thomason. Will attempt to follow this patient who doesn't return my calls as best I can.

## 2018-05-18 ENCOUNTER — PATIENT OUTREACH (OUTPATIENT)
Dept: FAMILY MEDICINE CLINIC | Age: 58
End: 2018-05-18

## 2018-05-18 NOTE — PROGRESS NOTES
[facility] health screening:    Ms Syed Burch explained Mr. Syed Burch has issues with \"working his phone and doesn't check his voicemail like he should. \" She apologized for the difficulty getting in touch with him and suggested we call her in the future. Ms Syed Burch acknowledged the positive fit and what that could mean but stated \"we just can't afford to get that colonoscopy done right now. We have family problems, financial issues, our car is broke down and it just isn't a good time right now. \" Ms Syed Bruch was pleasant and gracious to allow me to explain the importance of follow up colonoscopy, should be covered by insurance etc but still declined. She did say \"I do have that colonoscopy doctor's phone number and we will call if things improve. Could you please let that doctor know we won't be able to do it right now? \" I've informed Dr. Flash Norwood office as well as Ms Reynaga's office staff. Closed this episode of care.

## 2018-05-29 ENCOUNTER — HOSPITAL ENCOUNTER (OUTPATIENT)
Dept: LAB | Age: 58
Discharge: HOME | End: 2018-05-29
Payer: COMMERCIAL

## 2018-05-29 ENCOUNTER — OFFICE VISIT (OUTPATIENT)
Dept: FAMILY MEDICINE CLINIC | Age: 58
End: 2018-05-29

## 2018-05-29 VITALS
HEART RATE: 69 BPM | SYSTOLIC BLOOD PRESSURE: 134 MMHG | TEMPERATURE: 98 F | WEIGHT: 200.6 LBS | BODY MASS INDEX: 28.72 KG/M2 | RESPIRATION RATE: 18 BRPM | OXYGEN SATURATION: 98 % | HEIGHT: 70 IN | DIASTOLIC BLOOD PRESSURE: 97 MMHG

## 2018-05-29 DIAGNOSIS — E05.90 HYPERTHYROIDISM: ICD-10-CM

## 2018-05-29 DIAGNOSIS — R20.0 NUMBNESS OF FINGERS OF BOTH HANDS: ICD-10-CM

## 2018-05-29 DIAGNOSIS — Z86.79 HISTORY OF ABDOMINAL AORTIC ANEURYSM (AAA): ICD-10-CM

## 2018-05-29 DIAGNOSIS — R20.8 BURNING SENSATION OF FEET: ICD-10-CM

## 2018-05-29 DIAGNOSIS — G62.9 NEUROPATHY: Primary | ICD-10-CM

## 2018-05-29 DIAGNOSIS — G62.9 NEUROPATHY: ICD-10-CM

## 2018-05-29 DIAGNOSIS — Z90.81 S/P SPLENECTOMY: ICD-10-CM

## 2018-05-29 DIAGNOSIS — Z87.891 HISTORY OF NICOTINE USE: ICD-10-CM

## 2018-05-29 LAB
ALBUMIN SERPL-MCNC: 4 G/DL (ref 3.4–5)
ALBUMIN/GLOB SERPL: 1.2 {RATIO} (ref 0.8–1.7)
ALP SERPL-CCNC: 89 U/L (ref 45–117)
ALT SERPL-CCNC: 28 U/L (ref 16–61)
ANION GAP SERPL CALC-SCNC: 8 MMOL/L (ref 3–18)
AST SERPL-CCNC: 17 U/L (ref 15–37)
BASOPHILS # BLD: 0.1 K/UL (ref 0–0.06)
BASOPHILS NFR BLD: 1 % (ref 0–2)
BILIRUB SERPL-MCNC: 0.3 MG/DL (ref 0.2–1)
BUN SERPL-MCNC: 25 MG/DL (ref 7–18)
BUN/CREAT SERPL: 23 (ref 12–20)
CALCIUM SERPL-MCNC: 9.5 MG/DL (ref 8.5–10.1)
CHLORIDE SERPL-SCNC: 106 MMOL/L (ref 100–108)
CO2 SERPL-SCNC: 25 MMOL/L (ref 21–32)
CREAT SERPL-MCNC: 1.1 MG/DL (ref 0.6–1.3)
DIFFERENTIAL METHOD BLD: ABNORMAL
EOSINOPHIL # BLD: 0.2 K/UL (ref 0–0.4)
EOSINOPHIL NFR BLD: 2 % (ref 0–5)
ERYTHROCYTE [DISTWIDTH] IN BLOOD BY AUTOMATED COUNT: 13.6 % (ref 11.6–14.5)
EST. AVERAGE GLUCOSE BLD GHB EST-MCNC: 111 MG/DL
GLOBULIN SER CALC-MCNC: 3.4 G/DL (ref 2–4)
GLUCOSE SERPL-MCNC: 71 MG/DL (ref 74–99)
HBA1C MFR BLD: 5.5 % (ref 4.2–5.6)
HCT VFR BLD AUTO: 49 % (ref 36–48)
HGB BLD-MCNC: 16.2 G/DL (ref 13–16)
LYMPHOCYTES # BLD: 2.7 K/UL (ref 0.9–3.6)
LYMPHOCYTES NFR BLD: 19 % (ref 21–52)
MAGNESIUM SERPL-MCNC: 2.5 MG/DL (ref 1.6–2.6)
MCH RBC QN AUTO: 32.1 PG (ref 24–34)
MCHC RBC AUTO-ENTMCNC: 33.1 G/DL (ref 31–37)
MCV RBC AUTO: 97.2 FL (ref 74–97)
MONOCYTES # BLD: 1.5 K/UL (ref 0.05–1.2)
MONOCYTES NFR BLD: 10 % (ref 3–10)
NEUTS SEG # BLD: 9.5 K/UL (ref 1.8–8)
NEUTS SEG NFR BLD: 68 % (ref 40–73)
PLATELET # BLD AUTO: 435 K/UL (ref 135–420)
PMV BLD AUTO: 12 FL (ref 9.2–11.8)
POTASSIUM SERPL-SCNC: 4.3 MMOL/L (ref 3.5–5.5)
PROT SERPL-MCNC: 7.4 G/DL (ref 6.4–8.2)
RBC # BLD AUTO: 5.04 M/UL (ref 4.7–5.5)
SODIUM SERPL-SCNC: 139 MMOL/L (ref 136–145)
T4 FREE SERPL-MCNC: 0.9 NG/DL (ref 0.7–1.5)
TSH SERPL DL<=0.05 MIU/L-ACNC: 1.48 UIU/ML (ref 0.36–3.74)
VIT B12 SERPL-MCNC: 980 PG/ML (ref 211–911)
WBC # BLD AUTO: 14 K/UL (ref 4.6–13.2)

## 2018-05-29 PROCEDURE — 82607 VITAMIN B-12: CPT | Performed by: PHYSICIAN ASSISTANT

## 2018-05-29 PROCEDURE — 84439 ASSAY OF FREE THYROXINE: CPT | Performed by: PHYSICIAN ASSISTANT

## 2018-05-29 PROCEDURE — 83036 HEMOGLOBIN GLYCOSYLATED A1C: CPT | Performed by: PHYSICIAN ASSISTANT

## 2018-05-29 PROCEDURE — 83735 ASSAY OF MAGNESIUM: CPT | Performed by: PHYSICIAN ASSISTANT

## 2018-05-29 PROCEDURE — 85025 COMPLETE CBC W/AUTO DIFF WBC: CPT | Performed by: PHYSICIAN ASSISTANT

## 2018-05-29 PROCEDURE — 80053 COMPREHEN METABOLIC PANEL: CPT | Performed by: PHYSICIAN ASSISTANT

## 2018-05-29 RX ORDER — CHOLECALCIFEROL (VITAMIN D3) 125 MCG
CAPSULE ORAL
COMMUNITY

## 2018-05-29 NOTE — PROGRESS NOTES
Gely Lott is a 59 y.o. male here today with c/o pain,numbness and tingling on his hands and feet. He can't feel with his finger tips and drops things all the time. He says it has gotten worse since his surgery. Learning assessment previously completed. 1. Have you been to the ER, urgent care clinic or hospitalized since your last visit?  no    2. Have you seen or consulted any other health care providers outside of the [address] [place] [address] since your last visit (Include any pap smears or colon screening)? no    Do you have an Advanced Directive? yes    Would you like information on Advanced Directives?  no

## 2018-05-29 NOTE — MR AVS SNAPSHOT
[address] [place] [address] [address] [facility] [address] [address] 
[phone] Patient: Berto Masters MRN: [id number] Visit Information Date & Time Provider Department Dept. Phone Encounter #  
 5/29/2018  1:00 PM Hunter Longoria PA-C [address] [place] [phone] [id number] Follow-up Instructions Return in about 6 weeks (around 7/10/2018) for neuropathy in hands and feet. Upcoming Health Maintenance Date Due  
 MenB Meningococcal topic (1 of 2 - Bexsero 2-Dose Series) 3/1/1968 DTaP/Tdap/Td series (1 - Tdap) 3/1/1979 Pneumococcal 19-64 Highest Risk (2 of 3 - PPSV23) 12/15/2017 ZOSTER VACCINE AGE 60> 1/1/2018 Influenza Age 5 to Adult 8/1/2018 FOBT Q 1 YEAR AGE 50-75 3/20/2019 Allergies as of 5/29/2018  Review Complete On: 5/29/2018 By: Hunter Longoria PA-C No Known Allergies Current Immunizations  Reviewed on 10/26/2017 Name Date Influenza Vaccine (Quad) PF 10/20/2017 10:43 AM  
 Meningococcal (MCV4O) Vaccine 11/28/2017 11:14 AM  
 Pneumococcal Conjugate (PCV-13) 10/20/2017  1:28 PM  
  
 Not reviewed this visit You Were Diagnosed With   
  
 Codes Comments Neuropathy    -  Primary ICD-10-CM: G62.9 ICD-9-CM: 355.9 Numbness of fingers of both hands     ICD-10-CM: R20.0 ICD-9-CM: 782.0 Burning sensation of feet     ICD-10-CM: R20.8 ICD-9-CM: 782.0 Hyperthyroidism     ICD-10-CM: E05.90 ICD-9-CM: 242.90 History of abdominal aortic aneurysm (AAA)     ICD-10-CM: Z86.79 
ICD-9-CM: V12.59 S/P splenectomy     ICD-10-CM: Z90.81 ICD-9-CM: V45.79 History of nicotine use     ICD-10-CM: U36.727 ICD-9-CM: V15.82 Vitals BP Pulse Temp Resp Height(growth percentile) Weight(growth percentile) (!) 134/97 (BP 1 Location: Right arm, BP Patient Position: Sitting) 69 98 °F (36.7 °C) (Oral) 18 5' 10\" (1.778 m) 200 lb 9.6 oz (91 kg) SpO2 BMI Smoking Status 98% 28.78 kg/m2 Former Smoker Vitals History BMI and BSA Data Body Mass Index Body Surface Area 28.78 kg/m 2 2.12 m 2 Your Updated Medication List  
  
   
This list is accurate as of 5/29/18  1:57 PM.  Always use your most recent med list.  
  
  
  
  
 albuterol 90 mcg/actuation inhaler Commonly known as:  PROAIR HFA Take 2 Puffs by inhalation every four (4) hours as needed for Wheezing. atenolol 100 mg tablet Commonly known as:  TENORMIN Take 1 Tab by mouth daily. b complex vitamins tablet Take 1 Tab by mouth daily. CENTRUM SILVER MEN PO Take 1 Tab by mouth daily. dabigatran etexilate 150 mg capsule Commonly known as:  PRADAXA Take 1 Cap by mouth every twelve (12) hours. dilTIAZem  mg ER capsule Commonly known as:  CARTIA XT Take 1 Cap by mouth two (2) times a day. DULoxetine 30 mg capsule Commonly known as:  CYMBALTA Take 1 Cap by mouth two (2) times a day. fish oil-dha-epa 1,200-144-216 mg Cap Take 1 Tab by mouth daily. gabapentin 400 mg capsule Commonly known as:  NEURONTIN Take 1 Cap by mouth three (3) times daily. melatonin Tab tablet Take  by mouth nightly. potassium 99 mg tablet Take 99 mg by mouth daily. SYMBICORT 160-4.5 mcg/actuation Hfaa Generic drug:  budesonide-formoterol  
inhale 2 puffs by mouth twice a day VITAMIN D3 2,000 unit Tab Generic drug:  cholecalciferol (vitamin D3) Take  by mouth two (2) times a day. Follow-up Instructions Return in about 6 weeks (around 7/10/2018) for neuropathy in hands and feet. To-Do List   
 05/29/2018 Lab:  CBC WITH AUTOMATED DIFF   
  
 05/29/2018 Lab:  HEMOGLOBIN A1C WITH EAG   
  
 05/29/2018 Lab:  MAGNESIUM   
  
 05/29/2018 Lab:  METABOLIC PANEL, COMPREHENSIVE   
  
 05/29/2018 Lab:  TSH AND FREE T4   
  
 05/29/2018 Lab: VITAMIN B12 Patient Instructions Please schedule an appointment with the Vascular Doctor to check circulation in lower extremities and \"blood pressure\" in your legs AND Neurologist to check for Neuropathy in your hands and feet. Neuropathic Pain: Care Instructions Your Care Instructions Neuropathic pain is caused by pressure on or damage to your nerves. It's often simply called nerve pain. Some people feel this type of pain all the time. For others, it comes and goes. Diabetes, shingles, or an injury can cause nerve pain. Many people say the pain feels sharp, burning, or stabbing. But some people feel it as a dull ache. In some cases, it makes your skin very sensitive. So touch, pressure, and other sensations that did not hurt before may now cause pain. It's important to know that this kind of pain is real and can affect your quality of life. It's also important to know that treatment can help. Treatment includes pain medicines, exercise, and physical therapy. Medicines can help reduce the number of pain signals that travel over the nerves. This can make the painful areas less sensitive. It can also help you sleep better and improve your mood. But medicines are only one part of successful treatment. Most people do best with more than one kind of treatment. Your doctor may recommend that you try cognitive-behavioral therapy and stress management. Or, if needed, you may decide to try to quit smoking, lower your blood pressure, or better control blood sugar. These kinds of healthy changes can also make a difference. If you feel that your treatment is not working, talk to your doctor. And be sure to tell your doctor if you think you might be depressed or anxious. These are common problems that can also be treated. Follow-up care is a key part of your treatment and safety.  Be sure to make and go to all appointments, and call your doctor if you are having problems. It's also a good idea to know your test results and keep a list of the medicines you take. How can you care for yourself at home? · Be safe with medicines. Read and follow all instructions on the label. ¨ If the doctor gave you a prescription medicine for pain, take it as prescribed. ¨ If you are not taking a prescription pain medicine, ask your doctor if you can take an over-the-counter medicine. · Save hard tasks for days when you have less pain. Follow a hard task with an easy task. And remember to take breaks. · Relax, and reduce stress. You may want to try deep breathing or meditation. These can help. · Keep moving. Gentle, daily exercise can help reduce pain. Your doctor or physical therapist can tell you what type of exercise is best for you. This may include walking, swimming, and stationary biking. It may also include stretches and range-of-motion exercises. · Try heat, cold packs, and massage. · Get enough sleep. Constant pain can make you more tired. If the pain makes it hard to sleep, talk with your doctor. · Think positively. Your thoughts can affect your pain. Do fun things to distract yourself from the pain. See a movie, read a book, listen to music, or spend time with a friend. · Keep a pain diary. Try to write down how strong your pain is and what it feels like. Also try to notice and write down how your moods, thoughts, sleep, activities, and medicine affect your pain. These notes can help you and your doctor find the best ways to treat your pain. Reducing constipation caused by pain medicine Pain medicines often cause constipation. To reduce constipation: 
· Include fruits, vegetables, beans, and whole grains in your diet each day. These foods are high in fiber. · Drink plenty of fluids, enough so that your urine is light yellow or clear like water.  If you have kidney, heart, or liver disease and have to limit fluids, talk with your doctor before you increase the amount of fluids you drink. · Get some exercise every day. Build up slowly to 30 to 60 minutes a day on 5 or more days of the week. · Take a fiber supplement, such as Citrucel or Metamucil, every day if needed. Read and follow all instructions on the label. · Schedule time each day for a bowel movement. Having a daily routine may help. Take your time and do not strain when having a bowel movement. · Ask your doctor about a laxative. The goal is to have one easy bowel movement every 1 to 2 days. Do not let constipation go untreated for more than 3 days. When should you call for help? Call your doctor now or seek immediate medical care if: 
? · You feel sad, anxious, or hopeless for more than a few days. This could mean you are depressed. Depression is common in people who have a lot of pain. But it can be treated. ? · You have trouble with bowel movements, such as: 
¨ No bowel movement in 3 days. ¨ Blood in the anal area, in your stool, or on the toilet paper. ¨ Diarrhea for more than 24 hours. ? Watch closely for changes in your health, and be sure to contact your doctor if: 
? · Your pain is getting worse. ? · You can't sleep because of pain. ? · You are very worried or anxious about your pain. ? · You have trouble taking your pain medicine. ? · You have any concerns about your pain medicine or its side effects. ? · You have vomiting or cramps for more than 2 hours. Where can you learn more? Go to http://jeannine-kain.info/. Enter T078 in the search box to learn more about \"Neuropathic Pain: Care Instructions. \" Current as of: October 14, 2016 Content Version: 11.4 © 2279-6662 [employer]. Care instructions adapted under license by [employer] (which disclaims liability or warranty for this information).  If you have questions about a medical condition or this instruction, always ask your healthcare professional. [employer] disclaims any warranty or liability for your use of this information. Introducing [facility]! Dear Vinetta Lesches: 
Thank you for requesting a [employer] account. Our records indicate that you already have an active [employer] account. You can access your account anytime at [email or web address] Did you know that you can access your hospital and ER discharge instructions at any time in [employer]? You can also review all of your test results from your hospital stay or ER visit. Additional Information If you have questions, please visit the Frequently Asked Questions section of the [employer] website at [email or web address] Remember, [employer] is NOT to be used for urgent needs. For medical emergencies, dial 911. Now available from your iPhone and Android! Please provide this summary of care documentation to your next provider. Your primary care clinician is listed as Trini Berman. If you have any questions after today's visit, please call [phone].

## 2018-05-29 NOTE — PATIENT INSTRUCTIONS
Please schedule an appointment with the Vascular Doctor to check circulation in lower extremities and \"blood pressure\" in your legs  AND Neurologist to check for Neuropathy in your hands and feet. Neuropathic Pain: Care Instructions  Your Care Instructions    Neuropathic pain is caused by pressure on or damage to your nerves. It's often simply called nerve pain. Some people feel this type of pain all the time. For others, it comes and goes. Diabetes, shingles, or an injury can cause nerve pain. Many people say the pain feels sharp, burning, or stabbing. But some people feel it as a dull ache. In some cases, it makes your skin very sensitive. So touch, pressure, and other sensations that did not hurt before may now cause pain. It's important to know that this kind of pain is real and can affect your quality of life. It's also important to know that treatment can help. Treatment includes pain medicines, exercise, and physical therapy. Medicines can help reduce the number of pain signals that travel over the nerves. This can make the painful areas less sensitive. It can also help you sleep better and improve your mood. But medicines are only one part of successful treatment. Most people do best with more than one kind of treatment. Your doctor may recommend that you try cognitive-behavioral therapy and stress management. Or, if needed, you may decide to try to quit smoking, lower your blood pressure, or better control blood sugar. These kinds of healthy changes can also make a difference. If you feel that your treatment is not working, talk to your doctor. And be sure to tell your doctor if you think you might be depressed or anxious. These are common problems that can also be treated. Follow-up care is a key part of your treatment and safety. Be sure to make and go to all appointments, and call your doctor if you are having problems.  It's also a good idea to know your test results and keep a list of the medicines you take. How can you care for yourself at home? · Be safe with medicines. Read and follow all instructions on the label. ¨ If the doctor gave you a prescription medicine for pain, take it as prescribed. ¨ If you are not taking a prescription pain medicine, ask your doctor if you can take an over-the-counter medicine. · Save hard tasks for days when you have less pain. Follow a hard task with an easy task. And remember to take breaks. · Relax, and reduce stress. You may want to try deep breathing or meditation. These can help. · Keep moving. Gentle, daily exercise can help reduce pain. Your doctor or physical therapist can tell you what type of exercise is best for you. This may include walking, swimming, and stationary biking. It may also include stretches and range-of-motion exercises. · Try heat, cold packs, and massage. · Get enough sleep. Constant pain can make you more tired. If the pain makes it hard to sleep, talk with your doctor. · Think positively. Your thoughts can affect your pain. Do fun things to distract yourself from the pain. See a movie, read a book, listen to music, or spend time with a friend. · Keep a pain diary. Try to write down how strong your pain is and what it feels like. Also try to notice and write down how your moods, thoughts, sleep, activities, and medicine affect your pain. These notes can help you and your doctor find the best ways to treat your pain. Reducing constipation caused by pain medicine  Pain medicines often cause constipation. To reduce constipation:  · Include fruits, vegetables, beans, and whole grains in your diet each day. These foods are high in fiber. · Drink plenty of fluids, enough so that your urine is light yellow or clear like water. If you have kidney, heart, or liver disease and have to limit fluids, talk with your doctor before you increase the amount of fluids you drink. · Get some exercise every day.  Build up slowly to 30 to 60 minutes a day on 5 or more days of the week. · Take a fiber supplement, such as Citrucel or Metamucil, every day if needed. Read and follow all instructions on the label. · Schedule time each day for a bowel movement. Having a daily routine may help. Take your time and do not strain when having a bowel movement. · Ask your doctor about a laxative. The goal is to have one easy bowel movement every 1 to 2 days. Do not let constipation go untreated for more than 3 days. When should you call for help? Call your doctor now or seek immediate medical care if:  ? · You feel sad, anxious, or hopeless for more than a few days. This could mean you are depressed. Depression is common in people who have a lot of pain. But it can be treated. ? · You have trouble with bowel movements, such as:  ¨ No bowel movement in 3 days. ¨ Blood in the anal area, in your stool, or on the toilet paper. ¨ Diarrhea for more than 24 hours. ? Watch closely for changes in your health, and be sure to contact your doctor if:  ? · Your pain is getting worse. ? · You can't sleep because of pain. ? · You are very worried or anxious about your pain. ? · You have trouble taking your pain medicine. ? · You have any concerns about your pain medicine or its side effects. ? · You have vomiting or cramps for more than 2 hours. Where can you learn more? Go to http://jeannine-kain.info/. Enter B795 in the search box to learn more about \"Neuropathic Pain: Care Instructions. \"  Current as of: October 14, 2016  Content Version: 11.4  © 9033-5184 [employer]. Care instructions adapted under license by [employer] (which disclaims liability or warranty for this information). If you have questions about a medical condition or this instruction, always ask your healthcare professional. [address] any warranty or liability for your use of this information.

## 2018-05-30 ENCOUNTER — TELEPHONE (OUTPATIENT)
Dept: FAMILY MEDICINE CLINIC | Age: 58
End: 2018-05-30

## 2018-05-30 NOTE — TELEPHONE ENCOUNTER
Patient's wife states that they called [facility] to get him scheduled for an appointment for neuropathy and she was told that he needs a referral. Patient states that he had gone there in the past for something different which is why he needs a referral for this current condition.

## 2018-05-31 ENCOUNTER — OFFICE VISIT (OUTPATIENT)
Dept: VASCULAR SURGERY | Age: 58
End: 2018-05-31

## 2018-05-31 DIAGNOSIS — I73.9 PAD (PERIPHERAL ARTERY DISEASE) (HCC): ICD-10-CM

## 2018-05-31 DIAGNOSIS — I71.40 AAA (ABDOMINAL AORTIC ANEURYSM) WITHOUT RUPTURE: ICD-10-CM

## 2018-05-31 NOTE — PROCEDURES
[facility]  *** AMENDED REPORT ***    Name: Oli Vicente  MRN: [id number]       Outpatient  : 01 Mar 1960  HIS Order #: [id number]  [id number] [facility] Visit #: [id number]  Date: 31 May 2018    TYPE OF TEST: Aorto-Iliac Duplex    REASON FOR TEST  AAA, Limb pain    B-Mode:-                 (cm)   1     2     3  Aortic diameter:         AP:                           TV:  Common iliac diameter:   Right:                           Left:    Duplex:-                           PSV  Stenosis                           ----- --------------------  Aorta: (1)                     Normal         (2)                67.0         (3)                73.0    Right common iliac:       88.0 Normal  Right external iliac:    149.0 Normal    Left common iliac:       115.0 Normal  Left external iliac:     153.0 Normal    AMENDED INTERPRETATION/FINDINGS  Duplex images were obtained using 2-D gray scale, color flow and  spectral doppler analysis. Technically difficult exam due to overlying bowel gas with limited  visualization. 1. 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 The residual aneurysmal sac measures 4.6 x 4.3cm trv.  2. No evidence of sonya endoleak in the areas visualized. Poor  visualization due to bowel gas. 3. Celiac, SMA, left renal artery and PIPER were not visualized  secondary to excessive bowel gas. 4. The right ankle/brachial index is 1.20 and the left ankle/brachial  index is 1. 12. AMENDED COMMENTS  Line #4 updated. DBI values entered in error. MARJ values entered and  report amended. 18 fb    I have personally reviewed the data relevant to the interpretation of  this study. TECHNOLOGIST: Torey Wolf RDMS  Signed: 2018 11:32 AM    PHYSICIAN: Norberto Ferraro.  Do Eaton MD  Signed: 2018 09:01 AM

## 2018-05-31 NOTE — PROCEDURES
[facility]  *** FINAL REPORT ***    Name: Thelma Ku  MRN: [id number]       Outpatient  : 01 Mar 1960  HIS Order #: [id number]  [address] [facility] Visit #: [id number]  Date: 31 May 2018    TYPE OF TEST: Peripheral Arterial Testing    REASON FOR TEST  AAA, Leg weakness    Right Leg  Segmentals: Normal                     mmHg  Brachial         153  High thigh  Low thigh  Calf  Posterior tibial 183  Dorsalis pedis   162  Peroneal  Metatarsal  Toe pressure      68  Doppler:    Normal  Ankle/Brachial: 1.20    Left Leg  Segmentals: Normal                     mmHg  Brachial         148  High thigh  Low thigh  Calf  Posterior tibial 157  Dorsalis pedis   171  Peroneal  Metatarsal  Toe pressure      97  Doppler:    Normal  Ankle/Brachial: 1.12    INTERPRETATION/FINDINGS  Physiologic testing was performed using continuous wave doppler and  segmental pressures. MARJ only:  1. No evidence of significant peripheral arterial disease at rest in  the right leg. 2. No evidence of significant peripheral arterial disease at rest in  the left leg. 3. The right ankle/brachial index is 1.20 and the left ankle/brachial  index is 1.12.  4. The right digital/brachial index is 0.44 and the left  digital/brachial index is 0.63. ADDITIONAL COMMENTS    I have personally reviewed the data relevant to the interpretation of  this  study. TECHNOLOGIST: Tai Faith RDMS  Signed: 2018 11:12 AM    PHYSICIAN: Aftab Mathew.  Manny Hill MD  Signed: 2018 03:54 PM

## 2018-06-01 ENCOUNTER — OFFICE VISIT (OUTPATIENT)
Dept: NEUROLOGY | Age: 58
End: 2018-06-01

## 2018-06-01 VITALS
SYSTOLIC BLOOD PRESSURE: 130 MMHG | BODY MASS INDEX: 28.6 KG/M2 | RESPIRATION RATE: 18 BRPM | HEIGHT: 70 IN | DIASTOLIC BLOOD PRESSURE: 80 MMHG | WEIGHT: 199.8 LBS | TEMPERATURE: 97.7 F | HEART RATE: 63 BPM

## 2018-06-01 DIAGNOSIS — G62.9 NEUROPATHY: Primary | ICD-10-CM

## 2018-06-01 RX ORDER — DULOXETIN HYDROCHLORIDE 60 MG/1
60 CAPSULE, DELAYED RELEASE ORAL 2 TIMES DAILY
Qty: 60 CAP | Refills: 6 | Status: SHIPPED | OUTPATIENT
Start: 2018-06-01 | End: 2018-07-02 | Stop reason: ALTCHOICE

## 2018-06-01 NOTE — MR AVS SNAPSHOT
[address] [facility] 
 
 
 [address] [facility][note 3] [address] Formerly [facility] [id number] 
[phone] Patient: Sathay Galan MRN: [id number] Visit Information Date & Time Provider Department Dept. Phone Encounter #  
 6/1/2018 10:00 AM Missy Quick [facility] [address] [phone] [id number] Follow-up Instructions Return in about 4 weeks (around 6/29/2018). Your Appointments 6/5/2018  3:00 PM  
Follow Up with [facility] ([facility]) Appt Note: pt r/s from jan 2018 has new ioana feet pain, will have ultrasound test on 05/31/18 2300 [facility] [address] [place] [address]  
[phone] janay Zebulon [facility] [address] [place] [address]  
  
    
 6/21/2018 11:40 AM  
EMG with Missy Quick MD  
[address] [place] [address] at [address] [facility]) Appt Note: Dr. Yahaira Mruray; Three extremities; order in cc  
 [address] [facility] [address] [facility] [address] [facility] 630 [facility] [address] [facility] [address]  
  
    
 7/2/2018  1:00 PM  
Follow Up with Missy Quick MD  
[address] [place] [address]) Appt Note: 4wk f/u; EMG & Labs [address] Formerly [facility] [id number] [phone]  
  
   
 [facility] [id number]  
  
    
 7/10/2018  1:00 PM  
Follow Up with Jennifer Subramanian PA-C [address] [facility] ([facility]) Appt Note: Return in about 6 weeks (around 7/10/2018) for neuropathy in hands and feet. [facility] 1 [address] [address]  
[phone]  
  
   
 [facility] [address] [address] Upcoming Health Maintenance Date Due  
 MenB Meningococcal topic (1 of 2 - Bexsero 2-Dose Series) 3/1/1968 DTaP/Tdap/Td series (1 - Tdap) 3/1/1979 Pneumococcal 19-64 Highest Risk (2 of 3 - PPSV23) 12/15/2017 ZOSTER VACCINE AGE 60> 1/1/2018 Influenza Age 5 to Adult 8/1/2018 FOBT Q 1 YEAR AGE 50-75 3/20/2019 Allergies as of 6/1/2018  Review Complete On: 6/1/2018 By: Nelly Sanford LPN No Known Allergies Current Immunizations  Reviewed on 10/26/2017 Name Date Influenza Vaccine (Quad) PF 10/20/2017 10:43 AM  
 Meningococcal (MCV4O) Vaccine 11/28/2017 11:14 AM  
 Pneumococcal Conjugate (PCV-13) 10/20/2017  1:28 PM  
  
 Not reviewed this visit You Were Diagnosed With   
  
 Codes Comments Neuropathy    -  Primary ICD-10-CM: G62.9 ICD-9-CM: 022. 9 Vitals BP Pulse Temp Resp Height(growth percentile) Weight(growth percentile) 130/80 (BP 1 Location: Left arm, BP Patient Position: Sitting) 63 97.7 °F (36.5 °C) (Oral) 18 5' 10\" (1.778 m) 199 lb 12.8 oz (90.6 kg) BMI Smoking Status 28.67 kg/m2 Former Smoker BMI and BSA Data Body Mass Index Body Surface Area  
 28.67 kg/m 2 2.12 m 2 Preferred Pharmacy Pharmacy Name Phone [address], [address] [place] [address] [phone] Your Updated Medication List  
  
   
This list is accurate as of 6/1/18 11:04 AM.  Always use your most recent med list.  
  
  
  
  
 albuterol 90 mcg/actuation inhaler Commonly known as:  PROAIR HFA Take 2 Puffs by inhalation every four (4) hours as needed for Wheezing. atenolol 100 mg tablet Commonly known as:  TENORMIN Take 1 Tab by mouth daily. b complex vitamins tablet Take 1 Tab by mouth daily. CENTRUM SILVER MEN PO Take 1 Tab by mouth daily. dabigatran etexilate 150 mg capsule Commonly known as:  PRADAXA Take 1 Cap by mouth every twelve (12) hours. dilTIAZem  mg ER capsule Commonly known as:  CARTIA XT Take 1 Cap by mouth two (2) times a day. DULoxetine 60 mg capsule Commonly known as:  CYMBALTA Take 1 Cap by mouth two (2) times a day. Indications: NEUROPATHIC PAIN  
  
 fish oil-dha-epa 1,200-144-216 mg Cap Take 1 Tab by mouth daily. gabapentin 400 mg capsule Commonly known as:  NEURONTIN Take 1 Cap by mouth three (3) times daily. melatonin Tab tablet Take  by mouth nightly. potassium 99 mg tablet Take 99 mg by mouth daily. SYMBICORT 160-4.5 mcg/actuation Hfaa Generic drug:  budesonide-formoterol  
inhale 2 puffs by mouth twice a day VITAMIN D3 2,000 unit Tab Generic drug:  cholecalciferol (vitamin D3) Take  by mouth two (2) times a day. Prescriptions Sent to Pharmacy Refills DULoxetine (CYMBALTA) 60 mg capsule 6 Sig: Take 1 Cap by mouth two (2) times a day. Indications: NEUROPATHIC PAIN Class: Normal  
 Pharmacy: [facility], [address] [place] [address] #: [phone] Route: Oral  
  
Follow-up Instructions Return in about 4 weeks (around 6/29/2018). To-Do List   
 06/01/2018 Neurology:  EMG THREE EXTREMITIES   
  
 06/01/2018 Lab:  PROTEIN ELECTROPHORESIS   
  
 06/01/2018 Lab:  SED RATE (ESR) Introducing [facility]! Dear Jamil Cota: 
Thank you for requesting a [employer] account. Our records indicate that you already have an active [employer] account. You can access your account anytime at [email or web address] Did you know that you can access your hospital and ER discharge instructions at any time in [employer]? You can also review all of your test results from your hospital stay or ER visit. Additional Information If you have questions, please visit the Frequently Asked Questions section of the [employer] website at [email or web address] Remember, [employer] is NOT to be used for urgent needs. For medical emergencies, dial 911. Now available from your iPhone and Android! Please provide this summary of care documentation to your next provider. Your primary care clinician is listed as Radha Lucero. If you have any questions after today's visit, please call [phone].

## 2018-06-01 NOTE — PROGRESS NOTES
Gely Lott is a 59 y.o., right handed male, with an established history of hypertension, spontaneous splenic rupture, atrial fibrillation, who noticed onset about 10 years ago of a painful burning sensation. He feels as if the feet are burning and like he's walking on broken glass. He feels like there's sand between his toes. This has been worsening since onset 10 years ago. It started to involve his hands about 8 years ago. He has had cervical spine fusions in the neck, the first one was , and then 2 other surgeries in the last 8 years. He's also had 2 lumber spine surgeries in  and . He states the discomfort in the hands and feet has gotten worse since the spinal surgeries. He feels as if he can't pick small items up any longer. The burning is worse at night. Of note is that he was frostbitten a little back when he was younger while living in [place]. This involved both his hands and feet. Social History; , lives with his wife. Quite smoking 2017, prior was smoking for 40 years, 3 PPD. Quite drinking 8 years ago, was drinking to the point of intoxication at least every other day for 20 years. No illicit drugs currently. Worked as a cabinet buid    Family History; father  from heart disease in his 42's, had hypertension. Mother alive with lupus, hypothyroidism, fibromyalgia. Brother with hypertension, alcoholism. Brother  from aortic aneurysm. Sister  of trauma. Current Outpatient Prescriptions   Medication Sig Dispense Refill    melatonin tab tablet Take  by mouth nightly.  SYMBICORT 160-4.5 mcg/actuation HFAA inhale 2 puffs by mouth twice a day 1 Inhaler 5    dabigatran etexilate (PRADAXA) 150 mg capsule Take 1 Cap by mouth every twelve (12) hours. 180 Cap 3    dilTIAZem CD (CARTIA XT) 180 mg ER capsule Take 1 Cap by mouth two (2) times a day.  180 Cap 3    DULoxetine (CYMBALTA) 30 mg capsule Take 1 Cap by mouth two (2) times a day. 180 Cap 1    gabapentin (NEURONTIN) 400 mg capsule Take 1 Cap by mouth three (3) times daily. (Patient taking differently: Take 400 mg by mouth two (2) times a day.) 270 Cap 1    atenolol (TENORMIN) 100 mg tablet Take 1 Tab by mouth daily. 90 Tab 1    cholecalciferol, vitamin D3, (VITAMIN D3) 2,000 unit tab Take  by mouth two (2) times a day.  MULTIVIT-MIN/FA/LYCOPEN/LUTEIN (CENTRUM SILVER MEN PO) Take 1 Tab by mouth daily.  potassium 99 mg tablet Take 99 mg by mouth daily.  fish oil-dha-epa 1,200-144-216 mg cap Take 1 Tab by mouth daily.  albuterol (PROAIR HFA) 90 mcg/actuation inhaler Take 2 Puffs by inhalation every four (4) hours as needed for Wheezing. 1 Inhaler 1    b complex vitamins tablet Take 1 Tab by mouth daily. Past Medical History:   Diagnosis Date    AAA (abdominal aortic aneurysm) (HCC)     Alcoholism ([address]     Atrial fibrillation (HCC)     Bradycardia     Chronic pain     Degenerative disc disease, cervical     Degenerative disc disease, lumbar     Hypertension     Hypokalemia     Pneumonia 2016    caused permanent L lung problems    Thyroid disease        Past Surgical History:   Procedure Laterality Date    ABDOMEN SURGERY PROC UNLISTED  10/08/2017    spleenectomy    HX ORTHOPAEDIC      neck fracture    HX ORTHOPAEDIC      lumbar fusion       No Known Allergies    Patient Active Problem List   Diagnosis Code    Atrial fibrillation (HCC) I48.91    Osteoarthritis of multiple joints M15.9    Chronic pain G89.29    Hyperthyroidism E05.90    History of abdominal aortic aneurysm (AAA) Z86.79    Family history of systemic lupus erythematosus (SLE) in mother Z80.2    History of drug use Z87.898    H/O alcohol abuse Z87.898    Nicotine use disorder F17.200    Depression F32.9    Essential hypertension I10    FH: CAD (coronary artery disease) Z80.55    AAA (abdominal aortic aneurysm) without rupture (HCC) I71.4    Ruptured spleen S36. 09XA    S/P splenectomy Z90.81    Anemia due to blood loss, acute D62    Seizure ([facility] R56.9         Review of Systems:   As above otherwise 11 point review of systems negative including;   Constitutional no fever or chills  Skin denies rash or itching  HENT  Denies tinnitus, hearing lose  Eyes denies diplopia vision lose  Respiratory denies shortness of breath  Cardiovascular denies chest pain, dyspnea on exertion  Gastrointestinal denies nausea, vomiting, diarrhea, constipation  Genitourinary denies incontinence  Musculoskeletal denies joint pain or swelling  Endocrine denies weight change  Hematology denies easy bruising or bleeding   Neurological as above in HPI      PHYSICAL EXAMINATION:      VITAL SIGNS:    Visit Vitals    /80 (BP 1 Location: Left arm, BP Patient Position: Sitting)    Pulse 63    Temp 97.7 °F (36.5 °C) (Oral)    Resp 18    Ht 5' 10\" (1.778 m)    Wt 90.6 kg (199 lb 12.8 oz)    BMI 28.67 kg/m2       GENERAL: The patient is well developed, well nourished, and in no apparent distress. EXTREMITIES: No clubbing, cyanosis, or edema is identified. Pulses 2+ and symmetrical.  Muscle tone is normal.    HEAD:   Ear, nose, and throat appear to be without trauma. The patient is normocephalic. NEUROLOGIC EXAMINATION    MENTAL STATUS: The patient is awake, alert, and oriented x 4. Fund of knowledge is adequate. Speech is fluent and memory appears to be intact, both long and short term. CRANIAL NERVES: II  Visual fields are full to confrontation. Funduscopic examination reveals flat disks bilaterally. Pupils are both 5 mm and briskly reactive to light and accommodation. III, IV, VI  Extraocular movements are intact and there is no nystagmus. V  Facial sensation is intact to pinprick and light touch. VII  Face is symmetrical.   VIII - Hearing is present. IX, X, [address] rises symmetrically. Gag is present. Tongue is in the midline.       XI - Shoulder shrugging and head turning intact  MOTOR:  The patient is 5/5 in all four limbs without any drift. Fine finger movements are symmetrical.  Isolated motor group testing reveals no focal abnormalities. Tone is normal.  Sensory examination is depressed to pin and light touch to the mid calf and wrist bilaterally. Sensation to vibration and position is intact. Reflexes are 2+ and symmetrical, except the right ankle jerk is absent. Plantars are down going. Cerebellar examination reveals no gross ataxia or dysmetria. Gait is abnormal he walks gingerly off of both feet. CBC:   Lab Results   Component Value Date/Time    WBC 14.0 (H) 05/29/2018 02:10 PM    RBC 5.04 05/29/2018 02:10 PM    HGB 16.2 (H) 05/29/2018 02:10 PM    HCT 49.0 (H) 05/29/2018 02:10 PM    PLATELET 065 (H) 40/49/0961 02:10 PM     BMP:   Lab Results   Component Value Date/Time    Glucose 71 (L) 05/29/2018 02:10 PM    Sodium 139 05/29/2018 02:10 PM    Potassium 4.3 05/29/2018 02:10 PM    Chloride 106 05/29/2018 02:10 PM    CO2 25 05/29/2018 02:10 PM    BUN 25 (H) 05/29/2018 02:10 PM    Creatinine 1.10 05/29/2018 02:10 PM    Calcium 9.5 05/29/2018 02:10 PM     CMP:   Lab Results   Component Value Date/Time    Glucose 71 (L) 05/29/2018 02:10 PM    Sodium 139 05/29/2018 02:10 PM    Potassium 4.3 05/29/2018 02:10 PM    Chloride 106 05/29/2018 02:10 PM    CO2 25 05/29/2018 02:10 PM    BUN 25 (H) 05/29/2018 02:10 PM    Creatinine 1.10 05/29/2018 02:10 PM    Calcium 9.5 05/29/2018 02:10 PM    Anion gap 8 05/29/2018 02:10 PM    BUN/Creatinine ratio 23 (H) 05/29/2018 02:10 PM    Alk.  phosphatase 89 05/29/2018 02:10 PM    Protein, total 7.4 05/29/2018 02:10 PM    Albumin 4.0 05/29/2018 02:10 PM    Globulin 3.4 05/29/2018 02:10 PM    A-G Ratio 1.2 05/29/2018 02:10 PM     Coagulation:   Lab Results   Component Value Date/Time    Prothrombin time 13.6 (H) 10/09/2017 12:45 AM    INR 1.2 (H) 10/09/2017 12:45 AM    aPTT 28.6 10/09/2017 12:45 AM     Cardiac markers: No results found for: CPK, CKND1, RUSSEL     5/29/2018  9:32 PM - [place]   Component Results   Component Value Flag Ref Range Units Status   Hemoglobin A1c 5.5  4.2 - 5.6 % Final   Comment:   (NOTE)   HbA1C Interpretive Ranges   <5.7              Normal   5.7 - 6.4         Consider Prediabetes   >6.5              Consider Diabetes      Est. average glucose 111   mg/dL Final   Comment:     5/29/2018  9:43 PM - [place]   Component Results   Component Value Flag Ref Range Units Status   Vitamin B12 980 (H) 211 - 911 pg/mL Final     5/29/2018  9:43 PM - [place]   Component Results   Component Value Flag Ref Range Units Status   TSH 1.48  0.36 - 3.74 uIU/mL Final   T4, Free 0.9  0.7 - 1.5 NG/DL Final         Impression: Peripheral neuropathy no less than the last 10 years affecting his feet worse than his hands in this patient who has risk factors including severe alcohol use in the past history of being frostbitten as a youngster. I suspect that the combination of these 2 insults have caused him to develop this neuropathy. He actually has had worsening of his symptoms but they started at least 10 years ago. That is around the time that he quit drinking. Plan: He needs to have a serum protein electrophoresis and a sedimentation rate to finish up the neuropathy workup. For now I am going to increase his Cymbalta from 30-60 mg twice a day. Also going to get an EMG nerve conduction to see how bad his nerve problem is. Return visit here in about 4 weeks. PLEASE NOTE:   This document has been produced using voice recognition software. Unrecognized errors in transcription may be present.

## 2018-06-01 NOTE — LETTER
2018 11:40 AM 
 
Patient:  Yenifer Lundberg YOB: 1960 Date of Visit: 2018 Dear DUANE Lopez [facility] [address] [employer] [address] [id number] VIA In Basket 
 : Thank you for referring Mr. Celia Doshi to me for evaluation/treatment. Below are the relevant portions of my assessment and plan of care. Yenifer Lundberg is a 59 y.o., right handed male, with an established history of hypertension, spontaneous splenic rupture, atrial fibrillation, who noticed onset about 10 years ago of a painful burning sensation. He feels as if the feet are burning and like he's walking on broken glass. He feels like there's sand between his toes. This has been worsening since onset 10 years ago. It started to involve his hands about 8 years ago. He has had cervical spine fusions in the neck, the first one was , and then 2 other surgeries in the last 8 years. He's also had 2 lumber spine surgeries in  and . He states the discomfort in the hands and feet has gotten worse since the spinal surgeries. He feels as if he can't pick small items up any longer. The burning is worse at night. Of note is that he was frostbitten a little back when he was younger while living in [place]. This involved both his hands and feet. Social History; , lives with his wife. Quite smoking 2017, prior was smoking for 40 years, 3 PPD. Quite drinking 8 years ago, was drinking to the point of intoxication at least every other day for 20 years. No illicit drugs currently. Worked as a cabinet buid Family History; father  from heart disease in his 42's, had hypertension. Mother alive with lupus, hypothyroidism, fibromyalgia. Brother with hypertension, alcoholism. Brother  from aortic aneurysm. Sister  of trauma. Current Outpatient Prescriptions Medication Sig Dispense Refill  melatonin tab tablet Take  by mouth nightly.  SYMBICORT 160-4.5 mcg/actuation HFAA inhale 2 puffs by mouth twice a day 1 Inhaler 5  
 dabigatran etexilate (PRADAXA) 150 mg capsule Take 1 Cap by mouth every twelve (12) hours. 180 Cap 3  
 dilTIAZem CD (CARTIA XT) 180 mg ER capsule Take 1 Cap by mouth two (2) times a day. 180 Cap 3  
 DULoxetine (CYMBALTA) 30 mg capsule Take 1 Cap by mouth two (2) times a day. 180 Cap 1  
 gabapentin (NEURONTIN) 400 mg capsule Take 1 Cap by mouth three (3) times daily. (Patient taking differently: Take 400 mg by mouth two (2) times a day.) 270 Cap 1  
 atenolol (TENORMIN) 100 mg tablet Take 1 Tab by mouth daily. 90 Tab 1  cholecalciferol, vitamin D3, (VITAMIN D3) 2,000 unit tab Take  by mouth two (2) times a day.  MULTIVIT-MIN/FA/LYCOPEN/LUTEIN (CENTRUM SILVER MEN PO) Take 1 Tab by mouth daily.  potassium 99 mg tablet Take 99 mg by mouth daily.  fish oil-dha-epa 1,200-144-216 mg cap Take 1 Tab by mouth daily.  albuterol (PROAIR HFA) 90 mcg/actuation inhaler Take 2 Puffs by inhalation every four (4) hours as needed for Wheezing. 1 Inhaler 1  
 b complex vitamins tablet Take 1 Tab by mouth daily. Past Medical History:  
Diagnosis Date  AAA (abdominal aortic aneurysm) [address] [address]  Alcoholism ([facility] [address]  Atrial fibrillation ([facility] [address]  Bradycardia  Chronic pain  Degenerative disc disease, cervical   
 Degenerative disc disease, lumbar  Hypertension  Hypokalemia  Pneumonia 2016  
 caused permanent L lung problems  Thyroid disease Past Surgical History:  
Procedure Laterality Date  ABDOMEN SURGERY PROC UNLISTED  10/08/2017  
 spleenectomy  HX ORTHOPAEDIC    
 neck fracture  HX ORTHOPAEDIC    
 lumbar fusion No Known Allergies Patient Active Problem List  
Diagnosis Code  Atrial fibrillation (HCC) I48.91  
 Osteoarthritis of multiple joints M15.9  Chronic pain G89.29  
  Hyperthyroidism E05.90  
 History of abdominal aortic aneurysm (AAA) Z86.79  
 Family history of systemic lupus erythematosus (SLE) in mother Z80.2  History of drug use Z87.898  
 H/O alcohol abuse Z87.898  
 Nicotine use disorder F17.200  Depression F32.9  
 Essential hypertension I10  
 FH: CAD (coronary artery disease) Z80.55  
 AAA (abdominal aortic aneurysm) without rupture (HCC) I71.4  Ruptured spleen S36. 09XA  S/P splenectomy Z90.81  
 Anemia due to blood loss, acute D62  Seizure ([facility] [address] R56.9 Review of Systems: As above otherwise 11 point review of systems negative including;  
Constitutional no fever or chills Skin denies rash or itching HENT  Denies tinnitus, hearing lose Eyes denies diplopia vision lose Respiratory denies shortness of breath Cardiovascular denies chest pain, dyspnea on exertion Gastrointestinal denies nausea, vomiting, diarrhea, constipation Genitourinary denies incontinence Musculoskeletal denies joint pain or swelling Endocrine denies weight change Hematology denies easy bruising or bleeding Neurological as above in HPI PHYSICAL EXAMINATION:   
 
VITAL SIGNS:   
Visit Vitals  /80 (BP 1 Location: Left arm, BP Patient Position: Sitting)  Pulse 63  Temp 97.7 °F (36.5 °C) (Oral)  Resp 18  Ht 5' 10\" (1.778 m)  Wt 90.6 kg (199 lb 12.8 oz)  BMI 28.67 kg/m2 GENERAL: The patient is well developed, well nourished, and in no apparent distress. EXTREMITIES: No clubbing, cyanosis, or edema is identified. Pulses 2+ and symmetrical.  Muscle tone is normal.   
HEAD:   Ear, nose, and throat appear to be without trauma. The patient is normocephalic. NEUROLOGIC EXAMINATION 
 
MENTAL STATUS: The patient is awake, alert, and oriented x 4. Fund of knowledge is adequate. Speech is fluent and memory appears to be intact, both long and short term. CRANIAL NERVES: II  Visual fields are full to confrontation. Funduscopic examination reveals flat disks bilaterally. Pupils are both 5 mm and briskly reactive to light and accommodation. III, IV, VI  Extraocular movements are intact and there is no nystagmus. V  Facial sensation is intact to pinprick and light touch. VII  Face is symmetrical.  
VIII - Hearing is present. IX, X, [address] rises symmetrically. Gag is present. Tongue is in the midline. XI - Shoulder shrugging and head turning intact MOTOR:  The patient is 5/5 in all four limbs without any drift. Fine finger movements are symmetrical.  Isolated motor group testing reveals no focal abnormalities. Tone is normal.  Sensory examination is depressed to pin and light touch to the mid calf and wrist bilaterally. Sensation to vibration and position is intact. Reflexes are 2+ and symmetrical, except the right ankle jerk is absent. Plantars are down going. Cerebellar examination reveals no gross ataxia or dysmetria. Gait is abnormal he walks gingerly off of both feet. CBC:  
Lab Results Component Value Date/Time WBC 14.0 (H) 05/29/2018 02:10 PM  
 RBC 5.04 05/29/2018 02:10 PM  
 HGB 16.2 (H) 05/29/2018 02:10 PM  
 HCT 49.0 (H) 05/29/2018 02:10 PM  
 PLATELET 777 (H) 52/37/2565 02:10 PM  
 
BMP:  
Lab Results Component Value Date/Time Glucose 71 (L) 05/29/2018 02:10 PM  
 Sodium 139 05/29/2018 02:10 PM  
 Potassium 4.3 05/29/2018 02:10 PM  
 Chloride 106 05/29/2018 02:10 PM  
 CO2 25 05/29/2018 02:10 PM  
 BUN 25 (H) 05/29/2018 02:10 PM  
 Creatinine 1.10 05/29/2018 02:10 PM  
 Calcium 9.5 05/29/2018 02:10 PM  
 
CMP:  
Lab Results Component Value Date/Time  Glucose 71 (L) 05/29/2018 02:10 PM  
 Sodium 139 05/29/2018 02:10 PM  
 Potassium 4.3 05/29/2018 02:10 PM  
 Chloride 106 05/29/2018 02:10 PM  
 CO2 25 05/29/2018 02:10 PM  
 BUN 25 (H) 05/29/2018 02:10 PM  
 Creatinine 1.10 05/29/2018 02:10 PM  
 Calcium 9.5 05/29/2018 02:10 PM  
 Anion gap 8 05/29/2018 02:10 PM  
 BUN/Creatinine ratio 23 (H) 05/29/2018 02:10 PM  
 Alk. phosphatase 89 05/29/2018 02:10 PM  
 Protein, total 7.4 05/29/2018 02:10 PM  
 Albumin 4.0 05/29/2018 02:10 PM  
 Globulin 3.4 05/29/2018 02:10 PM  
 A-G Ratio 1.2 05/29/2018 02:10 PM  
 
Coagulation:  
Lab Results Component Value Date/Time Prothrombin time 13.6 (H) 10/09/2017 12:45 AM  
 INR 1.2 (H) 10/09/2017 12:45 AM  
 aPTT 28.6 10/09/2017 12:45 AM  
 
Cardiac markers: No results found for: CPK, CKND1, RUSSEL  
 
5/29/2018  9:32 PM - [facility]  
Component Results Component Value Flag Ref Range Units Status Hemoglobin A1c 5.5  4.2 - 5.6 % Final  
Comment:  
(NOTE) HbA1C Interpretive Ranges <5.7              Normal  
5.7 - 6.4         Consider Prediabetes >6.5              Consider Diabetes Est. average glucose 111   mg/dL Final  
Comment:  
 
5/29/2018  9:43 PM - [facility]  
Component Results Component Value Flag Ref Range Units Status Vitamin B12 980 (H) 211 - 911 pg/mL Final  
 
5/29/2018  9:43 PM - [facility]  
Component Results Component Value Flag Ref Range Units Status TSH 1.48  0.36 - 3.74 uIU/mL Final  
T4, Free 0.9  0.7 - 1.5 NG/DL Final  
 
 
 
Impression: Peripheral neuropathy no less than the last 10 years affecting his feet worse than his hands in this patient who has risk factors including severe alcohol use in the past history of being frostbitten as a youngster. I suspect that the combination of these 2 insults have caused him to develop this neuropathy. He actually has had worsening of his symptoms but they started at least 10 years ago. That is around the time that he quit drinking. Plan: He needs to have a serum protein electrophoresis and a sedimentation rate to finish up the neuropathy workup. For now I am going to increase his Cymbalta from 30-60 mg twice a day.   Also going to get an EMG nerve conduction to see how bad his nerve problem is. Return visit here in about 4 weeks. PLEASE NOTE:  
This document has been produced using voice recognition software. Unrecognized errors in transcription may be present. If you have questions, please do not hesitate to call me. I look forward to following Mr. Jael Scott along with you.  
 
 
 
Sincerely, 
 
 
Marina Jiang MD

## 2018-06-01 NOTE — PROGRESS NOTES
Blood pressure 130/80, pulse 63, temperature 97.7 °F (36.5 °C), temperature source Oral, resp. rate 18, height 5' 10\" (1.778 m), weight 90.6 kg (199 lb 12.8 oz). Sathya Galan is a 59 y.o. male presents in office for    Chief Complaint   Patient presents with    Neurologic Problem     Neuropathy, numbness in fingers, burning in bilateral feet and fingers        Health Maintenance Due   Topic Date Due    MenB Meningococcal topic (1 of 2 - Bexsero 2-Dose Series) 03/01/1968    DTaP/Tdap/Td series (1 - Tdap) 03/01/1979    Pneumococcal 19-64 Highest Risk (2 of 3 - PPSV23) 12/15/2017    ZOSTER VACCINE AGE 60>  01/01/2018       1. Have you been to the ER, urgent care clinic since your last visit? Hospitalized since your last visit? No    2. Have you seen or consulted any other health care providers outside of the [address] [place] [address] since your last visit? Include any pap smears or colon screening. No     Patient states he fell in shower about 1 month ago. No injuries noted. Palpated bilateral dorsalis pedis and bilateral posterior tibialis, pulses present. Bilateral feet cold to touch. Elevated feet due to discoloration. PHQ over the last two weeks 6/1/2018   PHQ Not Done -   Little interest or pleasure in doing things Several days   Feeling down, depressed or hopeless Several days   Total Score PHQ 2 2     Fall Risk Assessment, last 12 mths 6/1/2018   Able to walk? Yes   Fall in past 12 months? Yes   Fall with injury? No   Number of falls in past 12 months 1   Fall Risk Score 1     Abuse Screening Questionnaire 6/1/2018   Do you ever feel afraid of your partner? N   Are you in a relationship with someone who physically or mentally threatens you? N   Is it safe for you to go home?  Y     Learning Assessment 6/1/2018   PRIMARY LEARNER Patient   PRIMARY LANGUAGE ENGLISH   LEARNER PREFERENCE PRIMARY LISTENING   ANSWERED BY patient   RELATIONSHIP SELF

## 2018-06-01 NOTE — COMMUNICATION BODY
Ann Alvarez is a 59 y.o., right handed male, with an established history of hypertension, spontaneous splenic rupture, atrial fibrillation, who noticed onset about 10 years ago of a painful burning sensation. He feels as if the feet are burning and like he's walking on broken glass. He feels like there's sand between his toes. This has been worsening since onset 10 years ago. It started to involve his hands about 8 years ago. He has had cervical spine fusions in the neck, the first one was , and then 2 other surgeries in the last 8 years. He's also had 2 lumber spine surgeries in  and . He states the discomfort in the hands and feet has gotten worse since the spinal surgeries. He feels as if he can't pick small items up any longer. The burning is worse at night. Of note is that he was frostbitten a little back when he was younger while living in [place]. This involved both his hands and feet. Social History; , lives with his wife. Quite smoking 2017, prior was smoking for 40 years, 3 PPD. Quite drinking 8 years ago, was drinking to the point of intoxication at least every other day for 20 years. No illicit drugs currently. Worked as a cabinet buid    Family History; father  from heart disease in his 42's, had hypertension. Mother alive with lupus, hypothyroidism, fibromyalgia. Brother with hypertension, alcoholism. Brother  from aortic aneurysm. Sister  of trauma. Current Outpatient Prescriptions   Medication Sig Dispense Refill    melatonin tab tablet Take  by mouth nightly.  SYMBICORT 160-4.5 mcg/actuation HFAA inhale 2 puffs by mouth twice a day 1 Inhaler 5    dabigatran etexilate (PRADAXA) 150 mg capsule Take 1 Cap by mouth every twelve (12) hours. 180 Cap 3    dilTIAZem CD (CARTIA XT) 180 mg ER capsule Take 1 Cap by mouth two (2) times a day.  180 Cap 3    DULoxetine (CYMBALTA) 30 mg capsule Take 1 Cap by mouth two (2) times a day. 180 Cap 1    gabapentin (NEURONTIN) 400 mg capsule Take 1 Cap by mouth three (3) times daily. (Patient taking differently: Take 400 mg by mouth two (2) times a day.) 270 Cap 1    atenolol (TENORMIN) 100 mg tablet Take 1 Tab by mouth daily. 90 Tab 1    cholecalciferol, vitamin D3, (VITAMIN D3) 2,000 unit tab Take  by mouth two (2) times a day.  MULTIVIT-MIN/FA/LYCOPEN/LUTEIN (CENTRUM SILVER MEN PO) Take 1 Tab by mouth daily.  potassium 99 mg tablet Take 99 mg by mouth daily.  fish oil-dha-epa 1,200-144-216 mg cap Take 1 Tab by mouth daily.  albuterol (PROAIR HFA) 90 mcg/actuation inhaler Take 2 Puffs by inhalation every four (4) hours as needed for Wheezing. 1 Inhaler 1    b complex vitamins tablet Take 1 Tab by mouth daily. Past Medical History:   Diagnosis Date    AAA (abdominal aortic aneurysm) (HCC)     Alcoholism ([address]     Atrial fibrillation (HCC)     Bradycardia     Chronic pain     Degenerative disc disease, cervical     Degenerative disc disease, lumbar     Hypertension     Hypokalemia     Pneumonia 2016    caused permanent L lung problems    Thyroid disease        Past Surgical History:   Procedure Laterality Date    ABDOMEN SURGERY PROC UNLISTED  10/08/2017    spleenectomy    HX ORTHOPAEDIC      neck fracture    HX ORTHOPAEDIC      lumbar fusion       No Known Allergies    Patient Active Problem List   Diagnosis Code    Atrial fibrillation (HCC) I48.91    Osteoarthritis of multiple joints M15.9    Chronic pain G89.29    Hyperthyroidism E05.90    History of abdominal aortic aneurysm (AAA) Z86.79    Family history of systemic lupus erythematosus (SLE) in mother Z80.2    History of drug use Z87.898    H/O alcohol abuse Z87.898    Nicotine use disorder F17.200    Depression F32.9    Essential hypertension I10    FH: CAD (coronary artery disease) Z80.55    AAA (abdominal aortic aneurysm) without rupture (HCC) I71.4    Ruptured spleen S36. 09XA    S/P splenectomy Z90.81    Anemia due to blood loss, acute D62    Seizure ([facility] [address] R56.9         Review of Systems:   As above otherwise 11 point review of systems negative including;   Constitutional no fever or chills  Skin denies rash or itching  HENT  Denies tinnitus, hearing lose  Eyes denies diplopia vision lose  Respiratory denies shortness of breath  Cardiovascular denies chest pain, dyspnea on exertion  Gastrointestinal denies nausea, vomiting, diarrhea, constipation  Genitourinary denies incontinence  Musculoskeletal denies joint pain or swelling  Endocrine denies weight change  Hematology denies easy bruising or bleeding   Neurological as above in HPI      PHYSICAL EXAMINATION:      VITAL SIGNS:    Visit Vitals    /80 (BP 1 Location: Left arm, BP Patient Position: Sitting)    Pulse 63    Temp 97.7 °F (36.5 °C) (Oral)    Resp 18    Ht 5' 10\" (1.778 m)    Wt 90.6 kg (199 lb 12.8 oz)    BMI 28.67 kg/m2       GENERAL: The patient is well developed, well nourished, and in no apparent distress. EXTREMITIES: No clubbing, cyanosis, or edema is identified. Pulses 2+ and symmetrical.  Muscle tone is normal.    HEAD:   Ear, nose, and throat appear to be without trauma. The patient is normocephalic. NEUROLOGIC EXAMINATION    MENTAL STATUS: The patient is awake, alert, and oriented x 4. Fund of knowledge is adequate. Speech is fluent and memory appears to be intact, both long and short term. CRANIAL NERVES: II  Visual fields are full to confrontation. Funduscopic examination reveals flat disks bilaterally. Pupils are both 5 mm and briskly reactive to light and accommodation. III, IV, VI  Extraocular movements are intact and there is no nystagmus. V  Facial sensation is intact to pinprick and light touch. VII  Face is symmetrical.   VIII - Hearing is present. IX, X, [address] rises symmetrically. Gag is present. Tongue is in the midline.       XI - Shoulder shrugging and head turning intact  MOTOR:  The patient is 5/5 in all four limbs without any drift. Fine finger movements are symmetrical.  Isolated motor group testing reveals no focal abnormalities. Tone is normal.  Sensory examination is depressed to pin and light touch to the mid calf and wrist bilaterally. Sensation to vibration and position is intact. Reflexes are 2+ and symmetrical, except the right ankle jerk is absent. Plantars are down going. Cerebellar examination reveals no gross ataxia or dysmetria. Gait is abnormal he walks gingerly off of both feet. CBC:   Lab Results   Component Value Date/Time    WBC 14.0 (H) 05/29/2018 02:10 PM    RBC 5.04 05/29/2018 02:10 PM    HGB 16.2 (H) 05/29/2018 02:10 PM    HCT 49.0 (H) 05/29/2018 02:10 PM    PLATELET 107 (H) 11/71/8586 02:10 PM     BMP:   Lab Results   Component Value Date/Time    Glucose 71 (L) 05/29/2018 02:10 PM    Sodium 139 05/29/2018 02:10 PM    Potassium 4.3 05/29/2018 02:10 PM    Chloride 106 05/29/2018 02:10 PM    CO2 25 05/29/2018 02:10 PM    BUN 25 (H) 05/29/2018 02:10 PM    Creatinine 1.10 05/29/2018 02:10 PM    Calcium 9.5 05/29/2018 02:10 PM     CMP:   Lab Results   Component Value Date/Time    Glucose 71 (L) 05/29/2018 02:10 PM    Sodium 139 05/29/2018 02:10 PM    Potassium 4.3 05/29/2018 02:10 PM    Chloride 106 05/29/2018 02:10 PM    CO2 25 05/29/2018 02:10 PM    BUN 25 (H) 05/29/2018 02:10 PM    Creatinine 1.10 05/29/2018 02:10 PM    Calcium 9.5 05/29/2018 02:10 PM    Anion gap 8 05/29/2018 02:10 PM    BUN/Creatinine ratio 23 (H) 05/29/2018 02:10 PM    Alk.  phosphatase 89 05/29/2018 02:10 PM    Protein, total 7.4 05/29/2018 02:10 PM    Albumin 4.0 05/29/2018 02:10 PM    Globulin 3.4 05/29/2018 02:10 PM    A-G Ratio 1.2 05/29/2018 02:10 PM     Coagulation:   Lab Results   Component Value Date/Time    Prothrombin time 13.6 (H) 10/09/2017 12:45 AM    INR 1.2 (H) 10/09/2017 12:45 AM    aPTT 28.6 10/09/2017 12:45 AM     Cardiac markers: No results found for: CPK, CKND1, RUSSEL     5/29/2018  9:32 PM - [facility]   Component Results   Component Value Flag Ref Range Units Status   Hemoglobin A1c 5.5  4.2 - 5.6 % Final   Comment:   (NOTE)   HbA1C Interpretive Ranges   <5.7              Normal   5.7 - 6.4         Consider Prediabetes   >6.5              Consider Diabetes      Est. average glucose 111   mg/dL Final   Comment:     5/29/2018  9:43 PM - [facility]   Component Results   Component Value Flag Ref Range Units Status   Vitamin B12 980 (H) 211 - 911 pg/mL Final     5/29/2018  9:43 PM - [place]   Component Results   Component Value Flag Ref Range Units Status   TSH 1.48  0.36 - 3.74 uIU/mL Final   T4, Free 0.9  0.7 - 1.5 NG/DL Final         Impression: Peripheral neuropathy no less than the last 10 years affecting his feet worse than his hands in this patient who has risk factors including severe alcohol use in the past history of being frostbitten as a youngster. I suspect that the combination of these 2 insults have caused him to develop this neuropathy. He actually has had worsening of his symptoms but they started at least 10 years ago. That is around the time that he quit drinking. Plan: He needs to have a serum protein electrophoresis and a sedimentation rate to finish up the neuropathy workup. For now I am going to increase his Cymbalta from 30-60 mg twice a day. Also going to get an EMG nerve conduction to see how bad his nerve problem is. Return visit here in about 4 weeks. PLEASE NOTE:   This document has been produced using voice recognition software. Unrecognized errors in transcription may be present.

## 2018-06-21 ENCOUNTER — HOSPITAL ENCOUNTER (OUTPATIENT)
Dept: LAB | Age: 58
Discharge: HOME | End: 2018-06-21
Payer: COMMERCIAL

## 2018-06-21 ENCOUNTER — OFFICE VISIT (OUTPATIENT)
Dept: NEUROLOGY | Age: 58
End: 2018-06-21

## 2018-06-21 DIAGNOSIS — G62.9 NEUROPATHY: Primary | ICD-10-CM

## 2018-06-21 DIAGNOSIS — G62.9 NEUROPATHY: ICD-10-CM

## 2018-06-21 LAB — ERYTHROCYTE [SEDIMENTATION RATE] IN BLOOD: 8 MM/HR (ref 0–20)

## 2018-06-21 PROCEDURE — 36415 COLL VENOUS BLD VENIPUNCTURE: CPT | Performed by: PSYCHIATRY & NEUROLOGY

## 2018-06-21 PROCEDURE — 85652 RBC SED RATE AUTOMATED: CPT | Performed by: PSYCHIATRY & NEUROLOGY

## 2018-06-21 PROCEDURE — 84155 ASSAY OF PROTEIN SERUM: CPT | Performed by: PSYCHIATRY & NEUROLOGY

## 2018-06-21 NOTE — LETTER
6/21/2018 1:21 PM 
 
Patient:  Yan Yoder YOB: 1960 Date of Visit: 6/21/2018 Dear DUANE Leary [facility] [employer] [address] [address] VIA In Basket 
 : Thank you for referring Mr. Desmond Amaya to me for evaluation/treatment. Below are the relevant portions of my assessment and plan of care. [facility] [address] [address] [phone] Neurophysiology Report Patient: Desmond Amaya ID: [id number] Physician: Frankie Ware. Particling Moon MD  
Gender: Male Ref Phys: Frankie Ware. Particling Moon MD  
Handedness:     
Study Date: June 21, 2018 Patient History: This 78-year-old gentleman has been complaining of pain radiating from his neck into the shoulders and a burning sensation in his legs for the past several months. Nerve Conduction Studies Anti Sensory Summary Table Stim Site NR Peak (ms) Norm Peak (ms) O-P Amp (µV) Norm O-P Amp Dist (cm) Trey (m/s) Left Median 2nd Digit Anti Sensory (2nd Digit) Wrist    3.0 <3.5 25.8 >20 13.0 61.9 Site 2    3.0  26.1 Left Sural Anti Sensory (Ankle) Calf    4.3 <4.4 0.7 >6.0 14.0 37.8 Site 2    4.7  1.9 Site 3    9.7  5.5 Right Sural Anti Sensory (Ankle) Calf    3.8 <4.4 6.9 >6.0 14.0 48.3 Site 2    4.1  11.3     
    3.9  21.3 Left Ulnar Anti Sensory (5th Digit ) Wrist    2.9 <3.1 23.7 >17.0 11.0 50.0 Site 2    2.9  23.4 Motor Summary Table Stim Site NR Onset (ms) Norm Onset (ms) O-P Amp (mV) Norm O-P Amp Dist (cm) Trey (m/s) Norm Trey (m/s) Left Median Motor (Abd Poll Brev) Wrist    2.9 <4.4 10.7 >4.0 26.0 59.1 >49 Elbow    7.3  9.4 Left Peroneal Motor (EDB) Ankle    3.8 <6.5 2.8 >2.0 46.0 62.2 >44 Fibula (Head)    11.2  2.6  10.0 45.5 Pop Fossa    13.4  4.8 Right Peroneal Motor (EDB) Ankle    13.8 <6.5 0.0 >2.0 Left Tibial Motor (Abd Barkre) Ankle    5.8 <5.8 14.6 >4.0 42.0 42.0 >41 Knee    15.8  9.6 Left Ulnar Motor (Abd Dig Minimi ) Wrist    3.2 <3.3 4.8 >6.0 20.0 51.3 >49 B Elbow    7.1  4.0  14.0 43.7 >50 A Elbow    10.3  4.3 EMG Side Muscle Nerve Root Ins Act Fibs Psw Fasc Amp Dur Poly Recrt Int Earnie Dotter Comment Left AntTibialis Dp Br Fibular L4-5 Nml Nml Nml None Nml Nml 0 Nml Nml Left MedGastroc   Nml Nml Nml None Nml Nml 0 Nml Nml Left VastusMed Femoral L2-4 Nml Nml Nml None Nml Nml 0 Nml Nml Left ExtHallLong Dp Br Fibular L5, S1 Nml Nml Nml None Nml Nml 0 Nml Nml Left BicepsFemS Sciatic L5-S1 Nml Nml Nml None Nml Nml 0 Nml Nml Left Lumbo Parasp Up Rami L1-2 Nml Nml Nml Left Lumbo Parasp Mid Rami L3-4 Nml Nml Nml Left Lumbo Parasp Low Rami L5-S1 Nml Nml Nml NCS/EMG FINDINGS: 
 
? Evaluation of the Left median motor, the Left peroneal motor, the Left tibial motor, the Left Median 2nd Digit sensory, the Right sural sensory, and the Left ulnar sensory nerves were unremarkable. ? The Right peroneal motor nerve showed prolonged distal onset latency (13.8 ms) and reduced amplitude (0.0 mV). ? The Left ulnar motor nerve showed reduced amplitude (4.8 mV) and decreased conduction velocity (A Elbow-B Elbow, 43.7 m/s). ? The Left sural sensory nerve showed reduced amplitude (0.7 µV) and decreased conduction velocity (Calf-Ankle, 37.8 m/s). 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 ___________________________ Cristobal Iglesias MD 
 
 
 
 
Waveforms: If you have questions, please do not hesitate to call me. I look forward to following Mr. Funesreji Rowe along with you.  
 
 
 
Sincerely, 
 
 
Sumanth Valle MD

## 2018-06-21 NOTE — PROGRESS NOTES
[facility] Neuroscience  [facility] [address] [facility] [address]  [facility] [phone]    Neurophysiology Report    Patient: Aliyah Seymour     ID: [id number] Physician: Jamilah Alegre MD   Gender: Male Ref Phys: Jamilah Kelly. David Alegre MD   Handedness:      Study Date: June 21, 2018         Patient History: This 78-year-old gentleman has been complaining of pain radiating from his neck into the shoulders and a burning sensation in his legs for the past several months.     Nerve Conduction Studies  Anti Sensory Summary Table     Stim Site NR Peak (ms) Norm Peak (ms) O-P Amp (µV) Norm O-P Amp Dist (cm) Trey (m/s)   Left Median 2nd Digit Anti Sensory (2nd Digit)   Wrist    3.0 <3.5 25.8 >20 13.0 61.9   Site 2    3.0  26.1      Left Sural Anti Sensory (Ankle)   Calf    4.3 <4.4 0.7 >6.0 14.0 37.8   Site 2    4.7  1.9      Site 3    9.7  5.5      Right Sural Anti Sensory (Ankle)   Calf    3.8 <4.4 6.9 >6.0 14.0 48.3   Site 2    4.1  11.3          3.9  21.3      Left Ulnar Anti Sensory (5th Digit )   Wrist    2.9 <3.1 23.7 >17.0 11.0 50.0   Site 2    2.9  23.4        Motor Summary Table     Stim Site NR Onset (ms) Norm Onset (ms) O-P Amp (mV) Norm O-P Amp Dist (cm) Trey (m/s) Norm Trey (m/s)   Left Median Motor (Abd Poll Brev)   Wrist    2.9 <4.4 10.7 >4.0 26.0 59.1 >49   Elbow    7.3  9.4       Left Peroneal Motor (EDB)   Ankle    3.8 <6.5 2.8 >2.0 46.0 62.2 >44   Fibula (Head)    11.2  2.6  10.0 45.5    Pop Fossa    13.4  4.8       Right Peroneal Motor (EDB)   Ankle    13.8 <6.5 0.0 >2.0      Left Tibial Motor (Abd Hill Brev)   Ankle    5.8 <5.8 14.6 >4.0 42.0 42.0 >41   Knee    15.8  9.6       Left Ulnar Motor (Abd Dig Minimi )   Wrist    3.2 <3.3 4.8 >6.0 20.0 51.3 >49   B Elbow    7.1  4.0  14.0 43.7 >50   A Elbow    10.3  4.3           EMG     Side Muscle Nerve Root Ins Act Fibs Psw Fasc Amp Dur Poly Recrt Int Andrey Dilan Comment   Left AntTibialis Dp Br Fibular L4-5 Nml Nml Nml None Nml Nml 0 Nml Nml Left MedGastroc   Nml Nml Nml None Nml Nml 0 Nml Nml    Left VastusMed Femoral L2-4 Nml Nml Nml None Nml Nml 0 Nml Nml    Left ExtHallLong Dp Br Fibular L5, S1 Nml Nml Nml None Nml Nml 0 Nml Nml    Left BicepsFemS Sciatic L5-S1 Nml Nml Nml None Nml Nml 0 Nml Nml    Left Lumbo Parasp Up Rami L1-2 Nml Nml Nml          Left Lumbo Parasp Mid Rami L3-4 Nml Nml Nml          Left Lumbo Parasp Low Rami L5-S1 Nml Nml Nml            NCS/EMG FINDINGS:     Evaluation of the Left median motor, the Left peroneal motor, the Left tibial motor, the Left Median 2nd Digit sensory, the Right sural sensory, and the Left ulnar sensory nerves were unremarkable.  The Right peroneal motor nerve showed prolonged distal onset latency (13.8 ms) and reduced amplitude (0.0 mV).  The Left ulnar motor nerve showed reduced amplitude (4.8 mV) and decreased conduction velocity (A Elbow-B Elbow, 43.7 m/s).  The Left sural sensory nerve showed reduced amplitude (0.7 µV) and decreased conduction velocity (Calf-Ankle, 37.8 m/s). 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 ___________________________  Michael James MD          Waveforms:

## 2018-06-21 NOTE — PROGRESS NOTES
[address] AT [facility]  OFFICE PROCEDURE PROGRESS NOTE        Chart reviewed for the following:   Orville Dillard MD, have reviewed the History, Physical and updated the Allergic reactions for [address] performed immediately prior to start of procedure:   Orville Dillard MD, have performed the following reviews on Reticlyde Hernandez prior to the start of the procedure:            * Patient was identified by name and date of birth   * Agreement on procedure being performed was verified  * Risks and Benefits explained to the patient  * Procedure site verified and marked as necessary  * Patient was positioned for comfort  * Consent was signed and verified     Time: 1:22 PM    Date of procedure: 6/21/2018    Procedure performed by:  Marina Jiang MD    Provider assisted by: Sb Cruz MA    Patient assisted by: self    How tolerated by patient: tolerated the procedure well with no complications    Post Procedural Pain Scale: 0 - No Hurt    Comments: none

## 2018-06-21 NOTE — COMMUNICATION BODY
[facility] Neuroscience  [facility] [address] [facility] [address]  [facility] [phone]    Neurophysiology Report    Patient: Madhuri Walden     ID: [id number] Physician: Ashley Dugan. Marion Bernstein MD   Gender: Male Ref Phys: Ashley Dugan. aMrion Bernstein MD   Handedness:      Study Date: June 21, 2018         Patient History: This 57-year-old gentleman has been complaining of pain radiating from his neck into the shoulders and a burning sensation in his legs for the past several months.     Nerve Conduction Studies  Anti Sensory Summary Table     Stim Site NR Peak (ms) Norm Peak (ms) O-P Amp (µV) Norm O-P Amp Dist (cm) Trey (m/s)   Left Median 2nd Digit Anti Sensory (2nd Digit)   Wrist    3.0 <3.5 25.8 >20 13.0 61.9   Site 2    3.0  26.1      Left Sural Anti Sensory (Ankle)   Calf    4.3 <4.4 0.7 >6.0 14.0 37.8   Site 2    4.7  1.9      Site 3    9.7  5.5      Right Sural Anti Sensory (Ankle)   Calf    3.8 <4.4 6.9 >6.0 14.0 48.3   Site 2    4.1  11.3          3.9  21.3      Left Ulnar Anti Sensory (5th Digit )   Wrist    2.9 <3.1 23.7 >17.0 11.0 50.0   Site 2    2.9  23.4        Motor Summary Table     Stim Site NR Onset (ms) Norm Onset (ms) O-P Amp (mV) Norm O-P Amp Dist (cm) Trey (m/s) Norm Trey (m/s)   Left Median Motor (Abd Poll Brev)   Wrist    2.9 <4.4 10.7 >4.0 26.0 59.1 >49   Elbow    7.3  9.4       Left Peroneal Motor (EDB)   Ankle    3.8 <6.5 2.8 >2.0 46.0 62.2 >44   Fibula (Head)    11.2  2.6  10.0 45.5    Pop Fossa    13.4  4.8       Right Peroneal Motor (EDB)   Ankle    13.8 <6.5 0.0 >2.0      Left Tibial Motor (Abd Hill Brev)   Ankle    5.8 <5.8 14.6 >4.0 42.0 42.0 >41   Knee    15.8  9.6       Left Ulnar Motor (Abd Dig Minimi )   Wrist    3.2 <3.3 4.8 >6.0 20.0 51.3 >49   B Elbow    7.1  4.0  14.0 43.7 >50   A Elbow    10.3  4.3           EMG     Side Muscle Nerve Root Ins Act Fibs Psw Fasc Amp Dur Poly Recrt Int [place] Comment   Left AntTibialis Dp Br Fibular L4-5 Nml Nml Nml None Nml Nml 0 Nml Nml Left MedGastroc   Nml Nml Nml None Nml Nml 0 Nml Nml    Left VastusMed Femoral L2-4 Nml Nml Nml None Nml Nml 0 Nml Nml    Left ExtHallLong Dp Br Fibular L5, S1 Nml Nml Nml None Nml Nml 0 Nml Nml    Left BicepsFemS Sciatic L5-S1 Nml Nml Nml None Nml Nml 0 Nml Nml    Left Lumbo Parasp Up Rami L1-2 Nml Nml Nml          Left Lumbo Parasp Mid Rami L3-4 Nml Nml Nml          Left Lumbo Parasp Low Rami L5-S1 Nml Nml Nml            NCS/EMG FINDINGS:     Evaluation of the Left median motor, the Left peroneal motor, the Left tibial motor, the Left Median 2nd Digit sensory, the Right sural sensory, and the Left ulnar sensory nerves were unremarkable.  The Right peroneal motor nerve showed prolonged distal onset latency (13.8 ms) and reduced amplitude (0.0 mV).  The Left ulnar motor nerve showed reduced amplitude (4.8 mV) and decreased conduction velocity (A Elbow-B Elbow, 43.7 m/s).  The Left sural sensory nerve showed reduced amplitude (0.7 µV) and decreased conduction velocity (Calf-Ankle, 37.8 m/s). 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 ___________________________  Raji Nathan MD          Waveforms:

## 2018-06-22 LAB
ALBUMIN SERPL ELPH-MCNC: 3.9 G/DL (ref 2.9–4.4)
ALBUMIN/GLOB SERPL: 1.2 {RATIO} (ref 0.7–1.7)
ALPHA1 GLOB SERPL ELPH-MCNC: 0.2 G/DL (ref 0–0.4)
ALPHA2 GLOB SERPL ELPH-MCNC: 0.9 G/DL (ref 0.4–1)
B-GLOBULIN SERPL ELPH-MCNC: 1.3 G/DL (ref 0.7–1.3)
GAMMA GLOB SERPL ELPH-MCNC: 0.7 G/DL (ref 0.4–1.8)
GLOBULIN SER CALC-MCNC: 3.2 G/DL (ref 2.2–3.9)
M PROTEIN SERPL ELPH-MCNC: NORMAL G/DL
PROT SERPL-MCNC: 7.1 G/DL (ref 6–8.5)

## 2018-07-02 ENCOUNTER — OFFICE VISIT (OUTPATIENT)
Dept: NEUROLOGY | Age: 58
End: 2018-07-02

## 2018-07-02 VITALS
SYSTOLIC BLOOD PRESSURE: 132 MMHG | TEMPERATURE: 98.3 F | BODY MASS INDEX: 28.92 KG/M2 | RESPIRATION RATE: 16 BRPM | WEIGHT: 202 LBS | HEIGHT: 70 IN | HEART RATE: 70 BPM | DIASTOLIC BLOOD PRESSURE: 78 MMHG | OXYGEN SATURATION: 98 %

## 2018-07-02 DIAGNOSIS — G62.9 NEUROPATHY: Primary | ICD-10-CM

## 2018-07-02 RX ORDER — NORTRIPTYLINE HYDROCHLORIDE 50 MG/1
50 CAPSULE ORAL
Qty: 60 CAP | Refills: 6 | Status: SHIPPED | OUTPATIENT
Start: 2018-07-02 | End: 2018-09-25

## 2018-07-02 NOTE — MR AVS SNAPSHOT
[address] Merged with [facility] [id number] [phone] Patient: Darby Kaiser MRN: [id number] PBT:6[birth year]8/[birth year] Visit Information Date & Time Provider Department Dept. Phone Encounter #  
 7/2/2018  1:00 PM Cami Crowder [facility] [phone] [id number] Follow-up Instructions Return in about 6 weeks (around 8/13/2018). Follow-up and Disposition History Your Appointments 7/10/2018  1:00 PM  
Follow Up with Mike Herrera PA-C [address] ([facility]) Appt Note: Return in about 6 weeks (around 7/10/2018) for neuropathy in hands and feet. Where I've Been [address] [address]  
[phone]  
  
   
 [facility] [facility]  
  
    
 8/20/2018  1:00 PM  
Follow Up with Cami Crowder MD  
[facility]) Appt Note: 6 wk f/u   [address] M[facility] with [facility] [id number]  
[phone]  
  
   
 [facility] [address] Upcoming Health Maintenance Date Due  
 MenB Meningococcal topic (1 of 2 - Bexsero 2-Dose Series) 3/1/1968 DTaP/Tdap/Td series (1 - Tdap) 3/1/1979 Pneumococcal 19-64 Highest Risk (2 of 3 - PPSV23) 12/15/2017 ZOSTER VACCINE AGE 60> 1/1/2018 Influenza Age 5 to Adult 8/1/2018 FOBT Q 1 YEAR AGE 50-75 3/20/2019 Allergies as of 7/2/2018  Review Complete On: 7/2/2018 By: Karen Parker LPN No Known Allergies Current Immunizations  Reviewed on 10/26/2017 Name Date Influenza Vaccine (Quad) PF 10/20/2017 10:43 AM  
 Meningococcal (MCV4O) Vaccine 11/28/2017 11:14 AM  
 Pneumococcal Conjugate (PCV-13) 10/20/2017  1:28 PM  
  
 Not reviewed this visit You Were Diagnosed With   
  
 Codes Comments Neuropathy    -  Primary ICD-10-CM: G62.9 ICD-9-CM: 262. 9 Vitals BP Pulse Temp Resp Height(growth percentile) Weight(growth percentile) 132/78 (BP 1 Location: Left arm, BP Patient Position: Sitting) 70 98.3 °F (36.8 °C) (Oral) 16 5' 10\" (1.778 m) 202 lb (91.6 kg) SpO2 BMI Smoking Status 98% 28.98 kg/m2 Former Smoker Vitals History BMI and BSA Data Body Mass Index Body Surface Area  
 28.98 kg/m 2 2.13 m 2 Preferred Pharmacy Pharmacy Name Phone [address], [address] [place] [address] [phone] Your Updated Medication List  
  
   
This list is accurate as of 7/2/18  2:11 PM.  Always use your most recent med list.  
  
  
  
  
 albuterol 90 mcg/actuation inhaler Commonly known as:  PROAIR HFA Take 2 Puffs by inhalation every four (4) hours as needed for Wheezing. atenolol 100 mg tablet Commonly known as:  TENORMIN Take 1 Tab by mouth daily. b complex vitamins tablet Take 1 Tab by mouth daily. CENTRUM SILVER MEN PO Take 1 Tab by mouth daily. dabigatran etexilate 150 mg capsule Commonly known as:  PRADAXA Take 1 Cap by mouth every twelve (12) hours. dilTIAZem  mg ER capsule Commonly known as:  CARTIA XT Take 1 Cap by mouth two (2) times a day. fish oil-dha-epa 1,200-144-216 mg Cap Take 1 Tab by mouth daily. gabapentin 400 mg capsule Commonly known as:  NEURONTIN Take 1 Cap by mouth three (3) times daily. melatonin Tab tablet Take  by mouth nightly. nortriptyline 50 mg capsule Commonly known as:  PAMELOR Take 1 Cap by mouth nightly. May increase to 2 caps in 1 week  
  
 potassium 99 mg tablet Take 99 mg by mouth daily. SYMBICORT 160-4.5 mcg/actuation Hfaa Generic drug:  budesonide-formoterol  
inhale 2 puffs by mouth twice a day VITAMIN D3 2,000 unit Tab Generic drug:  cholecalciferol (vitamin D3) Take  by mouth two (2) times a day. Prescriptions Sent to Pharmacy Refills  
 nortriptyline (PAMELOR) 50 mg capsule 6 Sig: Take 1 Cap by mouth nightly. May increase to 2 caps in 1 week Class: Normal  
 Pharmacy: [facility], [address] [place] [address] #: [phone] Route: Oral  
  
Follow-up Instructions Return in about 6 weeks (around 8/13/2018). Introducing [facility]! Dear Alejo Parham: 
Thank you for requesting a [employer] account. Our records indicate that you already have an active [employer] account. You can access your account anytime at [email or web address] Did you know that you can access your hospital and ER discharge instructions at any time in [employer]? You can also review all of your test results from your hospital stay or ER visit. Additional Information If you have questions, please visit the Frequently Asked Questions section of the [employer] website at [email or web address] Remember, D[employer] is NOT to be used for urgent needs. For medical emergencies, dial 911. Now available from your iPhone and Android! Please provide this summary of care documentation to your next provider. Your primary care clinician is listed as Stevie Jose. If you have any questions after today's visit, please call [phone].

## 2018-07-02 NOTE — PROGRESS NOTES
Chief Complaint   Patient presents with    Follow-up    Neurologic Problem     Neuropathy, EMG done, Labs done. C/o pain in hands/feet- states it is a conatant, severe, prickling pain, with an occ stabbing pain      1. Have you been to the ER, urgent care clinic since your last visit? Hospitalized since your last visit? No    2. Have you seen or consulted any other health care providers outside of the [address] [place] [address] since your last visit? Include any pap smears or colon screening.  No

## 2018-07-02 NOTE — PROGRESS NOTES
Re:  Mateo Aceves up visit     7/2/2018 1:53 PM    SSN: [id number]    Subjective:   Kana Fenton returns for follow up of his painful neuropathy. He's noted no change in his symptoms on high dose Cymbalta. There's no change what so ever in the nerve pain. Medications:    Current Outpatient Prescriptions   Medication Sig Dispense Refill    DULoxetine (CYMBALTA) 60 mg capsule Take 1 Cap by mouth two (2) times a day. Indications: NEUROPATHIC PAIN 60 Cap 6    melatonin tab tablet Take  by mouth nightly.  SYMBICORT 160-4.5 mcg/actuation HFAA inhale 2 puffs by mouth twice a day 1 Inhaler 5    dabigatran etexilate (PRADAXA) 150 mg capsule Take 1 Cap by mouth every twelve (12) hours. 180 Cap 3    dilTIAZem CD (CARTIA XT) 180 mg ER capsule Take 1 Cap by mouth two (2) times a day. 180 Cap 3    gabapentin (NEURONTIN) 400 mg capsule Take 1 Cap by mouth three (3) times daily. (Patient taking differently: Take 400 mg by mouth two (2) times a day.) 270 Cap 1    atenolol (TENORMIN) 100 mg tablet Take 1 Tab by mouth daily. 90 Tab 1    cholecalciferol, vitamin D3, (VITAMIN D3) 2,000 unit tab Take  by mouth two (2) times a day.  MULTIVIT-MIN/FA/LYCOPEN/LUTEIN (CENTRUM SILVER MEN PO) Take 1 Tab by mouth daily.  potassium 99 mg tablet Take 99 mg by mouth daily.  fish oil-dha-epa 1,200-144-216 mg cap Take 1 Tab by mouth daily.  albuterol (PROAIR HFA) 90 mcg/actuation inhaler Take 2 Puffs by inhalation every four (4) hours as needed for Wheezing. 1 Inhaler 1    b complex vitamins tablet Take 1 Tab by mouth daily.          Vital signs:    Visit Vitals    /78 (BP 1 Location: Left arm, BP Patient Position: Sitting)    Pulse 70    Temp 98.3 °F (36.8 °C) (Oral)    Resp 16    Ht 5' 10\" (1.778 m)    Wt 91.6 kg (202 lb)    SpO2 98%    BMI 28.98 kg/m2       Review of Systems:   As above otherwise 11 point review of systems negative including;   Constitutional no fever or chills  Skin denies rash or itching  HEENT  Denies tinnitus, hearing lose  Eyes denies diplopia vision lose  Respiratory denies sortness of breath  Cardiovascular denies chest pain, dyspnea on exertion  Gastrointestinal denies nausea, vomiting, diarrhea, constipation  Genitourinary denies incontinence  Musculoskeletal denies joint pain or swelling  Endocrine denies weight change  Hematology denies easy bruising or bleeding   Neurological as above in HPI      Patient Active Problem List   Diagnosis Code    Atrial fibrillation (HCC) I48.91    Osteoarthritis of multiple joints M15.9    Chronic pain G89.29    Hyperthyroidism E05.90    History of abdominal aortic aneurysm (AAA) Z86.79    Family history of systemic lupus erythematosus (SLE) in mother Z80.2    History of drug use Z87.898    H/O alcohol abuse Z87.898    Nicotine use disorder F17.200    Depression F32.9    Essential hypertension I10    FH: CAD (coronary artery disease) Z80.55    AAA (abdominal aortic aneurysm) without rupture ([facility]) I71.4    Ruptured spleen S36. 09XA    S/P splenectomy Z90.81    Anemia due to blood loss, acute D62    Seizure ([address] R56.9    Neuropathy G62.9         Objective: The patient is awake, alert, and oriented x 4. Fund of knowledge is adequate. Speech is fluent and memory is intact. Cranial Nerves: II - Visual fields are full to confrontation. III, IV, VI - Extraocular movements are intact. There is no nystagmus. V - Facial sensation is intact to pinprick. VII - Face is symmetrical.  VIII - Hearing is present. IX, X, XII - Palate is symmetrical.   XI - Shoulder shrugging and head turning intact  Motor: The patient moves all four limbs fairly well and symmetrically. Tone is normal. Reflexes are trace and symmetrical. Plantars are down going. Gait is antalgic, he walks gingerly on his feet.       Claudette Merrill MD   Neurology      []Hide copied text  [address] [facility] [address]  [facility] [phone]     Neurophysiology Report     Patient: Marine Segovia       ID: [id number] Physician: Padmini De Luna MD   Gender: Male Ref Phys: Padmini De Luna MD   Handedness:         Study Date: June 21, 2018             Patient History: This 75-year-old gentleman has been complaining of pain radiating from his neck into the shoulders and a burning sensation in his legs for the past several months.     Nerve Conduction Studies  Anti Sensory Summary Table      Stim Site NR Peak (ms) Norm Peak (ms) O-P Amp (µV) Norm O-P Amp Dist (cm) Trey (m/s)   Left Median 2nd Digit Anti Sensory (2nd Digit)   Wrist    3.0 <3.5 25.8 >20 13.0 61.9   Site 2    3.0   26.1         Left Sural Anti Sensory (Ankle)   Calf    4.3 <4.4 0.7 >6.0 14.0 37.8   Site 2    4.7   1.9         Site 3    9.7   5.5         Right Sural Anti Sensory (Ankle)   Calf    3.8 <4.4 6.9 >6.0 14.0 48.3   Site 2    4.1   11. 3              3.9   21. 3         Left Ulnar Anti Sensory (5th Digit )   Wrist    2.9 <3.1 23.7 >17.0 11.0 50.0   Site 2    2.9   23. 4            Motor Summary Table      Stim Site NR Onset (ms) Norm Onset (ms) O-P Amp (mV) Norm O-P Amp Dist (cm) Trey (m/s) Norm Trey (m/s)   Left Median Motor (Abd Poll Brev)   Wrist    2.9 <4.4 10.7 >4.0 26.0 59.1 >49   Elbow    7.3   9.4           Left Peroneal Motor (EDB)   Ankle    3.8 <6.5 2.8 >2.0 46.0 62.2 >44   Fibula (Head)    11.2   2.6   10.0 45.5     Pop Fossa    13.4   4.8           Right Peroneal Motor (EDB)   Ankle    13.8 <6.5 0.0 >2.0         Left Tibial Motor (Abd Hill Brev)   Ankle    5.8 <5.8 14.6 >4.0 42.0 42.0 >41   Knee    15.8   9.6           Left Ulnar Motor (Abd Dig Minimi )   Wrist    3.2 <3.3 4.8 >6.0 20.0 51.3 >49   B Elbow    7.1   4.0   14.0 43.7 >50   A Elbow    10.3   4.3                 EMG      Side Muscle Nerve Root Ins Act Fibs Psw Fasc Amp Dur Poly Recrt Int Pat Comment   Left AntTibialis Dp Br Fibular L4-5 Nml Nml Nml None Nml Nml 0 Nml Nml     Left MedGastroc     Nml Nml Nml None Nml Nml 0 Nml Nml     Left VastusMed Femoral L2-4 Nml Nml Nml None Nml Nml 0 Nml Nml     Left ExtHallLong Dp Br Fibular L5, S1 Nml Nml Nml None Nml Nml 0 Nml Nml     Left BicepsFemS Sciatic L5-S1 Nml Nml Nml None Nml Nml 0 Nml Nml     Left Lumbo Parasp Up Rami L1-2 Nml Nml Nml                 Left Lumbo Parasp Mid Rami L3-4 Nml Nml Nml                 Left Lumbo Parasp Low Rami L5-S1 Nml Nml Nml                    NCS/EMG FINDINGS:     · Evaluation of the Left median motor, the Left peroneal motor, the Left tibial motor, the Left Median 2nd Digit sensory, the Right sural sensory, and the Left ulnar sensory nerves were unremarkable. · The Right peroneal motor nerve showed prolonged distal onset latency (13.8 ms) and reduced amplitude (0.0 mV). · The Left ulnar motor nerve showed reduced amplitude (4.8 mV) and decreased conduction velocity (A Elbow-B Elbow, 43.7 m/s). · The Left sural sensory nerve showed reduced amplitude (0.7 µV) and decreased conduction velocity (Calf-Ankle, 37.8 m/s).       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        ___________________________  Nicolle Garibay. Duane Perrin MD            I have reviewed the above imagines myself.     CBC:   Lab Results   Component Value Date/Time    WBC 14.0 (H) 05/29/2018 02:10 PM    RBC 5.04 05/29/2018 02:10 PM    HGB 16.2 (H) 05/29/2018 02:10 PM    HCT 49.0 (H) 05/29/2018 02:10 PM    PLATELET 501 (H) 35/58/4420 02:10 PM     BMP:   Lab Results   Component Value Date/Time    Glucose 71 (L) 05/29/2018 02:10 PM    Sodium 139 05/29/2018 02:10 PM    Potassium 4.3 05/29/2018 02:10 PM    Chloride 106 05/29/2018 02:10 PM    CO2 25 05/29/2018 02:10 PM    BUN 25 (H) 05/29/2018 02:10 PM    Creatinine 1.10 05/29/2018 02:10 PM    Calcium 9.5 05/29/2018 02:10 PM     CMP:   Lab Results   Component Value Date/Time    Glucose 71 (L) 05/29/2018 02:10 PM    Sodium 139 05/29/2018 02:10 PM    Potassium 4.3 05/29/2018 02:10 PM    Chloride 106 05/29/2018 02:10 PM    CO2 25 05/29/2018 02:10 PM    BUN 25 (H) 05/29/2018 02:10 PM    Creatinine 1.10 05/29/2018 02:10 PM    Calcium 9.5 05/29/2018 02:10 PM    Anion gap 8 05/29/2018 02:10 PM    BUN/Creatinine ratio 23 (H) 05/29/2018 02:10 PM    Alk.  phosphatase 89 05/29/2018 02:10 PM    Protein, total 7.1 06/21/2018 01:17 PM    Albumin 4.0 05/29/2018 02:10 PM    Globulin 3.4 05/29/2018 02:10 PM    A-G Ratio 1.2 05/29/2018 02:10 PM     Coagulation:   Lab Results   Component Value Date/Time    Prothrombin time 13.6 (H) 10/09/2017 12:45 AM    INR 1.2 (H) 10/09/2017 12:45 AM    aPTT 28.6 10/09/2017 12:45 AM     Cardiac markers: No results found for: CPK, CKND1, RUSSEL    5/29/2018  9:43 PM - [place] [facility]   Component Results   Component Value Flag Ref Range Units Status   Vitamin B12 980 (H) 211 - 911 pg/mL Final     5/29/2018  9:32 PM - [place] [facility]   Component Results   Component Value Flag Ref Range Units Status   Hemoglobin A1c 5.5  4.2 - 5.6 % Final   Comment:   (NOTE)   HbA1C Interpretive Ranges   <5.7              Normal   5.7 - 6.4         Consider Prediabetes   >6.5              Consider Diabetes      Est. average glucose 111   mg/dL Final     5/29/2018  9:43 PM - [place]   Component Results   Component Value Flag Ref Range Units Status   TSH 1.48  0.36 - 3.74 uIU/mL Final   T4, Free 0.9  0.7 - 1.5 NG/DL Final     6/22/2018  4:40 PM - [place] [employer] [facility]   Component Results   Component Value Flag Ref Range Units Status   Protein, total 7.1  6.0 - 8.5 g/dL Final   Albumin 3.9  2.9 - 4.4 g/dL Final   Alpha-1-globulin 0.2  0.0 - 0.4 g/dL Final   ALPHA-2 GLOBULIN 0.9  0.4 - 1.0 g/dL Final   Beta globulin 1.3  0.7 - 1.3 g/dL Final   Gamma globulin 0.7  0.4 - 1.8 g/dL Final   M-Richard Not Observed Not Observed g/dL Final   Globulin, total 3.2  2.2 - 3.9 g/dL Final   A/G ratio 1.2  0.7 - 1.7   Final     6/21/2018  5:11 PM - [place]   Component Results   Component Value Flag Ref Range Units Status   Sed rate, automated 8  0 - 20 mm/hr Final     5/29/2018  9:43 PM - [place]   Component Results   Component Value Flag Ref Range Units Status   Magnesium 2.5  1.6 - 2.6 mg/dL Final         Assessment:  Severe idiopathic neuropathy in this man who has risk factors including none. I haven't been able to identify a specific cause for this man's neuropathy. Plan:  DC Cymbalta and switch over to Pamelor for nerve pain. Return here in 6 weeks. Sincerely,        Lauren Bowie.  Kelechi Vivar M.D.

## 2018-07-02 NOTE — LETTER
7/2/2018 2:07 PM 
 
Patient:  Stew Daniel YOB: 1960 Date of Visit: 7/2/2018 Dear Marcelino Gottron, PA-C [facility] [address] [employer] [address] [address] VIA In Basket 
 : Thank you for referring Mr. Francy Lainez to me for evaluation/treatment. Below are the relevant portions of my assessment and plan of care. Re:  Brigitte Bedoya up visit     7/2/2018 1:53 PM 
 
SSN: [id number] Subjective:   Stew Daniel returns for follow up of his painful neuropathy. He's noted no change in his symptoms on high dose Cymbalta. There's no change what so ever in the nerve pain. Medications:   
Current Outpatient Prescriptions Medication Sig Dispense Refill  DULoxetine (CYMBALTA) 60 mg capsule Take 1 Cap by mouth two (2) times a day. Indications: NEUROPATHIC PAIN 60 Cap 6  
 melatonin tab tablet Take  by mouth nightly.  SYMBICORT 160-4.5 mcg/actuation HFAA inhale 2 puffs by mouth twice a day 1 Inhaler 5  
 dabigatran etexilate (PRADAXA) 150 mg capsule Take 1 Cap by mouth every twelve (12) hours. 180 Cap 3  
 dilTIAZem CD (CARTIA XT) 180 mg ER capsule Take 1 Cap by mouth two (2) times a day. 180 Cap 3  
 gabapentin (NEURONTIN) 400 mg capsule Take 1 Cap by mouth three (3) times daily. (Patient taking differently: Take 400 mg by mouth two (2) times a day.) 270 Cap 1  
 atenolol (TENORMIN) 100 mg tablet Take 1 Tab by mouth daily. 90 Tab 1  cholecalciferol, vitamin D3, (VITAMIN D3) 2,000 unit tab Take  by mouth two (2) times a day.  MULTIVIT-MIN/FA/LYCOPEN/LUTEIN (CENTRUM SILVER MEN PO) Take 1 Tab by mouth daily.  potassium 99 mg tablet Take 99 mg by mouth daily.  fish oil-dha-epa 1,200-144-216 mg cap Take 1 Tab by mouth daily.  albuterol (PROAIR HFA) 90 mcg/actuation inhaler Take 2 Puffs by inhalation every four (4) hours as needed for Wheezing.  1 Inhaler 1  
  b complex vitamins tablet Take 1 Tab by mouth daily. Vital signs:   
Visit Vitals  /78 (BP 1 Location: Left arm, BP Patient Position: Sitting)  Pulse 70  Temp 98.3 °F (36.8 °C) (Oral)  Resp 16  
 Ht 5' 10\" (1.778 m)  Wt 91.6 kg (202 lb)  SpO2 98%  BMI 28.98 kg/m2 Review of Systems: As above otherwise 11 point review of systems negative including;  
Constitutional no fever or chills Skin denies rash or itching HEENT  Denies tinnitus, hearing lose Eyes denies diplopia vision lose Respiratory denies sortness of breath Cardiovascular denies chest pain, dyspnea on exertion Gastrointestinal denies nausea, vomiting, diarrhea, constipation Genitourinary denies incontinence Musculoskeletal denies joint pain or swelling Endocrine denies weight change Hematology denies easy bruising or bleeding Neurological as above in HPI Patient Active Problem List  
Diagnosis Code  Atrial fibrillation (HCC) I48.91  
 Osteoarthritis of multiple joints M15.9  Chronic pain G89.29  
 Hyperthyroidism E05.90  
 History of abdominal aortic aneurysm (AAA) Z86.79  
 Family history of systemic lupus erythematosus (SLE) in mother Z80.2  History of drug use Z87.898  
 H/O alcohol abuse Z87.898  
 Nicotine use disorder F17.200  Depression F32.9  
 Essential hypertension I10  
 FH: CAD (coronary artery disease) Z80.55  
 AAA (abdominal aortic aneurysm) without rupture (HCC) I71.4  Ruptured spleen S36. 09XA  S/P splenectomy Z90.81  
 Anemia due to blood loss, acute D62  Seizure ([facility] [address] R56.9  Neuropathy G62.9 Objective: The patient is awake, alert, and oriented x 4. Fund of knowledge is adequate. Speech is fluent and memory is intact. Cranial Nerves: II  Visual fields are full to confrontation. III, IV, VI  Extraocular movements are intact. There is no nystagmus. V  Facial sensation is intact to pinprick.   VII  Face is symmetrical.  VIII - Hearing is present. IX, X, XII  Palate is symmetrical.   XI - Shoulder shrugging and head turning intact Motor: The patient moves all four limbs fairly well and symmetrically. Tone is normal. Reflexes are trace and symmetrical. Plantars are down going. Gait is antalgic, he walks gingerly on his feet. Carli Dyer MD  
Neurology []Hide copied text [facility] [address] [facility] [address] 
[phone]      [phone] 
  
Neurophysiology Report 
  
Patient: Miranda Menon      
ID: [id number] Physician: Armando Freitas. lAverto Jara MD  
Gender: Male Ref Phys: Armando Freitas. Alverto Jara MD  
Handedness:        
Study Date: June 21, 2018      
  
  
Patient History: This 53-year-old gentleman has been complaining of pain radiating from his neck into the shoulders and a burning sensation in his legs for the past several months. 
  
Nerve Conduction Studies Anti Sensory Summary Table 
  
 Stim Site NR Peak (ms) Norm Peak (ms) O-P Amp (µV) Norm O-P Amp Dist (cm) Trey (m/s) Left Median 2nd Digit Anti Sensory (2nd Digit) Wrist    3.0 <3.5 25.8 >20 13.0 61.9 Site 2    3.0   26.1        
Left Sural Anti Sensory (Ankle) Calf    4.3 <4.4 0.7 >6.0 14.0 37.8 Site 2    4.7   1.9        
Site 3    9.7   5.5        
Right Sural Anti Sensory (Ankle) Calf    3.8 <4.4 6.9 >6.0 14.0 48.3 Site 2    4.1   11. 3        
     3.9   21. 3        
Left Ulnar Anti Sensory (5th Digit ) Wrist    2.9 <3.1 23.7 >17.0 11.0 50.0 Site 2    2.9   23. 4        
  
Motor Summary Table 
  
 Stim Site NR Onset (ms) Norm Onset (ms) O-P Amp (mV) Norm O-P Amp Dist (cm) Trey (m/s) Norm Trey (m/s) Left Median Motor (Abd Poll Brev) Wrist    2.9 <4.4 10.7 >4.0 26.0 59.1 >49 Elbow    7.3   9.4          
Left Peroneal Motor (EDB) Ankle    3.8 <6.5 2.8 >2.0 46.0 62.2 >44 Fibula (Head)    11.2   2.6   10.0 45.5    
Pop Fossa    13.4   4.8          
Right Peroneal Motor (EDB) Ankle    13.8 <6.5 0.0 >2.0        
Left Tibial Motor (Abd Barker) Ankle    5.8 <5.8 14.6 >4.0 42.0 42.0 >41 Knee    15.8   9.6          
Left Ulnar Motor (Abd Dig Minimi ) Wrist    3.2 <3.3 4.8 >6.0 20.0 51.3 >49 B Elbow    7.1   4.0   14.0 43.7 >50 A Elbow    10.3   4.3          
  
  
EMG 
  
 Side Muscle Nerve Root Ins Act Fibs Psw Fasc Amp Dur Poly Recrt Int Bonnie Medico Comment Left AntTibialis Dp Br Fibular L4-5 Nml Nml Nml None Nml Nml 0 Nml Nml    
Left MedGastroc     Nml Nml Nml None Nml Nml 0 Nml Nml    
Left VastusMed Femoral L2-4 Nml Nml Nml None Nml Nml 0 Nml Nml    
Left ExtHallLong Dp Br Fibular L5, S1 Nml Nml Nml None Nml Nml 0 Nml Nml    
Left BicepsFemS Sciatic L5-S1 Nml Nml Nml None Nml Nml 0 Nml Nml    
Left Lumbo Parasp Up Rami L1-2 Nml Nml Nml                
Left Lumbo Parasp Mid Rami L3-4 Nml Nml Nml                
Left Lumbo Parasp Low Rami L5-S1 Nml Nml Nml                
  
NCS/EMG FINDINGS: 
  
· Evaluation of the Left median motor, the Left peroneal motor, the Left tibial motor, the Left Median 2nd Digit sensory, the Right sural sensory, and the Left ulnar sensory nerves were unremarkable. · The Right peroneal motor nerve showed prolonged distal onset latency (13.8 ms) and reduced amplitude (0.0 mV). · The Left ulnar motor nerve showed reduced amplitude (4.8 mV) and decreased conduction velocity (A Elbow-B Elbow, 43.7 m/s). · The Left sural sensory nerve showed reduced amplitude (0.7 µV) and decreased conduction velocity (Calf-Ankle, 37.8 m/s).   
  
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 
  
  
___________________________ Hattie Yoon MD  
  
 
 
 
I have reviewed the above imagines myself. CBC:  
Lab Results Component Value Date/Time  WBC 14.0 (H) 05/29/2018 02:10 PM  
 RBC 5.04 05/29/2018 02:10 PM  
 HGB 16.2 (H) 05/29/2018 02:10 PM  
 HCT 49.0 (H) 05/29/2018 02:10 PM  
 PLATELET 341 (H) 89/60/7996 02:10 PM  
 
BMP:  
Lab Results Component Value Date/Time Glucose 71 (L) 05/29/2018 02:10 PM  
 Sodium 139 05/29/2018 02:10 PM  
 Potassium 4.3 05/29/2018 02:10 PM  
 Chloride 106 05/29/2018 02:10 PM  
 CO2 25 05/29/2018 02:10 PM  
 BUN 25 (H) 05/29/2018 02:10 PM  
 Creatinine 1.10 05/29/2018 02:10 PM  
 Calcium 9.5 05/29/2018 02:10 PM  
 
CMP:  
Lab Results Component Value Date/Time Glucose 71 (L) 05/29/2018 02:10 PM  
 Sodium 139 05/29/2018 02:10 PM  
 Potassium 4.3 05/29/2018 02:10 PM  
 Chloride 106 05/29/2018 02:10 PM  
 CO2 25 05/29/2018 02:10 PM  
 BUN 25 (H) 05/29/2018 02:10 PM  
 Creatinine 1.10 05/29/2018 02:10 PM  
 Calcium 9.5 05/29/2018 02:10 PM  
 Anion gap 8 05/29/2018 02:10 PM  
 BUN/Creatinine ratio 23 (H) 05/29/2018 02:10 PM  
 Alk. phosphatase 89 05/29/2018 02:10 PM  
 Protein, total 7.1 06/21/2018 01:17 PM  
 Albumin 4.0 05/29/2018 02:10 PM  
 Globulin 3.4 05/29/2018 02:10 PM  
 A-G Ratio 1.2 05/29/2018 02:10 PM  
 
Coagulation:  
Lab Results Component Value Date/Time Prothrombin time 13.6 (H) 10/09/2017 12:45 AM  
 INR 1.2 (H) 10/09/2017 12:45 AM  
 aPTT 28.6 10/09/2017 12:45 AM  
 
Cardiac markers: No results found for: CPK, CKND1, RUSSEL 
 
5/29/2018  9:43 PM - [place]  
Component Results Component Value Flag Ref Range Units Status Vitamin B12 980 (H) 211 - 911 pg/mL Final  
 
5/29/2018  9:32 PM - [place]  
Component Results Component Value Flag Ref Range Units Status Hemoglobin A1c 5.5  4.2 - 5.6 % Final  
Comment:  
(NOTE) HbA1C Interpretive Ranges <5.7              Normal  
5.7 - 6.4         Consider Prediabetes >6.5              Consider Diabetes Est. average glucose 111   mg/dL Final  
 
5/29/2018  9:43 PM - [place]  
Component Results Component Value Flag Ref Range Units Status TSH 1.48  0.36 - 3.74 uIU/mL Final  
T4, Free 0.9  0.7 - 1.5 NG/DL Final  
 
6/22/2018  4:40 PM - [place]  
Component Results Component Value Flag Ref Range Units Status Protein, total 7.1  6.0 - 8.5 g/dL Final  
Albumin 3.9  2.9 - 4.4 g/dL Final  
Alpha-1-globulin 0.2  0.0 - 0.4 g/dL Final  
ALPHA-2 GLOBULIN 0.9  0.4 - 1.0 g/dL Final  
Beta globulin 1.3  0.7 - 1.3 g/dL Final  
Gamma globulin 0.7  0.4 - 1.8 g/dL Final  
M-Richard Not Observed  Not Observed g/dL Final  
Globulin, total 3.2  2.2 - 3.9 g/dL Final  
A/G ratio 1.2  0.7 - 1.7   Final  
 
6/21/2018  5:11 PM - [place]  
Component Results Component Value Flag Ref Range Units Status Sed rate, automated 8  0 - 20 mm/hr Final  
 
5/29/2018  9:43 PM - [place]  
Component Results Component Value Flag Ref Range Units Status Magnesium 2.5  1.6 - 2.6 mg/dL Final  
 
 
 
Assessment:  Severe idiopathic neuropathy in this man who has risk factors including none. I haven't been able to identify a specific cause for this man's neuropathy. Plan:  DC Cymbalta and switch over to Pamelor for nerve pain. Return here in 6 weeks. Sincerely, 
 
 
 
Jie Pina. Lorenza Litten, M.D. If you have questions, please do not hesitate to call me. I look forward to following Mr. Theo Balbuena along with you.  
 
 
 
Sincerely, 
 
 
Tony Santiago MD

## 2018-07-02 NOTE — COMMUNICATION BODY
Re:  Sulema Henson up visit     7/2/2018 1:53 PM    SSN: [id number]    Subjective:   Beverly Anderson returns for follow up of his painful neuropathy. He's noted no change in his symptoms on high dose Cymbalta. There's no change what so ever in the nerve pain. Medications:    Current Outpatient Prescriptions   Medication Sig Dispense Refill    DULoxetine (CYMBALTA) 60 mg capsule Take 1 Cap by mouth two (2) times a day. Indications: NEUROPATHIC PAIN 60 Cap 6    melatonin tab tablet Take  by mouth nightly.  SYMBICORT 160-4.5 mcg/actuation HFAA inhale 2 puffs by mouth twice a day 1 Inhaler 5    dabigatran etexilate (PRADAXA) 150 mg capsule Take 1 Cap by mouth every twelve (12) hours. 180 Cap 3    dilTIAZem CD (CARTIA XT) 180 mg ER capsule Take 1 Cap by mouth two (2) times a day. 180 Cap 3    gabapentin (NEURONTIN) 400 mg capsule Take 1 Cap by mouth three (3) times daily. (Patient taking differently: Take 400 mg by mouth two (2) times a day.) 270 Cap 1    atenolol (TENORMIN) 100 mg tablet Take 1 Tab by mouth daily. 90 Tab 1    cholecalciferol, vitamin D3, (VITAMIN D3) 2,000 unit tab Take  by mouth two (2) times a day.  MULTIVIT-MIN/FA/LYCOPEN/LUTEIN (CENTRUM SILVER MEN PO) Take 1 Tab by mouth daily.  potassium 99 mg tablet Take 99 mg by mouth daily.  fish oil-dha-epa 1,200-144-216 mg cap Take 1 Tab by mouth daily.  albuterol (PROAIR HFA) 90 mcg/actuation inhaler Take 2 Puffs by inhalation every four (4) hours as needed for Wheezing. 1 Inhaler 1    b complex vitamins tablet Take 1 Tab by mouth daily.          Vital signs:    Visit Vitals    /78 (BP 1 Location: Left arm, BP Patient Position: Sitting)    Pulse 70    Temp 98.3 °F (36.8 °C) (Oral)    Resp 16    Ht 5' 10\" (1.778 m)    Wt 91.6 kg (202 lb)    SpO2 98%    BMI 28.98 kg/m2       Review of Systems:   As above otherwise 11 point review of systems negative including;   Constitutional no fever or chills  Skin denies rash or itching  HEENT  Denies tinnitus, hearing lose  Eyes denies diplopia vision lose  Respiratory denies sortness of breath  Cardiovascular denies chest pain, dyspnea on exertion  Gastrointestinal denies nausea, vomiting, diarrhea, constipation  Genitourinary denies incontinence  Musculoskeletal denies joint pain or swelling  Endocrine denies weight change  Hematology denies easy bruising or bleeding   Neurological as above in HPI      Patient Active Problem List   Diagnosis Code    Atrial fibrillation (HCC) I48.91    Osteoarthritis of multiple joints M15.9    Chronic pain G89.29    Hyperthyroidism E05.90    History of abdominal aortic aneurysm (AAA) Z86.79    Family history of systemic lupus erythematosus (SLE) in mother Z80.2    History of drug use Z87.898    H/O alcohol abuse Z87.898    Nicotine use disorder F17.200    Depression F32.9    Essential hypertension I10    FH: CAD (coronary artery disease) Z80.55    AAA (abdominal aortic aneurysm) without rupture ([facility]) I71.4    Ruptured spleen S36. 09XA    S/P splenectomy Z90.81    Anemia due to blood loss, acute D62    Seizure ([address] R56.9    Neuropathy G62.9         Objective: The patient is awake, alert, and oriented x 4. Fund of knowledge is adequate. Speech is fluent and memory is intact. Cranial Nerves: II - Visual fields are full to confrontation. III, IV, VI - Extraocular movements are intact. There is no nystagmus. V - Facial sensation is intact to pinprick. VII - Face is symmetrical.  VIII - Hearing is present. IX, X, XII - Palate is symmetrical.   XI - Shoulder shrugging and head turning intact  Motor: The patient moves all four limbs fairly well and symmetrically. Tone is normal. Reflexes are trace and symmetrical. Plantars are down going. Gait is antalgic, he walks gingerly on his feet.       Fito Monreal MD   Neurology      []Hide copied text  [address] [facility] [address]  [facility] [phone]     Neurophysiology Report     Patient: Suzy Stiles       ID: [id number] Physician: Veronica Diez. Yeimi Yoon MD   Gender: Male Ref Phys: Veronica Diez. Yeimi Yoon MD   Handedness:         Study Date: June 21, 2018             Patient History: This 58-year-old gentleman has been complaining of pain radiating from his neck into the shoulders and a burning sensation in his legs for the past several months.     Nerve Conduction Studies  Anti Sensory Summary Table      Stim Site NR Peak (ms) Norm Peak (ms) O-P Amp (µV) Norm O-P Amp Dist (cm) Trey (m/s)   Left Median 2nd Digit Anti Sensory (2nd Digit)   Wrist    3.0 <3.5 25.8 >20 13.0 61.9   Site 2    3.0   26.1         Left Sural Anti Sensory (Ankle)   Calf    4.3 <4.4 0.7 >6.0 14.0 37.8   Site 2    4.7   1.9         Site 3    9.7   5.5         Right Sural Anti Sensory (Ankle)   Calf    3.8 <4.4 6.9 >6.0 14.0 48.3   Site 2    4.1   11. 3              3.9   21. 3         Left Ulnar Anti Sensory (5th Digit )   Wrist    2.9 <3.1 23.7 >17.0 11.0 50.0   Site 2    2.9   23. 4            Motor Summary Table      Stim Site NR Onset (ms) Norm Onset (ms) O-P Amp (mV) Norm O-P Amp Dist (cm) Trey (m/s) Norm Trey (m/s)   Left Median Motor (Abd Poll Brev)   Wrist    2.9 <4.4 10.7 >4.0 26.0 59.1 >49   Elbow    7.3   9.4           Left Peroneal Motor (EDB)   Ankle    3.8 <6.5 2.8 >2.0 46.0 62.2 >44   Fibula (Head)    11.2   2.6   10.0 45.5     Pop Fossa    13.4   4.8           Right Peroneal Motor (EDB)   Ankle    13.8 <6.5 0.0 >2.0         Left Tibial Motor (Abd Hill Brev)   Ankle    5.8 <5.8 14.6 >4.0 42.0 42.0 >41   Knee    15.8   9.6           Left Ulnar Motor (Abd Dig Minimi )   Wrist    3.2 <3.3 4.8 >6.0 20.0 51.3 >49   B Elbow    7.1   4.0   14.0 43.7 >50   A Elbow    10.3   4.3                 EMG      Side Muscle Nerve Root Ins Act Fibs Psw Fasc Amp Dur Poly Recrt Int Pat Comment   Left AntTibialis Dp Br Fibular L4-5 Nml Nml Nml None Nml Nml 0 Nml Nml     Left MedGastroc     Nml Nml Nml None Nml Nml 0 Nml Nml     Left VastusMed Femoral L2-4 Nml Nml Nml None Nml Nml 0 Nml Nml     Left ExtHallLong Dp Br Fibular L5, S1 Nml Nml Nml None Nml Nml 0 Nml Nml     Left BicepsFemS Sciatic L5-S1 Nml Nml Nml None Nml Nml 0 Nml Nml     Left Lumbo Parasp Up Rami L1-2 Nml Nml Nml                 Left Lumbo Parasp Mid Rami L3-4 Nml Nml Nml                 Left Lumbo Parasp Low Rami L5-S1 Nml Nml Nml                    NCS/EMG FINDINGS:     · Evaluation of the Left median motor, the Left peroneal motor, the Left tibial motor, the Left Median 2nd Digit sensory, the Right sural sensory, and the Left ulnar sensory nerves were unremarkable. · The Right peroneal motor nerve showed prolonged distal onset latency (13.8 ms) and reduced amplitude (0.0 mV). · The Left ulnar motor nerve showed reduced amplitude (4.8 mV) and decreased conduction velocity (A Elbow-B Elbow, 43.7 m/s). · The Left sural sensory nerve showed reduced amplitude (0.7 µV) and decreased conduction velocity (Calf-Ankle, 37.8 m/s).       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        ___________________________  Anice Coad. Kaylyn Muro MD            I have reviewed the above imagines myself.     CBC:   Lab Results   Component Value Date/Time    WBC 14.0 (H) 05/29/2018 02:10 PM    RBC 5.04 05/29/2018 02:10 PM    HGB 16.2 (H) 05/29/2018 02:10 PM    HCT 49.0 (H) 05/29/2018 02:10 PM    PLATELET 441 (H) 70/78/1165 02:10 PM     BMP:   Lab Results   Component Value Date/Time    Glucose 71 (L) 05/29/2018 02:10 PM    Sodium 139 05/29/2018 02:10 PM    Potassium 4.3 05/29/2018 02:10 PM    Chloride 106 05/29/2018 02:10 PM    CO2 25 05/29/2018 02:10 PM    BUN 25 (H) 05/29/2018 02:10 PM    Creatinine 1.10 05/29/2018 02:10 PM    Calcium 9.5 05/29/2018 02:10 PM     CMP:   Lab Results   Component Value Date/Time    Glucose 71 (L) 05/29/2018 02:10 PM    Sodium 139 05/29/2018 02:10 PM    Potassium 4.3 05/29/2018 02:10 PM    Chloride 106 05/29/2018 02:10 PM    CO2 25 05/29/2018 02:10 PM    BUN 25 (H) 05/29/2018 02:10 PM    Creatinine 1.10 05/29/2018 02:10 PM    Calcium 9.5 05/29/2018 02:10 PM    Anion gap 8 05/29/2018 02:10 PM    BUN/Creatinine ratio 23 (H) 05/29/2018 02:10 PM    Alk.  phosphatase 89 05/29/2018 02:10 PM    Protein, total 7.1 06/21/2018 01:17 PM    Albumin 4.0 05/29/2018 02:10 PM    Globulin 3.4 05/29/2018 02:10 PM    A-G Ratio 1.2 05/29/2018 02:10 PM     Coagulation:   Lab Results   Component Value Date/Time    Prothrombin time 13.6 (H) 10/09/2017 12:45 AM    INR 1.2 (H) 10/09/2017 12:45 AM    aPTT 28.6 10/09/2017 12:45 AM     Cardiac markers: No results found for: CPK, CKND1, RUSSEL    5/29/2018  9:43 PM - [place]   Component Results   Component Value Flag Ref Range Units Status   Vitamin B12 980 (H) 211 - 911 pg/mL Final     5/29/2018  9:32 PM - [place]   Component Results   Component Value Flag Ref Range Units Status   Hemoglobin A1c 5.5  4.2 - 5.6 % Final   Comment:   (NOTE)   HbA1C Interpretive Ranges   <5.7              Normal   5.7 - 6.4         Consider Prediabetes   >6.5              Consider Diabetes      Est. average glucose 111   mg/dL Final     5/29/2018  9:43 PM - [place]   Component Results   Component Value Flag Ref Range Units Status   TSH 1.48  0.36 - 3.74 uIU/mL Final   T4, Free 0.9  0.7 - 1.5 NG/DL Final     6/22/2018  4:40 PM - [place]   Component Results   Component Value Flag Ref Range Units Status   Protein, total 7.1  6.0 - 8.5 g/dL Final   Albumin 3.9  2.9 - 4.4 g/dL Final   Alpha-1-globulin 0.2  0.0 - 0.4 g/dL Final   ALPHA-2 GLOBULIN 0.9  0.4 - 1.0 g/dL Final   Beta globulin 1.3  0.7 - 1.3 g/dL Final   Gamma globulin 0.7  0.4 - 1.8 g/dL Final   M-Richard Not Observed Not Observed g/dL Final   Globulin, total 3.2  2.2 - 3.9 g/dL Final   A/G ratio 1.2  0.7 - 1.7   Final     6/21/2018  5:11 PM - [place]   Component Results   Component Value Flag Ref Range Units Status   Sed rate, automated 8  0 - 20 mm/hr Final     5/29/2018  9:43 PM - [place]   Component Results   Component Value Flag Ref Range Units Status   Magnesium 2.5  1.6 - 2.6 mg/dL Final         Assessment:  Severe idiopathic neuropathy in this man who has risk factors including none. I haven't been able to identify a specific cause for this man's neuropathy. Plan:  DC Cymbalta and switch over to Pamelor for nerve pain. Return here in 6 weeks. Sincerely,        Aquilino Castle.  Germania Kevin M.D.

## 2018-07-16 ENCOUNTER — TELEPHONE (OUTPATIENT)
Dept: NEUROLOGY | Age: 58
End: 2018-07-16

## 2018-07-16 NOTE — TELEPHONE ENCOUNTER
Patient's daughter calling with concerns of new medication Pamelor. She reports med is making the patient feeling tired, weak and dizzy as well as lingering over into the next day. He is taking the med at approximately 6/7pm in the evening. Please advise.

## 2018-07-17 NOTE — TELEPHONE ENCOUNTER
Minnie Gonzalez MD                  Try to take a little earlier or go to every other day (Routing comment)         Left message for MsDarryl Mooney with Dr. Xu Bonilla instructions.

## 2018-07-18 RX ORDER — BUDESONIDE AND FORMOTEROL FUMARATE DIHYDRATE 160; 4.5 UG/1; UG/1
AEROSOL RESPIRATORY (INHALATION)
Qty: 1 INHALER | Refills: 5 | Status: SHIPPED | OUTPATIENT
Start: 2018-07-18 | End: 2018-09-25 | Stop reason: SDUPTHER

## 2018-07-20 RX ORDER — DABIGATRAN ETEXILATE 150 MG/1
150 CAPSULE ORAL EVERY 12 HOURS
Qty: 180 CAP | Refills: 3 | Status: SHIPPED | OUTPATIENT
Start: 2018-07-20 | End: 2019-01-08 | Stop reason: SDUPTHER

## 2018-07-30 ENCOUNTER — TELEPHONE (OUTPATIENT)
Dept: NEUROLOGY | Age: 58
End: 2018-07-30

## 2018-07-30 DIAGNOSIS — G89.4 CHRONIC PAIN SYNDROME: ICD-10-CM

## 2018-07-30 NOTE — TELEPHONE ENCOUNTER
Patients daughter called stating medication prescribed by provider is causing patient to be extremely fatigued and in pain. Patient is sleeping 15 hours a day. They are also requesting provider to prescribe Gabapentin. PCP advised patient to have that prescribed by neurologist now that he is established. Please advise.  [phone]

## 2018-07-31 RX ORDER — GABAPENTIN 400 MG/1
400 CAPSULE ORAL 3 TIMES DAILY
Qty: 270 CAP | Refills: 1 | Status: SHIPPED | OUTPATIENT
Start: 2018-07-31 | End: 2018-09-25 | Stop reason: SDUPTHER

## 2018-07-31 NOTE — TELEPHONE ENCOUNTER
Spoke with Ms. Mooney- informed her of RX approved and to refer back to PCP for swelling per Dr. Alverto Jara. She verbalized understanding.

## 2018-07-31 NOTE — TELEPHONE ENCOUNTER
Spoke with Ms. Mooney- patient has already stopped the Pamelor for the past week. She would like to know if Dr. Prosper Styles will take over witting patient's Gabapentin. Ms. Sandra Thurston would prefer if neuro wrote for it. Patient will be out of medication on Saturday. Currently taking 400mg TID. Ms. Campos Holley also states that Mr. Rafaela Rice is having a lot of swelling in his legs and hands. Attempted to make a sooner appointment- there aren't any spots available.  Will add to wait list.

## 2018-09-25 ENCOUNTER — OFFICE VISIT (OUTPATIENT)
Dept: FAMILY MEDICINE CLINIC | Age: 58
End: 2018-09-25

## 2018-09-25 ENCOUNTER — HOSPITAL ENCOUNTER (OUTPATIENT)
Dept: LAB | Age: 58
Discharge: HOME | End: 2018-09-25
Payer: COMMERCIAL

## 2018-09-25 VITALS
HEART RATE: 72 BPM | OXYGEN SATURATION: 98 % | RESPIRATION RATE: 18 BRPM | TEMPERATURE: 98.4 F | SYSTOLIC BLOOD PRESSURE: 150 MMHG | HEIGHT: 70 IN | BODY MASS INDEX: 30.06 KG/M2 | WEIGHT: 210 LBS | DIASTOLIC BLOOD PRESSURE: 84 MMHG

## 2018-09-25 DIAGNOSIS — R20.9 COLD EXTREMITY WITHOUT PERIPHERAL VASCULAR DISEASE: ICD-10-CM

## 2018-09-25 DIAGNOSIS — M79.604 CHRONIC PAIN OF BOTH LOWER EXTREMITIES: ICD-10-CM

## 2018-09-25 DIAGNOSIS — I71.40 AAA (ABDOMINAL AORTIC ANEURYSM) WITHOUT RUPTURE: ICD-10-CM

## 2018-09-25 DIAGNOSIS — I48.20 CHRONIC ATRIAL FIBRILLATION (HCC): ICD-10-CM

## 2018-09-25 DIAGNOSIS — M79.671 BILATERAL FOOT PAIN: Primary | ICD-10-CM

## 2018-09-25 DIAGNOSIS — G89.29 CHRONIC PAIN OF BOTH LOWER EXTREMITIES: ICD-10-CM

## 2018-09-25 DIAGNOSIS — Z82.69 FAMILY HISTORY OF SYSTEMIC LUPUS ERYTHEMATOSUS (SLE) IN MOTHER: ICD-10-CM

## 2018-09-25 DIAGNOSIS — M25.512 CHRONIC LEFT SHOULDER PAIN: ICD-10-CM

## 2018-09-25 DIAGNOSIS — G89.29 CHRONIC LEFT SHOULDER PAIN: ICD-10-CM

## 2018-09-25 DIAGNOSIS — M79.605 CHRONIC PAIN OF BOTH LOWER EXTREMITIES: ICD-10-CM

## 2018-09-25 DIAGNOSIS — G62.9 NEUROPATHY: ICD-10-CM

## 2018-09-25 DIAGNOSIS — G89.4 CHRONIC PAIN SYNDROME: ICD-10-CM

## 2018-09-25 DIAGNOSIS — F32.A DEPRESSION, UNSPECIFIED DEPRESSION TYPE: ICD-10-CM

## 2018-09-25 DIAGNOSIS — M25.619 LIMITED RANGE OF MOTION (ROM) OF SHOULDER: ICD-10-CM

## 2018-09-25 DIAGNOSIS — Z23 ENCOUNTER FOR IMMUNIZATION: ICD-10-CM

## 2018-09-25 DIAGNOSIS — I10 ESSENTIAL HYPERTENSION: ICD-10-CM

## 2018-09-25 DIAGNOSIS — M79.672 BILATERAL FOOT PAIN: Primary | ICD-10-CM

## 2018-09-25 LAB
CRP SERPL-MCNC: 1.1 MG/DL (ref 0–0.3)
ERYTHROCYTE [SEDIMENTATION RATE] IN BLOOD: 7 MM/HR (ref 0–20)

## 2018-09-25 PROCEDURE — 86140 C-REACTIVE PROTEIN: CPT | Performed by: PHYSICIAN ASSISTANT

## 2018-09-25 PROCEDURE — 85652 RBC SED RATE AUTOMATED: CPT | Performed by: PHYSICIAN ASSISTANT

## 2018-09-25 PROCEDURE — 86038 ANTINUCLEAR ANTIBODIES: CPT | Performed by: PHYSICIAN ASSISTANT

## 2018-09-25 RX ORDER — GABAPENTIN 400 MG/1
400 CAPSULE ORAL 3 TIMES DAILY
Qty: 270 CAP | Refills: 0 | Status: SHIPPED | OUTPATIENT
Start: 2018-09-25 | End: 2018-09-25 | Stop reason: SDUPTHER

## 2018-09-25 RX ORDER — AMITRIPTYLINE HYDROCHLORIDE 25 MG/1
25 TABLET, FILM COATED ORAL
Qty: 30 TAB | Refills: 1 | Status: SHIPPED | OUTPATIENT
Start: 2018-09-25 | End: 2018-11-20 | Stop reason: SDUPTHER

## 2018-09-25 RX ORDER — BUDESONIDE AND FORMOTEROL FUMARATE DIHYDRATE 160; 4.5 UG/1; UG/1
AEROSOL RESPIRATORY (INHALATION)
Qty: 1 INHALER | Refills: 5 | Status: SHIPPED | OUTPATIENT
Start: 2018-09-25 | End: 2018-10-09 | Stop reason: SDUPTHER

## 2018-09-25 RX ORDER — ATENOLOL 100 MG/1
100 TABLET ORAL DAILY
Qty: 90 TAB | Refills: 2 | Status: SHIPPED | OUTPATIENT
Start: 2018-09-25 | End: 2019-03-27 | Stop reason: SDUPTHER

## 2018-09-25 RX ORDER — GABAPENTIN 400 MG/1
400 CAPSULE ORAL 3 TIMES DAILY
Qty: 90 CAP | Refills: 0 | Status: SHIPPED | OUTPATIENT
Start: 2018-09-25 | End: 2018-10-16 | Stop reason: SDUPTHER

## 2018-09-25 RX ORDER — DILTIAZEM HYDROCHLORIDE 180 MG/1
180 CAPSULE, COATED, EXTENDED RELEASE ORAL 2 TIMES DAILY
Qty: 180 CAP | Refills: 2 | Status: SHIPPED | OUTPATIENT
Start: 2018-09-25 | End: 2019-03-27 | Stop reason: SDUPTHER

## 2018-09-25 NOTE — PROGRESS NOTES
Bijan Winchester is a 59 y.o. male here for neuropathic pain.  He was given meds by neurology that are not helping and make him sleep

## 2018-09-25 NOTE — PATIENT INSTRUCTIONS

## 2018-09-25 NOTE — PROGRESS NOTES
HISTORY OF PRESENT ILLNESS  Opal Elizabeth is a 59 y.o. male. HPI  Opal Elizabeth is a 59 y.o. male who presents to the office today for follow up. He comes in today for follow up. Since his last visit he was seen by Dr. Shadia Vogt a few times. His last visit was in July. At that time his Cymbalta was discontinued and he was started on pamelor. This was causing side effects such as dizziness and excessive fatigue. He was told to stop and restart gabapentin 400mg TID and follow up. Somehow Mr. Jose Angel Salas and his wife were under the impression that \"Neurology dropped us. \" I reviewed encounters and do not see where they would get this impression. But I did inform them that Dr. Shadia Vogt wanted them to follow up after the changes to his medication and they need to call and schedule an appointment. He is becoming more depressed with the chronic pain and upset that no one has answers as to why he has this pain. He has c/o bilateral foot pain and swelling. He's concerned that the bottoms of his feet are swollen and his toes are swollen. There is significant pain on the arch and into his heels. Both feet are cool to touch. He is having a hard time walking due to the pain. He also has chronic left shoulder pain. This has been going on for many years. Lately he has loss of ROM. 20+ years ago he had a fall and braced himself by placing his left hand behind him to catch his fall. Chief Complaint   Patient presents with    Peripheral Neuropathy    Immunization/Injection     flu       Current Outpatient Prescriptions on File Prior to Visit   Medication Sig Dispense Refill    dabigatran etexilate (PRADAXA) 150 mg capsule Take 1 Cap by mouth every twelve (12) hours. 180 Cap 3    melatonin tab tablet Take  by mouth nightly.  cholecalciferol, vitamin D3, (VITAMIN D3) 2,000 unit tab Take  by mouth two (2) times a day.  MULTIVIT-MIN/FA/LYCOPEN/LUTEIN (CENTRUM SILVER MEN PO) Take 1 Tab by mouth daily.       potassium 99 mg tablet Take 99 mg by mouth daily.  fish oil-dha-epa 1,200-144-216 mg cap Take 1 Tab by mouth daily.  b complex vitamins tablet Take 1 Tab by mouth daily.  albuterol (PROAIR HFA) 90 mcg/actuation inhaler Take 2 Puffs by inhalation every four (4) hours as needed for Wheezing. 1 Inhaler 1     No current facility-administered medications on file prior to visit. No Known Allergies  Past Medical History:   Diagnosis Date    AAA (abdominal aortic aneurysm) (HCC)     Alcoholism (HCC)     Atrial fibrillation (HCC)     Bradycardia     Chronic pain     Degenerative disc disease, cervical     Degenerative disc disease, lumbar     Hypertension     Hypokalemia     Pneumonia 2016    caused permanent L lung problems    Thyroid disease      History   Smoking Status    Former Smoker    Quit date: 2017   Smokeless Tobacco    Former User     History   Alcohol Use No     Comment: sober x 5 years     Family History   Problem Relation Age of Onset    Heart Disease Father     Heart Disease Brother      AAA-  in his 42's    Heart Disease Paternal Aunt     Heart Disease Paternal Uncle     Heart Disease Paternal Grandmother     Lupus Mother      Review of Systems   Constitutional: Positive for malaise/fatigue. Negative for chills, fever and weight loss. Cardiovascular: Negative for chest pain, claudication and leg swelling. Musculoskeletal: Positive for joint pain and myalgias. Negative for falls. Skin: Negative for rash. Neurological: Positive for tingling and sensory change. Negative for seizures. Psychiatric/Behavioral: Positive for depression. Negative for substance abuse and suicidal ideas. The patient has insomnia.       Visit Vitals    /84 (BP 1 Location: Right arm, BP Patient Position: Sitting)    Pulse 72    Temp 98.4 °F (36.9 °C) (Oral)    Resp 18    Ht 5' 10\" (1.778 m)    Wt 210 lb (95.3 kg)    SpO2 98%    BMI 30.13 kg/m2     Physical Exam Constitutional: He is oriented to person, place, and time. He appears well-developed and well-nourished. No distress. Pain with walking   Cardiovascular:   Pulses:       Radial pulses are 2+ on the right side, and 2+ on the left side. Posterior tibial pulses are 1+ on the right side, and 1+ on the left side. Pulmonary/Chest: Effort normal. No respiratory distress. Musculoskeletal: He exhibits no edema. Right foot: There is tenderness and swelling. Left foot: There is tenderness and swelling. Feet:    Walking with assistance of cane   Neurological: He is alert and oriented to person, place, and time. Skin: Skin is intact. Mottling of both lower legs, no hair growth on lower half of calves. Cool to touch. Psychiatric: He has a normal mood and affect. His behavior is normal. Thought content normal.   Nursing note and vitals reviewed. ASSESSMENT and PLAN    ICD-10-CM ICD-9-CM    1. Bilateral foot pain M79.671 729.5 XR FOOT LT MIN 3 V    M79.672  XR FOOT RT MIN 3 V      REFERRAL TO PODIATRY      ANKLE BRACHIAL INDEX   2. Encounter for immunization Z23 V03.89 DE IMMUNIZ ADMIN,1 SINGLE/COMB VAC/TOXOID      INFLUENZA VIRUS VAC QUAD,SPLIT,PRESV FREE SYRINGE IM      TETANUS, DIPHTHERIA TOXOIDS AND ACELLULAR PERTUSSIS VACCINE (TDAP), IN INDIVIDS. >=7, IM      PNEUMOCOCCAL POLYSACCHARIDE VACCINE, 23-VALENT, ADULT OR IMMUNOSUPPRESSED PT DOSE,      ZOSTER VACC RECOMBINANT ADJUVANTED   3. Chronic left shoulder pain M25.512 719.41 XR SHOULDER LT AP/LAT MIN 2 V    G89.29 338.29    4. Limited range of motion (ROM) of shoulder M25.619 719.51 XR SHOULDER LT AP/LAT MIN 2 V   5. Chronic pain syndrome G89.4 338.4 JULIO QL, W/REFLEX CASCADE      SED RATE (ESR)      C REACTIVE PROTEIN, QT      amitriptyline (ELAVIL) 25 mg tablet      gabapentin (NEURONTIN) 400 mg capsule      DISCONTINUED: gabapentin (NEURONTIN) 400 mg capsule   6.  AAA (abdominal aortic aneurysm) without rupture (HCC) I71.4 441.4 atenolol (TENORMIN) 100 mg tablet      dilTIAZem CD (CARTIA XT) 180 mg ER capsule   7. Chronic atrial fibrillation (HCC) I48.2 427.31 atenolol (TENORMIN) 100 mg tablet      dilTIAZem CD (CARTIA XT) 180 mg ER capsule   8. Essential hypertension I10 401.9 atenolol (TENORMIN) 100 mg tablet      dilTIAZem CD (CARTIA XT) 180 mg ER capsule   9. Family history of systemic lupus erythematosus (SLE) in mother Z80.2 V19.8 JULIO QL, W/REFLEX CASCADE      SED RATE (ESR)      C REACTIVE PROTEIN, QT   10. Depression, unspecified depression type F32.9 311 amitriptyline (ELAVIL) 25 mg tablet   11. Neuropathy G62.9 355.9 amitriptyline (ELAVIL) 25 mg tablet      ANKLE BRACHIAL INDEX   12. Cold extremity without peripheral vascular disease R20.9 782. 9 ANKLE BRACHIAL INDEX   13. Chronic pain of both lower extremities M79.604 729.5 ANKLE BRACHIAL INDEX    M79.605 338.29     G89.29        Xrays done in the office today. Will call with results. I am also checking JULIO, ESR and CRP. I am sending him to podiatry for bilateral foot pain. I also would like order ABIs of the LE's. He will call and schedule an appt with Dr. Larry Hoang for follow up. He is continuing gabapentin. I will add elavil at bedtime to help with depressive symptoms. Refilled meds today. BP uncontrolled but in pain. Reviewed medication and side effects. Patient agrees with the plan and verbalizes understanding. Follow-up Disposition:  Return in about 2 months (around 11/25/2018) for foot pain, neuropathy, HTN.     Miles Crockett PA-C  9/25/2018

## 2018-09-26 ENCOUNTER — DOCUMENTATION ONLY (OUTPATIENT)
Dept: FAMILY MEDICINE CLINIC | Age: 58
End: 2018-09-26

## 2018-09-26 DIAGNOSIS — M25.619 LIMITED RANGE OF MOTION (ROM) OF SHOULDER: ICD-10-CM

## 2018-09-26 DIAGNOSIS — G89.29 CHRONIC LEFT SHOULDER PAIN: Primary | ICD-10-CM

## 2018-09-26 DIAGNOSIS — M25.512 CHRONIC LEFT SHOULDER PAIN: Primary | ICD-10-CM

## 2018-09-26 LAB
ANA SER QL: NEGATIVE
SEE BELOW:, 164879: NORMAL

## 2018-09-26 NOTE — PROGRESS NOTES
Pt's wife called yesterday after visit stating that they wanted to the Gabapentin sent to a local pharmacy. I called it in and canceled it with the mail order pharmacy per [facility] verbal order.

## 2018-10-05 ENCOUNTER — HOSPITAL ENCOUNTER (OUTPATIENT)
Dept: MRI IMAGING | Age: 58
Discharge: HOME | End: 2018-10-05
Attending: PHYSICIAN ASSISTANT
Payer: COMMERCIAL

## 2018-10-05 DIAGNOSIS — G89.29 CHRONIC LEFT SHOULDER PAIN: ICD-10-CM

## 2018-10-05 DIAGNOSIS — M25.619 LIMITED RANGE OF MOTION (ROM) OF SHOULDER: ICD-10-CM

## 2018-10-05 DIAGNOSIS — M25.512 CHRONIC LEFT SHOULDER PAIN: ICD-10-CM

## 2018-10-05 PROCEDURE — 73221 MRI JOINT UPR EXTREM W/O DYE: CPT

## 2018-10-09 DIAGNOSIS — G89.29 CHRONIC LEFT SHOULDER PAIN: Primary | ICD-10-CM

## 2018-10-09 DIAGNOSIS — M75.52 SUBDELTOID BURSITIS OF LEFT SHOULDER JOINT: ICD-10-CM

## 2018-10-09 DIAGNOSIS — M25.512 CHRONIC LEFT SHOULDER PAIN: Primary | ICD-10-CM

## 2018-10-09 DIAGNOSIS — M67.814 TENDINOSIS OF LEFT SHOULDER: ICD-10-CM

## 2018-10-09 RX ORDER — BUDESONIDE AND FORMOTEROL FUMARATE DIHYDRATE 160; 4.5 UG/1; UG/1
AEROSOL RESPIRATORY (INHALATION)
Qty: 1 INHALER | Refills: 5 | Status: SHIPPED | OUTPATIENT
Start: 2018-10-09 | End: 2019-03-27 | Stop reason: SDUPTHER

## 2018-10-09 NOTE — PROGRESS NOTES
MRI of the left shoulder shows rotator cuff tendinosis and probable small tear, mild arthritis at the [facility] joint.  Will send to ortho

## 2018-10-16 ENCOUNTER — OFFICE VISIT (OUTPATIENT)
Dept: NEUROLOGY | Age: 58
End: 2018-10-16

## 2018-10-16 DIAGNOSIS — G62.9 NEUROPATHY: Primary | ICD-10-CM

## 2018-10-16 DIAGNOSIS — G89.4 CHRONIC PAIN SYNDROME: ICD-10-CM

## 2018-10-16 RX ORDER — GABAPENTIN 800 MG/1
1600 TABLET ORAL 3 TIMES DAILY
Qty: 180 TAB | Refills: 5 | Status: SHIPPED | OUTPATIENT
Start: 2018-10-16 | End: 2019-03-11 | Stop reason: SDUPTHER

## 2018-10-16 NOTE — PROGRESS NOTES
Re:  Jose Fill up visit     10/16/2018 11:18 AM      SSN: [id number]    Subjective:   Mendel Dolly returns for follow up of his painful neuropathy. He's noted no change in his symptoms on high dose Cymbalta. The Pamelor was very sedating, so he stopped it. He's been switched to Elavil 25 mg at night only which is more tolerable. He self increased the Neurontin to 1600 mg BID which helped a little, but not dramatically. His memory suffered with the high dose Neurontin. Medications:    Current Outpatient Prescriptions   Medication Sig Dispense Refill    budesonide-formoterol (SYMBICORT) 160-4.5 mcg/actuation HFAA inhale 2 puffs by mouth twice a day 1 Inhaler 5    atenolol (TENORMIN) 100 mg tablet Take 1 Tab by mouth daily. 90 Tab 2    dilTIAZem CD (CARTIA XT) 180 mg ER capsule Take 1 Cap by mouth two (2) times a day. 180 Cap 2    amitriptyline (ELAVIL) 25 mg tablet Take 1 Tab by mouth nightly. 30 Tab 1    gabapentin (NEURONTIN) 400 mg capsule Take 1 Cap by mouth three (3) times daily. 90 Cap 0    dabigatran etexilate (PRADAXA) 150 mg capsule Take 1 Cap by mouth every twelve (12) hours. 180 Cap 3    melatonin tab tablet Take  by mouth nightly.  cholecalciferol, vitamin D3, (VITAMIN D3) 2,000 unit tab Take  by mouth two (2) times a day.  MULTIVIT-MIN/FA/LYCOPEN/LUTEIN (CENTRUM SILVER MEN PO) Take 1 Tab by mouth daily.  potassium 99 mg tablet Take 99 mg by mouth daily.  fish oil-dha-epa 1,200-144-216 mg cap Take 1 Tab by mouth daily.  albuterol (PROAIR HFA) 90 mcg/actuation inhaler Take 2 Puffs by inhalation every four (4) hours as needed for Wheezing. 1 Inhaler 1    b complex vitamins tablet Take 1 Tab by mouth daily. Vital signs: There were no vitals taken for this visit.     Review of Systems:   As above otherwise 11 point review of systems negative including;   Constitutional no fever or chills  Skin denies rash or itching  HEENT  Denies tinnitus, hearing lose  Eyes denies diplopia vision lose  Respiratory denies sortness of breath  Cardiovascular denies chest pain, dyspnea on exertion  Gastrointestinal denies nausea, vomiting, diarrhea, constipation  Genitourinary denies incontinence  Musculoskeletal denies joint pain or swelling  Endocrine denies weight change  Hematology denies easy bruising or bleeding   Neurological as above in HPI      Patient Active Problem List   Diagnosis Code    Atrial fibrillation (HCC) I48.91    Osteoarthritis of multiple joints M15.9    Chronic pain G89.29    Hyperthyroidism E05.90    History of abdominal aortic aneurysm (AAA) Z86.79    Family history of systemic lupus erythematosus (SLE) in mother Z80.2    History of drug use Z87.898    H/O alcohol abuse Z87.898    Nicotine use disorder F17.200    Depression F32.9    Essential hypertension I10    FH: CAD (coronary artery disease) Z80.55    AAA (abdominal aortic aneurysm) without rupture ([facility]) I71.4    Ruptured spleen S36. 09XA    S/P splenectomy Z90.81    Anemia due to blood loss, acute D62    Seizure ([facility] [address] R56.9    Neuropathy G62.9         Objective: The patient is awake, alert, and oriented x 4. Fund of knowledge is adequate. Speech is fluent and memory is intact. Cranial Nerves: II - Visual fields are full to confrontation. III, IV, VI - Extraocular movements are intact. There is no nystagmus. V - Facial sensation is intact to pinprick. VII - Face is symmetrical.  VIII - Hearing is present. IX, X, XII - Palate is symmetrical.   XI - Shoulder shrugging and head turning intact  Motor: The patient moves all four limbs fairly well and symmetrically. Tone is normal. Reflexes are trace and symmetrical. Plantars are down going. Gait is antalgic, he walks gingerly on his feet.       CBC:   Lab Results   Component Value Date/Time    WBC 14.0 (H) 05/29/2018 02:10 PM    RBC 5.04 05/29/2018 02:10 PM    HGB 16.2 (H) 05/29/2018 02:10 PM    HCT 49.0 (H) 05/29/2018 02:10 PM    PLATELET 573 (H) 39/59/2115 02:10 PM     BMP:   Lab Results   Component Value Date/Time    Glucose 71 (L) 05/29/2018 02:10 PM    Sodium 139 05/29/2018 02:10 PM    Potassium 4.3 05/29/2018 02:10 PM    Chloride 106 05/29/2018 02:10 PM    CO2 25 05/29/2018 02:10 PM    BUN 25 (H) 05/29/2018 02:10 PM    Creatinine 1.10 05/29/2018 02:10 PM    Calcium 9.5 05/29/2018 02:10 PM     CMP:   Lab Results   Component Value Date/Time    Glucose 71 (L) 05/29/2018 02:10 PM    Sodium 139 05/29/2018 02:10 PM    Potassium 4.3 05/29/2018 02:10 PM    Chloride 106 05/29/2018 02:10 PM    CO2 25 05/29/2018 02:10 PM    BUN 25 (H) 05/29/2018 02:10 PM    Creatinine 1.10 05/29/2018 02:10 PM    Calcium 9.5 05/29/2018 02:10 PM    Anion gap 8 05/29/2018 02:10 PM    BUN/Creatinine ratio 23 (H) 05/29/2018 02:10 PM    Alk.  phosphatase 89 05/29/2018 02:10 PM    Protein, total 7.1 06/21/2018 01:17 PM    Albumin 4.0 05/29/2018 02:10 PM    Globulin 3.4 05/29/2018 02:10 PM    A-G Ratio 1.2 05/29/2018 02:10 PM     Coagulation:   Lab Results   Component Value Date/Time    Prothrombin time 13.6 (H) 10/09/2017 12:45 AM    INR 1.2 (H) 10/09/2017 12:45 AM    aPTT 28.6 10/09/2017 12:45 AM     Cardiac markers: No results found for: CPK, CKND1, RUSSEL    5/29/2018  9:43 PM - [facility]   Component Results   Component Value Flag Ref Range Units Status   Vitamin B12 980 (H) 211 - 911 pg/mL Final     5/29/2018  9:32 PM - [facility]   Component Results   Component Value Flag Ref Range Units Status   Hemoglobin A1c 5.5  4.2 - 5.6 % Final   Comment:   (NOTE)   HbA1C Interpretive Ranges   <5.7              Normal   5.7 - 6.4         Consider Prediabetes   >6.5              Consider Diabetes      Est. average glucose 111   mg/dL Final     5/29/2018  9:43 PM - [facility]   Component Results   Component Value Flag Ref Range Units Status   TSH 1.48  0.36 - 3.74 uIU/mL Final   T4, Free 0.9  0.7 - 1.5 NG/DL Final     6/22/2018  4:40 PM - [facility]   Component Results   Component Value Flag Ref Range Units Status   Protein, total 7.1  6.0 - 8.5 g/dL Final   Albumin 3.9  2.9 - 4.4 g/dL Final   Alpha-1-globulin 0.2  0.0 - 0.4 g/dL Final   ALPHA-2 GLOBULIN 0.9  0.4 - 1.0 g/dL Final   Beta globulin 1.3  0.7 - 1.3 g/dL Final   Gamma globulin 0.7  0.4 - 1.8 g/dL Final   M-Richard Not Observed  Not Observed g/dL Final   Globulin, total 3.2  2.2 - 3.9 g/dL Final   A/G ratio 1.2  0.7 - 1.7   Final     6/21/2018  5:11 PM - [facility]   Component Results   Component Value Flag Ref Range Units Status   Sed rate, automated 8  0 - 20 mm/hr Final     5/29/2018  9:43 PM - [facility]   Component Results   Component Value Flag Ref Range Units Status   Magnesium 2.5  1.6 - 2.6 mg/dL Final         Assessment:  Severe idiopathic neuropathy in this man who has risk factors including none. I haven't been able to identify a specific cause for this man's neuropathy. So far no help with Cymbalta, Neurontin, Pamelor, Elavil. Plan: Will try high dose Neurontin, 1600 mg TID for short term. Return here in 4 weeks. Sincerely,        Dav Caballero.  Masoud Chamorro M.D.

## 2018-10-16 NOTE — MR AVS SNAPSHOT
[address] [facility] [address] 
[phone] Patient: Mina Aguilar MRN: [id number] Visit Information Date & Time Provider Department Dept. Phone Encounter #  
 10/16/2018 10:40 AM Margarito Henry MD [facility] [phone] [id number] Follow-up Instructions Return in about 4 weeks (around 11/13/2018). Follow-up and Disposition History Upcoming Health Maintenance Date Due  
 MenB Meningococcal topic (1 of 2 - Bexsero 2-Dose Series) 3/1/1970 Shingrix Vaccine Age 50> (2 of 2) 11/20/2018 FOBT Q 1 YEAR AGE 50-75 3/20/2019 Pneumococcal 19-64 Highest Risk (3 of 3 - PPSV23) 9/25/2023 DTaP/Tdap/Td series (2 - Td) 9/25/2028 Allergies as of 10/16/2018  Review Complete On: 9/25/2018 By: Sita Delgadillo PA-C No Known Allergies Current Immunizations  Reviewed on 10/26/2017 Name Date Influenza Vaccine (Quad) PF 9/25/2018, 10/20/2017 10:43 AM  
 Meningococcal (MCV4O) Vaccine 11/28/2017 11:14 AM  
 Pneumococcal Conjugate (PCV-13) 10/20/2017  1:28 PM  
 Pneumococcal Polysaccharide (PPSV-23) 9/25/2018 Tdap 9/25/2018 Zoster Recombinant 9/25/2018 Not reviewed this visit You Were Diagnosed With   
  
 Codes Comments Neuropathy    -  Primary ICD-10-CM: G62.9 ICD-9-CM: 865. 9 Chronic pain syndrome     ICD-10-CM: G89.4 ICD-9-CM: 338. 4 Vitals Smoking Status Former Smoker Preferred Pharmacy Pharmacy Name Phone [address], [address] [place] [address] [phone] Your Updated Medication List  
  
   
This list is accurate as of 10/16/18 11:35 AM.  Always use your most recent med list.  
  
  
  
  
 albuterol 90 mcg/actuation inhaler Commonly known as:  PROAIR HFA Take 2 Puffs by inhalation every four (4) hours as needed for Wheezing. amitriptyline 25 mg tablet Commonly known as:  ELAVIL Take 1 Tab by mouth nightly. atenolol 100 mg tablet Commonly known as:  TENORMIN Take 1 Tab by mouth daily. b complex vitamins tablet Take 1 Tab by mouth daily. budesonide-formoterol 160-4.5 mcg/actuation Hfaa Commonly known as:  SYMBICORT  
inhale 2 puffs by mouth twice a day CENTRUM SILVER MEN PO Take 1 Tab by mouth daily. dabigatran etexilate 150 mg capsule Commonly known as:  PRADAXA Take 1 Cap by mouth every twelve (12) hours. dilTIAZem  mg ER capsule Commonly known as:  CARTIA XT Take 1 Cap by mouth two (2) times a day. fish oil-dha-epa 1,200-144-216 mg Cap Take 1 Tab by mouth daily. gabapentin 800 mg tablet Commonly known as:  NEURONTIN Take 2 Tabs by mouth three (3) times daily. melatonin Tab tablet Take  by mouth nightly. potassium 99 mg tablet Take 99 mg by mouth daily. VITAMIN D3 2,000 unit Tab Generic drug:  cholecalciferol (vitamin D3) Take  by mouth two (2) times a day. Prescriptions Sent to Pharmacy Refills  
 gabapentin (NEURONTIN) 800 mg tablet 5 Sig: Take 2 Tabs by mouth three (3) times daily. Class: Normal  
 Pharmacy: [facility], [address] [place] [address] #: [phone] Route: Oral  
  
Follow-up Instructions Return in about 4 weeks (around 11/13/2018). [facility]! Dear Bhupinder Duncan: 
Thank you for requesting a [facility] account. Our records indicate that you already have an active [facility] account. You can access your account anytime at [email or web address] [employer] Did you know that you can access your hospital and ER discharge instructions at any time in [facility]? You can also review all of your test results from your hospital stay or ER visit. Additional Information If you have questions, please visit the Frequently Asked Questions section of the [facility] website at [email or web address] Remember, [facility] is NOT to be used for urgent needs. For medical emergencies, dial 911. Now available from your iPhone and Android! Please provide this summary of care documentation to your next provider. Your primary care clinician is listed as Kolby Maurice. If you have any questions after today's visit, please call [phone].

## 2018-10-16 NOTE — PROGRESS NOTES
Chief Complaint   Patient presents with    Follow-up    Neurologic Problem     neuropathy     1. Have you been to the ER, urgent care clinic since your last visit? Hospitalized since your last visit? No    2. Have you seen or consulted any other health care providers outside of the [address] [place] [address] since your last visit? Include any pap smears or colon screening.  No

## 2018-11-13 ENCOUNTER — OFFICE VISIT (OUTPATIENT)
Dept: ORTHOPEDIC SURGERY | Age: 58
End: 2018-11-13

## 2018-11-13 VITALS
WEIGHT: 219.8 LBS | BODY MASS INDEX: 31.47 KG/M2 | OXYGEN SATURATION: 96 % | DIASTOLIC BLOOD PRESSURE: 93 MMHG | RESPIRATION RATE: 16 BRPM | SYSTOLIC BLOOD PRESSURE: 159 MMHG | TEMPERATURE: 96.8 F | HEIGHT: 70 IN | HEART RATE: 72 BPM

## 2018-11-13 DIAGNOSIS — M75.112 INCOMPLETE TEAR OF LEFT ROTATOR CUFF: Primary | ICD-10-CM

## 2018-11-13 RX ORDER — TRIAMCINOLONE ACETONIDE 40 MG/ML
40 INJECTION, SUSPENSION INTRA-ARTICULAR; INTRAMUSCULAR ONCE
Qty: 1 ML | Refills: 0
Start: 2018-11-13 | End: 2018-11-13

## 2018-11-13 NOTE — PROGRESS NOTES
Ophelia Small  1960   Chief Complaint   Patient presents with    Shoulder Pain     LEFT SHOULDER PAIN        HISTORY OF PRESENT ILLNESS  Ophelia Small is a 59 y.o. male who presents today for evaluation of left shoulder pain. He was referred by MANISHA Alan. Pt presents using a single point cane. He rates his pain 9/10 today. Pt reports pain raising his L arm. He describes the pain spreads from his shoulder to his neck and to his elbow. Pain has been present for a couple months. Patient describes the pain as sharp that is Constant in nature. Symptoms are worse with stretching and is better with  Rest. Associated symptoms include none. Since problem started, it: has worsened slightly. Pain does not wake patient up at night. Has taken no meds for the problem. Has tried following treatments: Injections:NO; Brace:NO;  Therapy:NO; Cane/Crutch:YES         No Known Allergies     Past Medical History:   Diagnosis Date    AAA (abdominal aortic aneurysm) (HCC)     Alcoholism (HCC)     Atrial fibrillation (HCC)     Bradycardia     Chronic pain     Degenerative disc disease, cervical     Degenerative disc disease, lumbar     Hypertension     Hypokalemia     Pneumonia 2016    caused permanent L lung problems    Thyroid disease       Social History     Socioeconomic History    Marital status:      Spouse name: Not on file    Number of children: Not on file    Years of education: Not on file    Highest education level: Not on file   Social Needs    Financial resource strain: Not on file    Food insecurity - worry: Not on file    Food insecurity - inability: Not on file   [employer] needs - medical: Not on file   [employer] needs - non-medical: Not on file   Occupational History    Not on file   Tobacco Use    Smoking status: Former Smoker     Last attempt to quit: 2017     Years since quittin.2    Smokeless tobacco: Former User   Substance and Sexual Activity    Alcohol use: No     Comment: sober x 5 years    Drug use: No     Comment: former drug use    Sexual activity: Not on file   Other Topics Concern    Not on file   Social History Narrative    Not on file      Past Surgical History:   Procedure Laterality Date    ABDOMEN SURGERY PROC UNLISTED  10/08/2017    spleenectomy    HX ORTHOPAEDIC      neck fracture    HX ORTHOPAEDIC      lumbar fusion      Family History   Problem Relation Age of Onset    Heart Disease Father     Heart Disease Brother         AAA-  in his 42's    Heart Disease Paternal Aunt     Heart Disease Paternal Uncle     Heart Disease Paternal Grandmother     Lupus Mother       Current Outpatient Medications   Medication Sig    gabapentin (NEURONTIN) 800 mg tablet Take 2 Tabs by mouth three (3) times daily.  budesonide-formoterol (SYMBICORT) 160-4.5 mcg/actuation HFAA inhale 2 puffs by mouth twice a day    atenolol (TENORMIN) 100 mg tablet Take 1 Tab by mouth daily.  dilTIAZem CD (CARTIA XT) 180 mg ER capsule Take 1 Cap by mouth two (2) times a day.  amitriptyline (ELAVIL) 25 mg tablet Take 1 Tab by mouth nightly.  dabigatran etexilate (PRADAXA) 150 mg capsule Take 1 Cap by mouth every twelve (12) hours.  melatonin tab tablet Take  by mouth nightly.  cholecalciferol, vitamin D3, (VITAMIN D3) 2,000 unit tab Take  by mouth two (2) times a day.  MULTIVIT-MIN/FA/LYCOPEN/LUTEIN (CENTRUM SILVER MEN PO) Take 1 Tab by mouth daily.  potassium 99 mg tablet Take 99 mg by mouth daily.  fish oil-dha-epa 1,200-144-216 mg cap Take 1 Tab by mouth daily.  albuterol (PROAIR HFA) 90 mcg/actuation inhaler Take 2 Puffs by inhalation every four (4) hours as needed for Wheezing.  b complex vitamins tablet Take 1 Tab by mouth daily. No current facility-administered medications for this visit.         REVIEW OF SYSTEM   Patient denies: Weight loss, Fever/Chills, HA, Visual changes, Fatigue, Chest pain, SOB, Abdominal pain, N/V/D/C, Blood in stool or urine, Edema. Pertinent positive as above in HPI. All others were negative    PHYSICAL EXAM:   Visit Vitals  BP (!) 159/93   Pulse 72   Temp 96.8 °F (36 °C) (Oral)   Resp 16   Ht 5' 10\" (1.778 m)   Wt 219 lb 12.8 oz (99.7 kg)   SpO2 96%   BMI 31.54 kg/m²     The patient is a well-developed, well-nourished male   in no acute distress. The patient is alert and oriented times three. The patient is alert and oriented times three. Mood and affect are normal.  LYMPHATIC: lymph nodes are not enlarged and are within normal limits  SKIN: normal in color and non tender to palpation. There are no bruises or abrasions noted. NEUROLOGICAL: Motor sensory exam is within normal limits. Reflexes are equal bilaterally. There is normal sensation to pinprick and light touch  MUSCULOSKELETAL:  Examination Left shoulder   Skin Intact   AC joint tenderness -   Biceps tenderness -   Forward flexion/Elevation    Active abduction    Glenohumeral abduction 90   External rotation ROM 30   Internal rotation ROM 30   Apprehension -   Kareens Relocation -   Jerk -   Load and Shift -   Obriens -   Speeds -   Impingement sign +   Supraspinatus/Empty Can -, 5/5   External Rotation Strength -, 5/5   Lift Off/Belly Press -, 5/5   Neurovascular Intact          PROCEDURE: Left shoulder Injection with Ultrasound Guidance  Indication:Left shoulder pain/swelling    After sterile prep, 6 cc of Xylocaine and 1 cc of Kenalog were injected into the left shoulder. Ultrasound images captured using [facility] Ultrasound machine and scanned into patient's chart.        [facility]  OFFICE PROCEDURE PROGRESS NOTE        Chart reviewed for the following:  Bong Armijo M.D, have reviewed the History, Physical and updated the Allergic reactions for [address] performed immediately prior to start of procedure:  Bong Armijo M.D, have performed the following reviews on Catarina Scott prior to the start of the procedure:            * Patient was identified by name and date of birth   * Agreement on procedure being performed was verified  * Risks and Benefits explained to the patient  * Procedure site verified and marked as necessary  * Patient was positioned for comfort  * Consent was signed and verified     Time: 2:24 PM     Date of procedure: 11/13/2018    Procedure performed by:  David Gao M.D    Provider assisted by: (see medication administration)    How tolerated by patient: tolerated the procedure well with no complications    Comments: none      IMAGING: MRI L SHOULDER dated 10/5/2018 was reviewed and read: Impression:  1. Mild supraspinatus, infraspinatus and subscapularis tendinosis. Probably a  small intrasubstance tear at the junction of the supraspinatus and infraspinatus  insertion. Anterior supraspinatus tendon bursal fraying and a small bursal  sided tear not excluded. 2. Mild acromioclavicular degenerative changes and mild edema in the joint might  be reactive or related to minor sprain. Trace subacromial subdeltoid bursitis. IMPRESSION:      ICD-10-CM ICD-9-CM    1. Incomplete tear of left rotator cuff M75.112 840.4 TRIAMCINOLONE ACETONIDE INJ      triamcinolone acetonide (KENALOG) 40 mg/mL injection      US GUIDE INJ/ASP/ARTHRO LG JNT/BURSA        PLAN:  1. Pt presents with left shoulder pain and I am hopeful a cortisone injection will help. Risk factors include: htn  2. No ultrasound exam indicated today  3. Yes cortisone injection indicated today L SHOULDER US  4. No Physical/Occupational Therapy indicated today  5. No diagnostic test indicated today:   6. No durable medical equipment indicated today  7. No referral indicated today   8. No medications indicated today:   9.  No Narcotic indicated today       RTC 3 weeks  Follow-up Disposition: Not on File    Scribed by Stephanie Miss Donnelly[address]) as dictated by Camilla García MD    I, Dr. Camilla García, confirm that all documentation is accurate.     Camilla García M.D.   [facility]

## 2018-11-14 ENCOUNTER — OFFICE VISIT (OUTPATIENT)
Dept: NEUROLOGY | Age: 58
End: 2018-11-14

## 2018-11-14 VITALS
HEIGHT: 70 IN | OXYGEN SATURATION: 96 % | RESPIRATION RATE: 22 BRPM | BODY MASS INDEX: 31.5 KG/M2 | HEART RATE: 94 BPM | DIASTOLIC BLOOD PRESSURE: 90 MMHG | SYSTOLIC BLOOD PRESSURE: 164 MMHG | TEMPERATURE: 98.1 F | WEIGHT: 220 LBS

## 2018-11-14 DIAGNOSIS — G89.4 CHRONIC PAIN SYNDROME: ICD-10-CM

## 2018-11-14 DIAGNOSIS — G62.9 NEUROPATHY: Primary | ICD-10-CM

## 2018-11-14 NOTE — PROGRESS NOTES
Re:  Douglas Wilson up visit     11/14/2018 3:41 PM      SSN: [id number]    Subjective:   Catarina Scott returns for follow up of his painful neuropathy. He's noted no change in his symptoms on high dose Cymbalta. The Pamelor was very sedating, so he stopped it. He's been switched to Elavil 25 mg at night only which is more tolerable. He feels great on the higher dose of Neurontin. The sharp pains are gone, still has some dull aching but but better. Medications:    Current Outpatient Medications   Medication Sig Dispense Refill    gabapentin (NEURONTIN) 800 mg tablet Take 2 Tabs by mouth three (3) times daily. 180 Tab 5    budesonide-formoterol (SYMBICORT) 160-4.5 mcg/actuation HFAA inhale 2 puffs by mouth twice a day 1 Inhaler 5    atenolol (TENORMIN) 100 mg tablet Take 1 Tab by mouth daily. 90 Tab 2    dilTIAZem CD (CARTIA XT) 180 mg ER capsule Take 1 Cap by mouth two (2) times a day. 180 Cap 2    amitriptyline (ELAVIL) 25 mg tablet Take 1 Tab by mouth nightly. 30 Tab 1    dabigatran etexilate (PRADAXA) 150 mg capsule Take 1 Cap by mouth every twelve (12) hours. 180 Cap 3    cholecalciferol, vitamin D3, (VITAMIN D3) 2,000 unit tab Take  by mouth two (2) times a day.  potassium 99 mg tablet Take 99 mg by mouth daily.  fish oil-dha-epa 1,200-144-216 mg cap Take 1 Tab by mouth daily.  melatonin tab tablet Take  by mouth nightly.  MULTIVIT-MIN/FA/LYCOPEN/LUTEIN (CENTRUM SILVER MEN PO) Take 1 Tab by mouth daily.  albuterol (PROAIR HFA) 90 mcg/actuation inhaler Take 2 Puffs by inhalation every four (4) hours as needed for Wheezing. 1 Inhaler 1    b complex vitamins tablet Take 1 Tab by mouth daily.          Vital signs:    Visit Vitals  /90 (BP 1 Location: Right arm, BP Patient Position: Sitting)   Pulse 94   Temp 98.1 °F (36.7 °C) (Oral)   Resp 22   Ht 5' 10\" (1.778 m)   Wt 99.8 kg (220 lb)   SpO2 96%   BMI 31.57 kg/m²       Review of Systems:   As above otherwise 11 point review of systems negative including;   Constitutional no fever or chills  Skin denies rash or itching  HEENT  Denies tinnitus, hearing lose  Eyes denies diplopia vision lose  Respiratory denies sortness of breath  Cardiovascular denies chest pain, dyspnea on exertion  Gastrointestinal denies nausea, vomiting, diarrhea, constipation  Genitourinary denies incontinence  Musculoskeletal denies joint pain or swelling  Endocrine denies weight change  Hematology denies easy bruising or bleeding   Neurological as above in HPI      Patient Active Problem List   Diagnosis Code    Atrial fibrillation (HCC) I48.91    Osteoarthritis of multiple joints M15.9    Chronic pain G89.29    Hyperthyroidism E05.90    History of abdominal aortic aneurysm (AAA) Z86.79    Family history of systemic lupus erythematosus (SLE) in mother Z80.2    History of drug use Z87.898    H/O alcohol abuse Z87.898    Nicotine use disorder F17.200    Depression F32.9    Essential hypertension I10    FH: CAD (coronary artery disease) Z80.55    AAA (abdominal aortic aneurysm) without rupture ([facility]) I71.4    Ruptured spleen S36. 09XA    S/P splenectomy Z90.81    Anemia due to blood loss, acute D62    Seizure ([facility] [address] R56.9    Neuropathy G62.9         Objective: The patient is awake, alert, and oriented x 4. Fund of knowledge is adequate. Speech is fluent and memory is intact. Cranial Nerves: II - Visual fields are full to confrontation. III, IV, VI - Extraocular movements are intact. There is no nystagmus. V - Facial sensation is intact to pinprick. VII - Face is symmetrical.  VIII - Hearing is present. IX, X, XII - Palate is symmetrical.   XI - Shoulder shrugging and head turning intact  Motor: The patient moves all four limbs fairly well and symmetrically. Tone is normal. Reflexes are trace and symmetrical. Plantars are down going. Gait is antalgic, he walks gingerly on his feet.       CBC:   Lab Results   Component Value Date/Time    WBC 14.0 (H) 05/29/2018 02:10 PM    RBC 5.04 05/29/2018 02:10 PM    HGB 16.2 (H) 05/29/2018 02:10 PM    HCT 49.0 (H) 05/29/2018 02:10 PM    PLATELET 238 (H) 25/00/9696 02:10 PM     BMP:   Lab Results   Component Value Date/Time    Glucose 71 (L) 05/29/2018 02:10 PM    Sodium 139 05/29/2018 02:10 PM    Potassium 4.3 05/29/2018 02:10 PM    Chloride 106 05/29/2018 02:10 PM    CO2 25 05/29/2018 02:10 PM    BUN 25 (H) 05/29/2018 02:10 PM    Creatinine 1.10 05/29/2018 02:10 PM    Calcium 9.5 05/29/2018 02:10 PM     CMP:   Lab Results   Component Value Date/Time    Glucose 71 (L) 05/29/2018 02:10 PM    Sodium 139 05/29/2018 02:10 PM    Potassium 4.3 05/29/2018 02:10 PM    Chloride 106 05/29/2018 02:10 PM    CO2 25 05/29/2018 02:10 PM    BUN 25 (H) 05/29/2018 02:10 PM    Creatinine 1.10 05/29/2018 02:10 PM    Calcium 9.5 05/29/2018 02:10 PM    Anion gap 8 05/29/2018 02:10 PM    BUN/Creatinine ratio 23 (H) 05/29/2018 02:10 PM    Alk.  phosphatase 89 05/29/2018 02:10 PM    Protein, total 7.1 06/21/2018 01:17 PM    Albumin 4.0 05/29/2018 02:10 PM    Globulin 3.4 05/29/2018 02:10 PM    A-G Ratio 1.2 05/29/2018 02:10 PM     Coagulation:   Lab Results   Component Value Date/Time    Prothrombin time 13.6 (H) 10/09/2017 12:45 AM    INR 1.2 (H) 10/09/2017 12:45 AM    aPTT 28.6 10/09/2017 12:45 AM     Cardiac markers: No results found for: CPK, CKND1, RUSSEL    5/29/2018  9:43 PM - [facility] [place]   Component Results   Component Value Flag Ref Range Units Status   Vitamin B12 980 (H) 211 - 911 pg/mL Final     5/29/2018  9:32 PM - [facility] [place]   Component Results   Component Value Flag Ref Range Units Status   Hemoglobin A1c 5.5  4.2 - 5.6 % Final   Comment:   (NOTE)   HbA1C Interpretive Ranges   <5.7              Normal   5.7 - 6.4         Consider Prediabetes   >6.5              Consider Diabetes      Est. average glucose 111   mg/dL Final     5/29/2018  9:43 PM - [facility] [place] Component Results   Component Value Flag Ref Range Units Status   TSH 1.48  0.36 - 3.74 uIU/mL Final   T4, Free 0.9  0.7 - 1.5 NG/DL Final     6/22/2018  4:40 PM - [email or web address]   Component Results   Component Value Flag Ref Range Units Status   Protein, total 7.1  6.0 - 8.5 g/dL Final   Albumin 3.9  2.9 - 4.4 g/dL Final   Alpha-1-globulin 0.2  0.0 - 0.4 g/dL Final   ALPHA-2 GLOBULIN 0.9  0.4 - 1.0 g/dL Final   Beta globulin 1.3  0.7 - 1.3 g/dL Final   Gamma globulin 0.7  0.4 - 1.8 g/dL Final   M-Richard Not Observed  Not Observed g/dL Final   Globulin, total 3.2  2.2 - 3.9 g/dL Final   A/G ratio 1.2  0.7 - 1.7   Final     6/21/2018  5:11 PM - [email or web address]   Component Results   Component Value Flag Ref Range Units Status   Sed rate, automated 8  0 - 20 mm/hr Final     5/29/2018  9:43 PM - [email or web address]   Component Results   Component Value Flag Ref Range Units Status   Magnesium 2.5  1.6 - 2.6 mg/dL Final         Assessment:  Severe idiopathic neuropathy in this man who has risk factors including none. I haven't been able to identify a specific cause for this man's neuropathy. So far no help with Cymbalta, Neurontin, Pamelor, Elavil. Doing better on high dose Neurontin. Plan: Will continue high dose Neurontin, 1600 mg TID for short term. Return here in 3 months. Sincerely,        Niyah Troncoso.  Iman Hernandez M.D.

## 2018-11-14 NOTE — PROGRESS NOTES
Elisha Agnuiano is a 59 y.o. male in today for follow-up on chronic pain and neuropathy. Learning assessment previously completed 6/1/2018; primary language is [place]. 1. Have you been to the ER, urgent care clinic since your last visit? Hospitalized since your last visit? No    2. Have you seen or consulted any other health care providers outside of the [address] [place] [address] since your last visit? Include any pap smears or colon screening.  No

## 2018-11-19 ENCOUNTER — TELEPHONE (OUTPATIENT)
Dept: FAMILY MEDICINE CLINIC | Age: 58
End: 2018-11-19

## 2018-11-19 NOTE — TELEPHONE ENCOUNTER
Patients wife got a [employer] message stating that he is due for his Meningococcal. Patient had one 11/28/2017. Please call when you have a moment.

## 2018-11-20 DIAGNOSIS — G62.9 NEUROPATHY: ICD-10-CM

## 2018-11-20 DIAGNOSIS — F32.A DEPRESSION, UNSPECIFIED DEPRESSION TYPE: ICD-10-CM

## 2018-11-20 DIAGNOSIS — G89.4 CHRONIC PAIN SYNDROME: ICD-10-CM

## 2018-11-21 RX ORDER — AMITRIPTYLINE HYDROCHLORIDE 25 MG/1
25 TABLET, FILM COATED ORAL
Qty: 30 TAB | Refills: 1 | Status: SHIPPED | OUTPATIENT
Start: 2018-11-21 | End: 2018-12-05 | Stop reason: SDUPTHER

## 2018-11-23 ENCOUNTER — HOSPITAL ENCOUNTER (OUTPATIENT)
Dept: LAB | Age: 58
Discharge: HOME | End: 2018-11-23
Payer: COMMERCIAL

## 2018-11-23 ENCOUNTER — OFFICE VISIT (OUTPATIENT)
Dept: FAMILY MEDICINE CLINIC | Age: 58
End: 2018-11-23

## 2018-11-23 VITALS
SYSTOLIC BLOOD PRESSURE: 130 MMHG | BODY MASS INDEX: 31.78 KG/M2 | HEIGHT: 70 IN | DIASTOLIC BLOOD PRESSURE: 96 MMHG | OXYGEN SATURATION: 97 % | RESPIRATION RATE: 18 BRPM | TEMPERATURE: 98.5 F | WEIGHT: 222 LBS | HEART RATE: 67 BPM

## 2018-11-23 DIAGNOSIS — I10 ESSENTIAL HYPERTENSION: ICD-10-CM

## 2018-11-23 DIAGNOSIS — E05.90 HYPERTHYROIDISM: ICD-10-CM

## 2018-11-23 DIAGNOSIS — Z90.81 S/P SPLENECTOMY: ICD-10-CM

## 2018-11-23 DIAGNOSIS — E05.90 HYPERTHYROIDISM: Primary | ICD-10-CM

## 2018-11-23 DIAGNOSIS — G62.9 NEUROPATHY: ICD-10-CM

## 2018-11-23 DIAGNOSIS — G89.4 CHRONIC PAIN SYNDROME: ICD-10-CM

## 2018-11-23 LAB
ANION GAP SERPL CALC-SCNC: 8 MMOL/L (ref 3–18)
BUN SERPL-MCNC: 24 MG/DL (ref 7–18)
BUN/CREAT SERPL: 24 (ref 12–20)
CALCIUM SERPL-MCNC: 10 MG/DL (ref 8.5–10.1)
CHLORIDE SERPL-SCNC: 103 MMOL/L (ref 100–108)
CO2 SERPL-SCNC: 29 MMOL/L (ref 21–32)
CREAT SERPL-MCNC: 1 MG/DL (ref 0.6–1.3)
GLUCOSE SERPL-MCNC: 84 MG/DL (ref 74–99)
POTASSIUM SERPL-SCNC: 4.7 MMOL/L (ref 3.5–5.5)
SODIUM SERPL-SCNC: 140 MMOL/L (ref 136–145)
T4 FREE SERPL-MCNC: 0.8 NG/DL (ref 0.7–1.5)
TSH SERPL DL<=0.05 MIU/L-ACNC: 1.5 UIU/ML (ref 0.36–3.74)

## 2018-11-23 PROCEDURE — 84439 ASSAY OF FREE THYROXINE: CPT

## 2018-11-23 PROCEDURE — 84480 ASSAY TRIIODOTHYRONINE (T3): CPT

## 2018-11-23 PROCEDURE — 80048 BASIC METABOLIC PNL TOTAL CA: CPT

## 2018-11-23 NOTE — PROGRESS NOTES
Arlet Le is a 59 y.o. male here today with his wife for a follow up. They're main concern is his weight gain. His appetite has increased and he has noticed occasional swelling in his fingers and toes.

## 2018-11-23 NOTE — PROGRESS NOTES
HISTORY OF PRESENT ILLNESS  Cheng Law is a 59 y.o. male. HPI  Cheng Law is a 59 y.o. male who presents to the office today for weight concerns. He comes in today for follow up. He saw Neuro and gabapentin was increased to 1600mg TID. He says this has helped a lot. His pain went from 9/10 down to 4/10 and much more tolerable. He says since the increase in dose, his appetite has increased and subsequently his weight is increasing. He says he gets hungry between the hours of 4-10pm. He says he is always hot now. When his wife is cold at home under blankets, he is sweating. He has a hx of hyperthyroidism and his thyroid levels were last checked in May and normal.   He still has c/o cold feet. He never did call and make a follow up appointment with Vascular as suggested. BP is elevated today. It has been consistently elevated at previous visits. He is compliant with diltiazem 180mg BID. He is doing well with elavil at bedtime. This helps calm him down and he can fall asleep. Chief Complaint   Patient presents with    Weight Gain       Current Outpatient Medications on File Prior to Visit   Medication Sig Dispense Refill    amitriptyline (ELAVIL) 25 mg tablet Take 1 Tab by mouth nightly. 30 Tab 1    gabapentin (NEURONTIN) 800 mg tablet Take 2 Tabs by mouth three (3) times daily. 180 Tab 5    budesonide-formoterol (SYMBICORT) 160-4.5 mcg/actuation HFAA inhale 2 puffs by mouth twice a day 1 Inhaler 5    atenolol (TENORMIN) 100 mg tablet Take 1 Tab by mouth daily. 90 Tab 2    dilTIAZem CD (CARTIA XT) 180 mg ER capsule Take 1 Cap by mouth two (2) times a day. 180 Cap 2    dabigatran etexilate (PRADAXA) 150 mg capsule Take 1 Cap by mouth every twelve (12) hours. 180 Cap 3    cholecalciferol, vitamin D3, (VITAMIN D3) 2,000 unit tab Take  by mouth two (2) times a day.  MULTIVIT-MIN/FA/LYCOPEN/LUTEIN (CENTRUM SILVER MEN PO) Take 1 Tab by mouth daily.       potassium 99 mg tablet Take 99 mg by mouth daily.  fish oil-dha-epa 1,200-144-216 mg cap Take 1 Tab by mouth daily.  b complex vitamins tablet Take 1 Tab by mouth daily.  melatonin tab tablet Take  by mouth nightly.  albuterol (PROAIR HFA) 90 mcg/actuation inhaler Take 2 Puffs by inhalation every four (4) hours as needed for Wheezing. 1 Inhaler 1     No current facility-administered medications on file prior to visit. No Known Allergies  Past Medical History:   Diagnosis Date    AAA (abdominal aortic aneurysm) (HCC)     Alcoholism (HCC)     Atrial fibrillation (HCC)     Bradycardia     Chronic pain     Degenerative disc disease, cervical     Degenerative disc disease, lumbar     Hypertension     Hypokalemia     Pneumonia 2016    caused permanent L lung problems    Thyroid disease      Social History     Tobacco Use   Smoking Status Former Smoker    Last attempt to quit: 2017    Years since quittin.3   Smokeless Tobacco Former User     Social History     Substance and Sexual Activity   Alcohol Use No    Comment: sober x 5 years     Family History   Problem Relation Age of Onset    Heart Disease Father     Heart Disease Brother         AAA-  in his 42's    Heart Disease Paternal Aunt     Heart Disease Paternal Uncle     Heart Disease Paternal Grandmother     Lupus Mother        Review of Systems   Constitutional: Negative for diaphoresis, malaise/fatigue and weight loss. Respiratory: Negative for cough. Cardiovascular: Negative for chest pain and palpitations. Gastrointestinal: Negative for constipation, diarrhea, nausea and vomiting. Musculoskeletal: Positive for joint pain. Negative for falls. Neurological: Positive for tingling and sensory change. Negative for seizures. Much improvement with increased dose of gabapentin   Endo/Heme/Allergies: Does not bruise/bleed easily. Psychiatric/Behavioral: Positive for depression. Negative for substance abuse and suicidal ideas. The patient has insomnia. Improved with elavil     Visit Vitals  BP (!) 130/96 (BP 1 Location: Left arm, BP Patient Position: Sitting) Comment: manual cuff   Pulse 67   Temp 98.5 °F (36.9 °C) (Oral)   Resp 18   Ht 5' 10\" (1.778 m)   Wt 222 lb (100.7 kg)   SpO2 97%   BMI 31.85 kg/m²     Physical Exam   Constitutional: He is oriented to person, place, and time. He appears well-developed and well-nourished. No distress. Cardiovascular: Normal rate. An irregular rhythm present. Pulses:       Posterior tibial pulses are 1+ on the right side, and 1+ on the left side. Pulmonary/Chest: Effort normal. No respiratory distress. He has no wheezes. Musculoskeletal: He exhibits no edema. Walking much better with assistance of cane   Neurological: He is alert and oriented to person, place, and time. Skin: Skin is intact. Blue-purple hue to both lower legs, no hair growth on lower half of calves. Cool to touch. Psychiatric: He has a normal mood and affect. His behavior is normal. Thought content normal.   Nursing note and vitals reviewed. ASSESSMENT and PLAN    ICD-10-CM ICD-9-CM    1. Hyperthyroidism E05.90 242.90 TSH AND FREE T4      T3 TOTAL   2. Chronic pain syndrome G89.4 338.4    3. Neuropathy M11.8 170.3 METABOLIC PANEL, BASIC      TSH AND FREE T4      T3 TOTAL   4. S/P splenectomy Z90.81 V45.79 MENINGOCOCCAL (MENVEO) CONJUGATE VACCINE, SEROGROUPS A, C, Y AND W-135 (TETRAVALENT), IM      MENINGOCOCCAL B (BEXSERO) RECOMBINANT PROT W/OUT MEMBR VESIC VACC IM   5. Essential hypertension E93 883.8 METABOLIC PANEL, BASIC      TSH AND FREE T4      T3 TOTAL      Will recheck thyroid levels today. If BMP is normal, will add diuretic to BP regimen. Updated vaccines today. Make an appointment with Vascular. Continue follow up with Dr. Ramiro Ornelas. Increase in Gabapentin is working well for him and now has a tolerable level of pain. Reviewed medication and side effects.  Patient agrees with the plan and verbalizes understanding. Follow-up Disposition:  Return in about 3 months (around 2/23/2019) for HTN.     Kristian Perez PA-C  11/23/2018

## 2018-11-23 NOTE — PATIENT INSTRUCTIONS
DASH Diet: Care Instructions  Your Care Instructions    The DASH diet is an eating plan that can help lower your blood pressure. DASH stands for Dietary Approaches to Stop Hypertension. Hypertension is high blood pressure. The DASH diet focuses on eating foods that are high in calcium, potassium, and magnesium. These nutrients can lower blood pressure. The foods that are highest in these nutrients are fruits, vegetables, low-fat dairy products, nuts, seeds, and legumes. But taking calcium, potassium, and magnesium supplements instead of eating foods that are high in those nutrients does not have the same effect. The DASH diet also includes whole grains, fish, and poultry. The DASH diet is one of several lifestyle changes your doctor may recommend to lower your high blood pressure. Your doctor may also want you to decrease the amount of sodium in your diet. Lowering sodium while following the DASH diet can lower blood pressure even further than just the DASH diet alone. Follow-up care is a key part of your treatment and safety. Be sure to make and go to all appointments, and call your doctor if you are having problems. It's also a good idea to know your test results and keep a list of the medicines you take. How can you care for yourself at home? Following the DASH diet  · Eat 4 to 5 servings of fruit each day. A serving is 1 medium-sized piece of fruit, ½ cup chopped or canned fruit, 1/4 cup dried fruit, or 4 ounces (½ cup) of fruit juice. Choose fruit more often than fruit juice. · Eat 4 to 5 servings of vegetables each day. A serving is 1 cup of lettuce or raw leafy vegetables, ½ cup of chopped or cooked vegetables, or 4 ounces (½ cup) of vegetable juice. Choose vegetables more often than vegetable juice. · Get 2 to 3 servings of low-fat and fat-free dairy each day. A serving is 8 ounces of milk, 1 cup of yogurt, or 1 ½ ounces of cheese. · Eat 6 to 8 servings of grains each day.  A serving is 1 slice of bread, 1 ounce of dry cereal, or ½ cup of cooked rice, pasta, or cooked cereal. Try to choose whole-grain products as much as possible. · Limit lean meat, poultry, and fish to 2 servings each day. A serving is 3 ounces, about the size of a deck of cards. · Eat 4 to 5 servings of nuts, seeds, and legumes (cooked dried beans, lentils, and split peas) each week. A serving is 1/3 cup of nuts, 2 tablespoons of seeds, or ½ cup of cooked beans or peas. · Limit fats and oils to 2 to 3 servings each day. A serving is 1 teaspoon of vegetable oil or 2 tablespoons of salad dressing. · Limit sweets and added sugars to 5 servings or less a week. A serving is 1 tablespoon jelly or jam, ½ cup sorbet, or 1 cup of lemonade. · Eat less than 2,300 milligrams (mg) of sodium a day. If you limit your sodium to 1,500 mg a day, you can lower your blood pressure even more. Tips for success  · Start small. Do not try to make dramatic changes to your diet all at once. You might feel that you are missing out on your favorite foods and then be more likely to not follow the plan. Make small changes, and stick with them. Once those changes become habit, add a few more changes. · Try some of the following:  ? Make it a goal to eat a fruit or vegetable at every meal and at snacks. This will make it easy to get the recommended amount of fruits and vegetables each day. ? Try yogurt topped with fruit and nuts for a snack or healthy dessert. ? Add lettuce, tomato, cucumber, and onion to sandwiches. ? Combine a ready-made pizza crust with low-fat mozzarella cheese and lots of vegetable toppings. Try using tomatoes, squash, spinach, broccoli, carrots, cauliflower, and onions. ? Have a variety of cut-up vegetables with a low-fat dip as an appetizer instead of chips and dip. ? Sprinkle sunflower seeds or chopped almonds over salads. Or try adding chopped walnuts or almonds to cooked vegetables.   ? Try some vegetarian meals using beans and peas. Add garbanzo or kidney beans to salads. Make burritos and tacos with mashed navarro beans or black beans. Where can you learn more? Go to http://jeannine-kain.info/. Enter G845 in the search box to learn more about \"DASH Diet: Care Instructions. \"  Current as of: December 6, 2017  Content Version: 11.8  © 3554-7872 [employer]. Care instructions adapted under license by [employer] (which disclaims liability or warranty for this information). If you have questions about a medical condition or this instruction, always ask your healthcare professional. [address] any warranty or liability for your use of this information. Low Sodium Diet (2,000 Milligram): Care Instructions  Your Care Instructions    Too much sodium causes your body to hold on to extra water. This can raise your blood pressure and force your heart and kidneys to work harder. In very serious cases, this could cause you to be put in the hospital. It might even be life-threatening. By limiting sodium, you will feel better and lower your risk of serious problems. The most common source of sodium is salt. People get most of the salt in their diet from canned, prepared, and packaged foods. Fast food and restaurant meals also are very high in sodium. Your doctor will probably limit your sodium to less than 2,000 milligrams (mg) a day. This limit counts all the sodium in prepared and packaged foods and any salt you add to your food. Follow-up care is a key part of your treatment and safety. Be sure to make and go to all appointments, and call your doctor if you are having problems. It's also a good idea to know your test results and keep a list of the medicines you take. How can you care for yourself at home? Read food labels  · Read labels on cans and food packages. The labels tell you how much sodium is in each serving. Make sure that you look at the serving size.  If you eat more than the serving size, you have eaten more sodium. · Food labels also tell you the Percent Daily Value for sodium. Choose products with low Percent Daily Values for sodium. · Be aware that sodium can come in forms other than salt, including monosodium glutamate (MSG), sodium citrate, and sodium bicarbonate (baking soda). MSG is often added to Asian food. When you eat out, you can sometimes ask for food without MSG or added salt. Buy low-sodium foods  · Buy foods that are labeled \"unsalted\" (no salt added), \"sodium-free\" (less than 5 mg of sodium per serving), or \"low-sodium\" (less than 140 mg of sodium per serving). Foods labeled \"reduced-sodium\" and \"light sodium\" may still have too much sodium. Be sure to read the label to see how much sodium you are getting. · Buy fresh vegetables, or frozen vegetables without added sauces. Buy low-sodium versions of canned vegetables, soups, and other canned goods. Prepare low-sodium meals  · Cut back on the amount of salt you use in cooking. This will help you adjust to the taste. Do not add salt after cooking. One teaspoon of salt has about 2,300 mg of sodium. · Take the salt shaker off the table. · Flavor your food with garlic, lemon juice, onion, vinegar, herbs, and spices. Do not use soy sauce, lite soy sauce, steak sauce, onion salt, garlic salt, celery salt, mustard, or ketchup on your food. · Use low-sodium salad dressings, sauces, and ketchup. Or make your own salad dressings and sauces without adding salt. · Use less salt (or none) when recipes call for it. You can often use half the salt a recipe calls for without losing flavor. Other foods such as rice, pasta, and grains do not need added salt. · Rinse canned vegetables, and cook them in fresh water. This removes some--but not all--of the salt. · Avoid water that is naturally high in sodium or that has been treated with water softeners, which add sodium.  Call your local water company to find out the sodium content of your water supply. If you buy bottled water, read the label and choose a sodium-free brand. Avoid high-sodium foods  · Avoid eating:  ? Smoked, cured, salted, and canned meat, fish, and poultry. ? Ham, lawson, hot dogs, and luncheon meats. ? Regular, hard, and processed cheese and regular peanut butter. ? Crackers with salted tops, and other salted snack foods such as pretzels, chips, and salted popcorn. ? Frozen prepared meals, unless labeled low-sodium. ? Canned and dried soups, broths, and bouillon, unless labeled sodium-free or low-sodium. ? Canned vegetables, unless labeled sodium-free or low-sodium. ? [place] fries, pizza, tacos, and other fast foods. ? Pickles, olives, ketchup, and other condiments, especially soy sauce, unless labeled sodium-free or low-sodium. Where can you learn more? Go to http://jeannine-kain.info/. Enter A363 in the search box to learn more about \"Low Sodium Diet (2,000 Milligram): Care Instructions. \"  Current as of: March 29, 2018  Content Version: 11.8  © 1872-1301 [employer]. Care instructions adapted under license by [employer] (which disclaims liability or warranty for this information). If you have questions about a medical condition or this instruction, always ask your healthcare professional. [place] [address] any warranty or liability for your use of this information. Serogroup B Meningococcal Vaccine (MenB): What You Need to Know  Why get vaccinated? Meningococcal disease is a serious illness caused by a type of bacteria called Neisseria meningitidis. It can lead to meningitis (infection of the lining of the brain and spinal cord) and infections of the blood. Meningococcal disease often occurs without warning - even among people who are otherwise healthy.   Meningococcal disease can spread from person to person through close contact (coughing or kissing) or lengthy contact, especially among people living in the same household. There are at least 12 types of N. meningitidis, called \"serogroups. \" Serogroups A, B, C, W, and Y cause most meningococcal disease. Anyone can get meningococcal disease. But certain people are at increased risk, including:  · Infants younger than one year old  · Adolescents and young adults 12 through 21years old  · People with certain medical conditions that affect the immune system  · Microbiologists who routinely work with isolates of N. meningitidis  · People at risk because of an outbreak in their community  Even when it is treated, meningococcal disease kills 10 to 15 infected people out of 100. And of those who survive, about 10 to 20 out of every 100 will suffer disabilities such as hearing loss, brain damage, kidney damage, amputations, nervous system problems, or severe scars from skin grafts. Serogroup B meningococcal (MenB) vaccine can help prevent meningococcal disease caused by serogroup B. Other meningococcal vaccines are recommended to help protect against serogroups A, C, W, and Y. Serogroup B Meningococcal Vaccines  Two serogroup B meningococcal vaccines - Bexsero® and Trumenba® - have been licensed by the [employer] and Drug Administration (FDA).   These vaccines are recommended routinely for people 10 years or older who are at increased risk for serogroup B meningococcal infections, including:  · People at risk because of a serogroup B meningococcal disease outbreak  · Anyone whose spleen is damaged or has been removed  · Anyone with a rare immune system condition called \"persistent complement component deficiency\"  · Anyone taking a drug called eculizumab (also called Soliris®)  · Microbiologists who routinely work with isolates of N. meningitidis  These vaccines may also be given to anyone 12 through 21years old to provide short term protection against most strains of serogroup B meningococcal disease; 16 through 18 years are the preferred ages for vaccination. For best protection, more than 1 dose of a serogroup B meningococcal vaccine is needed. The same vaccine must be used for all doses. Ask your health care provider about the number and timing of doses. Some people should not get these vaccines  Tell the person who is giving you the vaccine:  · If you have any severe, life-threatening allergies. If you have ever had a life-threatening allergic reaction after a previous dose of serogroup B meningococcal vaccine, or if you have a severe allergy to any part of this vaccine, you should not get the vaccine. Tell your health care provider if you have any severe allergies that you know of, including a severe allergy to latex. He or she can tell you about the vaccine's ingredients. · If you are pregnant or breastfeeding. There is not very much information about the potential risks of this vaccine for a pregnant woman or breastfeeding mother. It should be used during pregnancy only if clearly needed. If you have a mild illness, such as a cold, you can probably get the vaccine today. If you are moderately or severely ill, you should probably wait until you recover. Your doctor can advise you. Risks of a vaccine reaction  With any medicine, including vaccines, there is a chance of reactions. These are usually mild and go away on their own within a few days, but serious reactions are also possible. More than half of the people who get serogroup B meningococcal vaccine have mild problems following vaccination. These reactions can last up to 3 to 7 days, and include:  · Soreness, redness, or swelling where the shot was given  · Tiredness or fatigue  · Headache  · Muscle or joint pain  · Fever or chills  · Nausea or diarrhea  Other problems that could happen after these vaccines:  · People sometimes faint after a medical procedure, including vaccination. Sitting or lying down for about 15 minutes can help prevent fainting and injuries caused by a fall. Tell your provider if you feel dizzy, or have vision changes or ringing in the ears. · Some people get shoulder pain that can be more severe and longer-lasting than the more routine soreness that can follow injections. This happens very rarely. · Any medication can cause a severe allergic reaction. Such reactions from a vaccine are very rare, estimated at about 1 in a million doses, and would happen within a few minutes to a few hours after the vaccination. As with any medicine, there is a very remote chance of a vaccine causing a serious injury or death. The safety of vaccines is always being monitored. For more information, visit the vaccine safety web site: www.cdc.gov/vaccinesafety. What if there is a serious reaction? What should I look for? · Look for anything that concerns you, such as signs of a severe allergic reaction, very high fever, or unusual behavior. Signs of a severe allergic reaction can include hives, swelling of the face and throat, difficulty breathing, a fast heartbeat, dizziness, and weakness. These would usually start a few minutes to a few hours after the vaccination. What should I do? · If you think it is a severe allergic reaction or other emergency that can't wait, call 911 and get to the nearest hospital. Otherwise, call your clinic. Afterward, the reaction should be reported to the Vaccine Adverse Event Reporting System (VAERS). Your doctor should file this report, or you can do it yourself through the VAERS website at www.vaers. hhs.gov, or by calling 4-460.340.6978. VAERS does not give medical advice. The National Vaccine Injury Compensation Program  The National Vaccine Injury Compensation Program (VICP) is a federal program that was created to compensate people who may have been injured by certain vaccines.   Persons who believe they may have been injured by a vaccine can learn about the program and about filing a claim by calling 2-670.134.8271 or visiting the [address] [employer] website at www.hrsa.gov/vaccinecompensation. There is a time limit to file a claim for compensation. How can I learn more? · Ask your health care provider. He or she can give you the vaccine package insert or suggest other sources of information. · Call your local or state health department. · Contact the Centers for Disease Control and Prevention (CDC):  ? Call 9-696.131.1792 (1-800-CDC-INFO) or  ? Visit CDC's vaccines website at www.cdc.gov/vaccines  Vaccine Information Statement  Serogroup B Meningococcal Vaccine  8-  42 RASHAWN Lazaro [id number]  Department of Health and Human Services  Centers for Disease Control and Prevention  Many Vaccine Information Statements are available in Surinamese and other languages. See www.immunize.org/vis. Hojas de información sobre vacunas están disponibles en español y en muchos otros idiomas. Visite www.immunize.org/vis. Care instructions adapted under license by [employer] (which disclaims liability or warranty for this information). If you have questions about a medical condition or this instruction, always ask your healthcare professional. [place] [address] any warranty or liability for your use of this information. Meningococcal ACWY Vaccines - MenACWY and MPSV4: What You Need to Know  Why get vaccinated? Meningococcal disease is a serious illness caused by a type of bacteria called Neisseria meningitidis. It can lead to meningitis (infection of the lining of the brain and spinal cord) and infections of the blood. Meningococcal disease often occurs without warning--even among people who are otherwise healthy. Meningococcal disease can spread from person to person through close contact (coughing or kissing) or lengthy contact, especially among people living in the same household. There are at least 12 types of N. meningitidis, called \"serogroups. \" Serogroups A, B, C, W, and Y cause most meningococcal disease.   Anyone can get meningococcal disease, but certain people are at increased risk, including:  · Infants younger than 3year old. · Adolescents and young adults 12 through 21years old. · People with certain medical conditions that affect the immune system. · Microbiologists who routinely work with isolates of N. meningitidis. · People at risk because of an outbreak in their community. Even when it is treated, meningococcal disease kills 10 to 15 infected people out of 100. And of those who survive, about 10 to 20 out of every 100 will suffer disabilities such as hearing loss, brain damage, kidney damage, amputations, nervous system problems, or severe scars from skin grafts. Meningococcal ACWY vaccines can help prevent meningococcal disease caused by serogroups A, C, W, and Y. A different meningococcal vaccine is available to help protect against serogroup B. Meningococcal ACWY vaccines  There are two kinds of meningococcal vaccines licensed by the Food and Drug Administration (FDA) for protection against serogroups A, C, W, and Y: meningococcal conjugate vaccine (MenACWY) and meningococcal polysaccharide vaccine (MPSV4). Two doses of MenACWY are routinely recommended for adolescents 6 through 25years old: the first dose at 6or 15years old, with a booster dose at age 12. Some adolescents, including those with HIV, should get additional doses. Ask your health care provider for more information.   In addition to routine vaccination for adolescents, MenACWY vaccine is also recommended for certain groups of people:  · People at risk because of a serogroup A, C, W, or Y meningococcal disease outbreak  · Anyone whose spleen is damaged or has been removed  · Anyone with a rare immune system condition called \"persistent complement component deficiency\"  · Anyone taking a drug called eculizumab (also called Soliris®)  · Microbiologists who routinely work with isolates of N. meningitidis  · Anyone traveling to, or living in, a part of the world where meningococcal disease is common, such as parts of [place]  · [employer] freshmen living in dormitories  · [address] recruits  Children between 2 and 22 months old and people with certain medical conditions need multiple doses for adequate protection. Ask your health care provider about the number and timing of doses and the need for booster doses. MenACWY is the preferred vaccine for people in these groups who are 2 months through 54years old, have received MenACWY previously, or anticipate requiring multiple doses. MPSV4 is recommended for adults older than 55 who anticipate requiring only a single dose (travelers, or during community outbreaks). Some people should not get this vaccine  Tell the person who is giving you the vaccine:  · If you have any severe, life-threatening allergies. If you have ever had a life-threatening allergic reaction after a previous dose of meningococcal ACWY vaccine, or if you have a severe allergy to any part of this vaccine, you should not get this vaccine. Your provider can tell you about the vaccine's ingredients. · If you are pregnant or breastfeeding. There is not very much information about the potential risks of this vaccine for a pregnant woman or breastfeeding mother. It should be used during pregnancy only if clearly needed. If you have a mild illness, such as a cold, you can probably get the vaccine today. If you are moderately or severely ill, you should probably wait until you recover. Your doctor can advise you. Risks of a vaccine reaction  With any medicine, including vaccines, there is a chance of side effects. These are usually mild and go away on their own within a few days, but serious reactions are also possible. As many as half of the people who get meningococcal ACWY vaccine have mild problems following vaccination, such as redness or soreness where the shot was given. If these problems occur, they usually last for 1 or 2 days.  They are more common after MenACWY than after MPSV4. A small percentage of people who receive the vaccine develop a mild fever. Problems that could happen after any injected vaccine:  · People sometimes faint after a medical procedure, including vaccination. Sitting or lying down for about 15 minutes can help prevent fainting, and injuries caused by a fall. Tell your doctor if you feel dizzy or have vision changes or ringing in the ears. · Some people get severe pain in the shoulder and have difficulty moving the arm where a shot was given. This happens very rarely. · Any medication can cause a severe allergic reaction. Such reactions from a vaccine are very rare, estimated at about 1 in a million doses, and would happen within a few minutes to a few hours after the vaccination. As with any medicine, there is a very remote chance of a vaccine causing a serious injury or death. The safety of vaccines is always being monitored. For more information, visit: www.cdc.gov/vaccinesafety/. What if there is a serious reaction? What should I look for? · Look for anything that concerns you, such as signs of a severe allergic reaction, very high fever, or behavior changes. Signs of a severe allergic reaction can include hives, swelling of the face and throat, difficulty breathing, a fast heartbeat, dizziness, and weakness--usually within a few minutes to a few hours after the vaccination. What should I do? · If you think it is a severe allergic reaction or other emergency that can't wait, call 911 or get the person to the nearest hospital. Otherwise, call your doctor. · Afterward, the reaction should be reported to the Vaccine Adverse Event Reporting System (VAERS). Your doctor should file this report, or you can do it yourself through the VAERS website at www.vaers. hhs.gov, or by calling 1-872.334.1634. VAERS does not give medical advice.   The [employer] Vaccine Injury Compensation Program  The [employer] Vaccine Injury Compensation Program (VICP) is a federal program that was created to compensate people who may have been injured by certain vaccines. Persons who believe they may have been injured by a vaccine can learn about the program and about filing a claim by calling 0-537.116.5500 or visiting the [employer] website at www.[facility].gov/vaccinecompensation. There is a time limit to file a claim for compensation. How can I learn more? · Ask your health care provider. · Call your local or state health department. · Contact the Centers for Disease Control and Prevention (CDC):  ? Call 2-214.778.4053 (1-800-CDC-INFO) or  ? Visit CDC's website at www.cdc.gov/vaccines  Vaccine Information Statement  Meningococcal ACWY Vaccines  03-  42 UDarryl Marie Roots [id number]  Department of Health and Human Services  Centers for Disease Control and Prevention  Many Vaccine Information Statements are available in Danish and other languages. See www.immunize.org/vis. Hojas de Información Sobre Vacunas están disponibles en español y en muchos otros idiomas. Visite www.immunize.org/vis. Care instructions adapted under license by [employer] (which disclaims liability or warranty for this information). If you have questions about a medical condition or this instruction, always ask your healthcare professional. [address] any warranty or liability for your use of this information.

## 2018-11-25 LAB — T3 SERPL-MCNC: 116 NG/DL (ref 71–180)

## 2018-11-27 DIAGNOSIS — G62.9 NEUROPATHY: ICD-10-CM

## 2018-11-27 DIAGNOSIS — F32.A DEPRESSION, UNSPECIFIED DEPRESSION TYPE: ICD-10-CM

## 2018-11-27 DIAGNOSIS — G89.4 CHRONIC PAIN SYNDROME: ICD-10-CM

## 2018-11-29 DIAGNOSIS — I10 ESSENTIAL HYPERTENSION: Primary | ICD-10-CM

## 2018-11-29 NOTE — PROGRESS NOTES
Labs look okay. Kidney function stable. I will add low dose HCTZ to his BP regimen.  Follow up BMP in one month

## 2018-11-30 ENCOUNTER — TELEPHONE (OUTPATIENT)
Dept: FAMILY MEDICINE CLINIC | Age: 58
End: 2018-11-30

## 2018-11-30 DIAGNOSIS — I10 ESSENTIAL HYPERTENSION: Primary | ICD-10-CM

## 2018-11-30 RX ORDER — HYDROCHLOROTHIAZIDE 12.5 MG/1
12.5 TABLET ORAL DAILY
Qty: 30 TAB | Refills: 2 | Status: SHIPPED | OUTPATIENT
Start: 2018-11-30 | End: 2021-02-01

## 2018-11-30 RX ORDER — AMITRIPTYLINE HYDROCHLORIDE 25 MG/1
25 TABLET, FILM COATED ORAL
Qty: 30 TAB | Refills: 1 | OUTPATIENT
Start: 2018-11-30

## 2018-11-30 RX ORDER — GABAPENTIN 800 MG/1
1600 TABLET ORAL 3 TIMES DAILY
Qty: 180 TAB | Refills: 5 | OUTPATIENT
Start: 2018-11-30

## 2018-11-30 NOTE — TELEPHONE ENCOUNTER
Spoke with patient's wife. She states Mr. Ángel Terrell is complaining about both of his knees bothering him and they would like to be checked for Lupus as there is a family HX. Should they schedule in a month so they can have his BMP rechecked at that visit or should they bring him in earlier.

## 2018-11-30 NOTE — TELEPHONE ENCOUNTER
Patient wife called to speak to Thor or Romana Katz, stating that Nikhil Martinez is having some problems and wanted to speak to one of them about it. Requesting a call back.

## 2018-11-30 NOTE — PROGRESS NOTES
Spoke with patient's wife and made her aware of results. They are agreeable to adding the HCTZ and would like it sent to the [facility] in [place].

## 2018-12-03 NOTE — TELEPHONE ENCOUNTER
Spoke with patient and made her aware of comments from provider. She voiced understanding and states patient's knees are feeling better and that they went to the pharmacy and picked up new medication.

## 2018-12-05 DIAGNOSIS — G89.4 CHRONIC PAIN SYNDROME: ICD-10-CM

## 2018-12-05 DIAGNOSIS — G62.9 NEUROPATHY: ICD-10-CM

## 2018-12-05 DIAGNOSIS — F32.A DEPRESSION, UNSPECIFIED DEPRESSION TYPE: ICD-10-CM

## 2018-12-05 NOTE — TELEPHONE ENCOUNTER
Patient is needing to have a 90 day supply for the medication called into the [facility] Mail order

## 2018-12-12 RX ORDER — AMITRIPTYLINE HYDROCHLORIDE 25 MG/1
25 TABLET, FILM COATED ORAL
Qty: 90 TAB | Refills: 1 | Status: SHIPPED | OUTPATIENT
Start: 2018-12-12 | End: 2019-03-27 | Stop reason: SDUPTHER

## 2019-01-09 RX ORDER — DABIGATRAN ETEXILATE 150 MG/1
150 CAPSULE ORAL EVERY 12 HOURS
Qty: 180 CAP | Refills: 3 | Status: SHIPPED | OUTPATIENT
Start: 2019-01-09 | End: 2019-03-27 | Stop reason: SDUPTHER

## 2019-01-17 ENCOUNTER — TELEPHONE (OUTPATIENT)
Dept: FAMILY MEDICINE CLINIC | Age: 59
End: 2019-01-17

## 2019-01-17 NOTE — TELEPHONE ENCOUNTER
Spoke to pt's wife and advised her that per [facility] they will need to contact his cardiologist about the pradaxa. She said she doesn't know who that is and he only say the cardiologist when he was in the ED a year ago, he never had a follow up visit with him.

## 2019-01-18 ENCOUNTER — DOCUMENTATION ONLY (OUTPATIENT)
Dept: FAMILY MEDICINE CLINIC | Age: 59
End: 2019-01-18

## 2019-01-18 NOTE — PROGRESS NOTES
Spoke with Samuel Kulkarni at [facility] who ran patient's Pradaxa through and saw that the prior authorization had been approved. Medication to be filled and mailed out to patient.

## 2019-03-11 RX ORDER — GABAPENTIN 800 MG/1
1600 TABLET ORAL 3 TIMES DAILY
Qty: 180 TAB | Refills: 5 | Status: SHIPPED | OUTPATIENT
Start: 2019-03-11 | End: 2019-09-11 | Stop reason: SDUPTHER

## 2019-03-11 NOTE — TELEPHONE ENCOUNTER
Requested Prescriptions     Pending Prescriptions Disp Refills    gabapentin (NEURONTIN) 800 mg tablet 180 Tab 5     Sig: Take 2 Tabs by mouth three (3) times daily.      Pt has f/u appt r/s to 3/28

## 2019-03-19 DIAGNOSIS — G89.4 CHRONIC PAIN SYNDROME: ICD-10-CM

## 2019-03-19 DIAGNOSIS — G62.9 NEUROPATHY: ICD-10-CM

## 2019-03-19 DIAGNOSIS — F32.A DEPRESSION, UNSPECIFIED DEPRESSION TYPE: ICD-10-CM

## 2019-03-20 RX ORDER — AMITRIPTYLINE HYDROCHLORIDE 25 MG/1
TABLET, FILM COATED ORAL
Qty: 30 TAB | Refills: 1 | Status: ON HOLD | OUTPATIENT
Start: 2019-03-20 | End: 2021-02-01 | Stop reason: SDUPTHER

## 2019-03-27 ENCOUNTER — OFFICE VISIT (OUTPATIENT)
Dept: FAMILY MEDICINE CLINIC | Age: 59
End: 2019-03-27

## 2019-03-27 ENCOUNTER — HOSPITAL ENCOUNTER (OUTPATIENT)
Dept: LAB | Age: 59
Discharge: HOME | End: 2019-03-27
Payer: COMMERCIAL

## 2019-03-27 VITALS
TEMPERATURE: 98.5 F | DIASTOLIC BLOOD PRESSURE: 82 MMHG | WEIGHT: 239 LBS | SYSTOLIC BLOOD PRESSURE: 148 MMHG | BODY MASS INDEX: 34.22 KG/M2 | HEIGHT: 70 IN | RESPIRATION RATE: 18 BRPM | OXYGEN SATURATION: 98 % | HEART RATE: 62 BPM

## 2019-03-27 DIAGNOSIS — E05.90 HYPERTHYROIDISM: ICD-10-CM

## 2019-03-27 DIAGNOSIS — G62.9 NEUROPATHY: ICD-10-CM

## 2019-03-27 DIAGNOSIS — G89.4 CHRONIC PAIN SYNDROME: Primary | ICD-10-CM

## 2019-03-27 DIAGNOSIS — M79.672 BILATERAL FOOT PAIN: ICD-10-CM

## 2019-03-27 DIAGNOSIS — F32.A DEPRESSION, UNSPECIFIED DEPRESSION TYPE: ICD-10-CM

## 2019-03-27 DIAGNOSIS — M79.671 BILATERAL FOOT PAIN: ICD-10-CM

## 2019-03-27 DIAGNOSIS — G89.4 CHRONIC PAIN SYNDROME: ICD-10-CM

## 2019-03-27 DIAGNOSIS — I48.20 CHRONIC ATRIAL FIBRILLATION (HCC): ICD-10-CM

## 2019-03-27 DIAGNOSIS — E55.9 VITAMIN D INSUFFICIENCY: ICD-10-CM

## 2019-03-27 DIAGNOSIS — R20.0 NUMBNESS OF FINGERS OF BOTH HANDS: ICD-10-CM

## 2019-03-27 DIAGNOSIS — I10 ESSENTIAL HYPERTENSION: ICD-10-CM

## 2019-03-27 DIAGNOSIS — R68.89 ABNORMAL ANKLE BRACHIAL INDEX (ABI): ICD-10-CM

## 2019-03-27 DIAGNOSIS — R53.81 PHYSICAL DECONDITIONING: ICD-10-CM

## 2019-03-27 DIAGNOSIS — I71.40 AAA (ABDOMINAL AORTIC ANEURYSM) WITHOUT RUPTURE: ICD-10-CM

## 2019-03-27 LAB
EST. AVERAGE GLUCOSE BLD GHB EST-MCNC: 117 MG/DL
HBA1C MFR BLD: 5.7 % (ref 4.2–5.6)
T4 FREE SERPL-MCNC: 1.1 NG/DL (ref 0.7–1.5)
TSH SERPL DL<=0.05 MIU/L-ACNC: 1.2 UIU/ML (ref 0.36–3.74)
VIT B12 SERPL-MCNC: 881 PG/ML (ref 211–911)

## 2019-03-27 PROCEDURE — 83036 HEMOGLOBIN GLYCOSYLATED A1C: CPT

## 2019-03-27 PROCEDURE — 84439 ASSAY OF FREE THYROXINE: CPT

## 2019-03-27 PROCEDURE — 82306 VITAMIN D 25 HYDROXY: CPT

## 2019-03-27 PROCEDURE — 82607 VITAMIN B-12: CPT

## 2019-03-27 NOTE — PROGRESS NOTES
Medhat Del Cid is a 62 y.o. male here today to follow up on his feet and hand pain. He is still having numbness. Patient has appointment tomorrow to see Dr. Naif Parra.

## 2019-03-28 DIAGNOSIS — E55.9 VITAMIN D DEFICIENCY: Primary | ICD-10-CM

## 2019-03-28 LAB — 25(OH)D3 SERPL-MCNC: 20.3 NG/ML (ref 30–100)

## 2019-03-28 RX ORDER — DILTIAZEM HYDROCHLORIDE 180 MG/1
180 CAPSULE, COATED, EXTENDED RELEASE ORAL 2 TIMES DAILY
Qty: 180 CAP | Refills: 2 | Status: SHIPPED | OUTPATIENT
Start: 2019-03-28 | End: 2021-02-01

## 2019-03-28 RX ORDER — AMITRIPTYLINE HYDROCHLORIDE 25 MG/1
25 TABLET, FILM COATED ORAL
Qty: 90 TAB | Refills: 1 | Status: SHIPPED | OUTPATIENT
Start: 2019-03-28 | End: 2019-03-29 | Stop reason: SDUPTHER

## 2019-03-28 RX ORDER — ERGOCALCIFEROL 1.25 MG/1
50000 CAPSULE ORAL
Qty: 12 CAP | Refills: 0 | Status: SHIPPED | OUTPATIENT
Start: 2019-03-28 | End: 2021-02-01

## 2019-03-28 RX ORDER — ATENOLOL 100 MG/1
100 TABLET ORAL DAILY
Qty: 90 TAB | Refills: 2 | Status: SHIPPED | OUTPATIENT
Start: 2019-03-28 | End: 2019-12-23 | Stop reason: SDUPTHER

## 2019-03-28 RX ORDER — BUDESONIDE AND FORMOTEROL FUMARATE DIHYDRATE 160; 4.5 UG/1; UG/1
AEROSOL RESPIRATORY (INHALATION)
Qty: 1 INHALER | Refills: 5 | Status: SHIPPED | OUTPATIENT
Start: 2019-03-28 | End: 2019-11-18 | Stop reason: SDUPTHER

## 2019-03-28 RX ORDER — DABIGATRAN ETEXILATE 150 MG/1
150 CAPSULE ORAL EVERY 12 HOURS
Qty: 180 CAP | Refills: 3 | Status: SHIPPED | OUTPATIENT
Start: 2019-03-28 | End: 2021-02-01

## 2019-03-29 DIAGNOSIS — G62.9 NEUROPATHY: ICD-10-CM

## 2019-03-29 DIAGNOSIS — F32.A DEPRESSION, UNSPECIFIED DEPRESSION TYPE: ICD-10-CM

## 2019-03-29 DIAGNOSIS — G89.4 CHRONIC PAIN SYNDROME: ICD-10-CM

## 2019-03-29 NOTE — PROGRESS NOTES
Vit D. Will send in weekly replacement. Thyroid level normal. B12 normal. A1c is just on the cusp of \"pre-diabetes\" but this could be because of his weight gain.

## 2019-04-01 RX ORDER — AMITRIPTYLINE HYDROCHLORIDE 25 MG/1
25 TABLET, FILM COATED ORAL
Qty: 90 TAB | Refills: 1 | Status: SHIPPED | OUTPATIENT
Start: 2019-04-01 | End: 2019-05-22 | Stop reason: SDUPTHER

## 2019-04-01 NOTE — PROGRESS NOTES
HISTORY OF PRESENT ILLNESS  Chivo Soto is a 62 y.o. male. HPI  Chivo Soto is a 62 y.o. male who presents to the office today for med refills  He is here for routine follow up. Neuropathic pain- he continues to have significant pain in his feet. He is currently taking gabapentin 1600mg TID. He says initially when this was increased, the pain improved by more than half, but now the pain is worsening. He cannot get up and walk around much due to the pain. He sits in his recliner chair most of the day. He has been gaining weight. He says the medication is also making him feel like he's \"in a fog. \"  He is using Elavil at bedtime and this is working well to help with insomnia. He is asking to do physical therapy. He says when he was doing physical therapy back in 2017, that was the best he felt. Chief Complaint   Patient presents with    Medication Refill       Current Outpatient Medications on File Prior to Visit   Medication Sig Dispense Refill    saw palmetto xtr/zinc picolin (SAW PALMETTO EXTRACT PO) Take  by mouth.  amitriptyline (ELAVIL) 25 mg tablet take 1 tablet by mouth NIGHTLY 30 Tab 1    gabapentin (NEURONTIN) 800 mg tablet Take 2 Tabs by mouth three (3) times daily. 180 Tab 5    hydroCHLOROthiazide (HYDRODIURIL) 12.5 mg tablet Take 1 Tab by mouth daily. 30 Tab 2    melatonin tab tablet Take  by mouth nightly.  cholecalciferol, vitamin D3, (VITAMIN D3) 2,000 unit tab Take  by mouth two (2) times a day.  MULTIVIT-MIN/FA/LYCOPEN/LUTEIN (CENTRUM SILVER MEN PO) Take 1 Tab by mouth daily.  potassium 99 mg tablet Take 99 mg by mouth daily.  fish oil-dha-epa 1,200-144-216 mg cap Take 1 Tab by mouth daily.  albuterol (PROAIR HFA) 90 mcg/actuation inhaler Take 2 Puffs by inhalation every four (4) hours as needed for Wheezing. 1 Inhaler 1    b complex vitamins tablet Take 1 Tab by mouth daily.        No current facility-administered medications on file prior to visit. No Known Allergies  Past Medical History:   Diagnosis Date    AAA (abdominal aortic aneurysm) (HCC)     Alcoholism (HCC)     Atrial fibrillation (HCC)     Bradycardia     Chronic pain     Degenerative disc disease, cervical     Degenerative disc disease, lumbar     Hypertension     Hypokalemia     Pneumonia 2016    caused permanent L lung problems    Thyroid disease      Social History     Tobacco Use   Smoking Status Former Smoker    Last attempt to quit: 2017    Years since quittin.6   Smokeless Tobacco Former User     Social History     Substance and Sexual Activity   Alcohol Use No    Comment: sober x 5 years     Family History   Problem Relation Age of Onset    Heart Disease Father     Heart Disease Brother         AAA-  in his 42's    Heart Disease Paternal Aunt     Heart Disease Paternal Uncle     Heart Disease Paternal Grandmother     Lupus Mother        Review of Systems   Constitutional: Negative for diaphoresis, malaise/fatigue and weight loss. Weight gain   Respiratory: Negative for cough. Cardiovascular: Negative for chest pain and palpitations. Gastrointestinal: Negative for constipation, diarrhea, nausea and vomiting. Musculoskeletal: Positive for joint pain. Negative for falls. Neurological: Positive for tingling and sensory change. Negative for seizures. Endo/Heme/Allergies: Does not bruise/bleed easily. Psychiatric/Behavioral: Positive for depression. Negative for substance abuse and suicidal ideas. The patient has insomnia. Improved with elavil     Visit Vitals  /82 (BP 1 Location: Right arm, BP Patient Position: Sitting) Comment: manual   Pulse 62   Temp 98.5 °F (36.9 °C) (Oral)   Resp 18   Ht 5' 10\" (1.778 m)   Wt 239 lb (108.4 kg)   SpO2 98%   BMI 34.29 kg/m²     Physical Exam   Constitutional: He is oriented to person, place, and time. He appears well-developed and well-nourished. No distress.    Cardiovascular: Normal rate. An irregular rhythm present. Pulses:       Radial pulses are 2+ on the right side, and 2+ on the left side. Pulmonary/Chest: Effort normal. No respiratory distress. He has no wheezes. Musculoskeletal: He exhibits no edema. Walking with assistance of cane   Neurological: He is alert and oriented to person, place, and time. Skin: Skin is intact. Psychiatric: He has a normal mood and affect. His behavior is normal. Thought content normal.   Nursing note and vitals reviewed. ASSESSMENT and PLAN    ICD-10-CM ICD-9-CM    1. Chronic pain syndrome G89.4 338.4 VITAMIN B12      VITAMIN D, 25 HYDROXY      REFERRAL TO PHYSICAL THERAPY   2. Neuropathy G62.9 355.9 VITAMIN B12      HEMOGLOBIN A1C WITH EAG   3. Bilateral foot pain M79.671 729.5 VITAMIN B12    M79.672  VITAMIN D, 25 HYDROXY   4. Abnormal ankle brachial index (MARJ) R68.89 796.4    5. Numbness of fingers of both hands R20.0 782.0 VITAMIN B12      HEMOGLOBIN A1C WITH EAG   6. Vitamin D insufficiency E55.9 268.9 VITAMIN D, 25 HYDROXY   7. Physical deconditioning R53.81 799.3 REFERRAL TO PHYSICAL THERAPY   8. Hyperthyroidism E05.90 242.90 TSH AND FREE T4     I am checking Vitamin B12, Vit D and A1c for deficiencies given his numbness and neuropathy. He will follow up with Neuro to discuss Gabapentin and neuropathic symptoms. His wife says he saw vascular in the past but I do not see any record of this in his chart. I reviewed his MARJ from May 2018, and the digits were both abnormal, < 0.9. This could be the cause of his chronic bilateral foot pain. We will need to get him to Vascular for evaluation and possible treatment. I am also sending him to Physical therapy to get him up and moving more. Will recheck thyroid levels. Reviewed medication and side effects. Patient agrees with the plan and verbalizes understanding.        Zaida Klein PA-C  4/1/2019

## 2019-04-01 NOTE — PATIENT INSTRUCTIONS
Neuropathic Pain: Care Instructions  Your Care Instructions    Neuropathic pain is caused by pressure on or damage to your nerves. It's often simply called nerve pain. Some people feel this type of pain all the time. For others, it comes and goes. Diabetes, shingles, or an injury can cause nerve pain. Many people say the pain feels sharp, burning, or stabbing. But some people feel it as a dull ache. In some cases, it makes your skin very sensitive. So touch, pressure, and other sensations that did not hurt before may now cause pain. It's important to know that this kind of pain is real and can affect your quality of life. It's also important to know that treatment can help. Treatment includes pain medicines, exercise, and physical therapy. Medicines can help reduce the number of pain signals that travel over the nerves. This can make the painful areas less sensitive. It can also help you sleep better and improve your mood. But medicines are only one part of successful treatment. Most people do best with more than one kind of treatment. Your doctor may recommend that you try cognitive-behavioral therapy and stress management. Or, if needed, you may decide to try to quit smoking, lower your blood pressure, or better control blood sugar. These kinds of healthy changes can also make a difference. If you feel that your treatment is not working, talk to your doctor. And be sure to tell your doctor if you think you might be depressed or anxious. These are common problems that can also be treated. Follow-up care is a key part of your treatment and safety. Be sure to make and go to all appointments, and call your doctor if you are having problems. It's also a good idea to know your test results and keep a list of the medicines you take. How can you care for yourself at home? · Be safe with medicines. Read and follow all instructions on the label.   ? If the doctor gave you a prescription medicine for pain, take it as prescribed. ? If you are not taking a prescription pain medicine, ask your doctor if you can take an over-the-counter medicine. · Save hard tasks for days when you have less pain. Follow a hard task with an easy task. And remember to take breaks. · Relax, and reduce stress. You may want to try deep breathing or meditation. These can help. · Keep moving. Gentle, daily exercise can help reduce pain. Your doctor or physical therapist can tell you what type of exercise is best for you. This may include walking, swimming, and stationary biking. It may also include stretches and range-of-motion exercises. · Try heat, cold packs, and massage. · Get enough sleep. Constant pain can make you more tired. If the pain makes it hard to sleep, talk with your doctor. · Think positively. Your thoughts can affect your pain. Do fun things to distract yourself from the pain. See a movie, read a book, listen to music, or spend time with a friend. · Keep a pain diary. Try to write down how strong your pain is and what it feels like. Also try to notice and write down how your moods, thoughts, sleep, activities, and medicine affect your pain. These notes can help you and your doctor find the best ways to treat your pain. Reducing constipation caused by pain medicine  Pain medicines often cause constipation. To reduce constipation:  · Include fruits, vegetables, beans, and whole grains in your diet each day. These foods are high in fiber. · Drink plenty of fluids, enough so that your urine is light yellow or clear like water. If you have kidney, heart, or liver disease and have to limit fluids, talk with your doctor before you increase the amount of fluids you drink. · Get some exercise every day. Build up slowly to 30 to 60 minutes a day on 5 or more days of the week. · Take a fiber supplement, such as Citrucel or Metamucil, every day if needed. Read and follow all instructions on the label.   · Schedule time each day for a bowel movement. Having a daily routine may help. Take your time and do not strain when having a bowel movement. · Ask your doctor about a laxative. The goal is to have one easy bowel movement every 1 to 2 days. Do not let constipation go untreated for more than 3 days. When should you call for help? Call your doctor now or seek immediate medical care if:    · You feel sad, anxious, or hopeless for more than a few days. This could mean you are depressed. Depression is common in people who have a lot of pain. But it can be treated.     · You have trouble with bowel movements, such as:  ? No bowel movement in 3 days. ? Blood in the anal area, in your stool, or on the toilet paper. ? Diarrhea for more than 24 hours.    Watch closely for changes in your health, and be sure to contact your doctor if:    · Your pain is getting worse.     · You can't sleep because of pain.     · You are very worried or anxious about your pain.     · You have trouble taking your pain medicine.     · You have any concerns about your pain medicine or its side effects.     · You have vomiting or cramps for more than 2 hours. Where can you learn more? Go to http://jeannine-kain.info/. Enter W878 in the search box to learn more about \"Neuropathic Pain: Care Instructions. \"  Current as of: Megan 3, 2018  Content Version: 11.9  © 7709-3262 [employer]. Care instructions adapted under license by [employer] (which disclaims liability or warranty for this information). If you have questions about a medical condition or this instruction, always ask your healthcare professional. [place] [address] any warranty or liability for your use of this information.

## 2019-04-09 ENCOUNTER — TELEPHONE (OUTPATIENT)
Dept: FAMILY MEDICINE CLINIC | Age: 59
End: 2019-04-09

## 2019-04-09 NOTE — TELEPHONE ENCOUNTER
Patient is needing to have a referral for Dermatology for spots     Patient is requesting to have a MRI ordered for lower back.  Patient requests to have a Spine specialist.     Please call Patients wife once these have been complete

## 2019-04-12 DIAGNOSIS — G62.9 NEUROPATHY: ICD-10-CM

## 2019-04-12 DIAGNOSIS — L98.9 SKIN PROBLEM: Primary | ICD-10-CM

## 2019-04-12 DIAGNOSIS — G89.4 CHRONIC PAIN SYNDROME: ICD-10-CM

## 2019-04-24 ENCOUNTER — TELEPHONE (OUTPATIENT)
Dept: NEUROLOGY | Age: 59
End: 2019-04-24

## 2019-04-24 NOTE — TELEPHONE ENCOUNTER
Pt's wife canceled today's appt since pt was not feeling well. She states Gabapentin worked well for one month, but they plan on weaning him off of Gabapentin to 1/3 of the dosage because he is experiencing symptoms of weight gain, feet swelling, and inability to walk. Pt's wife says she looked up the side effects of Gabapentin and claims he is experiencing all of them. Pt will reschedule appt at a later time. Please advise if necessary.

## 2019-05-22 DIAGNOSIS — F32.A DEPRESSION, UNSPECIFIED DEPRESSION TYPE: ICD-10-CM

## 2019-05-22 DIAGNOSIS — G89.4 CHRONIC PAIN SYNDROME: ICD-10-CM

## 2019-05-22 DIAGNOSIS — G62.9 NEUROPATHY: ICD-10-CM

## 2019-05-22 NOTE — TELEPHONE ENCOUNTER
Patient is requesting to have a 30 day supply with 3 refills. Please send the prescription to the pharmacy that is on file.

## 2019-05-24 RX ORDER — AMITRIPTYLINE HYDROCHLORIDE 25 MG/1
25 TABLET, FILM COATED ORAL
Qty: 30 TAB | Refills: 3 | Status: SHIPPED | OUTPATIENT
Start: 2019-05-24 | End: 2021-01-14

## 2019-09-11 DIAGNOSIS — G62.9 NEUROPATHY: Primary | ICD-10-CM

## 2019-09-11 RX ORDER — GABAPENTIN 800 MG/1
TABLET ORAL
Qty: 180 TAB | Refills: 5 | Status: SHIPPED | OUTPATIENT
Start: 2019-09-11 | End: 2020-03-17

## 2019-11-20 RX ORDER — BUDESONIDE AND FORMOTEROL FUMARATE DIHYDRATE 160; 4.5 UG/1; UG/1
AEROSOL RESPIRATORY (INHALATION)
Qty: 1 INHALER | Refills: 2 | Status: SHIPPED | OUTPATIENT
Start: 2019-11-20

## 2019-12-10 ENCOUNTER — TELEPHONE (OUTPATIENT)
Dept: FAMILY MEDICINE CLINIC | Age: 59
End: 2019-12-10

## 2019-12-10 NOTE — TELEPHONE ENCOUNTER
Patient's wife called stating that patient is unable to move or get out of the house and he needs home healthcare. Patient states that she needs some help with getting him help.

## 2019-12-18 DIAGNOSIS — R53.81 PHYSICAL DECONDITIONING: ICD-10-CM

## 2019-12-18 DIAGNOSIS — G62.9 NEUROPATHY: ICD-10-CM

## 2019-12-18 DIAGNOSIS — G89.4 CHRONIC PAIN SYNDROME: Primary | ICD-10-CM

## 2019-12-23 DIAGNOSIS — I48.20 CHRONIC ATRIAL FIBRILLATION (HCC): ICD-10-CM

## 2019-12-23 DIAGNOSIS — I71.40 AAA (ABDOMINAL AORTIC ANEURYSM) WITHOUT RUPTURE: ICD-10-CM

## 2019-12-23 DIAGNOSIS — I10 ESSENTIAL HYPERTENSION: ICD-10-CM

## 2019-12-26 RX ORDER — ATENOLOL 100 MG/1
100 TABLET ORAL DAILY
Qty: 90 TAB | Refills: 0 | Status: ON HOLD | OUTPATIENT
Start: 2019-12-26 | End: 2021-02-21 | Stop reason: SDUPTHER

## 2020-03-15 DIAGNOSIS — G62.9 NEUROPATHY: ICD-10-CM

## 2020-03-16 NOTE — TELEPHONE ENCOUNTER
Requested Prescriptions     Pending Prescriptions Disp Refills    gabapentin (NEURONTIN) 800 mg tablet [Pharmacy Med Name: GABAPENTIN 800 MG TABLET] 180 Tab      Sig: take 2 tablets by mouth three times a day           Patients wife called requesting refil.  Pharmacy told her prescription  on 3/10/2020

## 2020-03-17 RX ORDER — GABAPENTIN 800 MG/1
TABLET ORAL
Qty: 180 TAB | Refills: 5 | Status: SHIPPED | OUTPATIENT
Start: 2020-03-17

## 2020-04-07 ENCOUNTER — TELEPHONE (OUTPATIENT)
Dept: NEUROLOGY | Age: 60
End: 2020-04-07

## 2020-04-07 NOTE — TELEPHONE ENCOUNTER
Pt's wife states that she picked up pt's GABAPENTIN from the pharmacy, but they are only able to fill one month at a time. She requests a 90-day supply. Please advise.

## 2020-04-08 NOTE — TELEPHONE ENCOUNTER
Please advise on refill for 90 day supply of Gabapentin. Spoke with his AgentPiggy. She reports his plan should allow 90 and 1 refill.

## 2020-09-26 ENCOUNTER — HOME HEALTH ADMISSION (OUTPATIENT)
Dept: HOME HEALTH SERVICES | Facility: HOME HEALTH | Age: 60
End: 2020-09-26

## 2020-09-28 ENCOUNTER — HOME CARE VISIT (OUTPATIENT)
Dept: HOME HEALTH SERVICES | Facility: HOME HEALTH | Age: 60
End: 2020-09-28

## 2020-09-29 ENCOUNTER — HOME CARE VISIT (OUTPATIENT)
Dept: HOME HEALTH SERVICES | Facility: HOME HEALTH | Age: 60
End: 2020-09-29

## 2020-10-01 ENCOUNTER — HOME CARE VISIT (OUTPATIENT)
Dept: SCHEDULING | Facility: HOME HEALTH | Age: 60
End: 2020-10-01

## 2020-10-01 ENCOUNTER — HOME CARE VISIT (OUTPATIENT)
Dept: HOME HEALTH SERVICES | Facility: HOME HEALTH | Age: 60
End: 2020-10-01

## 2020-10-01 NOTE — PROGRESS NOTES
Spoke with spouse re: scheduling PT admission to home care. She reports that the patient declines Home Health PT, that she has been trying to convince him to try it but he continues to decline it stating he does not think it will help. When asked about safety, she reports that he seems to be getting around the home safely. When asked about outpatient PT, she replies that he is very afraid of catching the covid virus and will not go o ut of the house, therefore he will not go to an outpatient clinic.   PT informed spouse that I will contact MD to inform of non-admit, and that if the patient does decide to have [address] PT in the future, to speak with the doctor for a new referral.

## 2021-01-14 ENCOUNTER — APPOINTMENT (OUTPATIENT)
Dept: CT IMAGING | Age: 61
DRG: 673 | End: 2021-01-14
Attending: GENERAL PRACTICE
Payer: COMMERCIAL

## 2021-01-14 ENCOUNTER — APPOINTMENT (OUTPATIENT)
Dept: GENERAL RADIOLOGY | Age: 61
DRG: 673 | End: 2021-01-14
Attending: GENERAL PRACTICE
Payer: COMMERCIAL

## 2021-01-14 ENCOUNTER — APPOINTMENT (OUTPATIENT)
Dept: GENERAL RADIOLOGY | Age: 61
DRG: 673 | End: 2021-01-14
Attending: PHYSICIAN ASSISTANT
Payer: COMMERCIAL

## 2021-01-14 ENCOUNTER — HOSPITAL ENCOUNTER (INPATIENT)
Age: 61
LOS: 18 days | Discharge: HOME HEALTH | DRG: 673 | End: 2021-02-01
Attending: EMERGENCY MEDICINE | Admitting: HOSPITALIST
Payer: COMMERCIAL

## 2021-01-14 DIAGNOSIS — I48.91 ATRIAL FIBRILLATION, UNSPECIFIED TYPE (HCC): ICD-10-CM

## 2021-01-14 DIAGNOSIS — D72.829 LEUKOCYTOSIS, UNSPECIFIED TYPE: ICD-10-CM

## 2021-01-14 DIAGNOSIS — G62.9 NEUROPATHY: ICD-10-CM

## 2021-01-14 DIAGNOSIS — S91.302A OPEN WOUND OF LEFT FOOT, INITIAL ENCOUNTER: ICD-10-CM

## 2021-01-14 DIAGNOSIS — I10 ESSENTIAL HYPERTENSION: ICD-10-CM

## 2021-01-14 DIAGNOSIS — G89.4 CHRONIC PAIN SYNDROME: ICD-10-CM

## 2021-01-14 DIAGNOSIS — E66.01 CLASS 2 SEVERE OBESITY DUE TO EXCESS CALORIES WITH SERIOUS COMORBIDITY AND BODY MASS INDEX (BMI) OF 38.0 TO 38.9 IN ADULT (HCC): ICD-10-CM

## 2021-01-14 DIAGNOSIS — N17.9 ACUTE KIDNEY INJURY (HCC): Primary | ICD-10-CM

## 2021-01-14 DIAGNOSIS — F32.A DEPRESSION, UNSPECIFIED DEPRESSION TYPE: ICD-10-CM

## 2021-01-14 LAB
ALBUMIN SERPL-MCNC: 2.6 G/DL (ref 3.4–5)
ALBUMIN/GLOB SERPL: 0.5 {RATIO} (ref 0.8–1.7)
ALP SERPL-CCNC: 76 U/L (ref 45–117)
ALT SERPL-CCNC: 34 U/L (ref 16–61)
ANION GAP SERPL CALC-SCNC: 5 MMOL/L (ref 3–18)
ANION GAP SERPL CALC-SCNC: 8 MMOL/L (ref 3–18)
APPEARANCE UR: CLEAR
AST SERPL-CCNC: 25 U/L (ref 10–38)
ATRIAL RATE: 326 BPM
BACTERIA URNS QL MICRO: ABNORMAL /HPF
BASOPHILS # BLD: 0 K/UL (ref 0–0.06)
BASOPHILS NFR BLD: 0 % (ref 0–3)
BILIRUB SERPL-MCNC: 0.4 MG/DL (ref 0.2–1)
BILIRUB UR QL: NEGATIVE
BNP SERPL-MCNC: 7135 PG/ML (ref 0–900)
BUN SERPL-MCNC: 101 MG/DL (ref 7–18)
BUN SERPL-MCNC: 89 MG/DL (ref 7–18)
BUN/CREAT SERPL: 10 (ref 12–20)
BUN/CREAT SERPL: 11 (ref 12–20)
CALCIUM SERPL-MCNC: 8.3 MG/DL (ref 8.5–10.1)
CALCIUM SERPL-MCNC: 8.4 MG/DL (ref 8.5–10.1)
CALCULATED R AXIS, ECG10: 36 DEGREES
CALCULATED T AXIS, ECG11: 59 DEGREES
CHLORIDE SERPL-SCNC: 103 MMOL/L (ref 100–111)
CHLORIDE SERPL-SCNC: 103 MMOL/L (ref 100–111)
CK MB CFR SERPL CALC: 1.7 % (ref 0–4)
CK MB CFR SERPL CALC: 1.8 % (ref 0–4)
CK MB SERPL-MCNC: 2.3 NG/ML (ref 5–25)
CK MB SERPL-MCNC: 2.6 NG/ML (ref 5–25)
CK SERPL-CCNC: 127 U/L (ref 39–308)
CK SERPL-CCNC: 149 U/L (ref 39–308)
CO2 SERPL-SCNC: 29 MMOL/L (ref 21–32)
CO2 SERPL-SCNC: 30 MMOL/L (ref 21–32)
COLOR UR: ABNORMAL
CREAT SERPL-MCNC: 8.89 MG/DL (ref 0.6–1.3)
CREAT SERPL-MCNC: 9.18 MG/DL (ref 0.6–1.3)
CREAT UR-MCNC: 78 MG/DL (ref 30–125)
DIAGNOSIS, 93000: NORMAL
DIFFERENTIAL METHOD BLD: ABNORMAL
EOSINOPHIL # BLD: 0 K/UL (ref 0–0.4)
EOSINOPHIL NFR BLD: 0 % (ref 0–5)
EPITH CASTS URNS QL MICRO: ABNORMAL /LPF (ref 0–5)
ERYTHROCYTE [DISTWIDTH] IN BLOOD BY AUTOMATED COUNT: 15 % (ref 11.6–14.5)
GLOBULIN SER CALC-MCNC: 5.2 G/DL (ref 2–4)
GLUCOSE SERPL-MCNC: 76 MG/DL (ref 74–99)
GLUCOSE SERPL-MCNC: 86 MG/DL (ref 74–99)
GLUCOSE UR STRIP.AUTO-MCNC: 100 MG/DL
HCT VFR BLD AUTO: 45.6 % (ref 36–48)
HGB BLD-MCNC: 14.6 G/DL (ref 13–16)
HGB UR QL STRIP: ABNORMAL
INR PPP: 1.9 (ref 0.8–1.2)
KETONES UR QL STRIP.AUTO: NEGATIVE MG/DL
LEUKOCYTE ESTERASE UR QL STRIP.AUTO: ABNORMAL
LYMPHOCYTES # BLD: 1.5 K/UL (ref 0.8–3.5)
LYMPHOCYTES NFR BLD: 9 % (ref 20–51)
MCH RBC QN AUTO: 30.8 PG (ref 24–34)
MCHC RBC AUTO-ENTMCNC: 32 G/DL (ref 31–37)
MCV RBC AUTO: 96.2 FL (ref 74–97)
MONOCYTES # BLD: 1 K/UL (ref 0–1)
MONOCYTES NFR BLD: 6 % (ref 2–9)
NEUTS SEG # BLD: 14.4 K/UL (ref 1.8–8)
NEUTS SEG NFR BLD: 85 % (ref 42–75)
NITRITE UR QL STRIP.AUTO: NEGATIVE
PH UR STRIP: 6 [PH] (ref 5–8)
PLATELET # BLD AUTO: 450 K/UL (ref 135–420)
PLATELET COMMENTS,PCOM: ABNORMAL
PMV BLD AUTO: 11.9 FL (ref 9.2–11.8)
POTASSIUM SERPL-SCNC: 4 MMOL/L (ref 3.5–5.5)
POTASSIUM SERPL-SCNC: 4.8 MMOL/L (ref 3.5–5.5)
PROT SERPL-MCNC: 7.8 G/DL (ref 6.4–8.2)
PROT UR STRIP-MCNC: 300 MG/DL
PROT UR-MCNC: 644 MG/DL
PROT/CREAT UR-RTO: 8.3
PROTHROMBIN TIME: 21.4 SEC (ref 11.5–15.2)
Q-T INTERVAL, ECG07: 384 MS
QRS DURATION, ECG06: 74 MS
QTC CALCULATION (BEZET), ECG08: 414 MS
RBC # BLD AUTO: 4.74 M/UL (ref 4.7–5.5)
RBC #/AREA URNS HPF: ABNORMAL /HPF (ref 0–5)
RBC MORPH BLD: ABNORMAL
SODIUM SERPL-SCNC: 138 MMOL/L (ref 136–145)
SODIUM SERPL-SCNC: 140 MMOL/L (ref 136–145)
SP GR UR REFRACTOMETRY: 1.02 (ref 1–1.03)
T4 FREE SERPL-MCNC: 0.8 NG/DL (ref 0.7–1.5)
TROPONIN I SERPL-MCNC: <0.02 NG/ML (ref 0–0.04)
TROPONIN I SERPL-MCNC: <0.02 NG/ML (ref 0–0.04)
TSH SERPL DL<=0.05 MIU/L-ACNC: 1.94 UIU/ML (ref 0.36–3.74)
UROBILINOGEN UR QL STRIP.AUTO: 0.2 EU/DL (ref 0.2–1)
VENTRICULAR RATE, ECG03: 70 BPM
WBC # BLD AUTO: 16.9 K/UL (ref 4.6–13.2)
WBC URNS QL MICRO: ABNORMAL /HPF (ref 0–5)

## 2021-01-14 PROCEDURE — 83883 ASSAY NEPHELOMETRY NOT SPEC: CPT

## 2021-01-14 PROCEDURE — 81001 URINALYSIS AUTO W/SCOPE: CPT

## 2021-01-14 PROCEDURE — 51702 INSERT TEMP BLADDER CATH: CPT

## 2021-01-14 PROCEDURE — 87205 SMEAR GRAM STAIN: CPT

## 2021-01-14 PROCEDURE — 99284 EMERGENCY DEPT VISIT MOD MDM: CPT

## 2021-01-14 PROCEDURE — U0003 INFECTIOUS AGENT DETECTION BY NUCLEIC ACID (DNA OR RNA); SEVERE ACUTE RESPIRATORY SYNDROME CORONAVIRUS 2 (SARS-COV-2) (CORONAVIRUS DISEASE [COVID-19]), AMPLIFIED PROBE TECHNIQUE, MAKING USE OF HIGH THROUGHPUT TECHNOLOGIES AS DESCRIBED BY CMS-2020-01-R: HCPCS

## 2021-01-14 PROCEDURE — 87186 SC STD MICRODIL/AGAR DIL: CPT

## 2021-01-14 PROCEDURE — 83520 IMMUNOASSAY QUANT NOS NONAB: CPT

## 2021-01-14 PROCEDURE — 87106 FUNGI IDENTIFICATION YEAST: CPT

## 2021-01-14 PROCEDURE — 84443 ASSAY THYROID STIM HORMONE: CPT

## 2021-01-14 PROCEDURE — 86705 HEP B CORE ANTIBODY IGM: CPT

## 2021-01-14 PROCEDURE — 82595 ASSAY OF CRYOGLOBULIN: CPT

## 2021-01-14 PROCEDURE — 82553 CREATINE MB FRACTION: CPT

## 2021-01-14 PROCEDURE — 74011000250 HC RX REV CODE- 250: Performed by: GENERAL PRACTICE

## 2021-01-14 PROCEDURE — 85025 COMPLETE CBC W/AUTO DIFF WBC: CPT

## 2021-01-14 PROCEDURE — 86038 ANTINUCLEAR ANTIBODIES: CPT

## 2021-01-14 PROCEDURE — 83516 IMMUNOASSAY NONANTIBODY: CPT

## 2021-01-14 PROCEDURE — 84439 ASSAY OF FREE THYROXINE: CPT

## 2021-01-14 PROCEDURE — 86803 HEPATITIS C AB TEST: CPT

## 2021-01-14 PROCEDURE — 87389 HIV-1 AG W/HIV-1&-2 AB AG IA: CPT

## 2021-01-14 PROCEDURE — 74011250636 HC RX REV CODE- 250/636: Performed by: GENERAL PRACTICE

## 2021-01-14 PROCEDURE — 74011250637 HC RX REV CODE- 250/637: Performed by: HOSPITALIST

## 2021-01-14 PROCEDURE — 87077 CULTURE AEROBIC IDENTIFY: CPT

## 2021-01-14 PROCEDURE — 84156 ASSAY OF PROTEIN URINE: CPT

## 2021-01-14 PROCEDURE — 86706 HEP B SURFACE ANTIBODY: CPT

## 2021-01-14 PROCEDURE — 74176 CT ABD & PELVIS W/O CONTRAST: CPT

## 2021-01-14 PROCEDURE — 99223 1ST HOSP IP/OBS HIGH 75: CPT | Performed by: HOSPITALIST

## 2021-01-14 PROCEDURE — 85610 PROTHROMBIN TIME: CPT

## 2021-01-14 PROCEDURE — 71046 X-RAY EXAM CHEST 2 VIEWS: CPT

## 2021-01-14 PROCEDURE — 73060999999 HC MISC LAB CHARGE

## 2021-01-14 PROCEDURE — 74011000250 HC RX REV CODE- 250: Performed by: HOSPITALIST

## 2021-01-14 PROCEDURE — 65270000029 HC RM PRIVATE

## 2021-01-14 PROCEDURE — 87340 HEPATITIS B SURFACE AG IA: CPT

## 2021-01-14 PROCEDURE — 73562 X-RAY EXAM OF KNEE 3: CPT

## 2021-01-14 PROCEDURE — 82043 UR ALBUMIN QUANTITATIVE: CPT

## 2021-01-14 PROCEDURE — 93005 ELECTROCARDIOGRAM TRACING: CPT

## 2021-01-14 PROCEDURE — 80053 COMPREHEN METABOLIC PANEL: CPT

## 2021-01-14 PROCEDURE — 83880 ASSAY OF NATRIURETIC PEPTIDE: CPT

## 2021-01-14 PROCEDURE — 82784 ASSAY IGA/IGD/IGG/IGM EACH: CPT

## 2021-01-14 PROCEDURE — 86162 COMPLEMENT TOTAL (CH50): CPT

## 2021-01-14 RX ORDER — PROMETHAZINE HYDROCHLORIDE 25 MG/1
12.5 TABLET ORAL
Status: DISCONTINUED | OUTPATIENT
Start: 2021-01-14 | End: 2021-01-14 | Stop reason: SDUPTHER

## 2021-01-14 RX ORDER — FUROSEMIDE 10 MG/ML
40 INJECTION INTRAMUSCULAR; INTRAVENOUS ONCE
Status: COMPLETED | OUTPATIENT
Start: 2021-01-14 | End: 2021-01-14

## 2021-01-14 RX ORDER — HEPARIN SODIUM 5000 [USP'U]/ML
5000 INJECTION, SOLUTION INTRAVENOUS; SUBCUTANEOUS EVERY 8 HOURS
Status: DISCONTINUED | OUTPATIENT
Start: 2021-01-14 | End: 2021-01-14

## 2021-01-14 RX ORDER — CHOLECALCIFEROL (VITAMIN D3) 125 MCG
5 CAPSULE ORAL
Status: DISCONTINUED | OUTPATIENT
Start: 2021-01-14 | End: 2021-02-01 | Stop reason: HOSPADM

## 2021-01-14 RX ORDER — POLYETHYLENE GLYCOL 3350 17 G/17G
17 POWDER, FOR SOLUTION ORAL DAILY PRN
Status: DISCONTINUED | OUTPATIENT
Start: 2021-01-14 | End: 2021-01-26

## 2021-01-14 RX ORDER — ACETAMINOPHEN 325 MG/1
650 TABLET ORAL
Status: DISCONTINUED | OUTPATIENT
Start: 2021-01-14 | End: 2021-02-01 | Stop reason: HOSPADM

## 2021-01-14 RX ORDER — ATENOLOL 25 MG/1
100 TABLET ORAL DAILY
Status: DISCONTINUED | OUTPATIENT
Start: 2021-01-15 | End: 2021-01-17

## 2021-01-14 RX ORDER — DILTIAZEM HYDROCHLORIDE 180 MG/1
180 CAPSULE, COATED, EXTENDED RELEASE ORAL 2 TIMES DAILY
Status: DISCONTINUED | OUTPATIENT
Start: 2021-01-14 | End: 2021-01-17

## 2021-01-14 RX ORDER — SODIUM CHLORIDE 0.9 % (FLUSH) 0.9 %
5-40 SYRINGE (ML) INJECTION EVERY 8 HOURS
Status: DISCONTINUED | OUTPATIENT
Start: 2021-01-14 | End: 2021-02-01 | Stop reason: HOSPADM

## 2021-01-14 RX ORDER — SODIUM CHLORIDE 0.9 % (FLUSH) 0.9 %
5-40 SYRINGE (ML) INJECTION AS NEEDED
Status: DISCONTINUED | OUTPATIENT
Start: 2021-01-14 | End: 2021-01-14 | Stop reason: SDUPTHER

## 2021-01-14 RX ORDER — ONDANSETRON 2 MG/ML
4 INJECTION INTRAMUSCULAR; INTRAVENOUS
Status: DISCONTINUED | OUTPATIENT
Start: 2021-01-14 | End: 2021-01-14 | Stop reason: SDUPTHER

## 2021-01-14 RX ORDER — ZINC GLUCONATE 10 MG
1 LOZENGE ORAL DAILY
COMMUNITY
End: 2021-02-01

## 2021-01-14 RX ORDER — ACETAMINOPHEN 325 MG/1
650 TABLET ORAL
Status: DISCONTINUED | OUTPATIENT
Start: 2021-01-14 | End: 2021-01-14 | Stop reason: SDUPTHER

## 2021-01-14 RX ORDER — POLYETHYLENE GLYCOL 3350 17 G/17G
17 POWDER, FOR SOLUTION ORAL DAILY PRN
Status: DISCONTINUED | OUTPATIENT
Start: 2021-01-14 | End: 2021-01-14 | Stop reason: SDUPTHER

## 2021-01-14 RX ORDER — AMITRIPTYLINE HYDROCHLORIDE 50 MG/1
50 TABLET, FILM COATED ORAL
Status: DISCONTINUED | OUTPATIENT
Start: 2021-01-14 | End: 2021-02-01 | Stop reason: HOSPADM

## 2021-01-14 RX ORDER — ACETAMINOPHEN 650 MG/1
650 SUPPOSITORY RECTAL
Status: DISCONTINUED | OUTPATIENT
Start: 2021-01-14 | End: 2021-01-14 | Stop reason: SDUPTHER

## 2021-01-14 RX ORDER — SODIUM CHLORIDE 0.9 % (FLUSH) 0.9 %
5-40 SYRINGE (ML) INJECTION AS NEEDED
Status: DISCONTINUED | OUTPATIENT
Start: 2021-01-14 | End: 2021-02-01 | Stop reason: HOSPADM

## 2021-01-14 RX ORDER — PROMETHAZINE HYDROCHLORIDE 25 MG/1
12.5 TABLET ORAL
Status: DISCONTINUED | OUTPATIENT
Start: 2021-01-14 | End: 2021-02-01 | Stop reason: HOSPADM

## 2021-01-14 RX ORDER — ONDANSETRON 2 MG/ML
4 INJECTION INTRAMUSCULAR; INTRAVENOUS
Status: DISCONTINUED | OUTPATIENT
Start: 2021-01-14 | End: 2021-02-01 | Stop reason: HOSPADM

## 2021-01-14 RX ORDER — ACETAMINOPHEN 650 MG/1
650 SUPPOSITORY RECTAL
Status: DISCONTINUED | OUTPATIENT
Start: 2021-01-14 | End: 2021-02-01 | Stop reason: HOSPADM

## 2021-01-14 RX ORDER — ERGOCALCIFEROL 1.25 MG/1
50000 CAPSULE ORAL
Status: DISCONTINUED | OUTPATIENT
Start: 2021-01-15 | End: 2021-01-23

## 2021-01-14 RX ADMIN — AMITRIPTYLINE HYDROCHLORIDE 50 MG: 50 TABLET, FILM COATED ORAL at 22:43

## 2021-01-14 RX ADMIN — ARFORMOTEROL TARTRATE: 15 SOLUTION RESPIRATORY (INHALATION) at 22:43

## 2021-01-14 RX ADMIN — FUROSEMIDE 40 MG: 10 INJECTION, SOLUTION INTRAMUSCULAR; INTRAVENOUS at 22:49

## 2021-01-14 RX ADMIN — WATER 1 G: 1 INJECTION INTRAMUSCULAR; INTRAVENOUS; SUBCUTANEOUS at 20:57

## 2021-01-14 RX ADMIN — Medication 5 MG: at 22:43

## 2021-01-14 NOTE — ED TRIAGE NOTES
Pt brought inby EMS with c/o SOB. EMS report bilateral rales and bilateral lower extremity edema. EMS also report that pt is in A-fib. Pt was 88% on room air. Pt placed on 4 L NC and now at 95%. Pt c/o hematuria x 10 days and tremors with onset one week ago.

## 2021-01-14 NOTE — H&P
Hospitalist Admission History and Physical    NAME:  Evelia Reyes   :   1960   MRN:   [id number]     PCP:  Mateo Simpson PA-C  Date/Time:  2021 6:56 PM    Subjective:   CHIEF COMPLAINT:  Near syncope and weakness    HISTORY OF PRESENT ILLNESS:     Pat Jones is a 56 y.o.  male who presents with near syncope and weakness, dyspnea with exertion and swelling to extremities for the past 1 month. Patient states blister appeared to left foot approximately 2 weeks ago became progressively worse and ruptured now draining the last couple days. Patient also states he is developing worsening dyspnea with exertion over the last couple days. He reports he has been with significantly reduced mobility and has had 4 falls in the last 3 days. He does report some issues with constipation in the last couple days as well. Patient denies any other concerns including no new pain complaints, no headaches, denies chest pain, no shortness of breath at rest or orthopnea, no cough, no fevers chills and no known sick contacts, denies nausea vomiting. Patient denies blood in stool but reports some blood in urine. Additional conversation with wife via phone call she states patient has been having somewhat of a decline over the last year but worse over the last month -patient has taken significant amount of commencing to come to ED due to Covid concerns. Patient denies active smoking, alcohol use or illicit drug use.     Past Medical History:   Diagnosis Date    AAA (abdominal aortic aneurysm) (HCC)     Alcoholism ([address]     Atrial fibrillation (HCC)     Bradycardia     Chronic pain     Degenerative disc disease, cervical     Degenerative disc disease, lumbar     Hypertension     Hypokalemia     Pneumonia 2016    caused permanent L lung problems    Thyroid disease         Past Surgical History:   Procedure Laterality Date    HX ORTHOPAEDIC      neck fracture    HX ORTHOPAEDIC      lumbar fusion    OR ABDOMEN SURGERY PROC UNLISTED  10/08/2017    spleenectomy       Social History     Tobacco Use    Smoking status: Former Smoker     Quit date: 2017     Years since quitting: 3.4    Smokeless tobacco: Former User   Substance Use Topics    Alcohol use: No     Comment: sober x 5 years        Family History   Problem Relation Age of Onset    Heart Disease Father     Heart Disease Brother         AAA-  in his 42's    Heart Disease Paternal Aunt     Heart Disease Paternal Uncle     Heart Disease Paternal Grandmother     Lupus Mother         No Known Allergies     Prior to Admission Medications   Prescriptions Last Dose Informant Patient Reported? Taking? MULTIVIT-MIN/FA/LYCOPEN/LUTEIN (CENTRUM SILVER MEN PO) Not Taking at Unknown time  Yes No   Sig: Take 1 Tab by mouth daily. albuterol (PROAIR HFA) 90 mcg/actuation inhaler   No No   Sig: Take 2 Puffs by inhalation every four (4) hours as needed for Wheezing. amitriptyline (ELAVIL) 25 mg tablet 2021 at Unknown time  No Yes   Sig: take 1 tablet by mouth NIGHTLY   Patient taking differently: 50 mg nightly. atenolol (TENORMIN) 100 mg tablet 2021 at Unknown time  No Yes   Sig: Take 1 Tab by mouth daily. b complex vitamins tablet Unknown at Unknown time  Yes No   Sig: Take 1 Tab by mouth daily. budesonide-formoterol (SYMBICORT) 160-4.5 mcg/actuation HFAA 2021 at Unknown time  No Yes   Sig: inhale 2 puffs by mouth twice a day   cholecalciferol, vitamin D3, (VITAMIN D3) 2,000 unit tab   Yes No   Sig: Take  by mouth two (2) times a day. dabigatran etexilate (PRADAXA) 150 mg capsule 2021 at Unknown time  No Yes   Sig: Take 1 Cap by mouth every twelve (12) hours. dilTIAZem CD (CARTIA XT) 180 mg ER capsule 2021 at Unknown time  No Yes   Sig: Take 1 Cap by mouth two (2) times a day. ergocalciferol (ERGOCALCIFEROL) 50,000 unit capsule Not Taking at Unknown time  No No   Sig: Take 1 Cap by mouth every seven (7) days.    fish oil-dha-epa 1,200-144-216 mg cap Not Taking at Unknown time  Yes No   Sig: Take 1 Tab by mouth daily. gabapentin (NEURONTIN) 800 mg tablet 1/14/2021 at Unknown time  No Yes   Sig: take 2 tablets by mouth three times a day   Patient taking differently: Take 1,200 mg by mouth two (2) times a day. hydroCHLOROthiazide (HYDRODIURIL) 12.5 mg tablet 1/14/2021 at Unknown time  No Yes   Sig: Take 1 Tab by mouth daily. Patient taking differently: Take 25 mg by mouth daily. magnesium 250 mg tab 1/14/2021 at Unknown time  Yes Yes   Sig: Take 1 Tab by mouth daily. melatonin tab tablet Not Taking at Unknown time  Yes No   Sig: Take  by mouth nightly. potassium 99 mg tablet Not Taking at Unknown time  Yes No   Sig: Take 99 mg by mouth daily. saw palmetto xtr/zinc picolin (SAW PALMETTO EXTRACT PO) Unknown at Unknown time  Yes No   Sig: Take  by mouth. Facility-Administered Medications: None     REVIEW OF SYSTEMS:     [] Unable to obtain  ROS due to  []mental status change  []sedated   []intubated   [x]Total of 12 systems reviewed as follows:    GENERAL: no fever or chills   HEENT: no sinus congestion / hearing or vision changes  NECK: No pain or stiffness. PULMONARY: + Dyspnea with exertion / no shortness of breath at rest and no cough   Cardiovascular: denies palpitations or chest pain; ++ significant bilateral lower extremity swelling extending up leg  GASTROINTESTINAL: + Constipation, denies diarrhea, nausea, vomiting or melena / bloody stools  GENITOURINARY: + Hematuria; no urinary frequency, urgency, hesitancy or dysuria. MUSCULOSKELETAL: no back / joint or muscle pain, no recent trauma. DERMATOLOGIC: denies pruritis, rashes or open areas   ENDOCRINE: No polyuria, polydipsia, no heat or cold intolerance.    HEMATOLOGICAL: + Blood per urine denies other source of bleeding at this point  NEUROLOGIC:  no focal weakness,  no speech changes     Objective:   VITALS:    Visit Vitals  Temp 98.3 °F (36.8 °C) Resp 19   Ht 5' 10\" (1.778 m)   BMI 34.29 kg/m²     Temp (24hrs), Av.3 °F (36.8 °C), Min:98.3 °F (36.8 °C), Max:98.3 °F (36.8 °C)    PHYSICAL EXAM:   General:          Alert, in NAD  HEENT:          Sclera anicteric. Conjunctiva pink. Neck:               Supple. Trachea midline. No accessory muscle use. No jugular venous distention, no carotid bruit  CV:                  Regular rate and rhythm. S1S2+  Lungs:             Clear to auscultation bilaterally. No Wheezing or Rhonchi. No rales. Abdomen:        Soft, non-tender. Not distended. Bowel sounds normal.   Extremities:     No cyanosis. 4+ bilateral lower extremity edema. Feet warm  Neurologic:      Alert and oriented X 3. Follows commands, responds appropriately. No focal neurological deficit was noted  Skin:              Left foot dorsal aspect with large area of ruptured blister clear/blood-tinged drainage from this area left foot, medial aspect with small area yellow drainage    LAB DATA REVIEWED:    No components found for: [place]  Recent Labs     21  1156      K 4.8      CO2 30   BUN 89*   CREA 8.89*   GLU 76   CA 8.3*   ALB 2.6*   WBC 16.9*   HGB 14.6   HCT 45.6   *     IMAGING RESULTS:     [x]  I have personally reviewed the actual   [x]CXR  [x]CT scan    Assessment/Plan:      Active Problems:    [facility] (2021)       ___________________________________________________  PLAN:    1. Acute kidney failure -etiology unclear /poor medical follow-up over the last several months /nephrology following   2. Acute systolic versus diastolic versus fluid overload from kidney failure -status post Lasix x1 in ED /check echocardiogram /strict I's and O's/based on echo consider cardiology referral  3. Lower extremity wounds -continue vancomycin and cefepime /wound culture requested  4. Hematuria -awaiting UA /SCDs only for now   5.  Presyncope - cardiac monitoring   6.  Covid rule out, lower suspicion - droplet plus /Covid ordered in ED    Full code in discussion with patient  Update provided to wife Maureen Mcdowell at phone number [phone]    Risk of deterioration:  []Low    [x]Moderate  []High    Prophylaxis:  []Lovenox  []Coumadin  []Hep SQ  [x]SCDs  []H2B/PPI    Disposition:  []Home w/ Family   [x]HH PT,OT,RN   [x]SNF/LTC   []SAH/Rehab    Discussed Code Status:    [x]Full Code      []DNR     ___________________________________________________    Care Plan discussed with:    [x]Patient   [x]Family    []ED Care Manager  [x]ED Doc   []Specialist :  ___________________________________________________  Admitting Provider: Adryan Sepulveda NP

## 2021-01-14 NOTE — ED PROVIDER NOTES
56year old man with a history of atrial fibrillation, pneumonia, AAA, alcoholism, hypertension, degenerative disc disease and thyroid disease presents with concerns for multiple episodes of almost passing out at home that has been on going for the last 1 month. Yesterday he almost passed out twice and his wife urged him to come to the ED. He has never had anything like this before. Also notes some blood in his urine for about the same amount of time. No history of kidney stones and no report of abdominal pain. Patient also states that he has a wound on the top of his left foot that initially started as a blister and has since popped and has not healed. This has been present for the last month as well. He denies any fevers.          Past Medical History:   Diagnosis Date    AAA (abdominal aortic aneurysm) (HCC)     Alcoholism ([address]     Atrial fibrillation (HCC)     Bradycardia     Chronic pain     Degenerative disc disease, cervical     Degenerative disc disease, lumbar     Hypertension     Hypokalemia     Pneumonia 2016    caused permanent L lung problems    Thyroid disease        Past Surgical History:   Procedure Laterality Date    ABDOMEN SURGERY PROC UNLISTED  10/08/2017    spleenectomy    HX ORTHOPAEDIC      neck fracture    HX ORTHOPAEDIC      lumbar fusion         Family History:   Problem Relation Age of Onset    Heart Disease Father     Heart Disease Brother         AAA-  in his 42's    Heart Disease Paternal Aunt     Heart Disease Paternal Uncle     Heart Disease Paternal Grandmother     Lupus Mother        Social History     Socioeconomic History    Marital status:      Spouse name: Not on file    Number of children: Not on file    Years of education: Not on file    Highest education level: Not on file   Occupational History    Not on file   Social Needs    Financial resource strain: Not on file    Food insecurity     Worry: Not on file     Inability: Not on file  Transportation needs     Medical: Not on file     Non-medical: Not on file   Tobacco Use    Smoking status: Former Smoker     Quit date: 8/8/2017     Years since quitting: 3.4    Smokeless tobacco: Former User   Substance and Sexual Activity    Alcohol use: No     Comment: sober x 5 years    Drug use: No     Comment: former drug use    Sexual activity: Not on file   Lifestyle    Physical activity     Days per week: Not on file     Minutes per session: Not on file    Stress: Not on file   Relationships    Social connections     Talks on phone: Not on file     Gets together: Not on file     Attends Voodoo service: Not on file     Active member of club or organization: Not on file     Attends meetings of clubs or organizations: Not on file     Relationship status: Not on file    Intimate partner violence     Fear of current or ex partner: Not on file     Emotionally abused: Not on file     Physically abused: Not on file     Forced sexual activity: Not on file   Other Topics Concern    Not on file   Social History Narrative    Not on file         ALLERGIES: Patient has no known allergies. Review of Systems   Constitutional: Positive for activity change, chills and fatigue. Negative for appetite change, diaphoresis and fever. HENT: Negative for congestion, mouth sores, nosebleeds, rhinorrhea, sinus pressure, sinus pain and sore throat. Respiratory: Positive for shortness of breath and wheezing. Negative for cough and chest tightness. Cardiovascular: Positive for leg swelling. Negative for chest pain and palpitations. Gastrointestinal: Positive for constipation. Negative for abdominal distention, abdominal pain, blood in stool, diarrhea, nausea, rectal pain and vomiting. Endocrine: Negative for cold intolerance and heat intolerance. Genitourinary: Positive for frequency and hematuria. Negative for discharge, dysuria, flank pain, penile pain and penile swelling.    Neurological: Positive for tremors and light-headedness. Negative for seizures, syncope, facial asymmetry, numbness and headaches. Vitals:    01/14/21 1128   Resp: 19   Temp: 98.3 °F (36.8 °C)          Physical Exam  Vitals signs and nursing note reviewed. Constitutional:       Appearance: He is well-developed. HENT:      Head: Normocephalic and atraumatic. Mouth/Throat:      Mouth: Mucous membranes are moist.   Eyes:      Pupils: Pupils are equal, round, and reactive to light. Neck:      Musculoskeletal: Normal range of motion and neck supple. Cardiovascular:      Rate and Rhythm: Normal rate and regular rhythm. Heart sounds: No murmur. No friction rub. No gallop. Pulmonary:      Effort: Pulmonary effort is normal.      Breath sounds: Normal breath sounds. Chest:      Chest wall: No tenderness. Abdominal:      Palpations: Abdomen is soft. Musculoskeletal:      Right lower leg: Edema present. Left lower leg: Edema present. Comments: There is 2+ pitting edema bilaterally up to the knee. Over the dorsum of the left foot there is a circular approximately 5 cm in diameter wound that is shallow with surrounding erythema. Skin:     General: Skin is warm and dry. Capillary Refill: Capillary refill takes less than 2 seconds. Neurological:      General: No focal deficit present. Mental Status: He is alert. MDM  Number of Diagnoses or Management Options  Acute kidney injury ([facility]  Leukocytosis, unspecified type  Open wound of left foot, initial encounter  Diagnosis management comments: The BNP was elevated at 7000. Assessment: 17-year-old male with a history of abdominal aortic aneurysm status post graft placement, atrial fibrillation, hypertension, degenerative disc disease and thyroid disease who presents with 2 to 4 weeks of presyncopal-like symptoms and multiple falls found to be in acute renal failure.     Differential diagnosis: Renal failure, dehydration, acute tubular necrosis, myocardial infarction, pulmonary embolism, pneumonia, dysrhythmia, electrolyte disturbance, hypoglycemia, hypothyroidism, hyperthyroidism. Medical decision making:  The patient's last creatinine value was 1. Today it is 8 with a 10-1 BUN/creatinine ratio. He also presented with some hematuria which may favor a renal process causing his renal failure. We will repeat a BMP to confirm the elevation but he otherwise has no electrolyte disturbance and no evidence of acidosis. On examination he is volume overloaded so we consulted nephrology who may do inpatient dialysis. Given the patient's foot wound and leukocytosis cefepime and vancomycin were ordered IV. The patient's troponin was negative and she had no evidence of ischemia on his twelve-lead EKG. His thyroid studies were unremarkable. His glucose was normal.  The BNP was elevated at around 7000. Spoke to Nephrology, Dr. Narendra Mark at 14:49. Recommended lopez catheter, lasix 40 mg IV now, and admission. Will place an order for renal US. Spoke with the hospitalist Dr. Renard Ritchie who recommended starting Vancomycin and Cefepime given the wound on the right foot. Plan:  -CT of the abdomen with and without contrast.  -IV Lasix 40 mg.  -Lopez. -Nephrology consult.  -Vancomycin and cefepime for the leukocytosis and a left foot wound infection. Procedures    Lauren Her M.D.   [place] [id number]  Emergency Medicine PGY-3

## 2021-01-14 NOTE — PROGRESS NOTES
JOE of uncertain etiology  Low albumin- raising the suspicion of nephrotic syndrome  Hematuria reported-- ?RPGN  UA not available    Work up ordered-  USG kidneys  Renal Dopplers  JULIO,ANCA,Anti GBM  Complements with CH 50,C3 and C4  JULIO  Hepatitis serologies with cryo levels  SPEP,PAOLO,K/l ratio  CK is normal  Ur prot/cr  Ur alb/cr  UA  Place lopez cath  Considering pul vascular congestion,edema in LE per my discussion with ED, will order for lasix    If he doesn't turn around soon, then will benefit from kidney biopsy.

## 2021-01-14 NOTE — Clinical Note
Status[de-identified] INPATIENT [101]   Type of Bed: Medical [8]   Inpatient Hospitalization Certified Necessary for the Following Reasons: 3.  Patient receiving treatment that can only be provided in an inpatient setting (further clarification in H&P documentation)   Admitting Diagnosis: Acute kidney injury [facility]) [[id number]]   Admitting Physician: Belen Travis [[id number]]   Attending Physician: Belen Travis [[id number]]   Estimated Length of Stay: 3-4 Midnights   Discharge Plan[de-identified] Home with Office Follow-up

## 2021-01-15 ENCOUNTER — APPOINTMENT (OUTPATIENT)
Dept: NON INVASIVE DIAGNOSTICS | Age: 61
DRG: 673 | End: 2021-01-15
Attending: NURSE PRACTITIONER
Payer: COMMERCIAL

## 2021-01-15 ENCOUNTER — ANESTHESIA EVENT (OUTPATIENT)
Dept: SURGERY | Age: 61
DRG: 673 | End: 2021-01-15
Payer: COMMERCIAL

## 2021-01-15 ENCOUNTER — APPOINTMENT (OUTPATIENT)
Dept: GENERAL RADIOLOGY | Age: 61
DRG: 673 | End: 2021-01-15
Attending: PODIATRIST
Payer: COMMERCIAL

## 2021-01-15 ENCOUNTER — APPOINTMENT (OUTPATIENT)
Dept: VASCULAR SURGERY | Age: 61
DRG: 673 | End: 2021-01-15
Attending: NURSE PRACTITIONER
Payer: COMMERCIAL

## 2021-01-15 ENCOUNTER — APPOINTMENT (OUTPATIENT)
Dept: ULTRASOUND IMAGING | Age: 61
DRG: 673 | End: 2021-01-15
Attending: NURSE PRACTITIONER
Payer: COMMERCIAL

## 2021-01-15 LAB
ANION GAP SERPL CALC-SCNC: 11 MMOL/L (ref 3–18)
BASOPHILS # BLD: 0 K/UL (ref 0–0.1)
BASOPHILS NFR BLD: 0 % (ref 0–2)
BUN SERPL-MCNC: 102 MG/DL (ref 7–18)
BUN/CREAT SERPL: 11 (ref 12–20)
CALCIUM SERPL-MCNC: 8.4 MG/DL (ref 8.5–10.1)
CHLORIDE SERPL-SCNC: 102 MMOL/L (ref 100–111)
CK MB CFR SERPL CALC: 1.6 % (ref 0–4)
CK MB CFR SERPL CALC: 1.8 % (ref 0–4)
CK MB SERPL-MCNC: 3 NG/ML (ref 5–25)
CK MB SERPL-MCNC: 3.3 NG/ML (ref 5–25)
CK SERPL-CCNC: 188 U/L (ref 39–308)
CK SERPL-CCNC: 193 U/L (ref 39–308)
CO2 SERPL-SCNC: 27 MMOL/L (ref 21–32)
COVID-19 RAPID TEST, COVR: NOT DETECTED
CREAT SERPL-MCNC: 9.57 MG/DL (ref 0.6–1.3)
CREAT UR-MCNC: 79 MG/DL (ref 30–125)
DIFFERENTIAL METHOD BLD: ABNORMAL
ECHO LV INTERNAL DIMENSION DIASTOLIC: 5.38 CM (ref 4.2–5.9)
ECHO LV INTERNAL DIMENSION SYSTOLIC: 2.69 CM
ECHO LV IVSD: 1.02 CM (ref 0.6–1)
ECHO LV MASS 2D: 210.9 G (ref 88–224)
ECHO LV MASS INDEX 2D: 86.3 G/M2 (ref 49–115)
ECHO LV POSTERIOR WALL DIASTOLIC: 1.02 CM (ref 0.6–1)
EOSINOPHIL # BLD: 0.2 K/UL (ref 0–0.4)
EOSINOPHIL NFR BLD: 1 % (ref 0–5)
ERYTHROCYTE [DISTWIDTH] IN BLOOD BY AUTOMATED COUNT: 15.1 % (ref 11.6–14.5)
EST. AVERAGE GLUCOSE BLD GHB EST-MCNC: 123 MG/DL
GLUCOSE BLD STRIP.AUTO-MCNC: 104 MG/DL (ref 70–110)
GLUCOSE BLD STRIP.AUTO-MCNC: 110 MG/DL (ref 70–110)
GLUCOSE SERPL-MCNC: 87 MG/DL (ref 74–99)
HBA1C MFR BLD: 5.9 % (ref 4.2–5.6)
HBV CORE IGM SER QL: NEGATIVE
HBV SURFACE AB SER QL IA: NEGATIVE
HBV SURFACE AB SERPL IA-ACNC: 4.78 MIU/ML
HBV SURFACE AG SER QL: <0.1 INDEX
HBV SURFACE AG SER QL: NEGATIVE
HCT VFR BLD AUTO: 45.3 % (ref 36–48)
HCV AB SER IA-ACNC: 0.03 INDEX
HCV AB SERPL QL IA: NEGATIVE
HCV COMMENT,HCGAC: NORMAL
HEP BS AB COMMENT,HBSAC: ABNORMAL
HGB BLD-MCNC: 14.6 G/DL (ref 13–16)
HIV 1+2 AB+HIV1 P24 AG SERPL QL IA: NONREACTIVE
HIV12 RESULT COMMENT, HHIVC: NORMAL
LYMPHOCYTES # BLD: 1.5 K/UL (ref 0.9–3.6)
LYMPHOCYTES NFR BLD: 10 % (ref 21–52)
MAGNESIUM SERPL-MCNC: 3.4 MG/DL (ref 1.6–2.6)
MCH RBC QN AUTO: 30.9 PG (ref 24–34)
MCHC RBC AUTO-ENTMCNC: 32.2 G/DL (ref 31–37)
MCV RBC AUTO: 95.8 FL (ref 74–97)
MICROALBUMIN UR-MCNC: 420 MG/DL (ref 0–3)
MICROALBUMIN/CREAT UR-RTO: 5316 MG/G (ref 0–30)
MONOCYTES # BLD: 1.3 K/UL (ref 0.05–1.2)
MONOCYTES NFR BLD: 9 % (ref 3–10)
NEUTS SEG # BLD: 12 K/UL (ref 1.8–8)
NEUTS SEG NFR BLD: 80 % (ref 40–73)
PLATELET # BLD AUTO: 454 K/UL (ref 135–420)
PMV BLD AUTO: 12.2 FL (ref 9.2–11.8)
POTASSIUM SERPL-SCNC: 4.3 MMOL/L (ref 3.5–5.5)
RBC # BLD AUTO: 4.73 M/UL (ref 4.7–5.5)
SODIUM SERPL-SCNC: 140 MMOL/L (ref 136–145)
SOURCE, COVRS: NORMAL
SPECIMEN TYPE, XMCV1T: NORMAL
TROPONIN I SERPL-MCNC: <0.02 NG/ML (ref 0–0.04)
TROPONIN I SERPL-MCNC: <0.02 NG/ML (ref 0–0.04)
WBC # BLD AUTO: 15.1 K/UL (ref 4.6–13.2)

## 2021-01-15 PROCEDURE — 74011000250 HC RX REV CODE- 250: Performed by: NURSE PRACTITIONER

## 2021-01-15 PROCEDURE — 83735 ASSAY OF MAGNESIUM: CPT

## 2021-01-15 PROCEDURE — 74011250636 HC RX REV CODE- 250/636: Performed by: HOSPITALIST

## 2021-01-15 PROCEDURE — 83036 HEMOGLOBIN GLYCOSYLATED A1C: CPT

## 2021-01-15 PROCEDURE — 36415 COLL VENOUS BLD VENIPUNCTURE: CPT

## 2021-01-15 PROCEDURE — 93325 DOPPLER ECHO COLOR FLOW MAPG: CPT | Performed by: INTERNAL MEDICINE

## 2021-01-15 PROCEDURE — 93321 DOPPLER ECHO F-UP/LMTD STD: CPT

## 2021-01-15 PROCEDURE — 85025 COMPLETE CBC W/AUTO DIFF WBC: CPT

## 2021-01-15 PROCEDURE — 94761 N-INVAS EAR/PLS OXIMETRY MLT: CPT

## 2021-01-15 PROCEDURE — 74011000250 HC RX REV CODE- 250: Performed by: HOSPITALIST

## 2021-01-15 PROCEDURE — 97110 THERAPEUTIC EXERCISES: CPT

## 2021-01-15 PROCEDURE — 76770 US EXAM ABDO BACK WALL COMP: CPT

## 2021-01-15 PROCEDURE — 65660000000 HC RM CCU STEPDOWN

## 2021-01-15 PROCEDURE — 82962 GLUCOSE BLOOD TEST: CPT

## 2021-01-15 PROCEDURE — 99233 SBSQ HOSP IP/OBS HIGH 50: CPT | Performed by: INTERNAL MEDICINE

## 2021-01-15 PROCEDURE — 74011250637 HC RX REV CODE- 250/637: Performed by: NURSE PRACTITIONER

## 2021-01-15 PROCEDURE — 82553 CREATINE MB FRACTION: CPT

## 2021-01-15 PROCEDURE — 73620 X-RAY EXAM OF FOOT: CPT

## 2021-01-15 PROCEDURE — 74011250636 HC RX REV CODE- 250/636: Performed by: NURSE PRACTITIONER

## 2021-01-15 PROCEDURE — 74011250637 HC RX REV CODE- 250/637: Performed by: HOSPITALIST

## 2021-01-15 PROCEDURE — 80048 BASIC METABOLIC PNL TOTAL CA: CPT

## 2021-01-15 PROCEDURE — 97162 PT EVAL MOD COMPLEX 30 MIN: CPT

## 2021-01-15 PROCEDURE — 94640 AIRWAY INHALATION TREATMENT: CPT

## 2021-01-15 PROCEDURE — 87635 SARS-COV-2 COVID-19 AMP PRB: CPT

## 2021-01-15 PROCEDURE — 93321 DOPPLER ECHO F-UP/LMTD STD: CPT | Performed by: INTERNAL MEDICINE

## 2021-01-15 PROCEDURE — 93308 TTE F-UP OR LMTD: CPT | Performed by: INTERNAL MEDICINE

## 2021-01-15 RX ORDER — DEXTROSE 50 % IN WATER (D50W) INTRAVENOUS SYRINGE
25-50 AS NEEDED
Status: DISCONTINUED | OUTPATIENT
Start: 2021-01-15 | End: 2021-02-01 | Stop reason: HOSPADM

## 2021-01-15 RX ORDER — INSULIN LISPRO 100 [IU]/ML
INJECTION, SOLUTION INTRAVENOUS; SUBCUTANEOUS
Status: DISCONTINUED | OUTPATIENT
Start: 2021-01-15 | End: 2021-02-01 | Stop reason: HOSPADM

## 2021-01-15 RX ORDER — MAGNESIUM SULFATE 100 %
4 CRYSTALS MISCELLANEOUS AS NEEDED
Status: DISCONTINUED | OUTPATIENT
Start: 2021-01-15 | End: 2021-02-01 | Stop reason: HOSPADM

## 2021-01-15 RX ADMIN — DILTIAZEM HYDROCHLORIDE 180 MG: 180 CAPSULE, COATED, EXTENDED RELEASE ORAL at 01:21

## 2021-01-15 RX ADMIN — Medication 10 ML: at 01:25

## 2021-01-15 RX ADMIN — DILTIAZEM HYDROCHLORIDE 180 MG: 180 CAPSULE, COATED, EXTENDED RELEASE ORAL at 19:02

## 2021-01-15 RX ADMIN — Medication 5 MG: at 22:21

## 2021-01-15 RX ADMIN — ARFORMOTEROL TARTRATE: 15 SOLUTION RESPIRATORY (INHALATION) at 10:26

## 2021-01-15 RX ADMIN — WATER 1 G: 1 INJECTION INTRAMUSCULAR; INTRAVENOUS; SUBCUTANEOUS at 19:02

## 2021-01-15 RX ADMIN — ATENOLOL 100 MG: 25 TABLET ORAL at 10:26

## 2021-01-15 RX ADMIN — Medication 10 ML: at 22:00

## 2021-01-15 RX ADMIN — ARFORMOTEROL TARTRATE: 15 SOLUTION RESPIRATORY (INHALATION) at 21:20

## 2021-01-15 RX ADMIN — AMITRIPTYLINE HYDROCHLORIDE 50 MG: 50 TABLET, FILM COATED ORAL at 22:21

## 2021-01-15 RX ADMIN — ACETAMINOPHEN 650 MG: 325 TABLET ORAL at 01:24

## 2021-01-15 RX ADMIN — VANCOMYCIN HYDROCHLORIDE 2250 MG: 1 INJECTION, POWDER, LYOPHILIZED, FOR SOLUTION INTRAVENOUS at 01:25

## 2021-01-15 RX ADMIN — Medication 10 ML: at 22:22

## 2021-01-15 NOTE — VIRTUAL HEALTH
Consult    Patient: Alejandro Pickard MRN: [id number]  SSN: [id number]    YOB: 1960  Age: 56 y.o. Sex: male      Subjective:      Alejandro Pickard is a 56 y.o. male who is being seen for asked by Emergency room to consult and treat both feet, infection and wounds.  .    Past Medical History:   Diagnosis Date    [facility]) ([facility]     Alcoholism ([facility]     Atrial fibrillation (HCC)     Bradycardia     Chronic pain     Degenerative disc disease, cervical     Degenerative disc disease, lumbar     Hypertension     Hypokalemia     Pneumonia 2016    caused permanent L lung problems    Thyroid disease      Past Surgical History:   Procedure Laterality Date    HX ORTHOPAEDIC      neck fracture    HX ORTHOPAEDIC      lumbar fusion    ME ABDOMEN SURGERY PROC UNLISTED  10/08/2017    spleenectomy      Family History   Problem Relation Age of Onset    Heart Disease Father     Heart Disease Brother         AAA-  in his 42's    Heart Disease Paternal Aunt     Heart Disease Paternal Uncle     Heart Disease Paternal Grandmother     Lupus Mother      Social History     Tobacco Use    Smoking status: Former Smoker     Quit date: 2017     Years since quitting: 3.4    Smokeless tobacco: Former User   Substance Use Topics    Alcohol use: No     Comment: sober x 5 years      Current Facility-Administered Medications   Medication Dose Route Frequency Provider Last Rate Last Admin    cefepime (MAXIPIME) 1 g in sterile water (preservative free) 10 mL IV syringe  1 g IntraVENous Q24H Oli BURNS NP   1 g at 21    sodium chloride (NS) flush 5-40 mL  5-40 mL IntraVENous Q8H Oli BURNS NP   10 mL at 01/15/21 0125    sodium chloride (NS) flush 5-40 mL  5-40 mL IntraVENous Q8H Lc Grewal MD   10 mL at 01/15/21 0125    sodium chloride (NS) flush 5-40 mL  5-40 mL IntraVENous PRN Carrington Steven MD       [address] [place] [address] acetaminophen (TYLENOL) tablet 650 mg  650 mg Oral Q6H PRN Anuj Ventura MD   650 mg at 01/15/21 0124    Or    acetaminophen (TYLENOL) suppository 650 mg  650 mg Rectal Q6H PRN Anuj Ventura MD        polyethylene glycol (MIRALAX) packet 17 g  17 g Oral DAILY PRN Anuj Ventura MD        promethazine (PHENERGAN) tablet 12.5 mg  12.5 mg Oral Q6H PRN Nadege Grewal MD        Or    ondansetron (ZOFRAN) injection 4 mg  4 mg IntraVENous Q6H PRN Nadege Grewal MD        amitriptyline (ELAVIL) tablet 50 mg  50 mg Oral QHS Anuj Ventura MD   50 mg at 01/14/21 2243    dilTIAZem ER (CARDIZEM CD) capsule 180 mg  180 mg Oral BID Anuj Ventura MD   180 mg at 01/15/21 0121    ergocalciferol capsule 50,000 Units  50,000 Units Oral Q7D Anuj Ventura MD        melatonin tablet 5 mg  5 mg Oral QHS Nadege Grewal MD   5 mg at 01/14/21 2243    arformoterol 15 mcg/budesonide 0.5 mg neb solution   Nebulization BID RT Anuj Ventura MD   Given at 01/15/21 1026    VANCOMYCIN INFORMATION NOTE   Other Rx Dosing/Monitoring Anuj Ventura MD        atenoloL (TENORMIN) tablet 100 mg  100 mg Oral DAILY Hood Sails B, NP   100 mg at 01/15/21 1026        No Known Allergies    Review of Systems:  A comprehensive review of systems was negative except for that written in the History of Present Illness. Objective:     Vitals:    01/15/21 0345 01/15/21 0430 01/15/21 0500 01/15/21 1119   BP: 114/70 125/79 132/84 120/86   Pulse: 76 78 75 80   Resp:  15 13 20   Temp:    97.5 °F (36.4 °C)   SpO2:    100%   Weight:       Height:            Physical Exam:  Seen at bedside awake and alert. Inspected both feet. Cellulitis both feet and ankles. Necrotic dorsal foot wound left. Fluctuant abscess dorsal right foot and extends to ankle.        No x-ray   Assessment: Hospital Problems  Date Reviewed: 4/1/2019          Codes Class Noted POA    Acute kidney injury [facility]) ICD-10-CM: N17.9  ICD-9-CM: 584.9  1/14/2021 Unknown        ARF (acute renal failure) ([facility] ICD-10-CM: N17.9  ICD-9-CM: 584.9  1/14/2021 Unknown              Plan:     Plan incision and drainage in AM.    Signed By: Antoine Josue DPM     January 15, 2021

## 2021-01-15 NOTE — ED NOTES
Patient complaining of pain. Provided tylenol. Patient spilled meal tray on the floor. Patient left foot is leaking from open wound.

## 2021-01-15 NOTE — ED NOTES
Patient requesting something to eat as well as something for pain in his legs. Will page hospitalist to request pain medication.

## 2021-01-15 NOTE — PROGRESS NOTES
Patient now in ER 12 -   Visit Vitals  BP (!) 147/95 (BP 1 Location: Left arm, BP Patient Position: At rest)   Pulse 77   Temp 97.2 °F (36.2 °C)   Resp 20   Ht 5' 9\" (1.753 m)   Wt 131.5 kg (290 lb)   SpO2 97%   BMI 42.83 kg/m²     Urinalysis with large amount of blood, will resume atenolol with hold parameters    Signed By: Mickey Rose, NP     January 14, 2021

## 2021-01-15 NOTE — ROUTINE PROCESS
TRANSFER - IN REPORT: 
 
Verbal report received from Seymour MALONEY RN(name) on Darren New  being received from ED(unit) for routine progression of care Report consisted of patients Situation, Background, Assessment and  
Recommendations(SBAR). Information from the following report(s) SBAR, Kardex and Recent Results was reviewed with the receiving nurse. Opportunity for questions and clarification was provided. Assessment completed upon patients arrival to unit and care assumed.

## 2021-01-15 NOTE — PROGRESS NOTES
Problem: Mobility Impaired (Adult and Pediatric)  Goal: *Acute Goals and Plan of Care (Insert Text)  Description: Physical Therapy Goals  Initiated 1/15/2021 and to be accomplished within 7 day(s)  1. Patient will move from supine to sit and sit to supine  and roll side to side in bed with minimal assistance. 2.  Patient will transfer from bed to chair and chair to bed with moderate assistance using the least restrictive device. 3.  Patient will perform sit to stand with moderate assistance. 4.  Patient will ambulate with moderate for 20 feet with the least restrictive device. PLOF: Patient reports he began using RW about 2 weeks ago, prior to that he was ambulating without AD. He lives with spouse in single story home. Outcome: Progressing Towards Goal     PHYSICAL THERAPY EVALUATION    Patient: Bar Brown (51 y.o. male)  Date: 1/15/2021  Primary Diagnosis: Acute kidney injury ([facility] [address] [N17.9]  ARF (acute renal failure) ([facility] [address] [N17.9]        Precautions:  Fall      ASSESSMENT :  Upon entering room, pt supine with stretcher in trendelenburg position, agreeable to PT evaluation, and cleared by nursing. Based on the objective data described below, the patient presents with decreased strength, BLE edema, increased pain, decreased balance, decreased endurance, resulting in decreased independence with functional mobility. Patient calm, cooperative,  oriented to person and place. He has multiple wounds on BLE/feet, blisters, and LE edema. Pt reports tightness in LE's when performing ankle pumps and heel slides within decreased, functional ROM. Educated patient to perform supine there-ex 5-10x every hour. Patient requires maximum assistance at trunk for supine to sit transfer using bed rail. Pt required BUE support on bed and constant trunk support for balance. With hands fisted, he sat EOB for 8 seconds, wrist began to give out, and c/o increased pain at lopez cath site.  Patient returned to supine with mod/maximum assistance x2. At end of session, pt left left resting comfortably with HOB elevated in trendelenburg and call bell within reach. Patient will benefit from skilled intervention to address the above impairments. Patient's rehabilitation potential is considered to be Good  Factors which may influence rehabilitation potential include:   []         None noted  []         Mental ability/status  [x]         Medical condition  []         Home/family situation and support systems  []         Safety awareness  []         Pain tolerance/management  []         Other:      PLAN :  Recommendations and Planned Interventions:   [x]           Bed Mobility Training             [x]    Neuromuscular Re-Education  [x]           Transfer Training                   []    Orthotic/Prosthetic Training  [x]           Gait Training                          []    Modalities  [x]           Therapeutic Exercises           [x]    Edema Management/Control  [x]           Therapeutic Activities            []    Family Training/Education  [x]           Patient Education  []           Other (comment):    Frequency/Duration: Patient will be followed by physical therapy 1-2 times per day/4-7 days per week to address goals. Discharge Recommendations: Rehab  Further Equipment Recommendations for Discharge: TBD     SUBJECTIVE:   Patient stated Simple tasks were hard to do.  \"I can't bend, my wife helps me put my pants on. \"    OBJECTIVE DATA SUMMARY:     Past Medical History:   Diagnosis Date    AAA [facility]ab[facility] aortic aneurysm) ([facility] [address]     Alcoholism ([facility] [address]     Atrial fibrillation (HCC)     Bradycardia     Chronic pain     Degenerative disc disease, cervical     Degenerative disc disease, lumbar     Hypertension     Hypokalemia     Pneumonia 2016    caused permanent L lung problems    Thyroid disease      Past Surgical History:   Procedure Laterality Date    HX ORTHOPAEDIC      neck fracture    HX ORTHOPAEDIC      lumbar fusion    DC ABDOMEN SURGERY PROC UNLISTED  10/08/2017    spleenectomy     Barriers to Learning/Limitations: None  Compensate with: Visual Cues and Verbal Cues  Home Situation:     Critical Behavior:  Neurologic State: Alert  Orientation Level: Oriented to person;Oriented to place  Cognition: Follows commands  Safety/Judgement: Fall prevention  Psychosocial  Patient Behaviors: Calm; Cooperative       Strength:    Strength: Generally decreased, functional       Tone & Sensation:   Tone: Normal  Sensation: Intact           Range Of Motion:  AROM: Generally decreased, functional  PROM: Generally decreased, functional        Functional Mobility:  Bed Mobility:     Supine to Sit: Maximum assistance  Sit to Supine: Moderate assistance;Maximum assistance;Assist x2       Balance:   Sitting: Impaired; With support  Sitting - Static: Poor (constant support)      Therapeutic Exercises:   10x ankle pumps, 5x heel slide, 5x SLR    Pain:  Pain level pre-treatment: -/10   Pain level post-treatment: -/10   Pain Intervention(s) : Medication (see MAR); Rest, Ice, Repositioning  Response to intervention: Nurse notified, See doc flow    Activity Tolerance:   Fair  Please refer to the flowsheet for vital signs taken during this treatment. After treatment:   []         Patient left in no apparent distress sitting up in chair  [x]         Patient left in no apparent distress in bed  [x]         Call bell left within reach  [x]         Nursing notified  []         Caregiver present  []         Bed alarm activated  []         SCDs applied    COMMUNICATION/EDUCATION:   [x]         Role of Physical Therapy in the acute care setting. [x]         Fall prevention education was provided and the patient/caregiver indicated understanding. [x]         Patient/family have participated as able in goal setting and plan of care. [x]         Patient/family agree to work toward stated goals and plan of care.   []         Patient understands intent and goals of therapy, but is neutral about his/her participation. []         Patient is unable to participate in goal setting/plan of care: ongoing with therapy staff.  []         Other:     Thank you for this referral.  Tatyana Arora, PT   Time Calculation: 34 mins      Eval Complexity: History: MEDIUM  Complexity : 1-2 comorbidities / personal factors will impact the outcome/ POC Exam:MEDIUM Complexity : 3 Standardized tests and measures addressing body structure, function, activity limitation and / or participation in recreation  Presentation: MEDIUM Complexity : Evolving with changing characteristics  Clinical Decision Making:Medium Complexity    Overall Complexity:MEDIUM

## 2021-01-15 NOTE — PROGRESS NOTES
Admit Date: 2021  Date of Service: 1/15/2021    Reason for follow-up: ROBERT, volume overload      Assessment:         Acute renal failure with proteinuira:  No evidence of obstruction on CT; ? Nephrotic syndrome- RPGN; Cr increased today   - Renal US normal  Acute systolic vs diastolic HF combined  Acute hypoxic respiratory failure: multifactorial  A.fib:  Rate controlled; on Cardizem; Pradaxa on hold   Volume overload   Hypercoagulable sate:  INR 1.9 in patient not on coumadin  Leukocytosis with mild lymphopenia  Chronic LE wounds with abscess to dorsal right foot  Hematuria  Pre-syncope  Suspected COVID   Surgically asplenic    Plan:   Nephrology consulted  TTE pending  Continue atenolol, cardizem   Lasix x 1 given  Vancomcyin and Cefepime for LE wounds  F/u blood cx  F/u JULIO, SPEP, PAOLO, K/I ratio, urine protein, CH50, C3 and C4, HIV, Hep B/C pending  Wound care conuslt  F/u LE wound cultures  F/u retroperitoneal US, renal duplex pending  F/u COVID testing; continue with droplet + precautions while pending  To OR tomorrow      Current Antibtiocs:   Vanc - present  Cefepime - present    Lines:   Peripheral    Case discussed with:  [x]Patient  []Family  [x]Nursing  []Case Management  DVT Prophylaxis:  []Lovenox  []Hep SQ  []SCDs  []Coumadin   []On Heparin gtt    I have independently examined the patient and reviewed all lab studies and imgaing as well as review of nursing notes and physican notes from the past 24 hours. Garnell Bence D.O. Pager [phone]      No Known Allergies        Subjective:      Pt seen and examined. Talkative. In a good mood. Concerned about his feet. No fevers or chills. Poor appetite. He is concern about the persistent swelling in his legs. No SOB.  Garza with bloody urine    Objective:        Visit Vitals  /84   Pulse 75   Temp 97.2 °F (36.2 °C)   Resp 13   Ht 5' 9\" (1.753 m)   Wt 131.5 kg (290 lb)   SpO2 96%   BMI 42.83 kg/m²     Temp (24hrs), Av.7 °F (36.5 °C), Min:97.2 °F (36.2 °C), Max:98.3 °F (36.8 °C)        General:   awake alert and oriented, non-toxic, chronically ill appearing, disheveled, anasarca   Skin:   dry, flaky, pale; b/l LE with blisters of variying stages; left dorsal foot with ruptured blisted and developing eschar; right dorsal foot with abscess vs bullae   HEENT:  No scleral icterus or pallor; oral mucosa moist, lips moist   Lymph Nodes:   not assessed today   Lungs:   non, labored; bilaterally clear to aspiration- no crackles wheezes rales or rhonchi   Heart:  RRR, s1 and s2; no murmurs rubs or gallops; 3+ edema, + pedal pulses   Abdomen:  soft, non-distended, old surgical scar noted; active bowel sounds, non-tender, dependent edema to sacrum   Genitourinary:  deferred   Extremities:   average muscle tone; no contractures, no joint effusions   Neurologic:  No gross focal motor or sensory abnormalities; CN 2-12 intact; Follows commands. Psychiatric:   appropriate and interactive. Labs: Results:   Chemistry Recent Labs     01/15/21  0426 01/14/21  2100 01/14/21  1156   GLU 87 86 76    140 138   K 4.3 4.0 4.8    103 103   CO2 27 29 30   * 101* 89*   CREA 9.57* 9.18* 8.89*   CA 8.4* 8.4* 8.3*   AGAP 11 8 5   BUCR 11* 11* 10*   AP  --   --  76   TP  --   --  7.8   ALB  --   --  2.6*   GLOB  --   --  5.2*   AGRAT  --   --  0.5*      CBC w/Diff Recent Labs     01/15/21  0426 01/14/21  1156   WBC 15.1* 16.9*   RBC 4.73 4.74   HGB 14.6 14.6   HCT 45.3 45.6   * 450*   GRANS 80* 85*   LYMPH 10* 9*   EOS 1 0        No results found for: SDES No results found for: CULT     Results     Procedure Component Value Units Date/Time    CULTURE, Moises Kelch STAIN [[id number]] Collected: 01/14/21 2100    Order Status: Completed Specimen: Wound from Foot Updated: 01/14/21 2123          Imaging:     Xr Chest Pa Lat    Result Date: 1/14/2021  IMPRESSION: 1. Enlarged cardiac silhouette with mild pulmonary vascular congestion.  2. Small left pleural effusion and left basilar atelectasis or pneumonia. Ct Abd Pelv Wo Cont    Result Date: 1/14/2021  IMPRESSION: No acute abnormality is identified in the abdomen or pelvis. There are small pleural effusions and contiguous atelectasis evident in the lung bases. Etiology indeterminate with broad differential. Elevated BNP favors congestive heart failure or fluid overload . All CT scans at this facility are performed using dose optimization technique as appropriate to the performed exam, to include automated exposure control, adjustment of the mA and/or kV according to patient's size (Including appropriate matching for site-specific examinations), or use of iterative reconstruction technique. Xr Knee Rt 3 V    Result Date: 1/14/2021  IMPRESSION: Subtle cortical irregularity at the proximal aspect of the medial femoral condyle could represent nondisplaced fracture, correlate with point tenderness. Diffuse soft tissue swelling.

## 2021-01-15 NOTE — PROGRESS NOTES
Kinetic Dosing- Initial Progress Note    Pharmacy Consult ordered by Dr. Apolinar Garcia     Indication: cellulitis    Patient clinical status and labs ordered/reviewed. Pt Weight Weight: 131.5 kg (290 lb)   Serum Creatinine Lab Results   Component Value Date/Time    Creatinine 8.89 (H) 01/14/2021 11:56 AM    Creatinine, POC 1.2 07/10/2017 07:57 AM       Creatinine Clearance Estimated Creatinine Clearance: 11.6 mL/min (A) (based on SCr of 8.89 mg/dL (H)). BUN Lab Results   Component Value Date/Time    BUN 89 (H) 01/14/2021 11:56 AM       WBC Lab Results   Component Value Date/Time    WBC 16.9 (H) 01/14/2021 11:56 AM      Temperature Temp: 97.2 °F (36.2 °C)   HR Pulse (Heart Rate): 77     BP BP: (!) 147/95             Drug Levels:   Vancomycin    No results for input(s): VANCP, VANCT, VANCR, VANRA in the last 72 hours. Gentamicin   No results for input(s): GENP, GENT in the last 72 hours. No lab exists for component:  GENR   Tobramycin   No results for input(s): TOBP, TOBT, TOBR in the last 72 hours. Amikacin   No results for input(s): Doug Boast in the last 72 hours.     No lab exists for component:  SWAPNIL Estes DAMIKR     Dose for naïve patient was initiated at: Vancomycin 2.25gm IV x1, further dosing pending labs/levels    Continue to monitor    Sign: ROSENDO Olea [facility]  Date: 1/14/2021  Time: 9:20 PM

## 2021-01-15 NOTE — PROGRESS NOTES
New OT orders received and chart reviewed. Unable to see pt for OT evaluation at this time due to:    Pt is with complete bedrest orders at this time. Per podiatry note the plan is to have I&D done for BLE tomorrow in am. Pt also have orders for BLE foot X-rays. Please update pt's activity restrictions and weight bearing following procedure. Will follow up later as pt's schedule allows.  Thank you for this referral.    Connie Wayne MS, OTR/L

## 2021-01-15 NOTE — ED NOTES
Patient has become confused. Patient has taken off his BP cuff, pulse ox, as well unhooked himself from cardiac monitor. Patient speech has become hard to understand.

## 2021-01-15 NOTE — ED NOTES
Patient is still inpatient as of today.   Encounter closed TRANSFER - OUT REPORT:    Verbal report given to RAFA Alcala on Army Kaden  being transferred to [facility] [address]  for routine progression of care       Report consisted of patients Situation, Background, Assessment and   Recommendations(SBAR). Information from the following report(s) ED Summary was reviewed with the receiving nurse. Lines:   Peripheral IV 01/15/21 Left Antecubital (Active)        Opportunity for questions and clarification was provided.       Patient transported with:   Transportation

## 2021-01-16 ENCOUNTER — ANESTHESIA (OUTPATIENT)
Dept: SURGERY | Age: 61
DRG: 673 | End: 2021-01-16
Payer: COMMERCIAL

## 2021-01-16 LAB
ANION GAP SERPL CALC-SCNC: 10 MMOL/L (ref 3–18)
BUN SERPL-MCNC: 119 MG/DL (ref 7–18)
BUN/CREAT SERPL: 11 (ref 12–20)
CALCIUM SERPL-MCNC: 8 MG/DL (ref 8.5–10.1)
CHLORIDE SERPL-SCNC: 106 MMOL/L (ref 100–111)
CO2 SERPL-SCNC: 26 MMOL/L (ref 21–32)
CREAT SERPL-MCNC: 10.7 MG/DL (ref 0.6–1.3)
ERYTHROCYTE [DISTWIDTH] IN BLOOD BY AUTOMATED COUNT: 15 % (ref 11.6–14.5)
GLUCOSE BLD STRIP.AUTO-MCNC: 81 MG/DL (ref 70–110)
GLUCOSE BLD STRIP.AUTO-MCNC: 95 MG/DL (ref 70–110)
GLUCOSE BLD STRIP.AUTO-MCNC: 96 MG/DL (ref 70–110)
GLUCOSE BLD STRIP.AUTO-MCNC: 97 MG/DL (ref 70–110)
GLUCOSE SERPL-MCNC: 89 MG/DL (ref 74–99)
HCT VFR BLD AUTO: 39.9 % (ref 36–48)
HGB BLD-MCNC: 12.9 G/DL (ref 13–16)
MCH RBC QN AUTO: 31 PG (ref 24–34)
MCHC RBC AUTO-ENTMCNC: 32.3 G/DL (ref 31–37)
MCV RBC AUTO: 95.9 FL (ref 74–97)
PLATELET # BLD AUTO: 375 K/UL (ref 135–420)
PMV BLD AUTO: 11.7 FL (ref 9.2–11.8)
POTASSIUM SERPL-SCNC: 4.5 MMOL/L (ref 3.5–5.5)
RBC # BLD AUTO: 4.16 M/UL (ref 4.7–5.5)
SODIUM SERPL-SCNC: 142 MMOL/L (ref 136–145)
VANCOMYCIN SERPL-MCNC: 20.5 UG/ML (ref 5–40)
WBC # BLD AUTO: 12.4 K/UL (ref 4.6–13.2)

## 2021-01-16 PROCEDURE — 74011250637 HC RX REV CODE- 250/637: Performed by: FAMILY MEDICINE

## 2021-01-16 PROCEDURE — 76210000006 HC OR PH I REC 0.5 TO 1 HR: Performed by: PODIATRIST

## 2021-01-16 PROCEDURE — 87205 SMEAR GRAM STAIN: CPT

## 2021-01-16 PROCEDURE — 0H9NXZZ DRAINAGE OF LEFT FOOT SKIN, EXTERNAL APPROACH: ICD-10-PCS | Performed by: PODIATRIST

## 2021-01-16 PROCEDURE — 65270000029 HC RM PRIVATE

## 2021-01-16 PROCEDURE — 2709999900 HC NON-CHARGEABLE SUPPLY: Performed by: PODIATRIST

## 2021-01-16 PROCEDURE — 80202 ASSAY OF VANCOMYCIN: CPT

## 2021-01-16 PROCEDURE — 74011250637 HC RX REV CODE- 250/637: Performed by: NURSE ANESTHETIST, CERTIFIED REGISTERED

## 2021-01-16 PROCEDURE — 99140 ANES COMP EMERGENCY COND: CPT | Performed by: NURSE ANESTHETIST, CERTIFIED REGISTERED

## 2021-01-16 PROCEDURE — 82962 GLUCOSE BLOOD TEST: CPT

## 2021-01-16 PROCEDURE — 74011250637 HC RX REV CODE- 250/637: Performed by: PODIATRIST

## 2021-01-16 PROCEDURE — 85027 COMPLETE CBC AUTOMATED: CPT

## 2021-01-16 PROCEDURE — 00400 ANES INTEGUMENTARY SYS NOS: CPT | Performed by: ANESTHESIOLOGY

## 2021-01-16 PROCEDURE — 80048 BASIC METABOLIC PNL TOTAL CA: CPT

## 2021-01-16 PROCEDURE — 76060000032 HC ANESTHESIA 0.5 TO 1 HR: Performed by: PODIATRIST

## 2021-01-16 PROCEDURE — 87075 CULTR BACTERIA EXCEPT BLOOD: CPT

## 2021-01-16 PROCEDURE — 74011000250 HC RX REV CODE- 250: Performed by: PODIATRIST

## 2021-01-16 PROCEDURE — 0JBQ0ZZ EXCISION OF RIGHT FOOT SUBCUTANEOUS TISSUE AND FASCIA, OPEN APPROACH: ICD-10-PCS | Performed by: PODIATRIST

## 2021-01-16 PROCEDURE — 74011250636 HC RX REV CODE- 250/636: Performed by: NURSE ANESTHETIST, CERTIFIED REGISTERED

## 2021-01-16 PROCEDURE — 74011000250 HC RX REV CODE- 250: Performed by: NURSE ANESTHETIST, CERTIFIED REGISTERED

## 2021-01-16 PROCEDURE — 00400 ANES INTEGUMENTARY SYS NOS: CPT | Performed by: NURSE ANESTHETIST, CERTIFIED REGISTERED

## 2021-01-16 PROCEDURE — 99140 ANES COMP EMERGENCY COND: CPT | Performed by: ANESTHESIOLOGY

## 2021-01-16 PROCEDURE — 2709999900 HC NON-CHARGEABLE SUPPLY

## 2021-01-16 PROCEDURE — 76010000138 HC OR TIME 0.5 TO 1 HR: Performed by: PODIATRIST

## 2021-01-16 PROCEDURE — 74011250636 HC RX REV CODE- 250/636: Performed by: PODIATRIST

## 2021-01-16 PROCEDURE — 36415 COLL VENOUS BLD VENIPUNCTURE: CPT

## 2021-01-16 PROCEDURE — 74011000258 HC RX REV CODE- 258: Performed by: NURSE ANESTHETIST, CERTIFIED REGISTERED

## 2021-01-16 PROCEDURE — 74011250636 HC RX REV CODE- 250/636: Performed by: FAMILY MEDICINE

## 2021-01-16 PROCEDURE — 99233 SBSQ HOSP IP/OBS HIGH 50: CPT | Performed by: FAMILY MEDICINE

## 2021-01-16 RX ORDER — MIDAZOLAM HYDROCHLORIDE 1 MG/ML
INJECTION, SOLUTION INTRAMUSCULAR; INTRAVENOUS AS NEEDED
Status: DISCONTINUED | OUTPATIENT
Start: 2021-01-16 | End: 2021-01-16 | Stop reason: HOSPADM

## 2021-01-16 RX ORDER — FAMOTIDINE 20 MG/1
20 TABLET, FILM COATED ORAL ONCE
Status: COMPLETED | OUTPATIENT
Start: 2021-01-16 | End: 2021-01-16

## 2021-01-16 RX ORDER — SODIUM CHLORIDE 0.9 % (FLUSH) 0.9 %
5-40 SYRINGE (ML) INJECTION AS NEEDED
Status: DISCONTINUED | OUTPATIENT
Start: 2021-01-16 | End: 2021-01-16 | Stop reason: HOSPADM

## 2021-01-16 RX ORDER — PROPOFOL 10 MG/ML
VIAL (ML) INTRAVENOUS
Status: DISCONTINUED | OUTPATIENT
Start: 2021-01-16 | End: 2021-01-16 | Stop reason: HOSPADM

## 2021-01-16 RX ORDER — NALOXONE HYDROCHLORIDE 0.4 MG/ML
0.1 INJECTION, SOLUTION INTRAMUSCULAR; INTRAVENOUS; SUBCUTANEOUS ONCE
Status: DISCONTINUED | OUTPATIENT
Start: 2021-01-16 | End: 2021-01-16 | Stop reason: HOSPADM

## 2021-01-16 RX ORDER — SODIUM CHLORIDE, SODIUM LACTATE, POTASSIUM CHLORIDE, CALCIUM CHLORIDE 600; 310; 30; 20 MG/100ML; MG/100ML; MG/100ML; MG/100ML
25 INJECTION, SOLUTION INTRAVENOUS CONTINUOUS
Status: DISCONTINUED | OUTPATIENT
Start: 2021-01-16 | End: 2021-01-16

## 2021-01-16 RX ORDER — LIDOCAINE HYDROCHLORIDE 20 MG/ML
INJECTION, SOLUTION EPIDURAL; INFILTRATION; INTRACAUDAL; PERINEURAL AS NEEDED
Status: DISCONTINUED | OUTPATIENT
Start: 2021-01-16 | End: 2021-01-16 | Stop reason: HOSPADM

## 2021-01-16 RX ORDER — KETAMINE HCL 50MG/ML(1)
SYRINGE (ML) INTRAVENOUS AS NEEDED
Status: DISCONTINUED | OUTPATIENT
Start: 2021-01-16 | End: 2021-01-16 | Stop reason: HOSPADM

## 2021-01-16 RX ORDER — MORPHINE SULFATE 2 MG/ML
1 INJECTION, SOLUTION INTRAMUSCULAR; INTRAVENOUS
Status: DISCONTINUED | OUTPATIENT
Start: 2021-01-16 | End: 2021-01-17

## 2021-01-16 RX ORDER — BUDESONIDE AND FORMOTEROL FUMARATE DIHYDRATE 160; 4.5 UG/1; UG/1
2 AEROSOL RESPIRATORY (INHALATION)
Status: DISCONTINUED | OUTPATIENT
Start: 2021-01-16 | End: 2021-01-31

## 2021-01-16 RX ORDER — HEPARIN SODIUM 5000 [USP'U]/ML
5000 INJECTION, SOLUTION INTRAVENOUS; SUBCUTANEOUS EVERY 8 HOURS
Status: DISCONTINUED | OUTPATIENT
Start: 2021-01-16 | End: 2021-01-24

## 2021-01-16 RX ORDER — SODIUM CHLORIDE 9 MG/ML
INJECTION, SOLUTION INTRAVENOUS
Status: DISCONTINUED | OUTPATIENT
Start: 2021-01-16 | End: 2021-01-16 | Stop reason: HOSPADM

## 2021-01-16 RX ORDER — OXYCODONE AND ACETAMINOPHEN 5; 325 MG/1; MG/1
1 TABLET ORAL
Status: DISCONTINUED | OUTPATIENT
Start: 2021-01-16 | End: 2021-02-01 | Stop reason: HOSPADM

## 2021-01-16 RX ORDER — FENTANYL CITRATE 50 UG/ML
50 INJECTION, SOLUTION INTRAMUSCULAR; INTRAVENOUS
Status: DISCONTINUED | OUTPATIENT
Start: 2021-01-16 | End: 2021-01-16 | Stop reason: HOSPADM

## 2021-01-16 RX ORDER — NEOMYCIN AND POLYMYXIN B SULFATES 40; 200000 MG/ML; [USP'U]/ML
SOLUTION IRRIGATION AS NEEDED
Status: DISCONTINUED | OUTPATIENT
Start: 2021-01-16 | End: 2021-01-16 | Stop reason: HOSPADM

## 2021-01-16 RX ORDER — SODIUM CHLORIDE 0.9 % (FLUSH) 0.9 %
5-40 SYRINGE (ML) INJECTION EVERY 8 HOURS
Status: DISCONTINUED | OUTPATIENT
Start: 2021-01-16 | End: 2021-01-16 | Stop reason: HOSPADM

## 2021-01-16 RX ORDER — ONDANSETRON 2 MG/ML
4 INJECTION INTRAMUSCULAR; INTRAVENOUS ONCE
Status: DISCONTINUED | OUTPATIENT
Start: 2021-01-16 | End: 2021-01-16 | Stop reason: HOSPADM

## 2021-01-16 RX ADMIN — Medication 10 ML: at 21:40

## 2021-01-16 RX ADMIN — Medication 10 ML: at 11:11

## 2021-01-16 RX ADMIN — Medication 10 ML: at 22:00

## 2021-01-16 RX ADMIN — WATER 1 G: 1 INJECTION INTRAMUSCULAR; INTRAVENOUS; SUBCUTANEOUS at 17:49

## 2021-01-16 RX ADMIN — DILTIAZEM HYDROCHLORIDE 180 MG: 180 CAPSULE, COATED, EXTENDED RELEASE ORAL at 17:49

## 2021-01-16 RX ADMIN — DILTIAZEM HYDROCHLORIDE 180 MG: 180 CAPSULE, COATED, EXTENDED RELEASE ORAL at 10:46

## 2021-01-16 RX ADMIN — HEPARIN SODIUM 5000 UNITS: 5000 INJECTION INTRAVENOUS; SUBCUTANEOUS at 21:39

## 2021-01-16 RX ADMIN — Medication 5 MG: at 21:36

## 2021-01-16 RX ADMIN — MIDAZOLAM HYDROCHLORIDE 2 MG: 2 INJECTION, SOLUTION INTRAMUSCULAR; INTRAVENOUS at 08:20

## 2021-01-16 RX ADMIN — SODIUM CHLORIDE, SODIUM LACTATE, POTASSIUM CHLORIDE, AND CALCIUM CHLORIDE 25 ML/HR: 600; 310; 30; 20 INJECTION, SOLUTION INTRAVENOUS at 17:57

## 2021-01-16 RX ADMIN — PROPOFOL 50 MCG/KG/MIN: 10 INJECTION, EMULSION INTRAVENOUS at 08:25

## 2021-01-16 RX ADMIN — FAMOTIDINE 20 MG: 20 TABLET, FILM COATED ORAL at 17:56

## 2021-01-16 RX ADMIN — Medication 50 MG: at 08:25

## 2021-01-16 RX ADMIN — PHENYLEPHRINE HYDROCHLORIDE 200 MCG: 10 INJECTION INTRAVENOUS at 08:47

## 2021-01-16 RX ADMIN — AMITRIPTYLINE HYDROCHLORIDE 50 MG: 50 TABLET, FILM COATED ORAL at 21:37

## 2021-01-16 RX ADMIN — Medication 10 ML: at 17:51

## 2021-01-16 RX ADMIN — Medication 10 ML: at 21:38

## 2021-01-16 RX ADMIN — PHENYLEPHRINE HYDROCHLORIDE 100 MCG: 10 INJECTION INTRAVENOUS at 08:40

## 2021-01-16 RX ADMIN — OXYCODONE HYDROCHLORIDE AND ACETAMINOPHEN 1 TABLET: 5; 325 TABLET ORAL at 21:43

## 2021-01-16 RX ADMIN — SODIUM CHLORIDE: 900 INJECTION, SOLUTION INTRAVENOUS at 08:20

## 2021-01-16 NOTE — ROUTINE PROCESS
Pt has multiple skin bruises on his right arm. The right foot has a series of blister on the top and side of his foot. The left foot has blister on the top of  The foot as well as the chin area. On top of  The left foot there is a 8 cm x 6 cm open area where a blister has ruptured. There is serousangous drainage. The patient has anasarca.

## 2021-01-16 NOTE — PROGRESS NOTES
New OT orders received and chart reviewed. Unable to see pt for OT evaluation at this time due to:    Pt is off Unit for scheduled surgical procedure. Please discontinue pt's bedrest orders and update pt's weight bearing following procedure when appropriate. Will follow up later as pt's schedule allows.  Thank you for this referral.    Collette Hunt, MS, OTR/L

## 2021-01-16 NOTE — BRIEF OP NOTE
Brief Postoperative Note    Patient: Haritha Aguilar  YOB: 1960  MRN: [id number]    Date of Procedure: 1/16/2021     Pre-Op Diagnosis: bilateral feet abscesses    Post-Op Diagnosis: Same as preoperative diagnosis. Procedure(s):  INCISION AND DRAINAGE BILATERAL FEET    Surgeon(s):  Gigi Zuñiga DPM    Surgical Assistant: Surg Asst-1: Mathew Garcia    Anesthesia: MAC     Estimated Blood Loss (mL): Minimal    Complications: None    Specimens: * No specimens in log *     Implants: * No implants in log *    Drains: * No LDAs found *    Findings: abscess? No deeper penetration.      Electronically Signed by Cliff Merritt DPM on 1/16/2021 at 8:43 AM

## 2021-01-16 NOTE — ROUTINE PROCESS
Bedside and Verbal shift change report given to RAFA Alas (oncoming nurse) by Sharon Mckeon (offgoing nurse). Report included the following information SBAR, Kardex and Recent Results.

## 2021-01-16 NOTE — PROGRESS NOTES
Problem: Impaired Skin Integrity/Pressure Injury Treatment  Goal: *Prevention of pressure injury  Description: Document Oswaldo Scale and appropriate interventions in the flowsheet. Outcome: Progressing Towards Goal  Note: Pressure Injury Interventions:  Sensory Interventions: Assess changes in LOC, Assess need for specialty bed, Avoid rigorous massage over bony prominences, Discuss PT/OT consult with provider, Float heels, Keep linens dry and wrinkle-free, Maintain/enhance activity level, Minimize linen layers, Monitor skin under medical devices, Pressure redistribution bed/mattress (bed type), Turn and reposition approx. every two hours (pillows and wedges if needed)    Moisture Interventions: Absorbent underpads, Apply protective barrier, creams and emollients, Check for incontinence Q2 hours and as needed, Limit adult briefs, Minimize layers, Moisture barrier    Activity Interventions: Assess need for specialty bed, Increase time out of bed, Pressure redistribution bed/mattress(bed type), PT/OT evaluation    Mobility Interventions: Assess need for specialty bed, Float heels, Pressure redistribution bed/mattress (bed type), PT/OT evaluation, Turn and reposition approx.  every two hours(pillow and wedges)    Nutrition Interventions: Document food/fluid/supplement intake, Offer support with meals,snacks and hydration    Friction and Shear Interventions: Apply protective barrier, creams and emollients, Feet elevated on foot rest, Foam dressings/transparent film/skin sealants, Minimize layers

## 2021-01-16 NOTE — ROUTINE PROCESS
Primary Nurse Kameron Fernando RN and Zane Mccullough RN performed a dual skin assessment on this patient Impairment noted- see wound doc flow sheet Oswaldo score is 15. Pt has blister on both lower extremities. The left top of foot has ccm 6 cm open area with serosanguinous drainage

## 2021-01-16 NOTE — ANESTHESIA PREPROCEDURE EVALUATION
Relevant Problems   No relevant active problems       Anesthetic History   No history of anesthetic complications            Review of Systems / Medical History  Patient summary reviewed and pertinent labs reviewed    Pulmonary  Within defined limits                 Neuro/Psych         Psychiatric history     Cardiovascular    Hypertension: well controlled        Dysrhythmias : atrial fibrillation      Exercise tolerance: <4 METS     GI/Hepatic/Renal         Renal disease: CRI  Liver disease     Endo/Other      Hypothyroidism: well controlled  Arthritis     Other Findings            Physical Exam    Airway  Mallampati: III  TM Distance: 4 - 6 cm  Neck ROM: decreased range of motion   Mouth opening: Diminished (comment)     Cardiovascular    Rhythm: irregular  Rate: normal         Dental    Dentition: Full lower dentures and Full upper dentures     Pulmonary  Breath sounds clear to auscultation               Abdominal  GI exam deferred       Other Findings            Anesthetic Plan    ASA: 3, emergent  Anesthesia type: MAC          Induction: Intravenous  Anesthetic plan and risks discussed with: Patient

## 2021-01-16 NOTE — PERIOP NOTES
TRANSFER - OUT REPORT:    Telephone report given to Kristel(name) on Clonataleee Bitter  being transferred to [facility](unit) for routine post - op       Report consisted of patients Situation, Background, Assessment and   Recommendations(SBAR). Information from the following report(s) SBAR, OR Summary and MAR was reviewed with the receiving nurse. Lines:   Peripheral IV 01/15/21 Left Antecubital (Active)   Site Assessment Dry; Intact 01/16/21 0851   Phlebitis Assessment 0 01/16/21 0851   Infiltration Assessment 0 01/16/21 0851   Dressing Status Dry; Intact 01/16/21 0851   Dressing Type Tape;Transparent 01/16/21 0851   Hub Color/Line Status Pink; Infusing 01/16/21 0583   Action Taken Open ports on tubing capped 01/15/21 2000   Alcohol Cap Used Yes 01/15/21 2000        Opportunity for questions and clarification was provided.       Patient transported with:   O2 @ 3 liters  Registered Nurse

## 2021-01-16 NOTE — ANESTHESIA POSTPROCEDURE EVALUATION
Procedure(s):  INCISION AND DRAINAGE BILATERAL FEET.     MAC    Anesthesia Post Evaluation      Multimodal analgesia: multimodal analgesia used between 6 hours prior to anesthesia start to PACU discharge  Patient location during evaluation: bedside  Patient participation: complete - patient participated  Level of consciousness: awake  Pain management: adequate  Airway patency: patent  Anesthetic complications: no  Cardiovascular status: stable  Respiratory status: acceptable  Hydration status: acceptable  Post anesthesia nausea and vomiting:  controlled      INITIAL Post-op Vital signs:   Vitals Value Taken Time   /55 01/16/21 0927   Temp 36.4 °C (97.5 °F) 01/16/21 0927   Pulse 64 01/16/21 0927   Resp 12 01/16/21 0927   SpO2 97 % 01/16/21 0927

## 2021-01-16 NOTE — H&P
Update History & Physical    The Patient's History and Physical of January 1/15/2021,   surgery was reviewed with the patient and I examined the patient. There was no change. The surgical site was confirmed by the patient and me. Plan:  The risk, benefits, expected outcome, and alternative to the recommended procedure have been discussed with the patient. Patient understands and wants to proceed with the procedure.     Electronically signed by Jony Mchugh DPM on 1/16/2021 at 8:02 AM

## 2021-01-17 ENCOUNTER — APPOINTMENT (OUTPATIENT)
Dept: GENERAL RADIOLOGY | Age: 61
DRG: 673 | End: 2021-01-17
Payer: COMMERCIAL

## 2021-01-17 LAB
ANION GAP SERPL CALC-SCNC: 11 MMOL/L (ref 3–18)
BUN SERPL-MCNC: 126 MG/DL (ref 7–18)
BUN/CREAT SERPL: 11 (ref 12–20)
CALCIUM SERPL-MCNC: 8.1 MG/DL (ref 8.5–10.1)
CHLORIDE SERPL-SCNC: 102 MMOL/L (ref 100–111)
CO2 SERPL-SCNC: 24 MMOL/L (ref 21–32)
CREAT SERPL-MCNC: 11.8 MG/DL (ref 0.6–1.3)
GLUCOSE BLD STRIP.AUTO-MCNC: 82 MG/DL (ref 70–110)
GLUCOSE BLD STRIP.AUTO-MCNC: 85 MG/DL (ref 70–110)
GLUCOSE SERPL-MCNC: 88 MG/DL (ref 74–99)
POTASSIUM SERPL-SCNC: 5.1 MMOL/L (ref 3.5–5.5)
SARS-COV-2, COV2NT: NOT DETECTED
SODIUM SERPL-SCNC: 137 MMOL/L (ref 136–145)
VANCOMYCIN SERPL-MCNC: 17.5 UG/ML (ref 5–40)

## 2021-01-17 PROCEDURE — 77030040830 HC CATH URETH FOL MDII -A

## 2021-01-17 PROCEDURE — 74011250636 HC RX REV CODE- 250/636: Performed by: FAMILY MEDICINE

## 2021-01-17 PROCEDURE — 74011250637 HC RX REV CODE- 250/637: Performed by: PODIATRIST

## 2021-01-17 PROCEDURE — 77010033678 HC OXYGEN DAILY

## 2021-01-17 PROCEDURE — 2709999900 HC NON-CHARGEABLE SUPPLY

## 2021-01-17 PROCEDURE — 97535 SELF CARE MNGMENT TRAINING: CPT

## 2021-01-17 PROCEDURE — 80202 ASSAY OF VANCOMYCIN: CPT

## 2021-01-17 PROCEDURE — 94761 N-INVAS EAR/PLS OXIMETRY MLT: CPT

## 2021-01-17 PROCEDURE — 74011250636 HC RX REV CODE- 250/636: Performed by: INTERNAL MEDICINE

## 2021-01-17 PROCEDURE — 97165 OT EVAL LOW COMPLEX 30 MIN: CPT

## 2021-01-17 PROCEDURE — 71045 X-RAY EXAM CHEST 1 VIEW: CPT

## 2021-01-17 PROCEDURE — 90935 HEMODIALYSIS ONE EVALUATION: CPT

## 2021-01-17 PROCEDURE — 94640 AIRWAY INHALATION TREATMENT: CPT

## 2021-01-17 PROCEDURE — 99233 SBSQ HOSP IP/OBS HIGH 50: CPT | Performed by: FAMILY MEDICINE

## 2021-01-17 PROCEDURE — 5A1D70Z PERFORMANCE OF URINARY FILTRATION, INTERMITTENT, LESS THAN 6 HOURS PER DAY: ICD-10-PCS | Performed by: FAMILY MEDICINE

## 2021-01-17 PROCEDURE — 94760 N-INVAS EAR/PLS OXIMETRY 1: CPT

## 2021-01-17 PROCEDURE — P9047 ALBUMIN (HUMAN), 25%, 50ML: HCPCS

## 2021-01-17 PROCEDURE — 02HV33Z INSERTION OF INFUSION DEVICE INTO SUPERIOR VENA CAVA, PERCUTANEOUS APPROACH: ICD-10-PCS

## 2021-01-17 PROCEDURE — 74011250637 HC RX REV CODE- 250/637: Performed by: FAMILY MEDICINE

## 2021-01-17 PROCEDURE — 65270000029 HC RM PRIVATE

## 2021-01-17 PROCEDURE — 74011250637 HC RX REV CODE- 250/637: Performed by: HOSPITALIST

## 2021-01-17 PROCEDURE — 80048 BASIC METABOLIC PNL TOTAL CA: CPT

## 2021-01-17 PROCEDURE — 82962 GLUCOSE BLOOD TEST: CPT

## 2021-01-17 PROCEDURE — 74011250637 HC RX REV CODE- 250/637: Performed by: INTERNAL MEDICINE

## 2021-01-17 PROCEDURE — 36415 COLL VENOUS BLD VENIPUNCTURE: CPT

## 2021-01-17 PROCEDURE — 74011250636 HC RX REV CODE- 250/636

## 2021-01-17 RX ORDER — DILTIAZEM HYDROCHLORIDE 120 MG/1
120 CAPSULE, COATED, EXTENDED RELEASE ORAL EVERY 12 HOURS
Status: DISCONTINUED | OUTPATIENT
Start: 2021-01-17 | End: 2021-02-01 | Stop reason: HOSPADM

## 2021-01-17 RX ORDER — HEPARIN SODIUM 1000 [USP'U]/ML
5000 INJECTION, SOLUTION INTRAVENOUS; SUBCUTANEOUS
Status: DISCONTINUED | OUTPATIENT
Start: 2021-01-17 | End: 2021-02-01 | Stop reason: HOSPADM

## 2021-01-17 RX ORDER — SODIUM CHLORIDE 9 MG/ML
250 INJECTION, SOLUTION INTRAVENOUS
Status: DISCONTINUED | OUTPATIENT
Start: 2021-01-17 | End: 2021-02-01 | Stop reason: HOSPADM

## 2021-01-17 RX ORDER — DILTIAZEM HYDROCHLORIDE 120 MG/1
120 CAPSULE, COATED, EXTENDED RELEASE ORAL 2 TIMES DAILY
Status: DISCONTINUED | OUTPATIENT
Start: 2021-01-17 | End: 2021-01-17

## 2021-01-17 RX ORDER — ALBUMIN HUMAN 250 G/1000ML
25 SOLUTION INTRAVENOUS
Status: DISCONTINUED | OUTPATIENT
Start: 2021-01-17 | End: 2021-02-01 | Stop reason: HOSPADM

## 2021-01-17 RX ORDER — ALBUMIN HUMAN 250 G/1000ML
SOLUTION INTRAVENOUS
Status: COMPLETED
Start: 2021-01-17 | End: 2021-01-17

## 2021-01-17 RX ADMIN — AMITRIPTYLINE HYDROCHLORIDE 50 MG: 50 TABLET, FILM COATED ORAL at 22:41

## 2021-01-17 RX ADMIN — Medication 10 ML: at 06:03

## 2021-01-17 RX ADMIN — DILTIAZEM HYDROCHLORIDE 120 MG: 120 CAPSULE, COATED, EXTENDED RELEASE ORAL at 22:41

## 2021-01-17 RX ADMIN — HEPARIN SODIUM 5000 UNITS: 5000 INJECTION INTRAVENOUS; SUBCUTANEOUS at 06:02

## 2021-01-17 RX ADMIN — ALBUMIN (HUMAN) 25 G: 0.25 INJECTION, SOLUTION INTRAVENOUS at 15:17

## 2021-01-17 RX ADMIN — OXYCODONE HYDROCHLORIDE AND ACETAMINOPHEN 1 TABLET: 5; 325 TABLET ORAL at 06:00

## 2021-01-17 RX ADMIN — Medication 5 MG: at 22:41

## 2021-01-17 RX ADMIN — HEPARIN SODIUM 5000 UNITS: 1000 INJECTION INTRAVENOUS; SUBCUTANEOUS at 18:00

## 2021-01-17 RX ADMIN — BUDESONIDE AND FORMOTEROL FUMARATE DIHYDRATE 2 PUFF: 160; 4.5 AEROSOL RESPIRATORY (INHALATION) at 19:30

## 2021-01-17 RX ADMIN — DILTIAZEM HYDROCHLORIDE 180 MG: 180 CAPSULE, COATED, EXTENDED RELEASE ORAL at 08:23

## 2021-01-17 RX ADMIN — POLYETHYLENE GLYCOL 3350 17 G: 17 POWDER, FOR SOLUTION ORAL at 08:23

## 2021-01-17 NOTE — PROGRESS NOTES
Problem: Self Care Deficits Care Plan (Adult)  Goal: *Acute Goals and Plan of Care (Insert Text)  Description: Occupational Therapy Goals  Initiated 1/17/2021 within 7 day(s). 1.  Patient will perform grooming with supervision/set-up. 2.  Patient will perform self-feeding with supervision/set-up. 3.  Patient will perform upper body dressing with supervision/set-up. 4.  Patient will perform toilet transfers with contact guard assist.  5.  Patient will perform all aspects of toileting with contact guard assist.  6.  Patient will participate in upper extremity therapeutic exercise/activities with supervision/set-up for 5 minutes. 7.  Patient will utilize energy conservation techniques during functional activities with verbal and visual cues. Prior Level of Function: Pt reports he was independent with ADLs/IADLs prior to this hospital admission. Outcome: Progressing Towards Goal     OCCUPATIONAL THERAPY EVALUATION    Patient: Alexandra Leonard (9 y.o. male)  Date: 1/17/2021  Primary Diagnosis: Acute kidney injury ([facility] [address] [N17.9]  ARF (acute renal failure) (HCC) [N17.9]  Procedure(s) (LRB):  INCISION AND DRAINAGE BILATERAL FEET (Bilateral) 1 Day Post-Op   Precautions: Fall  PLOF: Pt reports he lives in a 1-story home with his wife. Prior to this hospital admission, he was independent for all ADLs and IADLs. He has a walk-in shower with a small step to enter, a standard toilet, a small wooden stool that he sometimes leave in the bathroom and no grab bars. ASSESSMENT :    Pt cleared by RN to participate in OT eval. Upon entering the room, pt was alert on 4L O2 via NC with one nasal piece out of place and trying to maneuver around his lines/tubes. Pt reported he was in some distress and felt trapped and would like some help. Pt agreeable to help and participation in OT eval. Nasal canula was repositioned to provide appropriate O2 flow and O2 sats remained constant at 94% throughout the entire session.  Pt was education over pursed lip breathing techniques and demonstrated good understanding with verbal cues to initiate strategy. Pt able to perform minimal bed mobility including re-adjusting his upright position by scooting and pushing off bed rails with stand-by assist. Pt observed to have decreased gross motor coordination and strength as demonstrated by lack of control over bilateral upper extremity eccentric lowering. Pt also observed to have decreased fine motor coordination as evident by increased difficult using spoon to  breakfast food. Upon bringing water cup to mouth, pt dropped cup two times. Pt reports decreased sensation in bilateral hands/fingers; pt's left thumb is observed to have deformity (e.g. missing thumb tip). Overall, pt appeared very emotional during the entire session as noted by tears, wanting to hold the therapist's hand and subjective statements of sadness, loneliness and fear and frustration over current medical condition. Pt was left upright in bed with call bell and phone within reach and all needs met at this time. RN notified of pt's performance. Based on the objective data described, the pt presents with decreased strength and coordination, decreased sensation and altered emotional state affecting his ability to carryout his ADLs at his prior level of function. Patient will benefit from skilled intervention to address the above impairments.   Patient's rehabilitation potential is considered to be Good  Factors which may influence rehabilitation potential include:   []             None noted  [x]             Mental ability/status  [x]             Medical condition  [x]             Home/family situation and support systems  []             Safety awareness  []             Pain tolerance/management  []             Other:      PLAN :  Recommendations and Planned Interventions:   [x]               Self Care Training                  [x]      Therapeutic Activities  [x] Functional Mobility Training   [x]      Cognitive Retraining  [x]               Therapeutic Exercises           [x]      Endurance Activities  []               Balance Training                    []      Neuromuscular Re-Education  []               Visual/Perceptual Training     [x]      Home Safety Training  [x]               Patient Education                   [x]      Family Training/Education  []               Other (comment):    Frequency/Duration: Patient will be followed by occupational therapy 1-2 times per day/4-7 days per week to address goals. Discharge Recommendations: Ney Shook  Further Equipment Recommendations for Discharge: shower chair, grab bars     SUBJECTIVE:   Patient stated I feel so guilty for taking up your time.     OBJECTIVE DATA SUMMARY:     Past Medical History:   Diagnosis Date    AAA (abdominal aortic aneurysm) ([facility] [address]     Alcoholism ([facility] [address]     Atrial fibrillation (HCC)     Bradycardia     Chronic pain     Degenerative disc disease, cervical     Degenerative disc disease, lumbar     Hypertension     Hypokalemia     Pneumonia 2016    caused permanent L lung problems    Thyroid disease      Past Surgical History:   Procedure Laterality Date    HX ORTHOPAEDIC      neck fracture    HX ORTHOPAEDIC      lumbar fusion    [facility] ABDOMEN SURGERY PROC UNLISTED  10/08/2017    spleenectomy     Barriers to Learning/Limitations: yes;  emotional  Compensate with: visual, verbal, tactile, kinesthetic cues/model    Home Situation:   Home Situation  Home Environment: Apartment  # Steps to Enter: 3  One/Two Story Residence: One story  Living Alone: No  Support Systems: Spouse/Significant Other/Partner  Patient Expects to be Discharged to[de-identified] Apartment  Current DME Used/Available at Home: None  Tub or Shower Type: Shower(min height step to enter)  [x]  Right hand dominant   []  Left hand dominant    Cognitive/Behavioral Status:  Neurologic State: Alert  Orientation Level: Oriented X4  Cognition: Follows commands  Safety/Judgement: Insight into deficits    Skin: visible skin appears intact; bilateral foot wounds wrapped  Edema: noted around BLEs    Vision/Perceptual:    Acuity: Within Defined Limits    Corrective Lenses: Glasses    Coordination: BUE  Fine Motor Skills-Upper: Left Impaired;Right Impaired    Gross Motor Skills-Upper: Left Impaired;Right Impaired    Strength: BUE  Strength: Generally decreased, functional    Tone & Sensation: BUE  Tone: Normal  Sensation: Impaired(decreased sensation in bilateral hands)    Range of Motion: BUE  AROM: Generally decreased, functional    ADL Assessment:   Feeding: Minimum assistance  Oral Facial Hygiene/Grooming: Contact guard assistance  Bathing: Moderate assistance  Upper Body Dressing: Moderate assistance  Lower Body Dressing: Moderate assistance  Toileting: Moderate assistance    ADL Intervention:  Feeding  Feeding Assistance: Minimum assistance  Utensil Management: Minimum assistance  Drink to Mouth: Minimum assistance    Grooming  Washing Face: Contact guard assistance    Cognitive Retraining  Safety/Judgement: Insight into deficits    Pain:  Pain level pre-treatment: 0/10   Pain level post-treatment: 0/10   Pain Intervention(s): Medication (see MAR); Rest, Ice, Repositioning   Response to intervention: Nurse notified, See doc flow    Activity Tolerance:   Poor    Please refer to the flowsheet for vital signs taken during this treatment. After treatment:   [] Patient left in no apparent distress sitting up in chair  [x] Patient left in no apparent distress in bed  [x] Call bell left within reach  [x] Nursing notified  [] Caregiver present  [] Bed alarm activated    COMMUNICATION/EDUCATION:   [x] Role of Occupational Therapy in the acute care setting  [x] Home safety education was provided and the patient/caregiver indicated understanding. [x] Patient/family have participated as able in goal setting and plan of care.   [x] Patient/family agree to work toward stated goals and plan of care. [] Patient understands intent and goals of therapy, but is neutral about his/her participation. [] Patient is unable to participate in goal setting and plan of care. Thank you for this referral.  Stephanie Garcia OTD, OTR/L  Time Calculation: 38 mins    Eval Complexity: History: MEDIUM Complexity : Expanded review of history including physical, cognitive and psychosocial  history ; Examination: MEDIUM Complexity : 3-5 performance deficits relating to physical, cognitive , or psychosocial skils that result in activity limitations and / or participation restrictions; Decision Making:MEDIUM Complexity : Patient may present with comorbidities that affect occupational performnce.  Miniml to moderate modification of tasks or assistance (eg, physical or verbal ) with assesment(s) is necessary to enable patient to complete evaluation

## 2021-01-17 NOTE — PROCEDURES
Date of Procedure: 1/17/2021     Pre-Operative Diagnosis: ESRD    Post-Operative Diagnosis: ESRD    Procedure Performed:    1. Ultrasound guidance for access of vein  2. Right internal jugular vein venous catheter placement    Surgeon: Jinny Smart DO     Anesthesia: 1% lidocaine    Indication for Procedure: Bar Brown is a 56 y.o. male in need of venous catheter placement for HD/CRRT. Risks, benefits, and alternatives to U[place]all placement were discussed with patient. These were understood and wished to proceed. All questions addressed and answered to satisfaction. Consent was signed, witnessed, and placed into the medical record. Description of Procedure: Patient was placed in a supine position on the bed. A formal timeout was called to confirm we had the correct patient, correct procedure, correct site, and all required equipment was in the room. An ultrasound was used to initially survey the Right internal jugular and vein was identified. The site was prepped and draped in the standard surgical fashion with Chlorohexidine. The skin and subcutaneous tissue were anesthestized with 1% lidocaine. Under ultrasound guidance, a Seldinger needle was directed into the vein. There was return of dark, nonpulsatile blood in the syringe. The syringe was removed and the guidewire was fed through the needle hub without difficulty. The needle was removed over the wire. An 11-blade was used to make a 4mm nick about the wire. The dilator was fed over the wire and removed without difficulty. A pre-flushed, 20 cm venous catheter was fed over the wire also without difficulty. The wire was removed via the brown port. The catheter tip is located at the distal SVC. Luer-lock caps were applied to each port. All ports aspirated and flushed easily. The catheter was sutured to the skin in 3 places. Tegaderm with CHG was applied over the catheter site.     EBL: Minimal    Complications: None    Specimen: None    Complications: No immediate complications. Plan: STAT portable chest X-ray ordered and will follow up when ready.             Virginia White, DO  Vascular Surgery  1/17/2021 11:38 AM

## 2021-01-17 NOTE — OP NOTES
[facility]  OPERATIVE REPORT    Name:  Misael Bryant  MR#:   [id number]  :  1960  ACCOUNT #:  [de-identified]  DATE OF SERVICE:  2021    PREOPERATIVE DIAGNOSIS:  Abscess and infected blisters and cellulitis of both ankles and both feet. POSTOPERATIVE DIAGNOSIS:  Abscess and infected blisters and cellulitis of both ankles and both feet. PROCEDURE PERFORMED:  Incision and drainage of multiple areas, both ankles and both feet. SURGEON:  Prateek Menendez DPM    ASSISTANT:  0.    ANESTHESIA:  MAC.    COMPLICATIONS:  0.    SPECIMENS REMOVED:  tissue. IMPLANTS:  0.    ESTIMATED BLOOD LOSS:  0.    DESCRIPTION OF PROCEDURE:  On 2021, the patient was placed on the operating room table in the supine position. After adequate induction of MAC anesthesia, both lower extremities prepped and draped in usual sterile fashion. Attention was directed to the right foot, there was devitalized tissue and blistered tissue quite extensively across the dorsum of the foot extending on the ankle, both anterior lateral and medial.  I sharply punctured these areas and drained clear fluid. This was cultured, I debrided and drained several areas and centrally on the top of the foot, there was a small necrotic area which was excised and then we probed into the deeper subcutaneous tissues expressing more clear fluid. These areas were also cultured and they were thoroughly irrigated with antibiotic solution. Attention was directed to the left foot, previous blistered area had been drained at home by the patient's wife and there was covering of necrotic tissue. This was sharply excised and several clear filled blistered areas up onto the ankle and dorsum of the foot were drained. All these areas were gently debrided with an antiseptic antibiotic brush and thoroughly irrigated. Nonstick dressing with a compressive  dressing was applied bilaterally.   The patient tolerated the procedure and anesthesia well. Vital signs stable throughout. He was transported to the recovery room in stable condition.       Vicky Sanz DPM [id number]  D:  01/16/2021 8:57  T:  01/16/2021 21:55  JOB #:  [id number]  CC:  Sadia Farah DPM

## 2021-01-17 NOTE — DIALYSIS
[facility]        ACUTE HEMODIALYSIS FLOW SHEET      HEMODIALYSIS ORDERS: Physician: Aleksandr Collins     Dialyzer: revaclear   Duration: 3 hr  BFR: 350   DFR: 600   Dialysate:  Temp 36-37*C  K+   2    Ca+  2.5 Na 138 Bicarb 35   Weight:  131.5 kg    Patient Chart [x]     Unable to Obtain []   Dry weight/UF Goal: 1000   Access: right IJ CVC    Heparin []  Bolus      Units    [] Hourly       Units    [x]None      Catheter locking solution: heparin    Pre BP:   119/60    Pulse:     56       Respirations: 18  Temperature:   97.5 ax   Labs: Pre        Post:         [x] N/A   Additional Orders(medications, blood products, hypotension management):       [x] N/A     [x] Janak Consent Verified     CATHETER ACCESS: []N/A   [x]Right   []Left   [x]IJ     []Fem   []transhepatic   [] First use X-ray verified     []Permanent                [x] Temporary   [x]No S/S infection  []Redness  []Drainage []Cultured []Swelling []Pain   [x]Medical Aseptic Prep Utilized   []Dressing Changed  [x] Biopatch  Date: 01/17/2021       []Clotted   [x]Patent   Flows: [x]Good  []Poor  []Reversed   If access problem,  notified: []Yes    [x]N/A       GRAFT/FISTULA ACCESS:  [x]N/A                               GENERAL ASSESSMENT:      LUNGS:  Rate 18 SaO2  93%        [] N/A    [x] Clear  [] Coarse  [] Crackles  [] Wheezing        [] Diminished     Location : []RLL   []LLL    []RUL  []SUKHJINDER     Cough: []Productive  []Dry  [x]N/A   Respirations:  [x]Easy  []Labored     Therapy:   []RA  [x]NC 4 l/min    Mask: []NRB []Venti       O2%                  []Ventilator  []Intubated  [] Trach  [] BiPaP     CARDIAC: [x]Regular      [] Irregular   [] Pericardial Rub  [] JVD        []  Monitored  [] Bedside  [] Remotely monitored [x] N/A       EDEMA: [] None   [x]Generalized  [] Pitting [] 1    [] 2    [] 3    [] 4                 [] Facial  [] Pedal  []  UE  [] LE     SKIN:   [x] Warm   [] Hot     [] Cold   [x] Dry     [] Pale   [] Diaphoretic                  [] Flushed  [] Jaundiced  [] Cyanotic  [] Rash  [] Weeping     LOC:    [] Alert      []Oriented:    [] Person     [] Place  []Time               [] Confused  [x] Lethargic  [] Medicated  [] Non-responsive     GI / ABDOMEN:  [] Flat    [] Distended    [x] Soft    [] Firm   []  Obese                             [] Diarrhea  [x] Bowel Sounds  [] Nausea  [] Vomiting       / URINE ASSESSMENT:[] Voiding   [x] Oliguria  [] Anuria   [x]  Garza     [] Incontinent    []  Incontinent Brief      []  Bathroom Privileges       PAIN: [x] 0 []1  []2   []3   []4   []5   []6   []7   []8   []9   []10              Scale 0-10  Action/Follow Up:      MOBILITY:  [] Amb    [] Amb/Assist    [x] Bed    [] Wheelchair  [] Stretcher      All Vitals and Treatment Details on Attached [address]: [facility]          Room # 191/20      [x] 1st Time Acute  [x] Stat  [] Routine  [] Urgent     [x] Acute Room  []  Bedside  [] ICU/CCU  [] ER   Isolation Precautions:  Droplet Plus      Special Considerations:         [] Blood Consent Verified [x]N/A      ALLERGIES:   No Known Allergies            Code Status:Full Code        Hepatitis Status:    2nd RN check: CC                    Lab Results   Component Value Date/Time    Hepatitis B surface Ag <0.10 01/14/2021 09:00 PM    Hepatitis B surface Ab 4.78 (L) 01/14/2021 09:00 PM    Hepatitis B core, IgM Negative 01/14/2021 09:00 PM    Hepatitis C virus Ab 0.03 01/14/2021 09:00 PM                     Current Labs:   Lab Results   Component Value Date/Time    Sodium 137 01/17/2021 05:22 AM    Potassium 5.1 01/17/2021 05:22 AM    Chloride 102 01/17/2021 05:22 AM    CO2 24 01/17/2021 05:22 AM    Anion gap 11 01/17/2021 05:22 AM    Glucose 88 01/17/2021 05:22 AM     (H) 01/17/2021 05:22 AM    Creatinine 11.80 (H) 01/17/2021 05:22 AM    BUN/Creatinine ratio 11 (L) 01/17/2021 05:22 AM    GFR est AA 5 (L) 01/17/2021 05:22 AM    GFR est non-AA 4 (L) 01/17/2021 05:22 AM    Calcium 8.1 (L) 01/17/2021 05:22 AM      Lab Results   Component Value Date/Time    WBC 12.4 01/16/2021 01:29 AM    HGB 12.9 (L) 01/16/2021 01:29 AM    HCT 39.9 01/16/2021 01:29 AM    PLATELET 671 73/27/2981 01:29 AM    MCV 95.9 01/16/2021 01:29 AM                                                                                     DIET:   DIET DIABETIC CONSISTENT CARB       PRIMARY NURSE REPORT: First initial/Last name/Title      Pre Dialysis: ROSA ISELA Adhikari RN       Time: 1416      EDUCATION:    [x] Patient [] Other         Knowledge Basis: []None [x]Minimal [] Substantial   Barriers to learning: AMS   [] Access Care     [] S&S of infection     [] Fluid Management     []K+     [x]Procedural    []Albumin     [] Medications     [] Tx Options     [] Transplant     [] Diet     [] Other   Teaching Tools:  [x] Explain  [x] Demo  [] Handouts [] Video  Patient response:  [] Verbalized understanding  [] Teach back  [] Return demonstration [x] Requires follow up   Inappropriate due to:      Pt very lethargic     [x]Time Out/Safety Check  [x]Extracorporeal Circuit Tested for integrity       RO/HEMODIALYSIS MACHINE SAFETY CHECKS  Before each treatment:     Machine Number:                   [facility]                                   [x] Unit Machine # 6 with centralized RO                                       Alarm Test:  Pass time 1357               [x] RO/Machine Log Complete      Temp   36             Dialysate: pH  7.4 Conductivity: Meter   13.8     HD Machine   13.8                  TCD: 13.7  Dialyzer Lot # [id number]            Blood Tubing Lot # [id number]          Saline Lot #  [id number]     CHLORINE TESTING-Before each treatment and every 4 hours    Total Chlorine: [x] less than 0.1 ppm  Time: 1430   (if greater than 0.1 ppm from Primary then every 30 minutes from Secondary)     TREATMENT INITIATION  with Dialysis Precautions:   [x] All Connections Secured                 [x] Saline Line Double Clamped   [x] Venous Parameters Set                  [x] Arterial Parameters Set    [x] Prime Given 250ml                          [x]Air Foam Detector Engaged      Treatment Initiation Note:    Pt arrived to HD unit via bed. VSS and in no apparent distress with O2 @ 4 LPM NC. Pt sleeping and very difficult to arouse. O2 sat 93%. Hospitalist at bedside at initiation of treatment- states this is not the pt's baseline and he will be ordering labs. FC in place, draining dark, sedimented urine. Right IJ CVC assessed with no s/s complications. Both ports aspirate and flush easily. HD initiated without difficulty. During Treatment Notes:  1500 Pt resting with eyes closed. Face and access in view with connections secure. 1530 Pt resting with eyes closed. Face and access in view with connections secure. 1600 Pt resting with eyes closed. Face and access in view with connections secure. 1615 Received order per Dr. Gricelda Wan to decrease BFR to 250-300.  1630 Pt resting with eyes closed. Face and access in view with connections secure. 1645 Pt now awake. Very confused, restless, and fidgeting. 1700 Pt awake and alert. Face and access in view with connections secure. 1730 Pt resting with eyes closed. Face and access in view with connections secure. Medication Dose Volume Route Time [facility] name Title   Albumin x2 25g 100 ml dialysis 1517 P Aneudy CRAIG                   Post Assessment:   Dialyzer Cleared: [] Good [x] Fair  [] Poor  Blood processed:  54.0 L  UF Removed  1500 Ml  POst BP:   113/64       Pulse: 61        Respirations: 18  Temperature: 97.6 ax Lungs:     [x] Clear      [] Course         [] Crackles    [] Wheezing         [] Diminished   Post Tx Vascular Access:      N/A Cardiac:   [x] Regular   [] Irregular   [] Monitor  [x] N/A       Catheter:   Locking solution: Heparin 1:1000   Art.  1.6   [place]kin. 1.7    Skin:   Pain:   [x] Warm  [x] Dry [] Diaphoretic    [] Flushed    [] Pale [] Cyanotic [x]0  []1  []2   []3  []4   []5   []6   []7   []8 []9   []10     Post Treatment Note:  Pt tolerated 3 hrs of treatment. Net 1 L UF removed. POST TREATMENT PRIMARY NURSE HANDOFF REPORT:     First initial/Last name/Title         Post Dialysis: ROSA ISELA Akhtar RN    Time:  6674     Abbreviations: AVG-arterial venous graft, AVF-arterial venous fistula, IJ-Internal Jugular, Subcl-Subclavian, Fem-Femoral, Tx-treatment, AP/HR-apical heart rate, DFR-dialysate flow rate, BFR-blood flow rate, AP-arterial pressure, -venous pressure, UF-ultrafiltrate, TMP-transmembrane pressure, [place]kin-Venous, Art-Arterial, RO-Reverse Osmosis

## 2021-01-17 NOTE — CONSULTS
Consult Note  Consult requested by: Dr. Fernie Arroyo is a 56 y.o. male [facility] who is being seen on consult for renal failure   Chief Complaint   Patient presents with    Shortness of Breath    Blood in Urine    Tremors     Admission diagnosis: Acute kidney injury ([address]     57y M with PMH HTN, chronic pain, admitted for severe renal failure  Impression & Plan:   IMPRESSION:   Acute kidney injury ATN vs RPGN, per history , has been having gross hematuria over last several weeks, brief use of nsaids, was on diuretics at home  Proteinuria, nephrotic range  Hematuria,   H/o nose bleeding episode   HTN  Lower extremities edema with cellulits, blister  Atrial fibrillation , on anti coagulation   H/o spleenectomy    PLAN:    Mr. Isabelle Jaime has severe kidney injury, suspect it has been going over last several weeks I have higher suspicion of ANCA vasculitis . Tess Merino He has symptomatic uremia , and will need to start urgent dialysis. I have discussed with patient and family (wife). Agree with dialysis. Will continue HD support for next week. He will need to switch iv heparin or hold anticoagulation  As He will need kidney biopsy asap. Continue antibiotics per primary team.   Follow up GN work up. Thank you very much for allowing me to participate in the care of this patient. We will continue to monitor with you and make recommendations as needed.         HPI: 69-year-old male with past medical history of hypertension, chronic musculoskeletal pain presented to emergency room complaining lower extremity edema and blister on his both feet. In the emergency room his work-up shows severe kidney injury, leukocytosis. He was evaluated by nephrology and recommended work-up for, glomerular nephritis. He underwent for incision and drainage for his lower extremity blister and started on broad-spectrum antibiotic. During Hospitalization her BUN continue to climb now at 126 mg per DL.   During my visit Mr. Eneida Riddle admitted he cannot able to think very clearly and feeling more shaky. He admit he has been noticing dark urine over last several days. He took ibuprofen briefly in the past for his joint pain. He also admitted he had episode of nosebleed few days ago. He has a Garza catheter his urine appears very dark. I discussed need to urgent start dialysis. I have discussed risk and benefit for dialysis. Dialysis helps to improve breathing, remove waste product which can cause life threatening condition , such as chest pain, seizure, etc, helps to correct as well as to  avoid  various electrolyte imbalance such as hyperkalemia, acidosis, hyperphosphatemia, hypo or hyper calcemia etc.  Acute complication includes hypotension, cramps, nausea and vomiting, headache, chest pain, back pain, itching, and fever and chills. Cardiac arrhythmia are frequently happened, including supraventricular tachycardia(most common) atrial fibrilation , ventricular arrhythmia. Dialysis catheter can cause bleeding , thrombosis and high risk for life threatening infection. I called his wife and updated about his kidney disease,need for dialysis.    Past Medical History:   Diagnosis Date    AAA [facility]abdominal aortic aneurysm) ([facility]     Alcoholism ([facility] [address]     Atrial fibrillation (HCC)     Bradycardia     Chronic pain     Degenerative disc disease, cervical     Degenerative disc disease, lumbar     Hypertension     Hypokalemia     Pneumonia 2016    caused permanent L lung problems    Thyroid disease       Past Surgical History:   Procedure Laterality Date    HX ORTHOPAEDIC      neck fracture    HX ORTHOPAEDIC      lumbar fusion    MS ABDOMEN SURGERY PROC UNLISTED  10/08/2017    spleenectomy       Social History     Socioeconomic History    Marital status:      Spouse name: Not on file    Number of children: Not on file    Years of education: Not on file    Highest education level: Not on file Occupational History    Not on file   Social Needs    Financial resource strain: Not on file    Food insecurity     Worry: Not on file     Inability: Not on file    Transportation needs     Medical: Not on file     Non-medical: Not on file   Tobacco Use    Smoking status: Former Smoker     Quit date: 2017     Years since quitting: 3.4    Smokeless tobacco: Former User   Substance and Sexual Activity    Alcohol use: No     Comment: sober x 5 years    Drug use: No     Comment: former drug use    Sexual activity: Not on file   Lifestyle    Physical activity     Days per week: Not on file     Minutes per session: Not on file    Stress: Not on file   Relationships    Social connections     Talks on phone: Not on file     Gets together: Not on file     Attends Rastafarian service: Not on file     Active member of club or organization: Not on file     Attends meetings of clubs or organizations: Not on file     Relationship status: Not on file    Intimate partner violence     Fear of current or ex partner: Not on file     Emotionally abused: Not on file     Physically abused: Not on file     Forced sexual activity: Not on file   Other Topics Concern    Not on file   Social History Narrative    Not on file       Family History   Problem Relation Age of Onset    Heart Disease Father     Heart Disease Brother         AAA-  in his 42's    Heart Disease Paternal Aunt     Heart Disease Paternal Uncle     Heart Disease Paternal Grandmother     Lupus Mother      No Known Allergies     Home Medications:     Prior to Admission Medications   Prescriptions Last Dose Informant Patient Reported? Taking? MULTIVIT-MIN/FA/LYCOPEN/LUTEIN (CENTRUM SILVER MEN PO) Not Taking at Unknown time  Yes No   Sig: Take 1 Tab by mouth daily. albuterol (PROAIR HFA) 90 mcg/actuation inhaler Unknown at Unknown time  No No   Sig: Take 2 Puffs by inhalation every four (4) hours as needed for Wheezing.    amitriptyline (ELAVIL) 25 mg tablet Unknown at Unknown time  No No   Sig: take 1 tablet by mouth NIGHTLY   Patient taking differently: 50 mg nightly. atenolol (TENORMIN) 100 mg tablet 1/14/2021 at Unknown time  No Yes   Sig: Take 1 Tab by mouth daily. b complex vitamins tablet Unknown at Unknown time  Yes No   Sig: Take 1 Tab by mouth daily. budesonide-formoterol (SYMBICORT) 160-4.5 mcg/actuation HFAA 1/14/2021 at Unknown time  No Yes   Sig: inhale 2 puffs by mouth twice a day   cholecalciferol, vitamin D3, (VITAMIN D3) 2,000 unit tab   Yes No   Sig: Take  by mouth two (2) times a day. dabigatran etexilate (PRADAXA) 150 mg capsule 1/14/2021 at Unknown time  No Yes   Sig: Take 1 Cap by mouth every twelve (12) hours. dilTIAZem CD (CARTIA XT) 180 mg ER capsule 1/14/2021 at Unknown time  No Yes   Sig: Take 1 Cap by mouth two (2) times a day. ergocalciferol (ERGOCALCIFEROL) 50,000 unit capsule Not Taking at Unknown time  No No   Sig: Take 1 Cap by mouth every seven (7) days. fish oil-dha-epa 1,200-144-216 mg cap Not Taking at Unknown time  Yes No   Sig: Take 1 Tab by mouth daily. gabapentin (NEURONTIN) 800 mg tablet 1/14/2021 at Unknown time  No Yes   Sig: take 2 tablets by mouth three times a day   Patient taking differently: Take 1,200 mg by mouth two (2) times a day. hydroCHLOROthiazide (HYDRODIURIL) 12.5 mg tablet 1/14/2021 at Unknown time  No Yes   Sig: Take 1 Tab by mouth daily. Patient taking differently: Take 25 mg by mouth daily. magnesium 250 mg tab 1/14/2021 at Unknown time  Yes Yes   Sig: Take 1 Tab by mouth daily. melatonin tab tablet Not Taking at Unknown time  Yes No   Sig: Take  by mouth nightly. potassium 99 mg tablet Not Taking at Unknown time  Yes No   Sig: Take 99 mg by mouth daily. saw palmetto xtr/zinc picolin (SAW PALMETTO EXTRACT PO) Unknown at Unknown time  Yes No   Sig: Take  by mouth.       Facility-Administered Medications: None       Current Facility-Administered Medications   Medication Dose Route Frequency    0.9% sodium chloride infusion 250 mL  250 mL IntraVENous DIALYSIS PRN    heparin (porcine) 1,000 unit/mL injection 5,000 Units  5,000 Units InterCATHeter DIALYSIS PRN    dilTIAZem ER (CARDIZEM CD) capsule 120 mg  120 mg Oral Q12H    budesonide-formoteroL (SYMBICORT) 160-4.5 mcg/actuation HFA inhaler 2 Puff  2 Puff Inhalation BID RT    oxyCODONE-acetaminophen (PERCOCET) 5-325 mg per tablet 1 Tab  1 Tab Oral Q6H PRN    heparin (porcine) injection 5,000 Units  5,000 Units SubCUTAneous Q8H    insulin lispro (HUMALOG) injection   SubCUTAneous AC&HS    glucose chewable tablet 16 g  4 Tab Oral PRN    glucagon (GLUCAGEN) injection 1 mg  1 mg IntraMUSCular PRN    dextrose (D50W) injection syrg 12.5-25 g  25-50 mL IntraVENous PRN    cefepime (MAXIPIME) 1 g in sterile water (preservative free) 10 mL IV syringe  1 g IntraVENous Q24H    sodium chloride (NS) flush 5-40 mL  5-40 mL IntraVENous Q8H    sodium chloride (NS) flush 5-40 mL  5-40 mL IntraVENous Q8H    sodium chloride (NS) flush 5-40 mL  5-40 mL IntraVENous PRN    acetaminophen (TYLENOL) tablet 650 mg  650 mg Oral Q6H PRN    Or    acetaminophen (TYLENOL) suppository 650 mg  650 mg Rectal Q6H PRN    polyethylene glycol (MIRALAX) packet 17 g  17 g Oral DAILY PRN    promethazine (PHENERGAN) tablet 12.5 mg  12.5 mg Oral Q6H PRN    Or    ondansetron (ZOFRAN) injection 4 mg  4 mg IntraVENous Q6H PRN    amitriptyline (ELAVIL) tablet 50 mg  50 mg Oral QHS    ergocalciferol capsule 50,000 Units  50,000 Units Oral Q7D    melatonin tablet 5 mg  5 mg Oral QHS       Review of Systems:      Complete 10-point review of systems were obtained and discussed in length  with the patient. Complete review of systems was negative/unremarkable  except as mentioned in HPI section.   Data Review:    Labs: Results:       Chemistry Recent Labs     01/17/21  0522 01/16/21  0129 01/15/21  0426   GLU 88 89 87    142 140   K 5.1 4.5 4.3    106 102   CO2 24 26 27   * 119* 102*   CREA 11.80* 10.70* 9.57*   CA 8.1* 8.0* 8.4*   AGAP 11 10 11   BUCR 11* 11* 11*      CBC w/Diff Recent Labs     01/16/21  0129 01/15/21  0426   WBC 12.4 15.1*   RBC 4.16* 4.73   HGB 12.9* 14.6   HCT 39.9 45.3    454*   GRANS  --  80*   LYMPH  --  10*   EOS  --  1      Coagulation No results for input(s): PTP, INR, APTT, INREXT in the last 72 hours. Iron/Ferritin No results for input(s): IRON in the last 72 hours. No lab exists for component: TIBCCALC   BNP No results for input(s): BNPP in the last 72 hours. Cardiac Enzymes Recent Labs     01/15/21  1559 01/15/21  0426    193   CKND1 1.8 1.6      Liver Enzymes No results for input(s): TP, ALB, TBIL, AP in the last 72 hours.     No lab exists for component: SGOT, GPT, DBIL   Thyroid Studies Lab Results   Component Value Date/Time    TSH 1.94 01/14/2021 11:56 AM         EKG: atrial fibrilation     Physical Assessment:     Visit Vitals  /67   Pulse 88   Temp 97.5 °F (36.4 °C)   Resp 20   Ht 5' 10\" (1.778 m)   Wt 131.5 kg (290 lb)   SpO2 93%   BMI 41.61 kg/m²     Weight change:     Intake/Output Summary (Last 24 hours) at 1/17/2021 1503  Last data filed at 1/17/2021 1654  Gross per 24 hour   Intake 120 ml   Output 500 ml   Net -380 ml     Physical Exam:   General: comfortable, no acute distress   HEENT sclera anicteric, supple neck, no thyromegaly  CVS: S1S2 heard,  no rub  RS: + air entry b/l,   Abd: Soft, Non tender, Not distended,  Neuro: non focal, awake, alert , CN II-XII are grossly intact,  +tremor   Extrm: ++edema, no cyanosis, clubbing   Skin:  dry  Musculoskeletal: No gross joints or bone deformities     Procedures/imaging: see electronic medical records for all procedures, Xrays and details which were not copied into this note but were reviewed prior to creation of Faby Shah MD  January 17, 2021  [facility]  Office [phone]

## 2021-01-17 NOTE — PROGRESS NOTES
Problem: Impaired Skin Integrity/Pressure Injury Treatment  Goal: *Prevention of pressure injury  Description: Document Oswaldo Scale and appropriate interventions in the flowsheet.   Outcome: Progressing Towards Goal  Note: Pressure Injury Interventions:  Sensory Interventions: Assess changes in LOC, Assess need for specialty bed    Moisture Interventions: Absorbent underpads    Activity Interventions: Pressure redistribution bed/mattress(bed type)    Mobility Interventions: HOB 30 degrees or less    Nutrition Interventions: Discuss nutritional consult with provider    Friction and Shear Interventions: Apply protective barrier, creams and emollients, Feet elevated on foot rest, Foam dressings/transparent film/skin sealants, HOB 30 degrees or less, Minimize layers                Problem: Pain  Goal: *Control of Pain  Outcome: Progressing Towards Goal     Problem: Impaired Skin Integrity/Pressure Injury Treatment  Goal: *Improvement of Existing Pressure Injury  Outcome: Progressing Towards Goal

## 2021-01-17 NOTE — PROGRESS NOTES
Admit Date: 2021  Date of Service: 2021    Reason for follow-up: ROBERT, volume overload      Assessment:     1. Severe JOE with proteinuira  2. Acute hypoxic respiratory failure: multifactorial  3. A.fib:  Rate controlled; on Cardizem; Pradaxa on hold   4. Volume overload   5. Hypocoagulable sate:  INR 1.9 in patient not on coumadin  6. Leukocytosis with mild lymphopenia  7. Chronic LE wounds with abscesses status post bilateral I&D  8. Hematuria  9. Pre-syncope  10. Suspected COVID   11. Surgically asplenic      Rapid Covid-19 - 1/15/2021  Send out [id number] pending 2021  Nephrology and podiatry consulted  Discussed with nephrology, will likely need dialysis starting tomorrow  Echo report reviewedEF 55-60%  F/u renal duplex   Continue atenolol, cardizem, Symbicort  Continue SSI with Accu-Cheks  Pain control  Continue IV ABXCefepime, hold vancomycin due to severe JOE  F/u blood and wound cultures  F/u JULIO, SPEP, PAOLO, K/I ratio, urine protein, CH50, C3 and C4, HIV, Hep B/C pending  Wound care consulted  PT, OT  Droplet plus isolation      Current Antibtiocs:   Vanc - present  Cefepime - present    Discussed with wife over the phone. All questions answered to satisfaction. Case discussed with:  [x]Patient  []Family  [x]Nursing  []Case Management  DVT Prophylaxis:  []Lovenox  [x]Hep SQ  []SCDs  []Coumadin   []On Heparin gtt  Diet: Diabetic  CODE STATUS: Full  Contact: Omaira Kohli (wife)  [phone]      RAMAN Whittaker    2021       Subjective:      Seen in room today  Status post I&D of bilateral feet  Complains of pain in bilateral feet  Complains of persistent swelling in his legs  Denies other significant complaints      Objective:        Visit Vitals  /73   Pulse 80   Temp 97.7 °F (36.5 °C)   Resp 19   Ht 5' 10\" (1.778 m)   Wt 131.5 kg (290 lb)   SpO2 92%   BMI 41.61 kg/m²     Temp (24hrs), Av.6 °F (36.4 °C), Min:97.4 °F (36.3 °C), Max:97.7 °F (36.5 °C)        General:   awake alert and oriented, non-toxic, chronically ill appearing, disheveled, anasarca   Skin:   dry, flaky, pale; prior to surgeryb/l LE with blisters of variying stages; left dorsal foot with ruptured blisted and developing eschar; right dorsal foot with abscess vs bullae; BLE wrapped in clean and dry bandages and Ace wraps   HEENT:  No scleral icterus or pallor; oral mucosa moist, lips moist   Lymph Nodes:   not assessed today   Lungs:   non, labored; bilaterally clear to aspiration- no crackles wheezes rales or rhonchi   Heart:  RRR, s1 and s2; no murmurs rubs or gallops; 3+ edema, + pedal pulses   Abdomen:  soft, non-distended, old surgical scar noted; active bowel sounds, non-tender, dependent edema to sacrum   Genitourinary:  deferred   Extremities:   average muscle tone; no contractures, no joint effusions   Neurologic:  No gross focal motor or sensory abnormalities; Follows commands. Psychiatric:   appropriate and interactive.        Labs: Results:   Chemistry Recent Labs     01/16/21  0129 01/15/21  0426 01/14/21  2100 01/14/21  1156   GLU 89 87 86 76    140 140 138   K 4.5 4.3 4.0 4.8    102 103 103   CO2 26 27 29 30   * 102* 101* 89*   CREA 10.70* 9.57* 9.18* 8.89*   CA 8.0* 8.4* 8.4* 8.3*   AGAP 10 11 8 5   BUCR 11* 11* 11* 10*   AP  --   --   --  76   TP  --   --   --  7.8   ALB  --   --   --  2.6*   GLOB  --   --   --  5.2*   AGRAT  --   --   --  0.5*      CBC w/Diff Recent Labs     01/16/21  0129 01/15/21  0426 01/14/21  1156   WBC 12.4 15.1* 16.9*   RBC 4.16* 4.73 4.74   HGB 12.9* 14.6 14.6   HCT 39.9 45.3 45.6    454* 450*   GRANS  --  80* 85*   LYMPH  --  10* 9*   EOS  --  1 0        No results found for: SDES Lab Results   Component Value Date/Time    Culture result: (A) 01/14/2021 09:00 PM     LIGHT POSSIBLE STAPHYLOCOCCUS SPECIES, COAGULASE NEGATIVE    Culture result: CHECKING FOR POSSIBLE  OTHER ORGANISMS   01/14/2021 09:00 PM        Results Procedure Component Value Units Date/Time    CULTURE, Lise Ally STAIN [[id number]] Collected: 01/16/21 0846    Order Status: Completed Updated: 01/16/21 1146    CULTURE, ANAEROBIC [[id number]] Collected: 01/16/21 0846    Order Status: Completed Updated: 01/16/21 1146    CULTURE, Lise Ally STAIN [[id number]] Collected: 01/16/21 0846    Order Status: Completed Updated: 01/16/21 1148    CULTURE, ANAEROBIC [[id number]] Collected: 01/16/21 0846    Order Status: Completed Updated: 01/16/21 1148    CULTURE, Lise Ally STAIN [[id number]]  (Abnormal) Collected: 01/14/21 2100    Order Status: Completed Specimen: Wound from Foot Updated: 01/16/21 1247     Special Requests: NO SPECIAL REQUESTS        GRAM STAIN FEW WBCS SEEN         NO ORGANISMS SEEN        Culture result:       LIGHT POSSIBLE STAPHYLOCOCCUS SPECIES, COAGULASE NEGATIVE            CHECKING FOR POSSIBLE  OTHER ORGANISMS             Imaging:     Xr Chest Pa Lat    Result Date: 1/14/2021  IMPRESSION: 1. Enlarged cardiac silhouette with mild pulmonary vascular congestion. 2. Small left pleural effusion and left basilar atelectasis or pneumonia. Xr Foot Lt Ap/lat    Result Date: 1/15/2021  IMPRESSION: 1. No distinct evidence of osteomyelitis. 2.  Nodular changes along the medial aspect could potentially represent a fluid collection or potential mass. May consider correlation with ultrasound as clinically indicated. 3.  Diffuse soft tissue edema. 4.  Osteopenia. If clinical concern for occult fracture, consider follow-up imaging in 10-14 days. Xr Foot Rt Ap/lat    Result Date: 1/15/2021  IMPRESSION: 1. No distinct evidence of osteomyelitis. . 2.  Diffuse soft tissue edema with nodular foci which could potentially represent fluid collections or masses. Correlation with ultrasound may be helpful. 3.  In the setting of osteopenia, nondisplaced fractures can be missed. If clinical concern for fracture, consider follow-up imaging in 10-14 days.     Ct Abd Pelv Wo Cont    Result Date: 1/14/2021  IMPRESSION: No acute abnormality is identified in the abdomen or pelvis. There are small pleural effusions and contiguous atelectasis evident in the lung bases. Etiology indeterminate with broad differential. Elevated BNP favors congestive heart failure or fluid overload . All CT scans at this facility are performed using dose optimization technique as appropriate to the performed exam, to include automated exposure control, adjustment of the mA and/or kV according to patient's size (Including appropriate matching for site-specific examinations), or use of iterative reconstruction technique. Us Retroperitoneum Comp    Result Date: 1/15/2021  IMPRESSION: 1. Normal examination of the kidneys. 2.  Bladder decompressed by Garza. Xr Knee Rt 3 V    Result Date: 1/14/2021  IMPRESSION: Subtle cortical irregularity at the proximal aspect of the medial femoral condyle could represent nondisplaced fracture, correlate with point tenderness. Diffuse soft tissue swelling.

## 2021-01-17 NOTE — ROUTINE PROCESS
Bedside shift change report given to Raz Baez RN (oncoming nurse) by Weston Lundberg RN (offgoing nurse). Report included the following information SBAR, Kardex, Intake/Output, MAR and Cardiac Rhythm Afib.

## 2021-01-17 NOTE — CONSULTS
Vascular Surgery Consult      Referring Physician: Dr. Lucie Roque    CC: Dialysis access    HPI:  Fiorella Toure is a 56 y.o. male with A fib, chronic pain, hx of AAA, depression, HTN, s/p splenectomy, and ARF. Consult for dialysis catheter placement. Patient Active Problem List   Diagnosis Code    Atrial fibrillation (HCC) I48.91    Osteoarthritis of multiple joints M15.9    Chronic pain G89.29    Hyperthyroidism E05.90    History of abdominal aortic aneurysm (AAA) Z86.79    Family history of systemic lupus erythematosus (SLE) in mother Z80.72    History of drug use Z87.898    H/O alcohol abuse F10.11    Nicotine use disorder F17.200    Depression F32.9    Essential hypertension I10    FH: CAD (coronary artery disease) Z80.55    AAA (abdominal aortic aneurysm) without rupture (HCC) I71.4    Ruptured spleen S36. 09XA    S/P splenectomy Z90.81    Anemia due to blood loss, acute D62    Seizure (HCC) R56.9    Neuropathy G62.9    Acute kidney injury (HCC) N17.9    ARF (acute renal failure) (HCC) N17.9     Past Medical History:   Diagnosis Date    AAA (abdominal aortic aneurysm) (HCC)     Alcoholism (HCC)     Atrial fibrillation (HCC)     Bradycardia     Chronic pain     Degenerative disc disease, cervical     Degenerative disc disease, lumbar     Hypertension     Hypokalemia     Pneumonia 2016    caused permanent L lung problems    Thyroid disease      Past Surgical History:   Procedure Laterality Date    HX ORTHOPAEDIC      neck fracture    HX ORTHOPAEDIC      lumbar fusion    ID ABDOMEN SURGERY PROC UNLISTED  10/08/2017    spleenectomy     Social History     Tobacco Use    Smoking status: Former Smoker     Quit date: 8/8/2017     Years since quitting: 3.4    Smokeless tobacco: Former User   Substance Use Topics    Alcohol use: No     Comment: sober x 5 years    Drug use: No     Comment: former drug use     Family History   Problem Relation Age of Onset    Heart Disease Father  Heart Disease Brother         AAA-  in his 42's    Heart Disease Paternal Aunt     Heart Disease Paternal Uncle     Heart Disease Paternal Grandmother     Lupus Mother      No Known Allergies  Home Medications:     Prior to Admission Medications   Prescriptions Last Dose Informant Patient Reported? Taking? MULTIVIT-MIN/FA/LYCOPEN/LUTEIN (CENTRUM SILVER MEN PO) Not Taking at Unknown time  Yes No   Sig: Take 1 Tab by mouth daily. albuterol (PROAIR HFA) 90 mcg/actuation inhaler Unknown at Unknown time  No No   Sig: Take 2 Puffs by inhalation every four (4) hours as needed for Wheezing. amitriptyline (ELAVIL) 25 mg tablet Unknown at Unknown time  No No   Sig: take 1 tablet by mouth NIGHTLY   Patient taking differently: 50 mg nightly. atenolol (TENORMIN) 100 mg tablet 2021 at Unknown time  No Yes   Sig: Take 1 Tab by mouth daily. b complex vitamins tablet Unknown at Unknown time  Yes No   Sig: Take 1 Tab by mouth daily. budesonide-formoterol (SYMBICORT) 160-4.5 mcg/actuation HFAA 2021 at Unknown time  No Yes   Sig: inhale 2 puffs by mouth twice a day   cholecalciferol, vitamin D3, (VITAMIN D3) 2,000 unit tab   Yes No   Sig: Take  by mouth two (2) times a day. dabigatran etexilate (PRADAXA) 150 mg capsule 2021 at Unknown time  No Yes   Sig: Take 1 Cap by mouth every twelve (12) hours. dilTIAZem CD (CARTIA XT) 180 mg ER capsule 2021 at Unknown time  No Yes   Sig: Take 1 Cap by mouth two (2) times a day. ergocalciferol (ERGOCALCIFEROL) 50,000 unit capsule Not Taking at Unknown time  No No   Sig: Take 1 Cap by mouth every seven (7) days. fish oil-dha-epa 1,200-144-216 mg cap Not Taking at Unknown time  Yes No   Sig: Take 1 Tab by mouth daily. gabapentin (NEURONTIN) 800 mg tablet 2021 at Unknown time  No Yes   Sig: take 2 tablets by mouth three times a day   Patient taking differently: Take 1,200 mg by mouth two (2) times a day.    hydroCHLOROthiazide (HYDRODIURIL) 12.5 mg tablet 1/14/2021 at Unknown time  No Yes   Sig: Take 1 Tab by mouth daily. Patient taking differently: Take 25 mg by mouth daily. magnesium 250 mg tab 1/14/2021 at Unknown time  Yes Yes   Sig: Take 1 Tab by mouth daily. melatonin tab tablet Not Taking at Unknown time  Yes No   Sig: Take  by mouth nightly. potassium 99 mg tablet Not Taking at Unknown time  Yes No   Sig: Take 99 mg by mouth daily. saw palmetto xtr/zinc picolin (SAW PALMETTO EXTRACT PO) Unknown at Unknown time  Yes No   Sig: Take  by mouth. Facility-Administered Medications: None     Review of Systems:     Constitutional: negative for chills, fever and night sweats   Skin: negative for itching and rash   HENT: negative for headaches, sore throat and stridor   Eyes: negative for double vision and eye pain   Cardiovascular: negative for chest pain, orthopnea, palpitations   Respiratory: negative for hemoptysis, shortness of breath or wheezing   Gastointestinal: negative for abdominal pain, nausea and vomiting   Genitourinary: not assessed   Musculoskeletal: not assessed   Endo: not assessed   Heme: not assessed   Allergies: not assessed   Neurological: negative for focal weakness and speech change   Psychiatric:  not assessed       Physical Assessment:      Visit Vitals  /67   Pulse 88   Temp 97.5 °F (36.4 °C)   Resp 20   Ht 5' 10\" (1.778 m)   Wt 131.5 kg (290 lb)   SpO2 93%   BMI 41.61 kg/m²     General:  Awake, alert. CV:  RRR. Lungs:  CTAB. Abdomen:  Soft, NT, ND.   Extremities: radial pulse bilaterally     Lab Test Results:     Recent Results (from the past 24 hour(s))   GLUCOSE, POC    Collection Time: 01/16/21 11:45 AM   Result Value Ref Range    Glucose (POC) 95 70 - 110 mg/dL   GLUCOSE, POC    Collection Time: 01/16/21  3:55 PM   Result Value Ref Range    Glucose (POC) 97 70 - 110 mg/dL   GLUCOSE, POC    Collection Time: 01/16/21  8:45 PM   Result Value Ref Range    Glucose (POC) 96 70 - 110 mg/dL   VANCOMYCIN, RANDOM    Collection Time: 01/17/21  5:22 AM   Result Value Ref Range    Vancomycin, random 17.5 5.0 - 05.7 UG/ML   METABOLIC PANEL, BASIC    Collection Time: 01/17/21  5:22 AM   Result Value Ref Range    Sodium 137 136 - 145 mmol/L    Potassium 5.1 3.5 - 5.5 mmol/L    Chloride 102 100 - 111 mmol/L    CO2 24 21 - 32 mmol/L    Anion gap 11 3.0 - 18 mmol/L    Glucose 88 74 - 99 mg/dL     (H) 7.0 - 18 MG/DL    Creatinine 11.80 (H) 0.6 - 1.3 MG/DL    BUN/Creatinine ratio 11 (L) 12 - 20      GFR est AA 5 (L) >60 ml/min/1.73m2    GFR est non-AA 4 (L) >60 ml/min/1.73m2    Calcium 8.1 (L) 8.5 - 10.1 MG/DL   GLUCOSE, POC    Collection Time: 01/17/21  6:25 AM   Result Value Ref Range    Glucose (POC) 82 70 - 110 mg/dL       Impression:   Gila Randolph is a 56 y.o. male with ARF with need for dialysis access  Plan:     1. Will place IJ dialysis catheter  2. CXR post procedure  3. All the risk and benefits of the procedure was discussed at length. All questions answered to his satisfaction. Consent obtained. Meek Delvalle DO  Vascular Surgery  1/17/2021 11:00 AM     Voice recognition software was used to generate this report. Attempt was made at correction, however transcription errors may occur.

## 2021-01-18 LAB
ANA TITR SER IF: NEGATIVE {TITER}
ANION GAP SERPL CALC-SCNC: 9 MMOL/L (ref 3–18)
BACTERIA SPEC CULT: ABNORMAL
BUN SERPL-MCNC: 81 MG/DL (ref 7–18)
BUN/CREAT SERPL: 9 (ref 12–20)
C-ANCA TITR SER IF: NORMAL TITER
CALCIUM SERPL-MCNC: 7.8 MG/DL (ref 8.5–10.1)
CH50 SERPL-ACNC: >60 U/ML
CHLORIDE SERPL-SCNC: 101 MMOL/L (ref 100–111)
CO2 SERPL-SCNC: 28 MMOL/L (ref 21–32)
CREAT SERPL-MCNC: 9.12 MG/DL (ref 0.6–1.3)
GBM IGG SER-ACNC: 4 UNITS (ref 0–20)
GLUCOSE BLD STRIP.AUTO-MCNC: 83 MG/DL (ref 70–110)
GLUCOSE BLD STRIP.AUTO-MCNC: 88 MG/DL (ref 70–110)
GLUCOSE BLD STRIP.AUTO-MCNC: 91 MG/DL (ref 70–110)
GLUCOSE BLD STRIP.AUTO-MCNC: 96 MG/DL (ref 70–110)
GLUCOSE SERPL-MCNC: 72 MG/DL (ref 74–99)
GRAM STN SPEC: ABNORMAL
GRAM STN SPEC: ABNORMAL
HCT VFR BLD AUTO: 31.5 % (ref 36–48)
HGB BLD-MCNC: 10.2 G/DL (ref 13–16)
INR PPP: 1.7 (ref 0.8–1.2)
KAPPA LC FREE SER-MCNC: 133.9 MG/L (ref 3.3–19.4)
KAPPA LC FREE/LAMBDA FREE SER: 1.55 {RATIO} (ref 0.26–1.65)
LAMBDA LC FREE SERPL-MCNC: 86.4 MG/L (ref 5.7–26.3)
MYELOPEROXIDASE AB SER IA-ACNC: <9 U/ML (ref 0–9)
P-ANCA ATYPICAL TITR SER IF: NORMAL TITER
P-ANCA TITR SER IF: NORMAL TITER
PLEASE NOTE, 734348: NORMAL
POTASSIUM SERPL-SCNC: 4.7 MMOL/L (ref 3.5–5.5)
PROTEINASE3 AB SER IA-ACNC: <3.5 U/ML (ref 0–3.5)
PROTHROMBIN TIME: 19.3 SEC (ref 11.5–15.2)
SERVICE CMNT-IMP: ABNORMAL
SODIUM SERPL-SCNC: 138 MMOL/L (ref 136–145)

## 2021-01-18 PROCEDURE — 94640 AIRWAY INHALATION TREATMENT: CPT

## 2021-01-18 PROCEDURE — 74011000250 HC RX REV CODE- 250: Performed by: PODIATRIST

## 2021-01-18 PROCEDURE — 74011250636 HC RX REV CODE- 250/636: Performed by: FAMILY MEDICINE

## 2021-01-18 PROCEDURE — 74011250637 HC RX REV CODE- 250/637: Performed by: INTERNAL MEDICINE

## 2021-01-18 PROCEDURE — 36591 DRAW BLOOD OFF VENOUS DEVICE: CPT

## 2021-01-18 PROCEDURE — 65270000029 HC RM PRIVATE

## 2021-01-18 PROCEDURE — 85610 PROTHROMBIN TIME: CPT

## 2021-01-18 PROCEDURE — 85018 HEMOGLOBIN: CPT

## 2021-01-18 PROCEDURE — 82962 GLUCOSE BLOOD TEST: CPT

## 2021-01-18 PROCEDURE — 74011250636 HC RX REV CODE- 250/636: Performed by: PODIATRIST

## 2021-01-18 PROCEDURE — 74011250637 HC RX REV CODE- 250/637: Performed by: HOSPITALIST

## 2021-01-18 PROCEDURE — 74011250637 HC RX REV CODE- 250/637: Performed by: PODIATRIST

## 2021-01-18 PROCEDURE — 2709999900 HC NON-CHARGEABLE SUPPLY

## 2021-01-18 PROCEDURE — 51798 US URINE CAPACITY MEASURE: CPT

## 2021-01-18 PROCEDURE — 80048 BASIC METABOLIC PNL TOTAL CA: CPT

## 2021-01-18 PROCEDURE — 74011250636 HC RX REV CODE- 250/636: Performed by: EMERGENCY MEDICINE

## 2021-01-18 PROCEDURE — 99232 SBSQ HOSP IP/OBS MODERATE 35: CPT | Performed by: EMERGENCY MEDICINE

## 2021-01-18 RX ORDER — SILVER SULFADIAZINE 10 G/1000G
CREAM TOPICAL DAILY
Status: DISCONTINUED | OUTPATIENT
Start: 2021-01-18 | End: 2021-02-01 | Stop reason: HOSPADM

## 2021-01-18 RX ORDER — IBUPROFEN 200 MG
1 TABLET ORAL DAILY
Status: DISCONTINUED | OUTPATIENT
Start: 2021-01-19 | End: 2021-02-01 | Stop reason: HOSPADM

## 2021-01-18 RX ORDER — LORAZEPAM 2 MG/ML
1 INJECTION INTRAMUSCULAR
Status: DISCONTINUED | OUTPATIENT
Start: 2021-01-18 | End: 2021-01-19

## 2021-01-18 RX ADMIN — DILTIAZEM HYDROCHLORIDE 120 MG: 120 CAPSULE, COATED, EXTENDED RELEASE ORAL at 21:12

## 2021-01-18 RX ADMIN — Medication 10 ML: at 21:12

## 2021-01-18 RX ADMIN — BUDESONIDE AND FORMOTEROL FUMARATE DIHYDRATE 2 PUFF: 160; 4.5 AEROSOL RESPIRATORY (INHALATION) at 08:00

## 2021-01-18 RX ADMIN — Medication 5 MG: at 21:12

## 2021-01-18 RX ADMIN — HEPARIN SODIUM 5000 UNITS: 5000 INJECTION INTRAVENOUS; SUBCUTANEOUS at 21:12

## 2021-01-18 RX ADMIN — WATER 1 G: 1 INJECTION INTRAMUSCULAR; INTRAVENOUS; SUBCUTANEOUS at 21:31

## 2021-01-18 RX ADMIN — AMITRIPTYLINE HYDROCHLORIDE 50 MG: 50 TABLET, FILM COATED ORAL at 21:12

## 2021-01-18 RX ADMIN — Medication 10 ML: at 06:00

## 2021-01-18 RX ADMIN — Medication 10 ML: at 21:13

## 2021-01-18 RX ADMIN — LORAZEPAM 1 MG: 2 INJECTION INTRAMUSCULAR; INTRAVENOUS at 19:55

## 2021-01-18 RX ADMIN — BUDESONIDE AND FORMOTEROL FUMARATE DIHYDRATE 2 PUFF: 160; 4.5 AEROSOL RESPIRATORY (INHALATION) at 20:38

## 2021-01-18 NOTE — PROGRESS NOTES
1930 Bedside report received from [address]. Pt laying in bed, confused. Vitals and FBS taken. According to day nurse, pt was not confused prior to dialysis. Assessment completed upon assuming care. Will continue to monitor closely. 2135 Pt placed on nonviolent bilateral wrist restraints and  called to assess patient. Pt managed to rip lopez catheter tubing. Orders received to replace new 16FR lopez. Lopez successfully placed and pt tolerated procedure well. 0000 pt still managed loosen his wrist restraints and pull at his lopez. Penis is now bleeding from insertion site. Lopez still intact in place and intact. Will continue to monitor. 0245 pt managed to loosen restraint and pull lopez out. Dr. Mcfadden notified. Continuous bladder checks ordered. 0330 pt's bed was noted to be saturated with blood. bladder scan 0ml    0345 stat H&H drawn and delivered to lab.     0445 dressing to LLE changed due blood saturation from lopez removal.     0600 bladder scan 0ml    0721 Bedside shift change report given to Abhay Llamas (oncoming nurse) by Humera Webb (offgoing nurse). Report included the following information SBAR, Intake/Output and Recent Results. Bed locked and in lowest position. Pt currently resting quietly with restraints in place.

## 2021-01-18 NOTE — PROGRESS NOTES
Progress  Note       57y M with PMH HTN, chronic pain, admitted for severe renal failure  Subjective      Overnight event noted  Pulled out his HD catheter, lopez catheter  Confused  During my visit, admit he is sorry about it. IMPRESSION:   Acute kidney injury ATN vs RPGN, per history , has been having gross hematuria over last several weeks, brief use of nsaids, was on diuretics at home  Proteinuria, nephrotic range  Hematuria,   H/o nose bleeding episode   HTN  Lower extremities edema with cellulits, blister  Atrial fibrillation , on anti coagulation   H/o spleenectomy   H/o heavy use of gabapentin ( about 2400mg daily for last 2 months)   PLAN:   Mr. Ruddy Alvarenga need ongoing dialysis support. Plan to get permacath placement. He also need kidney biopsy. Appreciate interventional radiology colleague to assist this to procedure. Will continue HD once HD catheter is placed. I have discussed about kidney biopsy with him and his daughter, explained risk and benefit of kidney biopsy. He is confused which seems out of proportion of his renal failure. Given his heavy use of gabapentin and renal failure could be possible reason for his severe confusion. He may need benefit from neurology colleague input. Follow up GN work up.     Discussed with Dr. Dinh Grullon, and family ( daughter)     Facility-Administered Medications: None       Current Facility-Administered Medications   Medication Dose Route Frequency    silver sulfADIAZINE (SILVADENE) 1 % topical cream   Topical DAILY    0.9% sodium chloride infusion 250 mL  250 mL IntraVENous DIALYSIS PRN    heparin (porcine) 1,000 unit/mL injection 5,000 Units  5,000 Units InterCATHeter DIALYSIS PRN    dilTIAZem ER (CARDIZEM CD) capsule 120 mg  120 mg Oral Q12H    albumin human 25% (BUMINATE) solution 25 g  25 g IntraVENous DIALYSIS PRN    budesonide-formoteroL (SYMBICORT) 160-4.5 mcg/actuation HFA inhaler 2 Puff  2 Puff Inhalation BID RT    oxyCODONE-acetaminophen (PERCOCET) 5-325 mg per tablet 1 Tab  1 Tab Oral Q6H PRN    heparin (porcine) injection 5,000 Units  5,000 Units SubCUTAneous Q8H    insulin lispro (HUMALOG) injection   SubCUTAneous AC&HS    glucose chewable tablet 16 g  4 Tab Oral PRN    glucagon (GLUCAGEN) injection 1 mg  1 mg IntraMUSCular PRN    dextrose (D50W) injection syrg 12.5-25 g  25-50 mL IntraVENous PRN    cefepime (MAXIPIME) 1 g in sterile water (preservative free) 10 mL IV syringe  1 g IntraVENous Q24H    sodium chloride (NS) flush 5-40 mL  5-40 mL IntraVENous Q8H    sodium chloride (NS) flush 5-40 mL  5-40 mL IntraVENous Q8H    sodium chloride (NS) flush 5-40 mL  5-40 mL IntraVENous PRN    acetaminophen (TYLENOL) tablet 650 mg  650 mg Oral Q6H PRN    Or    acetaminophen (TYLENOL) suppository 650 mg  650 mg Rectal Q6H PRN    polyethylene glycol (MIRALAX) packet 17 g  17 g Oral DAILY PRN    promethazine (PHENERGAN) tablet 12.5 mg  12.5 mg Oral Q6H PRN    Or    ondansetron (ZOFRAN) injection 4 mg  4 mg IntraVENous Q6H PRN    amitriptyline (ELAVIL) tablet 50 mg  50 mg Oral QHS    ergocalciferol capsule 50,000 Units  50,000 Units Oral Q7D    melatonin tablet 5 mg  5 mg Oral QHS       Review of Systems:     As above  Data Review:    Labs: Results:       Chemistry Recent Labs     01/18/21  0618 01/17/21  0522 01/16/21  0129   GLU 72* 88 89    137 142   K 4.7 5.1 4.5    102 106   CO2 28 24 26   BUN 81* 126* 119*   CREA 9.12* 11.80* 10.70*   CA 7.8* 8.1* 8.0*   AGAP 9 11 10   BUCR 9* 11* 11*      CBC w/Diff Recent Labs     01/18/21  0345 01/16/21  0129   WBC  --  12.4   RBC  --  4.16*   HGB 10.2* 12.9*   HCT 31.5* 39.9   PLT  --  375      Coagulation No results for input(s): PTP, INR, APTT, INREXT, INREXT in the last 72 hours. Iron/Ferritin No results for input(s): IRON in the last 72 hours. No lab exists for component: TIBCCALC   BNP No results for input(s): BNPP in the last 72 hours.    Cardiac Enzymes No results for input(s): CPK, CKND1, RUSSEL in the last 72 hours. No lab exists for component: CKRMB, TROIP   Liver Enzymes No results for input(s): TP, ALB, TBIL, AP in the last 72 hours.     No lab exists for component: SGOT, GPT, DBIL   Thyroid Studies Lab Results   Component Value Date/Time    TSH 1.94 01/14/2021 11:56 AM         EKG: atrial fibrilation     Physical Assessment:     Visit Vitals  BP 97/60   Pulse 71   Temp 99 °F (37.2 °C)   Resp 20   Ht 5' 10\" (1.778 m)   Wt 131.5 kg (290 lb)   SpO2 90%   BMI 41.61 kg/m²     Weight change:     Intake/Output Summary (Last 24 hours) at 1/18/2021 1652  Last data filed at 1/18/2021 0000  Gross per 24 hour   Intake 470 ml   Output 1230 ml   Net -760 ml     Physical Exam:   General: comfortable, no acute distress   HEENT sclera anicteric, supple neck, no thyromegaly  CVS: S1S2 heard,  no rub  RS: + air entry b/l,   Abd: Soft, Non tender, Not distended,  Neuro: non focal, awake, alert , CN II-XII are grossly intact,  +tremor   Extrm: ++edema, no cyanosis, clubbing   Skin:  dry  Musculoskeletal: No gross joints or bone deformities     Procedures/imaging: see electronic medical records for all procedures, Xrays and details which were not copied into this note but were reviewed prior to creation of Elmo Lind MD  January 18, 2021  [facility]  Office [phone]

## 2021-01-18 NOTE — PROGRESS NOTES
Patient pulled out his Garza despite being in wrist restraints. The wrist restraints were ordered earlier in the night as patient was also attempting to pull out his temporary dialysis catheter. I discussed the clinical situation earlier in the night and patient appeared to understand what was going on. Unfortunately, he must be delirious at this time. Per nursing he had pulled the Garza out again and now there is a large amount of blood with clots present. We have ordered a stat H&H to assess the amount of blood loss. Bladder scans have been ordered. Given the trauma, patient will likely benefit from urology consult during waking hours. I have discussed with nursing about relocating the wrist restraints, however it seems that patient will be able to reach his Garza wherever they are placed on the bed. Holding off on reinserting Garza for now and preference for time that patient is more lucid.     Signed By: Silvia Donaldson MD     January 18, 2021

## 2021-01-18 NOTE — PROGRESS NOTES
Seen at bedside awake and eating. Some mental confusion. Wounds improved. Decreased cellulitis and blistering. Start wound care . Neuro consult ?

## 2021-01-18 NOTE — PROGRESS NOTES
Admit Date: 2021  Date of Service: 2021    Reason for follow-up: ROBERT, volume overload      Assessment:     1. 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 Acute hypoxic respiratory failure: multifactorial  3. Acute metabolic encephalopathy/delirium   4. A.fib:  Rate controlled; on Cardizem; Pradaxa on hold   5. Volume overload   6. Hypocoagulable sate:  INR 1.9 in patient not on coumadin  7. Leukocytosis with mild lymphopenia  8. Chronic LE wounds with abscesses status post bilateral I&D  9. Hematuria  10. Pre-syncope  11. Suspected COVID   12. Surgically asplenic      Rapid Covid-19 negative 1/15/2021  Send out Covid19 negative 2021  Nephrology and podiatry consulted  Vascular placed HD catheter    Dialysis per nephrology   Echo report reviewedEF 55-60%  Renal duplex normal   Continue atenolol, cardizem, Symbicort  Continue SSI with Accu-Cheks  Pain control  Continue IV ABXCefepime, hold vancomycin due to severe JOE  F/u blood and wound cultures  F/u JULIO, SPEP, PAOLO, K/I ratio, urine protein, CH50, C3 and C4, HIV, Hep B/C pending  Wound care consulted  PT, OT    Current Antibtiocs:   Vanc -    Cefepime - present    Discussed with wife over the phone. All questions answered to satisfaction. Case discussed with:  [x]Patient  []Family  [x]Nursing  []Case Management  DVT Prophylaxis:  []Lovenox  [x]Hep SQ  []SCDs  []Coumadin   []On Heparin gtt  Diet: Diabetic  CODE STATUS: Full  Contact: Nereida Sandhoff (wife)  [phone]      H.  Dimas [facility], DO   2021       Subjective:      HD cath placed by vascular this am   Dialysis to begin today      Seen in dialysis today  Somnolent, very difficult to arouse today   Was A&OX3 and easily arousable yesterday       Objective:        Visit Vitals  BP (!) 124/57   Pulse 79   Temp 97.7 °F (36.5 °C)   Resp 20   Ht 5' 10\" (1.778 m)   Wt 131.5 kg (290 lb)   SpO2 92%   BMI 41.61 kg/m²     Temp (24hrs), Av.6 °F (36.4 °C), Min:97.5 °F (36.4 °C), Max:97.7 °F (36.5 °C)        General:   somnolent, non-toxic, chronically ill appearing, disheveled, anasarca   Skin:   dry, flaky, pale; prior to surgeryb/l LE with blisters of variying stages; left dorsal foot with ruptured blisted and developing eschar; right dorsal foot with abscess vs bullae; BLE wrapped in clean and dry bandages and Ace wraps   HEENT:  No scleral icterus or pallor; oral mucosa moist, lips moist   Lymph Nodes:   not assessed today   Lungs:   non, labored; bilaterally clear to aspiration- no crackles wheezes rales or rhonchi   Heart:  RRR, s1 and s2; no murmurs rubs or gallops; 3+ edema, + pedal pulses   Abdomen:  soft, non-distended, old surgical scar noted; active bowel sounds, non-tender, dependent edema to sacrum   Genitourinary:  deferred   Extremities:   average muscle tone; no contractures, no joint effusions   Neurologic:  No gross focal motor or sensory abnormalities; Follows commands.             Labs: Results:   Chemistry Recent Labs     01/17/21  0522 01/16/21  0129 01/15/21  0426   GLU 88 89 87    142 140   K 5.1 4.5 4.3    106 102   CO2 24 26 27   * 119* 102*   CREA 11.80* 10.70* 9.57*   CA 8.1* 8.0* 8.4*   AGAP 11 10 11   BUCR 11* 11* 11*      CBC w/Diff Recent Labs     01/16/21  0129 01/15/21  0426   WBC 12.4 15.1*   RBC 4.16* 4.73   HGB 12.9* 14.6   HCT 39.9 45.3    454*   GRANS  --  80*   LYMPH  --  10*   EOS  --  1        No results found for: SDES Lab Results   Component Value Date/Time    Culture result: NO GROWTH AFTER 15 HOURS 01/16/2021 08:46 AM    Culture result: NO GROWTH AFTER 15 HOURS 01/16/2021 08:46 AM    Culture result: (A) 01/14/2021 09:00 PM     LIGHT STAPHYLOCOCCUS EPIDERMIDIS (OXACILLIN RESISTANT)    Culture result: RARE POSSIBLE STAPHYLOCOCCUS AUREUS (A) 01/14/2021 09:00 PM    Culture result: FEW YEAST (A) 01/14/2021 09:00 PM        Results     Procedure Component Value Units Date/Time    CULTURE, WOUND W GRAM STAIN [[id number]] Collected: 01/16/21 0846    Order Status: Completed Specimen: Foot Updated: 01/17/21 1434     Special Requests: RIGHT        GRAM STAIN NO ORGANISMS SEEN        Culture result: NO GROWTH AFTER 15 HOURS       CULTURE, ANAEROBIC [[id number]] Collected: 01/16/21 0846    Order Status: Completed Updated: 01/16/21 2315    CULTURE, Lise Ally STAIN [[id number]] Collected: 01/16/21 0846    Order Status: Completed Specimen: Foot Updated: 01/17/21 1436     Special Requests: LEFT        GRAM STAIN NO ORGANISMS SEEN        Culture result: NO GROWTH AFTER 15 HOURS       CULTURE, ANAEROBIC [[id number]] Collected: 01/16/21 0846    Order Status: Completed Updated: 01/16/21 2325    CULTURE, WOUND Bre Alosa STAIN [[id number]]  (Abnormal)  (Susceptibility) Collected: 01/14/21 2100    Order Status: Completed Specimen: Wound from Foot Updated: 01/17/21 1014     Special Requests: NO SPECIAL REQUESTS        GRAM STAIN FEW WBCS SEEN         NO ORGANISMS SEEN        Culture result:       LIGHT STAPHYLOCOCCUS EPIDERMIDIS (OXACILLIN RESISTANT)                  RARE POSSIBLE STAPHYLOCOCCUS AUREUS            FEW YEAST       Susceptibility      Staphylococcus epidermidis     GEORGINA (Preliminary)     Ciprofloxacin ($) Susceptible     Erythromycin ($$$$) Resistant     Gentamicin ($) Susceptible     Inducible Clindamycin Susceptible     Levofloxacin ($) Susceptible     Linezolid ($$$$$) Susceptible     Moxifloxacin ($$$$) Susceptible     Oxacillin Resistant     Tetracycline Intermediate     Vancomycin ($) Susceptible                          Imaging:     Xr Chest Pa Lat    Result Date: 1/14/2021  IMPRESSION: 1. Enlarged cardiac silhouette with mild pulmonary vascular congestion. 2. Small left pleural effusion and left basilar atelectasis or pneumonia. Xr Foot Lt Ap/lat    Result Date: 1/15/2021  IMPRESSION: 1. No distinct evidence of osteomyelitis.  2.  Nodular changes along the medial aspect could potentially represent a fluid collection or potential mass. May consider correlation with ultrasound as clinically indicated. 3.  Diffuse soft tissue edema. 4.  Osteopenia. If clinical concern for occult fracture, consider follow-up imaging in 10-14 days. Xr Foot Rt Ap/lat    Result Date: 1/15/2021  IMPRESSION: 1. No distinct evidence of osteomyelitis. . 2.  Diffuse soft tissue edema with nodular foci which could potentially represent fluid collections or masses. Correlation with ultrasound may be helpful. 3.  In the setting of osteopenia, nondisplaced fractures can be missed. If clinical concern for fracture, consider follow-up imaging in 10-14 days. Ct Abd Pelv Wo Cont    Result Date: 1/14/2021  IMPRESSION: No acute abnormality is identified in the abdomen or pelvis. There are small pleural effusions and contiguous atelectasis evident in the lung bases. Etiology indeterminate with broad differential. Elevated BNP favors congestive heart failure or fluid overload . All CT scans at this facility are performed using dose optimization technique as appropriate to the performed exam, to include automated exposure control, adjustment of the mA and/or kV according to patient's size (Including appropriate matching for site-specific examinations), or use of iterative reconstruction technique. Us Retroperitoneum Comp    Result Date: 1/15/2021  IMPRESSION: 1. Normal examination of the kidneys. 2.  Bladder decompressed by Garza. Xr Knee Rt 3 V    Result Date: 1/14/2021  IMPRESSION: Subtle cortical irregularity at the proximal aspect of the medial femoral condyle could represent nondisplaced fracture, correlate with point tenderness. Diffuse soft tissue swelling.

## 2021-01-18 NOTE — ROUTINE PROCESS
Bedside shift change report given to Kermit Martinez RN (oncoming nurse) by Irma Elias RN (offgoing nurse). Report included the following information SBAR, Kardex, MAR and Cardiac Rhythm Afib.

## 2021-01-18 NOTE — CONSULTS
Interventional Radiology Consult Note    Patient: Virgil Bradshaw               Sex: male          DOA: 2021         YOB: 1960      Age:  56 y.o.        LOS:  LOS: 4 days              HPI:     Virgil Bradshaw is a 56 y.o. male who has been seen in evaluation of JOE at the request of Dr. Mikayla Blakely. PMHx Afib, AAA, splenectomy, and chronic pain. Per nephrology note, he has symptomatic uremia as well as proteinuria and potentially has ANCA vasculitis. Kidney biopsy has been requested to aid in treatment by providing further diagnostics. Vascular placed a right IJ dialysis catheter , removed by the patient since due to delirium after one round of HD yesterday. He continues to need stable dialysis access. Interview was not able to be obtained with the patient due to altered mental status.     Past Medical History:   Diagnosis Date    AAA (abdominal aortic aneurysm) (HCC)     Alcoholism ([facility] [address]     Atrial fibrillation (HCC)     Bradycardia     Chronic pain     Degenerative disc disease, cervical     Degenerative disc disease, lumbar     Hypertension     Hypokalemia     Pneumonia     caused permanent L lung problems    Thyroid disease      Past Surgical History:   Procedure Laterality Date    HX ORTHOPAEDIC      neck fracture    HX ORTHOPAEDIC      lumbar fusion    IL ABDOMEN SURGERY PROC UNLISTED  10/08/2017    spleenectomy     Family History   Problem Relation Age of Onset    Heart Disease Father     Heart Disease Brother         AAA-  in his 42's    Heart Disease Paternal Aunt     Heart Disease Paternal Uncle     Heart Disease Paternal Grandmother     Lupus Mother      Social History     Socioeconomic History    Marital status:      Spouse name: Not on file    Number of children: Not on file    Years of education: Not on file    Highest education level: Not on file   Tobacco Use    Smoking status: Former Smoker     Quit date: 2017     Years since quitting: 3. 4    Smokeless tobacco: Former User   Substance and Sexual Activity    Alcohol use: No     Comment: sober x 5 years    Drug use: No     Comment: former drug use       Prior to Admission medications    Medication Sig Start Date End Date Taking? Authorizing Provider   magnesium 250 mg tab Take 1 Tab by mouth daily. Yes Provider, Historical   gabapentin (NEURONTIN) 800 mg tablet take 2 tablets by mouth three times a day  Patient taking differently: Take 1,200 mg by mouth two (2) times a day. 3/17/20  Yes Livia Cotter MD   atenolol (TENORMIN) 100 mg tablet Take 1 Tab by mouth daily. 12/26/19  Yes Humble Inman PA-C   budesonide-formoterol [facility]) 160-4.5 mcg/actuation HFAA inhale 2 puffs by mouth twice a day 11/20/19  Yes Humble Inman PA-C   dabigatran etexilate (PRADAXA) 150 mg capsule Take 1 Cap by mouth every twelve (12) hours. 3/28/19  Yes Humble Inman PA-C   dilTIAZem CD (CARTIA XT) 180 mg ER capsule Take 1 Cap by mouth two (2) times a day. 3/28/19  Yes Humble Inman PA-C   hydroCHLOROthiazide (HYDRODIURIL) 12.5 mg tablet Take 1 Tab by mouth daily. Patient taking differently: Take 25 mg by mouth daily. 11/30/18  Yes Humble Inman PA-C   ergocalciferol (ERGOCALCIFEROL) 50,000 unit capsule Take 1 Cap by mouth every seven (7) days. 3/28/19   Humble Inman PA-C   saw palmetto xtr/zinc picolin (SAW PALMETTO EXTRACT PO) Take  by mouth. Provider, Historical   amitriptyline (ELAVIL) 25 mg tablet take 1 tablet by mouth NIGHTLY  Patient taking differently: 50 mg nightly. 3/20/19   Humble Inman PA-C   melatonin tab tablet Take  by mouth nightly. Provider, Historical   cholecalciferol, vitamin D3, (VITAMIN D3) 2,000 unit tab Take  by mouth two (2) times a day. Provider, Historical   MULTIVIT-MIN/FA/LYCOPEN/LUTEIN (CENTRUM SILVER MEN PO) Take 1 Tab by mouth daily. Provider, Historical   potassium 99 mg tablet Take 99 mg by mouth daily.     Provider, Historical   fish oil-dha-epa 1,200-144-216 mg cap Take 1 Tab by mouth daily. Provider, Historical   albuterol (PROAIR HFA) 90 mcg/actuation inhaler Take 2 Puffs by inhalation every four (4) hours as needed for Wheezing. 7/19/17   GENEVIEVE PalomoC   b complex vitamins tablet Take 1 Tab by mouth daily. Provider, Historical     No Known Allergies    Review of Systems  Pertinent items are noted in the History of Present Illness. Physical Exam:      Visit Vitals  BP 97/60   Pulse 71   Temp 99 °F (37.2 °C)   Resp 20   Ht 5' 10\" (1.778 m)   Wt 131.5 kg (290 lb)   SpO2 90%   BMI 41.61 kg/m²     Physical Exam:  General: Awake, confused, not oriented, NAD  Lungs: Normal work of breathing  Heart: RRR  Extremities: Moves all four  Skin: warm and dry    Labs Reviewed:  CMP:   Lab Results   Component Value Date/Time     01/18/2021 06:18 AM    K 4.7 01/18/2021 06:18 AM     01/18/2021 06:18 AM    CO2 28 01/18/2021 06:18 AM    AGAP 9 01/18/2021 06:18 AM    GLU 72 (L) 01/18/2021 06:18 AM    BUN 81 (H) 01/18/2021 06:18 AM    CREA 9.12 (H) 01/18/2021 06:18 AM    GFRAA 7 (L) 01/18/2021 06:18 AM    GFRNA 6 (L) 01/18/2021 06:18 AM    CA 7.8 (L) 01/18/2021 06:18 AM     CBC:   Lab Results   Component Value Date/Time    HGB 10.2 (L) 01/18/2021 03:45 AM    HCT 31.5 (L) 01/18/2021 03:45 AM     COAGS:  INR 1.9   Plt 375    Assessment/Plan   Principal Problem:    Acute kidney injury ([facility] [address] (1/14/2021)    Active Problems:    ARF (acute renal failure) ([facility] [address] (1/14/2021)      Gila Randolph is a 56 y.o. male with hematuria and edema along with acute renal failure and labs concerning for potential ANCA vasuclitis needing further tissue for diagnostics and stable dialysis access to aid in treatment of renal failure with uremia. Discussed with daughter on the phone, who voices understanding, gives consent for both procedures, and would appreciate a Zoom meeting with her father when possible. Plan     1.   NPO after midnight  2.  CT guided random renal biopsy 1/19 as schedule allows  3. IR tunneled dialysis catheter placement as schedule allows  4.  Updated coags ordered. If the risk of bleeding is significantly elevated, a temporary dialysis catheter will be considered and biopsy will be deferred until coags are corrected to normal range. Labs pending.     Thank you,  MANISHA Wolf

## 2021-01-18 NOTE — PROGRESS NOTES
Admit Date: 1/14/2021  Date of Service: 1/18/2021        Assessment:     1. 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 Acute hypoxic respiratory failure: multifactorial  3. Acute metabolic encephalopathy/delirium   4. A.fib:  Rate controlled; on Cardizem; Pradaxa on hold   5. Volume overload   6. Leukocytosis with mild lymphopenia  7. Chronic LE wounds with abscesses status post bilateral I&D  8. Hematuria   9. Pre-syncope  10. Surgically asplenic      Rapid Covid-19 negative 1/15/2021  Send out Covid19 negative 1/14/2021  Nephrology on case, IR consulted by nephrology for biopsy as well as dialysis catheter placement  Hemodialysis as per nephrology  Patient apparently pulled out his dialysis catheter and Garza last night. Patient remains confused. If confusion persists will consult neurology in the morning. Echo report reviewedEF 55-60%  On atenolol and Cardizem  Continue Symbicort  Sitter as patient is at risk of fall  Continue SSI with Accu-Cheks  Continue antibiotics, wound care, podiatry is following  PT, OT  I called patient's daughter at phone #[phone] and updated her. Daughter states that patient was taking very high doses of Neurontin prior to being admitted to the hospital.  Daughter also states that patient has been a lifelong smoker and transitioned to vaping nicotine since 2017. Discussed with nephrologist, discussed with RN, discussed with     Case discussed with:  [x]Patient  [x]Family  [x]Nursing  [x]Case Management  DVT Prophylaxis:  []Lovenox  [x]Hep SQ  []SCDs  []Coumadin   []On Heparin gtt   Diet: Diabetic  CODE STATUS: Full  Contact: Adolfo Robins (wife)  [phone], daughter [phone]      Haily Park MD    January 18, 2021       Subjective:      I saw and evaluated the patient earlier today around noontime. Patient is sitting in bed, confused and mildly agitated. Per nursing staff patient pulled out his dialysis catheter as well as his Garza catheter overnight.   I have ordered a sitter for the patient as he is at risk for falling.       Objective:        Visit Vitals  BP 97/60   Pulse 71   Temp 99 °F (37.2 °C)   Resp 20   Ht 5' 10\" (1.778 m)   Wt 131.5 kg (290 lb)   SpO2 90%   BMI 41.61 kg/m²     Temp (24hrs), Av.2 °F (36.8 °C), Min:97.6 °F (36.4 °C), Max:99.2 °F (37.3 °C)        General:  Awake, confused  Cardiovascular:  S1S2+, RRR  Pulmonary:  CTA b/l  GI:  Soft, BS+, NT, ND  Extremities: Bilateral feet and lower legs with dressing      Labs: Results:   Chemistry Recent Labs     21  0618 21  0522 21  0129   GLU 72* 88 89    137 142   K 4.7 5.1 4.5    102 106   CO2 28 24 26   BUN 81* 126* 119*   CREA 9.12* 11.80* 10.70*   CA 7.8* 8.1* 8.0*   AGAP 9 11 10   BUCR 9* 11* 11*      CBC w/Diff Recent Labs     21  0345 21  0129   WBC  --  12.4   RBC  --  4.16*   HGB 10.2* 12.9*   HCT 31.5* 39.9   PLT  --  375        No results found for: SDES Lab Results   Component Value Date/Time    Culture result: NO GROWTH 2 DAYS 2021 08:46 AM    Culture result: NO GROWTH 2 DAYS 2021 08:46 AM    Culture result: NO GROWTH 2 DAYS 2021 08:46 AM    Culture result: NO GROWTH 2 DAYS 2021 08:46 AM    Culture result: (A) 2021 09:00 PM     LIGHT STAPHYLOCOCCUS EPIDERMIDIS (OXACILLIN RESISTANT)    Culture result: RARE STAPHYLOCOCCUS AUREUS (A) 2021 09:00 PM    Culture result: FEW CANDIDA PARAPSILOSIS (A) 2021 09:00 PM        Results     Procedure Component Value Units Date/Time    CULTURE, [place] Police STAIN [[id number]] Collected: 21 0846    Order Status: Completed Specimen: Foot Updated: 21 1303     Special Requests: RIGHT        GRAM STAIN NO ORGANISMS SEEN        Culture result: NO GROWTH 2 DAYS       CULTURE, ANAEROBIC [[id number]] Collected: 21 0846    Order Status: Completed Specimen: Foot Updated: 21 4946     Special Requests: RIGHT        Culture result: NO GROWTH 2 DAYS       CULTURE, Venkata Merl STAIN [[id number]] Collected: 01/16/21 0846    Order Status: Completed Specimen: Foot Updated: 01/18/21 1308     Special Requests: LEFT        GRAM STAIN NO ORGANISMS SEEN        Culture result: NO GROWTH 2 DAYS       CULTURE, ANAEROBIC [[id number]] Collected: 01/16/21 0846    Order Status: Completed Specimen: Foot Updated: 01/18/21 1307     Special Requests: LEFT        Culture result: NO GROWTH 2 DAYS       CULTURE, WOUND Mekhi Mckusick STAIN [[id number]]  (Abnormal)  (Susceptibility) Collected: 01/14/21 2100    Order Status: Completed Specimen: Wound from Foot Updated: 01/18/21 1426     Special Requests: NO SPECIAL REQUESTS        GRAM STAIN FEW WBCS SEEN         NO ORGANISMS SEEN        Culture result:       LIGHT STAPHYLOCOCCUS EPIDERMIDIS (OXACILLIN RESISTANT)                  RARE STAPHYLOCOCCUS AUREUS            FEW CANDIDA PARAPSILOSIS       Susceptibility      Staphylococcus epidermidis     GEORGINA     Ciprofloxacin ($) Susceptible     Erythromycin ($$$$) Resistant     Gentamicin ($) Susceptible     Inducible Clindamycin Susceptible     Levofloxacin ($) Susceptible     Linezolid ($$$$$) Susceptible     Moxifloxacin ($$$$) Susceptible     Oxacillin Resistant     Tetracycline Intermediate     Vancomycin ($) Susceptible                Susceptibility      Staphylococcus aureus     GEORGINA     Ciprofloxacin ($) Susceptible     Clindamycin ($) Susceptible     Daptomycin ($$$$$) Susceptible     Doxycycline ($$) Susceptible     Erythromycin ($$$$) Susceptible     Gentamicin ($) Susceptible     Levofloxacin ($) Susceptible     Linezolid ($$$$$) Susceptible     Moxifloxacin ($$$$) Susceptible     Oxacillin Susceptible     Tetracycline Susceptible     Trimeth/Sulfa Susceptible     Vancomycin ($) Susceptible                          Imaging:     Xr Chest Pa Lat    Result Date: 1/14/2021  IMPRESSION: 1. Enlarged cardiac silhouette with mild pulmonary vascular congestion.  2. Small left pleural effusion and left basilar atelectasis or pneumonia. Xr Foot Lt Ap/lat    Result Date: 1/15/2021  IMPRESSION: 1. No distinct evidence of osteomyelitis. 2.  Nodular changes along the medial aspect could potentially represent a fluid collection or potential mass. May consider correlation with ultrasound as clinically indicated. 3.  Diffuse soft tissue edema. 4.  Osteopenia. If clinical concern for occult fracture, consider follow-up imaging in 10-14 days. Xr Foot Rt Ap/lat    Result Date: 1/15/2021  IMPRESSION: 1. No distinct evidence of osteomyelitis. . 2.  Diffuse soft tissue edema with nodular foci which could potentially represent fluid collections or masses. Correlation with ultrasound may be helpful. 3.  In the setting of osteopenia, nondisplaced fractures can be missed. If clinical concern for fracture, consider follow-up imaging in 10-14 days. Ct Abd Pelv Wo Cont    Result Date: 1/14/2021  IMPRESSION: No acute abnormality is identified in the abdomen or pelvis. There are small pleural effusions and contiguous atelectasis evident in the lung bases. Etiology indeterminate with broad differential. Elevated BNP favors congestive heart failure or fluid overload . All CT scans at this facility are performed using dose optimization technique as appropriate to the performed exam, to include automated exposure control, adjustment of the mA and/or kV according to patient's size (Including appropriate matching for site-specific examinations), or use of iterative reconstruction technique. Us Retroperitoneum Comp    Result Date: 1/15/2021  IMPRESSION: 1. Normal examination of the kidneys. 2.  Bladder decompressed by Garza. Xr Chest Port    Result Date: 1/18/2021  IMPRESSION: No pneumothorax after right CVL placement. Xr Knee Rt 3 V    Result Date: 1/14/2021  IMPRESSION: Subtle cortical irregularity at the proximal aspect of the medial femoral condyle could represent nondisplaced fracture, correlate with point tenderness.  Diffuse soft tissue swelling.       Dyan Owens MD

## 2021-01-19 ENCOUNTER — APPOINTMENT (OUTPATIENT)
Dept: VASCULAR SURGERY | Age: 61
DRG: 673 | End: 2021-01-19
Attending: INTERNAL MEDICINE
Payer: COMMERCIAL

## 2021-01-19 ENCOUNTER — HOSPITAL ENCOUNTER (INPATIENT)
Dept: INTERVENTIONAL RADIOLOGY/VASCULAR | Age: 61
Discharge: HOME | DRG: 673 | End: 2021-01-19
Attending: PHYSICIAN ASSISTANT
Payer: COMMERCIAL

## 2021-01-19 VITALS
SYSTOLIC BLOOD PRESSURE: 113 MMHG | OXYGEN SATURATION: 97 % | HEART RATE: 80 BPM | RESPIRATION RATE: 20 BRPM | DIASTOLIC BLOOD PRESSURE: 55 MMHG

## 2021-01-19 LAB
ABO + RH BLD: NORMAL
ALBUMIN SERPL ELPH-MCNC: 2.8 G/DL (ref 2.9–4.4)
ALBUMIN/GLOB SERPL: 1 {RATIO} (ref 0.7–1.7)
ALPHA1 GLOB SERPL ELPH-MCNC: 0.3 G/DL (ref 0–0.4)
ALPHA2 GLOB SERPL ELPH-MCNC: 0.9 G/DL (ref 0.4–1)
AMMONIA PLAS-SCNC: 19 UMOL/L (ref 11–32)
ANION GAP SERPL CALC-SCNC: 12 MMOL/L (ref 3–18)
B-GLOBULIN SERPL ELPH-MCNC: 1.3 G/DL (ref 0.7–1.3)
BASOPHILS # BLD: 0 K/UL (ref 0–0.06)
BASOPHILS NFR BLD: 0 % (ref 0–3)
BLOOD GROUP ANTIBODIES SERPL: NORMAL
BUN SERPL-MCNC: 94 MG/DL (ref 7–18)
BUN/CREAT SERPL: 9 (ref 12–20)
CALCIUM SERPL-MCNC: 7.7 MG/DL (ref 8.5–10.1)
CHLORIDE SERPL-SCNC: 99 MMOL/L (ref 100–111)
CO2 SERPL-SCNC: 25 MMOL/L (ref 21–32)
COVID-19 RAPID TEST, COVR: NOT DETECTED
CREAT SERPL-MCNC: 10.9 MG/DL (ref 0.6–1.3)
CRYOGLOB SER QL 1D COLD INC: NORMAL
DIFFERENTIAL METHOD BLD: ABNORMAL
EOSINOPHIL # BLD: 0.3 K/UL (ref 0–0.4)
EOSINOPHIL NFR BLD: 3 % (ref 0–5)
ERYTHROCYTE [DISTWIDTH] IN BLOOD BY AUTOMATED COUNT: 14.7 % (ref 11.6–14.5)
GAMMA GLOB SERPL ELPH-MCNC: 0.6 G/DL (ref 0.4–1.8)
GLOBULIN SER-MCNC: 3.1 G/DL (ref 2.2–3.9)
GLUCOSE BLD STRIP.AUTO-MCNC: 94 MG/DL (ref 70–110)
GLUCOSE BLD STRIP.AUTO-MCNC: 98 MG/DL (ref 70–110)
GLUCOSE SERPL-MCNC: 84 MG/DL (ref 74–99)
HCT VFR BLD AUTO: 30.6 % (ref 36–48)
HGB BLD-MCNC: 9.8 G/DL (ref 13–16)
IGA SERPL-MCNC: 443 MG/DL (ref 61–437)
IGG SERPL-MCNC: 730 MG/DL (ref 603–1613)
IGM SERPL-MCNC: 13 MG/DL (ref 20–172)
INR PPP: 1.6 (ref 0.8–1.2)
INTERPRETATION SERPL IEP-IMP: ABNORMAL
LYMPHOCYTES # BLD: 0.9 K/UL (ref 0.8–3.5)
LYMPHOCYTES NFR BLD: 8 % (ref 20–51)
M PROTEIN SERPL ELPH-MCNC: ABNORMAL G/DL
MAGNESIUM SERPL-MCNC: 3.3 MG/DL (ref 1.6–2.6)
MCH RBC QN AUTO: 30.4 PG (ref 24–34)
MCHC RBC AUTO-ENTMCNC: 32 G/DL (ref 31–37)
MCV RBC AUTO: 95 FL (ref 74–97)
MONOCYTES # BLD: 0.8 K/UL (ref 0–1)
MONOCYTES NFR BLD: 7 % (ref 2–9)
NEUTS SEG # BLD: 8.7 K/UL (ref 1.8–8)
NEUTS SEG NFR BLD: 82 % (ref 42–75)
PLATELET # BLD AUTO: 391 K/UL (ref 135–420)
PLATELET COMMENTS,PCOM: ABNORMAL
PMV BLD AUTO: 11.5 FL (ref 9.2–11.8)
POTASSIUM SERPL-SCNC: 4.8 MMOL/L (ref 3.5–5.5)
PROT SERPL-MCNC: 5.9 G/DL (ref 6–8.5)
PROTHROMBIN TIME: 19.1 SEC (ref 11.5–15.2)
RBC # BLD AUTO: 3.22 M/UL (ref 4.7–5.5)
RBC MORPH BLD: ABNORMAL
SODIUM SERPL-SCNC: 136 MMOL/L (ref 136–145)
SOURCE, COVRS: NORMAL
SPECIMEN EXP DATE BLD: NORMAL
SPECIMEN TYPE, XMCV1T: NORMAL
WBC # BLD AUTO: 10.7 K/UL (ref 4.6–13.2)

## 2021-01-19 PROCEDURE — 74011250637 HC RX REV CODE- 250/637: Performed by: INTERNAL MEDICINE

## 2021-01-19 PROCEDURE — 36558 INSERT TUNNELED CV CATH: CPT

## 2021-01-19 PROCEDURE — 82140 ASSAY OF AMMONIA: CPT

## 2021-01-19 PROCEDURE — 74011250636 HC RX REV CODE- 250/636: Performed by: FAMILY MEDICINE

## 2021-01-19 PROCEDURE — 74011000250 HC RX REV CODE- 250: Performed by: PODIATRIST

## 2021-01-19 PROCEDURE — 02HV33Z INSERTION OF INFUSION DEVICE INTO SUPERIOR VENA CAVA, PERCUTANEOUS APPROACH: ICD-10-PCS | Performed by: RADIOLOGY

## 2021-01-19 PROCEDURE — 74011250637 HC RX REV CODE- 250/637: Performed by: EMERGENCY MEDICINE

## 2021-01-19 PROCEDURE — 90935 HEMODIALYSIS ONE EVALUATION: CPT

## 2021-01-19 PROCEDURE — 87635 SARS-COV-2 COVID-19 AMP PRB: CPT

## 2021-01-19 PROCEDURE — 80048 BASIC METABOLIC PNL TOTAL CA: CPT

## 2021-01-19 PROCEDURE — 85610 PROTHROMBIN TIME: CPT

## 2021-01-19 PROCEDURE — 86901 BLOOD TYPING SEROLOGIC RH(D): CPT

## 2021-01-19 PROCEDURE — 83735 ASSAY OF MAGNESIUM: CPT

## 2021-01-19 PROCEDURE — 74011250636 HC RX REV CODE- 250/636: Performed by: INTERNAL MEDICINE

## 2021-01-19 PROCEDURE — 99253 IP/OBS CNSLTJ NEW/EST LOW 45: CPT | Performed by: PSYCHIATRY & NEUROLOGY

## 2021-01-19 PROCEDURE — 74011250637 HC RX REV CODE- 250/637: Performed by: PSYCHIATRY & NEUROLOGY

## 2021-01-19 PROCEDURE — 85025 COMPLETE CBC W/AUTO DIFF WBC: CPT

## 2021-01-19 PROCEDURE — 74011250636 HC RX REV CODE- 250/636: Performed by: PHYSICIAN ASSISTANT

## 2021-01-19 PROCEDURE — 74011000250 HC RX REV CODE- 250: Performed by: PHYSICIAN ASSISTANT

## 2021-01-19 PROCEDURE — 74011250636 HC RX REV CODE- 250/636: Performed by: PODIATRIST

## 2021-01-19 PROCEDURE — 65270000029 HC RM PRIVATE

## 2021-01-19 PROCEDURE — 74011250637 HC RX REV CODE- 250/637: Performed by: FAMILY MEDICINE

## 2021-01-19 PROCEDURE — 99232 SBSQ HOSP IP/OBS MODERATE 35: CPT | Performed by: EMERGENCY MEDICINE

## 2021-01-19 PROCEDURE — 74011250637 HC RX REV CODE- 250/637: Performed by: PODIATRIST

## 2021-01-19 PROCEDURE — P9047 ALBUMIN (HUMAN), 25%, 50ML: HCPCS | Performed by: INTERNAL MEDICINE

## 2021-01-19 PROCEDURE — 99231 SBSQ HOSP IP/OBS SF/LOW 25: CPT | Performed by: PHYSICIAN ASSISTANT

## 2021-01-19 PROCEDURE — 36415 COLL VENOUS BLD VENIPUNCTURE: CPT

## 2021-01-19 PROCEDURE — 2709999900 HC NON-CHARGEABLE SUPPLY

## 2021-01-19 PROCEDURE — 82962 GLUCOSE BLOOD TEST: CPT

## 2021-01-19 RX ORDER — OXCARBAZEPINE 300 MG/1
150 TABLET, FILM COATED ORAL 2 TIMES DAILY
Status: COMPLETED | OUTPATIENT
Start: 2021-01-19 | End: 2021-01-19

## 2021-01-19 RX ORDER — MIDAZOLAM HYDROCHLORIDE 1 MG/ML
.5-2 INJECTION, SOLUTION INTRAMUSCULAR; INTRAVENOUS
Status: DISCONTINUED | OUTPATIENT
Start: 2021-01-19 | End: 2021-01-23 | Stop reason: HOSPADM

## 2021-01-19 RX ORDER — LIDOCAINE HYDROCHLORIDE AND EPINEPHRINE 20; 5 MG/ML; UG/ML
20 INJECTION, SOLUTION EPIDURAL; INFILTRATION; INTRACAUDAL; PERINEURAL ONCE
Status: COMPLETED | OUTPATIENT
Start: 2021-01-19 | End: 2021-01-19

## 2021-01-19 RX ORDER — OXCARBAZEPINE 300 MG/1
600 TABLET, FILM COATED ORAL 2 TIMES DAILY
Status: DISCONTINUED | OUTPATIENT
Start: 2021-01-23 | End: 2021-01-27

## 2021-01-19 RX ORDER — SODIUM CHLORIDE 9 MG/ML
250 INJECTION, SOLUTION INTRAVENOUS AS NEEDED
Status: DISCONTINUED | OUTPATIENT
Start: 2021-01-19 | End: 2021-02-01 | Stop reason: ALTCHOICE

## 2021-01-19 RX ORDER — OXCARBAZEPINE 300 MG/1
150 TABLET, FILM COATED ORAL 2 TIMES DAILY
Status: DISCONTINUED | OUTPATIENT
Start: 2021-01-19 | End: 2021-01-19

## 2021-01-19 RX ORDER — HEPARIN SODIUM 1000 [USP'U]/ML
5000 INJECTION, SOLUTION INTRAVENOUS; SUBCUTANEOUS ONCE
Status: COMPLETED | OUTPATIENT
Start: 2021-01-19 | End: 2021-01-19

## 2021-01-19 RX ORDER — FENTANYL CITRATE 50 UG/ML
12.5-5 INJECTION, SOLUTION INTRAMUSCULAR; INTRAVENOUS
Status: DISCONTINUED | OUTPATIENT
Start: 2021-01-19 | End: 2021-01-23 | Stop reason: HOSPADM

## 2021-01-19 RX ORDER — GABAPENTIN 100 MG/1
100 CAPSULE ORAL 2 TIMES DAILY
Status: DISCONTINUED | OUTPATIENT
Start: 2021-01-19 | End: 2021-01-19

## 2021-01-19 RX ORDER — OXCARBAZEPINE 300 MG/1
300 TABLET, FILM COATED ORAL EVERY 12 HOURS
Status: COMPLETED | OUTPATIENT
Start: 2021-01-20 | End: 2021-01-22

## 2021-01-19 RX ADMIN — FENTANYL CITRATE 25 MCG: 50 INJECTION, SOLUTION INTRAMUSCULAR; INTRAVENOUS at 08:30

## 2021-01-19 RX ADMIN — Medication 5 MG: at 21:58

## 2021-01-19 RX ADMIN — LIDOCAINE HYDROCHLORIDE,EPINEPHRINE BITARTRATE 400 MG: 20; .005 INJECTION, SOLUTION EPIDURAL; INFILTRATION; INTRACAUDAL; PERINEURAL at 08:30

## 2021-01-19 RX ADMIN — FENTANYL CITRATE 25 MCG: 50 INJECTION, SOLUTION INTRAMUSCULAR; INTRAVENOUS at 08:45

## 2021-01-19 RX ADMIN — OXCARBAZEPINE 150 MG: 300 TABLET, FILM COATED ORAL at 15:08

## 2021-01-19 RX ADMIN — ALBUMIN (HUMAN) 25 G: 0.25 INJECTION, SOLUTION INTRAVENOUS at 17:01

## 2021-01-19 RX ADMIN — HEPARIN SODIUM 5000 UNITS: 5000 INJECTION INTRAVENOUS; SUBCUTANEOUS at 21:57

## 2021-01-19 RX ADMIN — OXCARBAZEPINE 150 MG: 300 TABLET, FILM COATED ORAL at 21:58

## 2021-01-19 RX ADMIN — Medication 10 ML: at 05:19

## 2021-01-19 RX ADMIN — OXYCODONE HYDROCHLORIDE AND ACETAMINOPHEN 1 TABLET: 5; 325 TABLET ORAL at 04:39

## 2021-01-19 RX ADMIN — DILTIAZEM HYDROCHLORIDE 120 MG: 120 CAPSULE, COATED, EXTENDED RELEASE ORAL at 21:59

## 2021-01-19 RX ADMIN — WATER 1 G: 1 INJECTION INTRAMUSCULAR; INTRAVENOUS; SUBCUTANEOUS at 21:57

## 2021-01-19 RX ADMIN — HEPARIN SODIUM 5000 UNITS: 1000 INJECTION INTRAVENOUS; SUBCUTANEOUS at 09:10

## 2021-01-19 RX ADMIN — AMITRIPTYLINE HYDROCHLORIDE 50 MG: 50 TABLET, FILM COATED ORAL at 21:59

## 2021-01-19 RX ADMIN — MIDAZOLAM 0.5 MG: 1 INJECTION INTRAMUSCULAR; INTRAVENOUS at 08:30

## 2021-01-19 RX ADMIN — Medication 10 ML: at 14:59

## 2021-01-19 RX ADMIN — MIDAZOLAM 0.5 MG: 1 INJECTION INTRAMUSCULAR; INTRAVENOUS at 08:40

## 2021-01-19 NOTE — ROUTINE PROCESS
Bedside and Verbal shift change report given to Jn Lin (oncoming nurse) by GERMAN Chen RN (offgoing nurse). Report included the following information SBAR, Kardex, MAR and Recent Results.

## 2021-01-19 NOTE — DIALYSIS
[facility]        ACUTE HEMODIALYSIS FLOW SHEET      HEMODIALYSIS ORDERS: Physician: Isabelle Mauro     Dialyzer: revaclear   Duration: 3 hr  BFR: 350   DFR: 600   Dialysate:  Temp 36-37*C  K+   2    Ca+  3 Na 138 Bicarb 35   Weight:  131.5 kg    Patient Chart [x]     Unable to Obtain []   Dry weight/UF Goal: 1000   Access: right chest TDC    Heparin []  Bolus      Units    [] Hourly       Units    [x]None      Catheter locking solution: heparin    Pre BP:   107/59    Pulse:     97       Respirations: 18  Temperature:   97.7 ax   Labs: Pre        Post:         [x] N/A   Additional Orders(medications, blood products, hypotension management):       [x] N/A     [x] Janak Consent Verified     CATHETER ACCESS: []N/A   [x]Right chest   []Left   []IJ     []Fem   []transhepatic   [] First use X-ray verified     [x]Permanent                [] Temporary   [x]No S/S infection  []Redness  []Drainage []Cultured []Swelling []Pain   [x]Medical Aseptic Prep Utilized   []Dressing Changed  [x] Biopatch  Date:  01/19/2021      []Clotted   [x]Patent   Flows: []Good  []Poor  [x]Reversed   If access problem,  notified: []Yes    [x]N/A        GRAFT/FISTULA ACCESS:  [x]N/A                              GENERAL ASSESSMENT:      LUNGS:  Rate 18 SaO2%        [] N/A    [x] Clear  [] Coarse  [] Crackles  [] Wheezing        [] Diminished     Location : []RLL   []LLL    []RUL  []SUKHJINDER     Cough: []Productive  []Dry  [x]N/A   Respirations:  [x]Easy  []Labored     Therapy:   []RA  [x]NC 3 l/min    Mask: []NRB []Venti       O2%                  []Ventilator  []Intubated  [] Trach  [] BiPaP     CARDIAC: [x]Regular      [] Irregular   [] Pericardial Rub  [] JVD        []  Monitored  [] Bedside  [] Remotely monitored [x] N/A      EDEMA: [] None   [x]Generalized  [] Pitting [] 1    [] 2    [] 3    [] 4                 [] Facial  [] Pedal  []  UE  [] LE     SKIN:   [x] Warm   [] Hot     [] Cold   [x] Dry     [] Pale   [] Diaphoretic                  [] Flushed  [] Jaundiced  [] Cyanotic  [] Rash  [] Weeping     LOC:    [x] Alert      [x]Oriented:    [x] Person     [] Place  []Time               [] Confused  [] Lethargic  [] Medicated  [] Non-responsive     GI / ABDOMEN:  [] Flat    [] Distended    [x] Soft    [] Firm   []  Obese                             [] Diarrhea  [x] Bowel Sounds  [] Nausea  [] Vomiting       / URINE ASSESSMENT:[] Voiding   [x] Oliguria  [] Anuria   [x]  Garza     [] Incontinent    []  Incontinent Brief      []  Bathroom Privileges       PAIN: [x] 0 []1  []2   []3   []4   []5   []6   []7   []8   []9   []10              Scale 0-10  Action/Follow Up:      MOBILITY:  [] Amb    [] Amb/Assist    [x] Bed    [] Wheelchair  [] Stretcher      All Vitals and Treatment Details on Attached [address]: [facility]          Room # 123/69      [] 1st Time Acute  [] Stat  [x] Routine  [] Urgent     [x] Acute Room  []  Bedside  [] ICU/CCU  [] ER   Isolation Precautions:   There are currently no Active Isolations      Special Considerations:         [] Blood Consent Verified [x]N/A      ALLERGIES:   No Known Allergies            Code Status:Full Code        Hepatitis Status:    2nd RN check: BAV                    Lab Results   Component Value Date/Time    Hepatitis B surface Ag <0.10 01/14/2021 09:00 PM    Hepatitis B surface Ab 4.78 (L) 01/14/2021 09:00 PM    Hepatitis B core, IgM Negative 01/14/2021 09:00 PM    Hepatitis C virus Ab 0.03 01/14/2021 09:00 PM                     Current Labs:   Lab Results   Component Value Date/Time    Sodium 136 01/19/2021 04:20 AM    Potassium 4.8 01/19/2021 04:20 AM    Chloride 99 (L) 01/19/2021 04:20 AM    CO2 25 01/19/2021 04:20 AM    Anion gap 12 01/19/2021 04:20 AM    Glucose 84 01/19/2021 04:20 AM    BUN 94 (H) 01/19/2021 04:20 AM    Creatinine 10.90 (H) 01/19/2021 04:20 AM    BUN/Creatinine ratio 9 (L) 01/19/2021 04:20 AM    GFR est AA 6 (L) 01/19/2021 04:20 AM    GFR est non-AA 5 (L) 01/19/2021 04:20 AM    Calcium 7.7 (L) 01/19/2021 04:20 AM      Lab Results   Component Value Date/Time    WBC 10.7 01/19/2021 04:20 AM    HGB 9.8 (L) 01/19/2021 04:20 AM    HCT 30.6 (L) 01/19/2021 04:20 AM    PLATELET 736 09/07/7325 04:20 AM    MCV 95.0 01/19/2021 04:20 AM                                                                                     DIET:   DIET NPO       PRIMARY NURSE REPORT: First initial/Last name/Title      Pre Dialysis: RUDY Olmos RN       Time: 1073      EDUCATION:    [x] Patient [] Other         Knowledge Basis: []None [x]Minimal [] Substantial   Barriers to learning: AMS   [] Access Care     [] S&S of infection     [] Fluid Management     []K+     [x]Procedural    []Albumin     [] Medications     [] Tx Options     [] Transplant     [] Diet     [] Other   Teaching Tools:  [x] Explain  [x] Demo  [] Handouts [] Video  Patient response:  [] Verbalized understanding  [] Teach back  [] Return demonstration [x] Requires follow up   Inappropriate due to:  AMS           [x]Time Out/Safety Check  [x]Extracorporeal Circuit Tested for integrity       RO/HEMODIALYSIS MACHINE SAFETY CHECKS  Before each treatment:     Machine Number:                   [facility]                                  [x] Unit Machine # 6 with centralized RO                                      Alarm Test:  Pass time 5080               [x] RO/Machine Log Complete      Temp   36             Dialysate: pH  7.6 Conductivity: Meter   13.9     HD Machine   13.9                  TCD: 13.8  Dialyzer Lot # [id number]            Blood Tubing Lot # [id number]          Saline Lot #  [id number]     CHLORINE TESTING-Before each treatment and every 4 hours    Total Chlorine: [x] less than 0.1 ppm  Time: 1300 4 Hr/2nd Check Time: 1700   (if greater than 0.1 ppm from Primary then every 30 minutes from Secondary)     TREATMENT INITIATION  with Dialysis Precautions:   [x] All Connections Secured                 [x] Saline Line Double Clamped   [x] Venous Parameters Set                  [x] Arterial Parameters Set    [x] Prime Given 250ml                          [x]Air Foam Detector Engaged      Treatment Initiation Note:   Pt arrived to HD unit via bed in stable condition. A&O to self only. VSS and in no distress with O2 @ 3 LPM NC. Right chest TDC assessed. Pt received TDC today and report from floor staff was that it was oozing blood. Pressure dressing removed. Blood has coagulated and oozing appears to slowed. Both ports aspirate and flush easily. HD initiated without difficulty. Albumin hung at start of treatment. During Treatment Notes:   1700 Pt awake and alert. Face and access in view with connections secure. 1715 Lines reversed due to arterial pressure. 1730 Pt resting with eyes closed. Face and access in view with connections secure. 1800 Dr. Damaris Cole notified of pt's BP and that he has received 2 albumin. Received order to decrease UF goal as tolerated. Pt in no noted distress. Face and access in view with connections secure. 1830 Pt resting with eyes closed. Face and access in view with connections secure. 1900 Pt resting with eyes closed. Face and access in view with connections secure. 1930 Pt resting with eyes closed. Face and access in view with connections secure. 2000 Pt resting with eyes closed. Face and access in view with connections secure. Medication Dose Volume Route Time [facility] name Title   Albumin x2 25g  100 ml dialysis 1701 P Aneudy CRAIG                   Post Assessment:   Dialyzer Cleared: [] Good [x] Fair  [] Poor  Blood processed:  60.5 L  UF Removed  1100 Ml  POst BP:   119/66       Pulse: 84        Respirations: 18  Temperature: 97.8 ax Lungs:     [x] Clear      [] Course         [] Crackles    [] Wheezing         [] Diminished   Post Tx Vascular Access:      N/A Cardiac:  [x] Regular   [] Irregular   [] Monitor  [x] N/A       Catheter:   Locking solution: Heparin 1:1000   Art. 1.9  [place].  1.9 Skin:   Pain:   [x] Warm  [x] Dry [] Diaphoretic    [] Flushed    [] Pale [] Cyanotic [x]0  []1  []2   []3  []4   []5   []6   []7   []8   []9   []10     Post Treatment Note:  Pt completed 3 hours of treatment. Net 600 mL UF removed. POST TREATMENT PRIMARY NURSE HANDOFF REPORT:     First initial/Last name/Title         Post Dialysis: GILBERTO Quinonez RN   Time:  2026     Abbreviations: AVG-arterial venous graft, AVF-arterial venous fistula, IJ-Internal Jugular, Subcl-Subclavian, Fem-Femoral, Tx-treatment, AP/HR-apical heart rate, DFR-dialysate flow rate, BFR-blood flow rate, AP-arterial pressure, -venous pressure, UF-ultrafiltrate, TMP-transmembrane pressure, [place]-Venous, Art-Arterial, RO-Reverse Osmosis

## 2021-01-19 NOTE — ROUTINE PROCESS
Received pt. Alert, oriented only to person. No command following, restless and pulling at IV while shouting. Ativan given

## 2021-01-19 NOTE — ROUTINE PROCESS
Bedside and Verbal shift change report given to Aby (oncoming nurse) by Bharti Rodriguez (offgoing nurse). Report included the following information SBAR and Kardex.

## 2021-01-19 NOTE — ROUTINE PROCESS
Pt bathed. Incontinent of small amount of bloody urine with clots.   Bladder scan showed no residual

## 2021-01-19 NOTE — PROGRESS NOTES
TRANSFER - OUT REPORT:    Verbal report given to Lindsay CRAIG(name) on Shawna Nance  being transferred to (unit) for routine post - op       Report consisted of patients Situation, Background, Assessment and   Recommendations(SBAR). Information from the following report(s) SBAR, Kardex, Procedure Summary, MAR and Recent Results was reviewed with the receiving nurse. Lines:   Peripheral IV 01/18/21 Left;Posterior Hand (Active)        Opportunity for questions and clarification was provided.       Patient transported with:   Registered Nurse  Tech

## 2021-01-19 NOTE — PROGRESS NOTES
Patient is not available to be assessed at this time; asleep post surgery.  provided care to sitting nurse.       [address] [place] [address]   [phone]

## 2021-01-19 NOTE — CONSULTS
NEUROLOGY CONSULT NOTE    Patient ID:  Darren Parker  [id number]  56 y.o.  1960    Date of Consultation:  January 19, 2021    Referring Physician: Mateo Santana MD    Reason for Consultation:  AMS/delirium         Subjective:       History of Present Illness:     Mr Kayode Douglas is a 56-year-old man with history of diabetes, hypertension, history of splenectomy, history of atrial fibrillation, was on Xarelto sometime ago, left cerebellar lacunar infarct, history of repair of abdominal aortic aneurysm and also he has history of seizures which were diagnosed in 2017 during the admission for acute onset abdominal pain when he was found to have a splenic hematoma and hemoperitoneum requiring splenectomy. He was placed on Keppra 1000 mg twice a day. Per medical records from [facility], the patient  had partial seizures in form of simple and complex manifested by eye fluttering, eye deviation and associated with alteration of mentation. The patient was admitted on 14 January with near syncope and weakness, dyspnea on exertion and worsening bilateral lower extremity edema over the past month. He had worsening blisters of the left foot. He developed abscesses bilateral lower extremity requiring incision and drainage. Pradaxa for primary stroke prevention in the setting of A. fib was held as he needed a tunneled dialysis catheter placement. I was asked to see the patient because of episodes of changes in mental status/delirium. Today he is in bed, appears globally weak/fatigued. He correctly stated his name and follow simple commands. He is in bed post right tunnel dialysis catheter placement . He denies any pain.        Patient Active Problem List    Diagnosis Date Noted    Acute kidney injury ([facility] 01/14/2021    ARF (acute renal failure) ([facility] 01/14/2021    Neuropathy 06/01/2018    S/P splenectomy 10/27/2017    Anemia due to blood loss, acute 10/27/2017    Seizure ([facility] [facility] 10/27/2017    Ruptured spleen 10/08/2017    AAA (abdominal aortic aneurysm) without rupture (HCC) 06/13/2017    Atrial fibrillation (HCC) 05/11/2017    Osteoarthritis of multiple joints 05/11/2017    Chronic pain 05/11/2017    Hyperthyroidism 05/11/2017    History of abdominal aortic aneurysm (AAA) 05/11/2017    Family history of systemic lupus erythematosus (SLE) in mother 05/11/2017    History of drug use 05/11/2017    H/O alcohol abuse 05/11/2017    Nicotine use disorder 05/11/2017    Depression 05/11/2017    Essential hypertension 05/11/2017    FH: CAD (coronary artery disease) 05/11/2017     Past Medical History:   Diagnosis Date    AAA (abdominal aortic aneurysm) (HCC)     Alcoholism ([address]     Atrial fibrillation (HCC)     Bradycardia     Chronic pain     Degenerative disc disease, cervical     Degenerative disc disease, lumbar     Hypertension     Hypokalemia     Pneumonia 2016    caused permanent L lung problems    Thyroid disease       Past Surgical History:   Procedure Laterality Date    HX ORTHOPAEDIC      neck fracture    HX ORTHOPAEDIC      lumbar fusion    [facility] ABDOMEN SURGERY PROC UNLISTED  10/08/2017    spleenectomy      Prior to Admission medications    Medication Sig Start Date End Date Taking? Authorizing Provider   magnesium 250 mg tab Take 1 Tab by mouth daily. Yes Provider, Historical   gabapentin (NEURONTIN) 800 mg tablet take 2 tablets by mouth three times a day  Patient taking differently: Take 1,200 mg by mouth two (2) times a day. 3/17/20  Yes Noah Velásquez MD   atenolol (TENORMIN) 100 mg tablet Take 1 Tab by mouth daily. 12/26/19  Yes Vanessa Sampson PA-C   budesonide-formoterol [facility]) 160-4.5 mcg/actuation HFAA inhale 2 puffs by mouth twice a day 11/20/19  Yes Vanessa Sampson PA-C   dabigatran etexilate (PRADAXA) 150 mg capsule Take 1 Cap by mouth every twelve (12) hours.  3/28/19  Yes Juju Silva PA-C   dilTIAZem CD (CARTIA XT) 180 mg ER capsule Take 1 Cap by mouth two (2) times a day. 3/28/19  Yes Everardo Brantley PA-C   hydroCHLOROthiazide (HYDRODIURIL) 12.5 mg tablet Take 1 Tab by mouth daily. Patient taking differently: Take 25 mg by mouth daily. 18  Yes Everardo Brantley PA-C   ergocalciferol (ERGOCALCIFEROL) 50,000 unit capsule Take 1 Cap by mouth every seven (7) days. 3/28/19   Everardo Brantley PA-C   saw palmetto xtr/zinc picolin (SAW PALMETTO EXTRACT PO) Take  by mouth. Provider, Historical   amitriptyline (ELAVIL) 25 mg tablet take 1 tablet by mouth NIGHTLY  Patient taking differently: 50 mg nightly. 3/20/19   Everardo Brantley PA-C   melatonin tab tablet Take  by mouth nightly. Provider, Historical   cholecalciferol, vitamin D3, (VITAMIN D3) 2,000 unit tab Take  by mouth two (2) times a day. Provider, Historical   MULTIVIT-MIN/FA/LYCOPEN/LUTEIN (CENTRUM SILVER MEN PO) Take 1 Tab by mouth daily. Provider, Historical   potassium 99 mg tablet Take 99 mg by mouth daily. Provider, Historical   fish oil-dha-epa 1,200-144-216 mg cap Take 1 Tab by mouth daily. Provider, Historical   albuterol (PROAIR HFA) 90 mcg/actuation inhaler Take 2 Puffs by inhalation every four (4) hours as needed for Wheezing. 17   Everardo Brantley PA-C   b complex vitamins tablet Take 1 Tab by mouth daily. Provider, Historical     No Known Allergies   Social History     Tobacco Use    Smoking status: Former Smoker     Quit date: 2017     Years since quitting: 3.4    Smokeless tobacco: Former User   Substance Use Topics    Alcohol use: No     Comment: sober x 5 years      Family History   Problem Relation Age of Onset    Heart Disease Father     Heart Disease Brother         AAA-  in his 42's    Heart Disease Paternal Aunt     Heart Disease Paternal Uncle     Heart Disease Paternal Grandmother     Lupus Mother         Review of Systems    Pertinent items are noted in HPI. Globally weak, somnolent, denies pain or focal weakness.   Denies chest pain or shortness of breath. Bilateral lower extremities wounds. Objective:     Patient Vitals for the past 8 hrs:   BP Temp Pulse Resp SpO2   01/19/21 1211 (!) 91/58 97.6 °F (36.4 °C) 87 20 97 %   01/19/21 0740 96/63 97.4 °F (36.3 °C) 83 19 97 %       General Exam  No acute distress, overweight body habitus    HEENT: Normocephalic, atraumatic, Sclera anicteric, normal conjunctiva  Mucous membranes: normal color and hydration     CV: No carotid bruits,   Heart:ir regular to rate and rhythm. No murmurs     RESP:  CTAB     Neurologic Exam:    MENTAL STATUS:     The patient is somnolent, but awakes to voice. He correctly states his name and stated that he is in the hospital at [facility]. His language seems to be normal, speech is very soft. No dysarthria. He follows commands. CRANIAL NERVES:   II Pupils are midline, symmetric, both reactive to light and accommodation. Visual fields are tara  III, IV, VI  Extraocular movements are intact and there is no nystagmus. V  Facial sensation is intact to pinprick and light touch. VII  Face is symmetrical.   VIII - Hearing is present. IX, X, [address] rises symmetrically. Gag is present. Tongue is in the midline. XI - Shoulder shrugging and head turning intact    MOTOR:          Tone is normal.  Muscle bulk is normal.  The patient is 5/5 in all four limbs without any drift. No involuntary movements    SENSATION:  Sensory examination is intact to pinprick, light touch and position sense testing. REFLEXES: 1+ and symmetrical. Plantars are down going. COORDINATION: Cerebellar examination reveals no gross ataxia or dysmetria. GAIT: Gait is not tested at this time.   Due to patient condition      Data Review:    Recent Results (from the past 24 hour(s))   GLUCOSE, POC    Collection Time: 01/18/21  5:09 PM   Result Value Ref Range    Glucose (POC) 91 70 - 110 mg/dL   PROTHROMBIN TIME + INR    Collection Time: 01/18/21  7:08 PM   Result Value Ref Range    Prothrombin time 19.3 (H) 11.5 - 15.2 sec    INR 1.7 (H) 0.8 - 1.2     GLUCOSE, POC    Collection Time: 01/18/21 11:58 PM   Result Value Ref Range    Glucose (POC) 96 70 - 110 mg/dL   SARS-COV-2    Collection Time: 01/19/21 12:30 AM   Result Value Ref Range    Specimen source NP SWAB     COVID-19 rapid test Not detected NOTD      Specimen type NP Swab     METABOLIC PANEL, BASIC    Collection Time: 01/19/21  4:20 AM   Result Value Ref Range    Sodium 136 136 - 145 mmol/L    Potassium 4.8 3.5 - 5.5 mmol/L    Chloride 99 (L) 100 - 111 mmol/L    CO2 25 21 - 32 mmol/L    Anion gap 12 3.0 - 18 mmol/L    Glucose 84 74 - 99 mg/dL    BUN 94 (H) 7.0 - 18 MG/DL    Creatinine 10.90 (H) 0.6 - 1.3 MG/DL    BUN/Creatinine ratio 9 (L) 12 - 20      GFR est AA 6 (L) >60 ml/min/1.73m2    GFR est non-AA 5 (L) >60 ml/min/1.73m2    Calcium 7.7 (L) 8.5 - 10.1 MG/DL   CBC WITH AUTOMATED DIFF    Collection Time: 01/19/21  4:20 AM   Result Value Ref Range    WBC 10.7 4.6 - 13.2 K/uL    RBC 3.22 (L) 4.70 - 5.50 M/uL    HGB 9.8 (L) 13.0 - 16.0 g/dL    HCT 30.6 (L) 36.0 - 48.0 %    MCV 95.0 74.0 - 97.0 FL    MCH 30.4 24.0 - 34.0 PG    MCHC 32.0 31.0 - 37.0 g/dL    RDW 14.7 (H) 11.6 - 14.5 %    PLATELET 031 128 - 850 K/uL    MPV 11.5 9.2 - 11.8 FL    NEUTROPHILS 82 (H) 42 - 75 %    LYMPHOCYTES 8 (L) 20 - 51 %    MONOCYTES 7 2 - 9 %    EOSINOPHILS 3 0 - 5 %    BASOPHILS 0 0 - 3 %    ABS. NEUTROPHILS 8.7 (H) 1.8 - 8.0 K/UL    ABS. LYMPHOCYTES 0.9 0.8 - 3.5 K/UL    ABS. MONOCYTES 0.8 0 - 1.0 K/UL    ABS. EOSINOPHILS 0.3 0.0 - 0.4 K/UL    ABS.  BASOPHILS 0.0 0.0 - 0.06 K/UL    DF MANUAL      PLATELET COMMENTS ADEQUATE PLATELETS      RBC COMMENTS ANISOCYTOSIS  1+        RBC COMMENTS HYPOCHROMIA  1+        RBC COMMENTS STOMATOCYTES  1+       MAGNESIUM    Collection Time: 01/19/21  4:20 AM   Result Value Ref Range    Magnesium 3.3 (H) 1.6 - 2.6 mg/dL   GLUCOSE, POC Collection Time: 01/19/21  6:14 AM   Result Value Ref Range    Glucose (POC) 94 70 - 110 mg/dL   GLUCOSE, POC    Collection Time: 01/19/21  1:27 PM   Result Value Ref Range    Glucose (POC) 98 70 - 110 mg/dL         Radiology studies:   No new imaging during this admission. MRI in 2017 with left cerebellar chronic infarct    65 minutes were spent on the patient of which more than 50% was spent in coordination of care and counseling (time spent with patient/family face to face, physical exam, reviewing laboratory and imaging investigations, speaking with physicians and nursing staff involved in this patient's care)    Assessment: This is a 67-year-old man, with multiple comorbidities, A. fib on Pradaxa, history of seizures, alcohol abuse, diabetes, hypertension, splenectomy, AAA repair, bilateral lower extremities wounds evaluated for episodes of delirium. To my exam today no concerns of encephalopathy. The patient has history of seizures with abnormal EEG in 2017. Given his overall risk factors to develop seizures again I think is reasonable to start an antiepileptic drug. In his case probably best to be Trileptal as will be effective for neuropathy, agitation/mood changes/depression and also for seizures prophylaxis. Plan:     - start Trileptal -I placed an order will slowly increase dose to 600 mg twice a day  -Stop gabapentin to avoid polypharmacy  -Consider low molecular Heparin or Lovenox therapeutic dose with the PT PTT in the lower range due to his atrial fibrillation .   I understand that Pradaxa has to be on hold  - will follow along to see how he tolerates Trileptal        Ellena Riedel, MD  Adult Neurologist  1/19/2021

## 2021-01-19 NOTE — PROGRESS NOTES
Note plan for IR to place [facility]. Will remain available as needed.    Please call with questions or concerns

## 2021-01-19 NOTE — PROGRESS NOTES
Admit Date: 2021  Date of Service: 2021        Assessment:     1. 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 Acute hypoxic respiratory failure: multifactorial  3. Acute metabolic encephalopathy/delirium   4. A.fib:  Rate controlled; on Cardizem; Pradaxa on hold   5. Volume overload   6. Leukocytosis with mild lymphopenia  7. Chronic LE wounds with abscesses status post bilateral I&D  8. Hematuria   9. Pre-syncope  10. Surgically asplenic      Rapid Covid-19 negative 1/15/2021  Send out Covid19 negative 2021  Dialysis catheter placed by IR. Await renal biopsy  Hemodialysis per nephrology  Neurology consulted, patient has had seizures in the past.  Neurology has started Trileptal  Echo report reviewedEF 55-60%  Continue Cardizem and atenolol  Continue Symbicort  Fall precautions  Continue SSI with Accu-Cheks  Continue antibiotics, wound care, podiatry is following  PT, OT  I called patient's daughter at phone #[phone] and discussed with her over the phone regarding patient's care for more than 15 minutes. I discussed with the neurologist, I discussed with nursing staff, I discussed with the nephrologist.    Case discussed with:  [x]Patient  [x]Family  [x]Nursing  [x]Case Management  DVT Prophylaxis:  []Lovenox  [x]Hep SQ  []SCDs  []Coumadin   []On Heparin gtt   Diet: Diabetic  CODE STATUS: Full  Contact: Bebo Caban (wife)  [phone], daughter [phone]      Wu Pedro MD    2021       Subjective:      Patient is laying in bed in no apparent distress. Patient is sleepy but awakens to verbal commands.   Patient has been intermittently confused    Objective:        Visit Vitals  BP 91/63   Pulse (!) 105   Temp 97.7 °F (36.5 °C) (Axillary)   Resp 18   Ht 5' 10\" (1.778 m)   Wt 131.5 kg (290 lb)   SpO2 97%   BMI 41.61 kg/m²     Temp (24hrs), Av.6 °F (36.4 °C), Min:97.4 °F (36.3 °C), Max:97.9 °F (36.6 °C)        General: Sleepy, awakens to verbal commands  Cardiovascular:  S1S2+, RRR  Pulmonary:  CTA b/l  GI:  Soft, BS+, NT, ND, obese  Extremities: Both lower extremities with dressing        Labs: Results:   Chemistry Recent Labs     01/19/21  0420 01/18/21  0618 01/17/21  0522   GLU 84 72* 88    138 137   K 4.8 4.7 5.1   CL 99* 101 102   CO2 25 28 24   BUN 94* 81* 126*   CREA 10.90* 9.12* 11.80*   CA 7.7* 7.8* 8.1*   AGAP 12 9 11   BUCR 9* 9* 11*      CBC w/Diff Recent Labs     01/19/21  0420 01/18/21  0345   WBC 10.7  --    RBC 3.22*  --    HGB 9.8* 10.2*   HCT 30.6* 31.5*     --    GRANS 82*  --    LYMPH 8*  --    EOS 3  --         No results found for: SDES Lab Results   Component Value Date/Time    Culture result: NO GROWTH 3 DAYS 01/16/2021 08:46 AM    Culture result: NO GROWTH 3 DAYS 01/16/2021 08:46 AM    Culture result: NO GROWTH 3 DAYS 01/16/2021 08:46 AM    Culture result: NO GROWTH 3 DAYS 01/16/2021 08:46 AM    Culture result: (A) 01/14/2021 09:00 PM     LIGHT STAPHYLOCOCCUS EPIDERMIDIS (OXACILLIN RESISTANT)    Culture result: RARE STAPHYLOCOCCUS AUREUS (A) 01/14/2021 09:00 PM    Culture result: FEW CANDIDA PARAPSILOSIS (A) 01/14/2021 09:00 PM        Results     Procedure Component Value Units Date/Time    CULTURE, Deandren Dorothea STAIN [[id number]] Collected: 01/16/21 0846    Order Status: Completed Specimen: Foot Updated: 01/19/21 1330     Special Requests: RIGHT        GRAM STAIN NO ORGANISMS SEEN        Culture result: NO GROWTH 3 DAYS       CULTURE, ANAEROBIC [[id number]] Collected: 01/16/21 0846    Order Status: Completed Specimen: Foot Updated: 01/19/21 1332     Special Requests: RIGHT        Culture result: NO GROWTH 3 DAYS       CULTURE, Hulon Dorothea STAIN [[id number]] Collected: 01/16/21 0846    Order Status: Completed Specimen: Foot Updated: 01/19/21 1333     Special Requests: LEFT        GRAM STAIN NO ORGANISMS SEEN        Culture result: NO GROWTH 3 DAYS       CULTURE, ANAEROBIC [[id number]] Collected: 01/16/21 0846    Order Status: Completed Specimen: Foot Updated: 01/19/21 1333     Special Requests: LEFT        Culture result: NO GROWTH 3 DAYS       CULTURE, WOUND Logan Loupe STAIN [[id number]]  (Abnormal)  (Susceptibility) Collected: 01/14/21 2100    Order Status: Completed Specimen: Wound from Foot Updated: 01/18/21 1426     Special Requests: NO SPECIAL REQUESTS        GRAM STAIN FEW WBCS SEEN         NO ORGANISMS SEEN        Culture result:       LIGHT STAPHYLOCOCCUS EPIDERMIDIS (OXACILLIN RESISTANT)                  RARE STAPHYLOCOCCUS AUREUS            FEW CANDIDA PARAPSILOSIS       Susceptibility      Staphylococcus epidermidis     GEORGINA     Ciprofloxacin ($) Susceptible     Erythromycin ($$$$) Resistant     Gentamicin ($) Susceptible     Inducible Clindamycin Susceptible     Levofloxacin ($) Susceptible     Linezolid ($$$$$) Susceptible     Moxifloxacin ($$$$) Susceptible     Oxacillin Resistant     Tetracycline Intermediate     Vancomycin ($) Susceptible                Susceptibility      Staphylococcus aureus     GEORGINA     Ciprofloxacin ($) Susceptible     Clindamycin ($) Susceptible     Daptomycin ($$$$$) Susceptible     Doxycycline ($$) Susceptible     Erythromycin ($$$$) Susceptible     Gentamicin ($) Susceptible     Levofloxacin ($) Susceptible     Linezolid ($$$$$) Susceptible     Moxifloxacin ($$$$) Susceptible     Oxacillin Susceptible     Tetracycline Susceptible     Trimeth/Sulfa Susceptible     Vancomycin ($) Susceptible                          Imaging:     No results found.     Teri Cohen MD

## 2021-01-19 NOTE — PROCEDURES
RADIOLOGY POST PROCEDURE NOTE     January 19, 2021       9:16 AM     Preoperative Diagnosis:Dialsis access requested  Postoperative Diagnosis:  Same. Post procedure. :  Dr. Adam Burton    Assistant:  None. Type of Anesthesia: 1% plain lidocaine, 45 min moderate sedation    Procedure/Description:  Us and fluoro guided placement of 23 cm length dialysis catheter by right internal jugular vein    Findings:  Same. Estimated blood Loss:  Minimal    Specimen Removed: no    Blood loss:  Minimal    Implants:  None.     Complications: None    Condition: Stable    Discharge Plan: return to floor, resume prior orders  Rajat Fofana MD

## 2021-01-19 NOTE — PROGRESS NOTES
New OT order received and chart reviewed. Patient evaluated and currently on skilled OT caseload. Will acknowledge the order.   Thank you for the referral.  Beena Torres MS OTR/L

## 2021-01-19 NOTE — INTERDISCIPLINARY ROUNDS
Interdisciplinary Round Note Patient Information: Patient Information: Junie Lockett 1905 Reason for Admission: Acute kidney injury ([facility] [address] [N17.9] ARF (acute renal failure) ([facility] [address] [N17.9] Attending Provider:   Jose Gaspar MD 
 Past Medical History:  
Past Medical History:  
Diagnosis Date  AAA (abdominal aortic aneurysm) ([facility] [address]  Alcoholism ([facility] [address]  Atrial fibrillation ([facility] [address]  Bradycardia  Chronic pain  Degenerative disc disease, cervical   
 Degenerative disc disease, lumbar  Hypertension  Hypokalemia  Pneumonia 2016  
 caused permanent L lung problems  Thyroid disease Hospital day: 5 Estimated discharge date:  1/23/21 RRAT Score: Low Risk 8 Total Score 3 Has Seen PCP in Last 6 Months (Yes=3, No=0) 2 . Living with Significant Other. Assisted Living. LTAC. SNF. or  
Rehab  
 3 Patient Length of Stay (>5 days = 3) Criteria that do not apply:  
 IP Visits Last 12 Months (1-3=4, 4=9, >4=11) Pt. Coverage (Medicare=5 , Medicaid, or Self-Pay=4) Charlson Comorbidity Score (Age + Comorbid Conditions) Retired Read Only-Readmit Risk Tool Support Systems: Spouse/Significant Other/Partner, History of Falls Within Past 3 Months: No, Needs Assistance with [address] [employer] of O2, Nebulizer: No, Requires Financial, Physical and/or Educational Assistance With Medications: No, History of Mental Illness: No, Living Alone: No 
The patient will require the following interventions based on the Readmission Risk Assessment:  Pharmacy evaluation and teaching Goals for Today:  Decrease pain medication use and monitor bleeding at dialysis catheter insertion site Overnight Events: Patient confused VITAL SIGNS Vitals:  
 01/19/21 1730 01/19/21 1745 01/19/21 1800 01/19/21 1815 BP: (!) 95/51 (!) 108/52 (!) 90/53 (!) 103/50 Pulse: 75 98 84 88 Resp:      
Temp:      
TempSrc: SpO2:      
Weight:      
Height:      
 
 
 
 Lines, Drains, & Airways Hemodialysis Access 01/19/21-Site Assessment: Intact [REMOVED] Peripheral IV 01/14/21 Right Antecubital-Site Assessment: Clean, dry, & intact [REMOVED] Hemodialysis Access 01/17/21-Site Assessment: Clean, dry, & intact [REMOVED] Peripheral IV 01/15/21 Left Antecubital-Site Assessment: Intact Peripheral IV 01/18/21 Left;Posterior Hand-Site Assessment: Clean, dry, & intact [REMOVED] Urinary Catheter 01/14/21 Garza-Indications for Use: Accurate measurement of urinary output [REMOVED] Urinary Catheter 01/17/21 Garza-Indications for Use: Accurate measurement of urinary output VTE Prophylaxis Sequential/Intermittent Compression Device: Bilateral 
       
        
Intake and Output:  
01/17 1901 - 01/19 0700 In: 56 [P.O.:470; I.V.:140] Out: 280 [Urine:280] No intake/output data recorded. Current Diet: DIET NPO Past Midnight, With [employer], With Sips of Clear Fluids, Except Meds Abdominal  
Last Bowel Movement Date: 01/14/21 Abdominal Assessment: Obese Nutrition Chewing/Swallowing Problems: No 
Difficulty with Secretions: No 
Speech Slurred/Thick/Garbled: No 
  
Recent Glucose Results:  
Lab Results Component Value Date/Time GLU 84 01/19/2021 04:20 AM  
 GLUCPOC 98 01/19/2021 01:27 PM  
 GLUCPOC 94 01/19/2021 06:14 AM  
 GLUCPOC 96 01/18/2021 11:58 PM  
 
 
 Sepsis Re-Assessment Documentation:  
 
Date: 1/19/2021 Time: 6:20 PM 
Lactic Acid level:   
 
Vital Signs Level of Consciousness: Alert Temp: 97.7 °F (36.5 °C) Temp Source: Oral 
Pulse (Heart Rate): 88 Heart Rate Source: Brachial, Monitor Cardiac Rhythm: Normal sinus rhythm Resp Rate: 18 
BP: (!) 103/50 MAP (Monitor): 73 MAP (Calculated): 68 BP 1 Location: Right arm BP 1 Method: Automatic 
BP Patient Position: Supine MEWS Score: 2 Vitals:  
 01/19/21 1730 01/19/21 1745 01/19/21 1800 01/19/21 1815 BP: (!) 95/51 (!) 108/52 (!) 90/53 (!) 103/50 Pulse: 75 98 84 88 Resp:      
Temp:      
TempSrc:      
SpO2:      
  
Vitals:  
 01/19/21 1730 01/19/21 1745 01/19/21 1800 01/19/21 1815 BP: (!) 95/51 (!) 108/52 (!) 90/53 (!) 103/50 Pulse: 75 98 84 88 Resp:      
Temp:      
TempSrc:      
SpO2:      
  
IV Antibiotics? cefepime Activity Level: Activity Level: Bed Rest 
Needs assistance with ADLs: yes PT Consult Status: YES Current Immunizations: 
Immunization History Administered Date(s) Administered  Influenza Vaccine [place]) PF (>6 Mo Flulaval, Fluarix, and >3 Yrs [place], Fluzone 25472) 10/20/2017, 09/25/2018  Meningococcal (MCV4O) Vaccine 11/28/2017, 11/28/2017, 11/23/2018, 11/23/2018  Meningococcal B (OMV) Vaccine 11/23/2018, 11/23/2018  Pneumococcal Conjugate (PCV-13) 10/20/2017  Pneumococcal Polysaccharide (PPSV-23) 09/25/2018  Tdap 09/25/2018  Zoster Recombinant 09/25/2018 Recommendations:  
 
Discharge Management:  Home with home health or Rehab COVID status: Negative Will the patient require COVID testing prior to discharge for placement?: NO Medication Reconciliation Completed: NO 
 
Cardiac/Pulmonary Rehab Ordered:  NO 
 
Patient/family/caregiver \"PREFERENCES\" for discharge: Unknown Recommendations from IDR team:  get patient to dialysis today Transfer Level of Care:  Not Ready for Transfer During rounds the following quality care indicators and evidence based practices were addressed :   Pain Management Interdisciplinary team rounds were held on 1/19/21 with the following team [facility] Management, Nursing and Physician and the  spouse. Plan of care discussed. See clinical pathway and/or care plan for interventions and desired outcomes.

## 2021-01-19 NOTE — PROGRESS NOTES
Attempted OT tx at [phone] and was unable to be seen 2/2 active bleeding per RN and was off unit earlier this morning. Will continue to f/u as time permits.  Thank you  RENATE Christensen/MAHENDRA

## 2021-01-19 NOTE — PROGRESS NOTES
New PT orders received with pt on caseload. Will follow up as appropriate. Acknowledged new orders. Spoke with Dr. Mcfarland Earing and pt is LISS SHULTZ.      Thank you,   Trav Hagan, PT, DPT

## 2021-01-19 NOTE — PROGRESS NOTES
Preprocedure Assessment      Today 1/19/2021     Indication/Symptoms:   Angelo Chadwick is a 56 y.o. male with acute renal failure and uremia here for a tunneled dialysis catheter placement. His INR is 1.7 today - random renal biopsy will be deferred until his INR is <1.6    The H & P and/or progress notes and any available imaging were reviewed. The risks, indications and possible alternatives to the procedure, including doing nothing, were discussed and informed consent was obtained. Physical Exam:      Heart:   RRR   Lungs:   Normal work of breathing    The patient is an appropriate candidate to undergo the planned procedure and sedation.     MANISHA Gomes

## 2021-01-19 NOTE — PROGRESS NOTES
Interventional Radiology    Patient seen in follow up regarding post procedure bleeding from [facility] site. Per Dr. Victorina Sykes, 2 units FFP ordered due to bleeding from procedure site and elevated INR. Please hold pressure (at least 5 minutes) at the right neck IJ access site to prevent further bleeding in addition to transfusing 2 units. Random renal biopsy will be deferred to later this week. Dialysis catheter is OK for use. Dr. Victorina Sykes will be at the patient's bedside to assess.     Thank you,  MANISHA Clark

## 2021-01-19 NOTE — PROGRESS NOTES
PT orders received, chart reviewed. Pt unable to participate with PT due to:  []  Nausea/vomiting  []  Eating  []  Pain  []  Pt lethargic  []  Off Unit  []  Pt refused  [x]  Other pt was bleeding with nursing applying pressure to control. Pt also sleeping at this time. Pt was off the unit earlier this am.   Will f/u later as patient's schedule allows.  Thank you for this referral.  Sundeep Lainez, PT, DPT

## 2021-01-19 NOTE — PROGRESS NOTES
Progress  Note       60y M with PMH HTN, chronic pain, admitted for severe renal failure  Subjective      Overnight event noted  S/p TDC placement,   Not able to do kidney biopsy, higher INR. During my visit , he appears drowsy, likely effect of pain meds         IMPRESSION:   Acute kidney injury ATN vs RPGN, per history , has been having gross hematuria over last several weeks, brief use of nsaids, was on diuretics at home-- negative ANCA panel, negative anti GBM, negative hep B, C, HIV, negative JULIO  Proteinuria, nephrotic range  Hematuria,   H/o nose bleeding episode   HTN  Lower extremities edema with cellulits, blister  Atrial fibrillation , on anti coagulation   H/o spleenectomy   H/o heavy use of gabapentin ( about 2400mg daily for last 2 months)   PLAN:   Plan for HD today with 2K bath 3hrs, blood flow 350. Awaiting for kidney biopsy. Avoid extended release trielptal  Appreciate neurology colleague input. Follow up GN work up.     Discussed with Dr. Lili Wild,    Facility-Administered Medications: None       Current Facility-Administered Medications   Medication Dose Route Frequency    0.9% sodium chloride infusion 250 mL  250 mL IntraVENous PRN    [START ON 1/20/2021] OXcarbazepine (TRILEPTAL) tablet 300 mg  300 mg Oral Q12H    [START ON 1/23/2021] OXcarbazepine (TRILEPTAL) tablet 600 mg  600 mg Oral BID    OXcarbazepine (TRILEPTAL) tablet 150 mg  150 mg Oral BID    silver sulfADIAZINE (SILVADENE) 1 % topical cream   Topical DAILY    nicotine (NICODERM CQ) 14 mg/24 hr patch 1 Patch  1 Patch TransDERmal DAILY    0.9% sodium chloride infusion 250 mL  250 mL IntraVENous DIALYSIS PRN    heparin (porcine) 1,000 unit/mL injection 5,000 Units  5,000 Units InterCATHeter DIALYSIS PRN    dilTIAZem ER (CARDIZEM CD) capsule 120 mg  120 mg Oral Q12H    albumin human 25% (BUMINATE) solution 25 g  25 g IntraVENous DIALYSIS PRN    budesonide-formoteroL (SYMBICORT) 160-4.5 mcg/actuation HFA inhaler 2 Puff  2 Puff Inhalation BID RT    oxyCODONE-acetaminophen (PERCOCET) 5-325 mg per tablet 1 Tab  1 Tab Oral Q6H PRN    heparin (porcine) injection 5,000 Units  5,000 Units SubCUTAneous Q8H    insulin lispro (HUMALOG) injection   SubCUTAneous AC&HS    glucose chewable tablet 16 g  4 Tab Oral PRN    glucagon (GLUCAGEN) injection 1 mg  1 mg IntraMUSCular PRN    dextrose (D50W) injection syrg 12.5-25 g  25-50 mL IntraVENous PRN    cefepime (MAXIPIME) 1 g in sterile water (preservative free) 10 mL IV syringe  1 g IntraVENous Q24H    sodium chloride (NS) flush 5-40 mL  5-40 mL IntraVENous Q8H    sodium chloride (NS) flush 5-40 mL  5-40 mL IntraVENous Q8H    sodium chloride (NS) flush 5-40 mL  5-40 mL IntraVENous PRN    acetaminophen (TYLENOL) tablet 650 mg  650 mg Oral Q6H PRN    Or    acetaminophen (TYLENOL) suppository 650 mg  650 mg Rectal Q6H PRN    polyethylene glycol (MIRALAX) packet 17 g  17 g Oral DAILY PRN    promethazine (PHENERGAN) tablet 12.5 mg  12.5 mg Oral Q6H PRN    Or    ondansetron (ZOFRAN) injection 4 mg  4 mg IntraVENous Q6H PRN    amitriptyline (ELAVIL) tablet 50 mg  50 mg Oral QHS    ergocalciferol capsule 50,000 Units  50,000 Units Oral Q7D    melatonin tablet 5 mg  5 mg Oral QHS     Facility-Administered Medications Ordered in Other Encounters   Medication Dose Route Frequency    fentaNYL citrate (PF) injection 12.5-50 mcg  12.5-50 mcg IntraVENous Multiple    midazolam (VERSED) injection 0.5-2 mg  0.5-2 mg IntraVENous Rad Multiple       Review of Systems:     As above  Data Review:    Labs: Results:       Chemistry Recent Labs     01/19/21  0420 01/18/21  0618 01/17/21  0522   GLU 84 72* 88    138 137   K 4.8 4.7 5.1   CL 99* 101 102   CO2 25 28 24   BUN 94* 81* 126*   CREA 10.90* 9.12* 11.80*   CA 7.7* 7.8* 8.1*   AGAP 12 9 11   BUCR 9* 9* 11*      CBC w/Diff Recent Labs     01/19/21  0420 01/18/21  0345   WBC 10.7  --    RBC 3.22*  --    HGB 9.8* 10.2*   HCT 30.6* 31.5*   --    GRANS 82*  --    LYMPH 8*  --    EOS 3  --       Coagulation Recent Labs     01/18/21  1908   PTP 19.3*   INR 1.7*       Iron/Ferritin No results for input(s): IRON in the last 72 hours. No lab exists for component: TIBCCALC   BNP No results for input(s): BNPP in the last 72 hours. Cardiac Enzymes No results for input(s): CPK, CKND1, RUSSEL in the last 72 hours. No lab exists for component: CKRMB, TROIP   Liver Enzymes No results for input(s): TP, ALB, TBIL, AP in the last 72 hours.     No lab exists for component: SGOT, GPT, DBIL   Thyroid Studies Lab Results   Component Value Date/Time    TSH 1.94 01/14/2021 11:56 AM         EKG: atrial fibrilation     Physical Assessment:     Visit Vitals  BP (!) 91/58 (BP 1 Location: Right arm)   Pulse 87   Temp 97.6 °F (36.4 °C)   Resp 20   Ht 5' 10\" (1.778 m)   Wt 131.5 kg (290 lb)   SpO2 97%   BMI 41.61 kg/m²     Weight change:     Intake/Output Summary (Last 24 hours) at 1/19/2021 1517  Last data filed at 1/19/2021 0641  Gross per 24 hour   Intake 140 ml   Output 50 ml   Net 90 ml     Physical Exam:   General: comfortable, no acute distress   HEENT sclera anicteric, supple neck, no thyromegaly  CVS: S1S2 heard,  no rub  RS: + air entry b/l,   Abd: Soft, Non tender, Not distended,  Neuro: non focal, awake, alert , CN II-XII are grossly intact,  +tremor   Extrm: ++edema, no cyanosis, clubbing   Skin:  dry  Musculoskeletal: No gross joints or bone deformities     Procedures/imaging: see electronic medical records for all procedures, Xrays and details which were not copied into this note but were reviewed prior to creation of Domingo Myles MD  January 19, 2021  [facility]  Office [phone]

## 2021-01-20 ENCOUNTER — APPOINTMENT (OUTPATIENT)
Dept: INTERVENTIONAL RADIOLOGY/VASCULAR | Age: 61
DRG: 673 | End: 2021-01-20
Attending: EMERGENCY MEDICINE
Payer: COMMERCIAL

## 2021-01-20 ENCOUNTER — APPOINTMENT (OUTPATIENT)
Dept: CT IMAGING | Age: 61
DRG: 673 | End: 2021-01-20
Attending: PHYSICIAN ASSISTANT
Payer: COMMERCIAL

## 2021-01-20 ENCOUNTER — APPOINTMENT (OUTPATIENT)
Dept: VASCULAR SURGERY | Age: 61
DRG: 673 | End: 2021-01-20
Attending: INTERNAL MEDICINE
Payer: COMMERCIAL

## 2021-01-20 LAB
ANION GAP SERPL CALC-SCNC: 11 MMOL/L (ref 3–18)
BACTERIA SPEC CULT: NORMAL
BASOPHILS # BLD: 0 K/UL (ref 0–0.06)
BASOPHILS NFR BLD: 0 % (ref 0–3)
BUN SERPL-MCNC: 50 MG/DL (ref 7–18)
BUN/CREAT SERPL: 7 (ref 12–20)
CALCIUM SERPL-MCNC: 8.3 MG/DL (ref 8.5–10.1)
CHLORIDE SERPL-SCNC: 101 MMOL/L (ref 100–111)
CO2 SERPL-SCNC: 24 MMOL/L (ref 21–32)
CREAT SERPL-MCNC: 7.32 MG/DL (ref 0.6–1.3)
DIFFERENTIAL METHOD BLD: ABNORMAL
EOSINOPHIL # BLD: 0.4 K/UL (ref 0–0.4)
EOSINOPHIL NFR BLD: 3 % (ref 0–5)
ERYTHROCYTE [DISTWIDTH] IN BLOOD BY AUTOMATED COUNT: 14.7 % (ref 11.6–14.5)
GLUCOSE BLD STRIP.AUTO-MCNC: 120 MG/DL (ref 70–110)
GLUCOSE BLD STRIP.AUTO-MCNC: 69 MG/DL (ref 70–110)
GLUCOSE BLD STRIP.AUTO-MCNC: 69 MG/DL (ref 70–110)
GLUCOSE BLD STRIP.AUTO-MCNC: 74 MG/DL (ref 70–110)
GLUCOSE BLD STRIP.AUTO-MCNC: 79 MG/DL (ref 70–110)
GLUCOSE BLD STRIP.AUTO-MCNC: 84 MG/DL (ref 70–110)
GLUCOSE BLD STRIP.AUTO-MCNC: 91 MG/DL (ref 70–110)
GLUCOSE BLD STRIP.AUTO-MCNC: 95 MG/DL (ref 70–110)
GLUCOSE BLD STRIP.AUTO-MCNC: 97 MG/DL (ref 70–110)
GLUCOSE SERPL-MCNC: 63 MG/DL (ref 74–99)
GRAM STN SPEC: NORMAL
GRAM STN SPEC: NORMAL
HCT VFR BLD AUTO: 26.7 % (ref 36–48)
HGB BLD-MCNC: 8.4 G/DL (ref 13–16)
LEFT INTERLOBAR EDV: 5.2 CM/S
LEFT INTERLOBAR PSV: 21.3 CM/S
LEFT INTERLOBAR RI: 0.8
LEFT KIDNEY LENGTH: 12.86 CM
LEFT KIDNEY WIDTH: 6.38 CM
LEFT RENAL LOWER PARENCHYMA MAX: 17 CM/S
LEFT RENAL LOWER PARENCHYMA MIN: 4.3 CM/S
LEFT RENAL LOWER PARENCHYMA RI: 0.75
LEFT RENAL MIDDLE PARENCHYMA MAX: 17 CM/S
LEFT RENAL MIDDLE PARENCHYMA MIN: 6.5 CM/S
LEFT RENAL MIDDLE PARENCHYMA RI: 0.62
LEFT RENAL UPPER PARENCHYMA MAX: 12.6 CM/S
LEFT RENAL UPPER PARENCHYMA MIN: 5.2 CM/S
LEFT RENAL UPPER PARENCHYMA RI: 0.59
LYMPHOCYTES # BLD: 1.4 K/UL (ref 0.8–3.5)
LYMPHOCYTES NFR BLD: 10 % (ref 20–51)
MAGNESIUM SERPL-MCNC: 2.8 MG/DL (ref 1.6–2.6)
MCH RBC QN AUTO: 30.2 PG (ref 24–34)
MCHC RBC AUTO-ENTMCNC: 31.5 G/DL (ref 31–37)
MCV RBC AUTO: 96 FL (ref 74–97)
MONOCYTES # BLD: 1.6 K/UL (ref 0–1)
MONOCYTES NFR BLD: 12 % (ref 2–9)
NEUTS SEG # BLD: 10.3 K/UL (ref 1.8–8)
NEUTS SEG NFR BLD: 75 % (ref 42–75)
PLATELET # BLD AUTO: 360 K/UL (ref 135–420)
PLATELET COMMENTS,PCOM: ABNORMAL
PMV BLD AUTO: 11.2 FL (ref 9.2–11.8)
POTASSIUM SERPL-SCNC: 4.6 MMOL/L (ref 3.5–5.5)
RBC # BLD AUTO: 2.78 M/UL (ref 4.7–5.5)
RBC MORPH BLD: ABNORMAL
RIGHT INTERLOBAR EDV: 4.8 CM/S
RIGHT INTERLOBAR PSV: 24 CM/S
RIGHT INTERLOBAR RI: 0.8
RIGHT KIDNEY LENGTH: 11.01 CM
RIGHT KIDNEY WIDTH: 5.95 CM
RIGHT RENAL DIST DIAS: 10.1 CM/S
RIGHT RENAL DIST RI: 0.82
RIGHT RENAL DIST SYS: 55.8 CM/S
RIGHT RENAL LOWER PARENCHYMA MAX: 15.2 CM/S
RIGHT RENAL LOWER PARENCHYMA MIN: 6.1 CM/S
RIGHT RENAL LOWER PARENCHYMA RI: 0.6
RIGHT RENAL MID DIAS: 7 CM/S
RIGHT RENAL MID RI: 0.91
RIGHT RENAL MID SYS: 79.2 CM/S
RIGHT RENAL MIDDLE PARENCHYMA MAX: 4.7 CM/S
RIGHT RENAL MIDDLE PARENCHYMA MIN: 0 CM/S
RIGHT RENAL MIDDLE PARENCHYMA RI: 1
RIGHT RENAL UPPER PARENCHYMA MAX: 15.2 CM/S
RIGHT RENAL UPPER PARENCHYMA MIN: 5.6 CM/S
RIGHT RENAL UPPER PARENCHYMA RI: 0.63
SERVICE CMNT-IMP: NORMAL
SODIUM SERPL-SCNC: 136 MMOL/L (ref 136–145)
WBC # BLD AUTO: 13.7 K/UL (ref 4.6–13.2)

## 2021-01-20 PROCEDURE — 74011250636 HC RX REV CODE- 250/636: Performed by: INTERNAL MEDICINE

## 2021-01-20 PROCEDURE — 94761 N-INVAS EAR/PLS OXIMETRY MLT: CPT

## 2021-01-20 PROCEDURE — P9059 PLASMA, FRZ BETWEEN 8-24HOUR: HCPCS

## 2021-01-20 PROCEDURE — 74011250637 HC RX REV CODE- 250/637: Performed by: INTERNAL MEDICINE

## 2021-01-20 PROCEDURE — 74011250637 HC RX REV CODE- 250/637: Performed by: HOSPITALIST

## 2021-01-20 PROCEDURE — 99232 SBSQ HOSP IP/OBS MODERATE 35: CPT | Performed by: PSYCHIATRY & NEUROLOGY

## 2021-01-20 PROCEDURE — 2709999900 HC NON-CHARGEABLE SUPPLY

## 2021-01-20 PROCEDURE — 74011000250 HC RX REV CODE- 250: Performed by: PODIATRIST

## 2021-01-20 PROCEDURE — 65270000029 HC RM PRIVATE

## 2021-01-20 PROCEDURE — 36430 TRANSFUSION BLD/BLD COMPNT: CPT

## 2021-01-20 PROCEDURE — 74011250637 HC RX REV CODE- 250/637: Performed by: EMERGENCY MEDICINE

## 2021-01-20 PROCEDURE — 85025 COMPLETE CBC W/AUTO DIFF WBC: CPT

## 2021-01-20 PROCEDURE — 36415 COLL VENOUS BLD VENIPUNCTURE: CPT

## 2021-01-20 PROCEDURE — 92610 EVALUATE SWALLOWING FUNCTION: CPT

## 2021-01-20 PROCEDURE — 82962 GLUCOSE BLOOD TEST: CPT

## 2021-01-20 PROCEDURE — 83735 ASSAY OF MAGNESIUM: CPT

## 2021-01-20 PROCEDURE — 93975 VASCULAR STUDY: CPT

## 2021-01-20 PROCEDURE — P9047 ALBUMIN (HUMAN), 25%, 50ML: HCPCS | Performed by: INTERNAL MEDICINE

## 2021-01-20 PROCEDURE — C1751 CATH, INF, PER/CENT/MIDLINE: HCPCS

## 2021-01-20 PROCEDURE — 90935 HEMODIALYSIS ONE EVALUATION: CPT

## 2021-01-20 PROCEDURE — 94640 AIRWAY INHALATION TREATMENT: CPT

## 2021-01-20 PROCEDURE — 77010033678 HC OXYGEN DAILY

## 2021-01-20 PROCEDURE — 70498 CT ANGIOGRAPHY NECK: CPT

## 2021-01-20 PROCEDURE — 74011250636 HC RX REV CODE- 250/636: Performed by: PODIATRIST

## 2021-01-20 PROCEDURE — 74011000636 HC RX REV CODE- 636: Performed by: EMERGENCY MEDICINE

## 2021-01-20 PROCEDURE — 74011250637 HC RX REV CODE- 250/637: Performed by: PSYCHIATRY & NEUROLOGY

## 2021-01-20 PROCEDURE — 99232 SBSQ HOSP IP/OBS MODERATE 35: CPT | Performed by: EMERGENCY MEDICINE

## 2021-01-20 PROCEDURE — 80048 BASIC METABOLIC PNL TOTAL CA: CPT

## 2021-01-20 RX ORDER — LEVOFLOXACIN 500 MG/1
500 TABLET, FILM COATED ORAL
Status: COMPLETED | OUTPATIENT
Start: 2021-01-20 | End: 2021-01-22

## 2021-01-20 RX ORDER — MIDODRINE HYDROCHLORIDE 2.5 MG/1
2.5 TABLET ORAL
Status: COMPLETED | OUTPATIENT
Start: 2021-01-20 | End: 2021-01-20

## 2021-01-20 RX ORDER — AMOXICILLIN AND CLAVULANATE POTASSIUM 500; 125 MG/1; MG/1
1 TABLET, FILM COATED ORAL EVERY 24 HOURS
Status: COMPLETED | OUTPATIENT
Start: 2021-01-20 | End: 2021-01-23

## 2021-01-20 RX ADMIN — BUDESONIDE AND FORMOTEROL FUMARATE DIHYDRATE 2 PUFF: 160; 4.5 AEROSOL RESPIRATORY (INHALATION) at 07:54

## 2021-01-20 RX ADMIN — AMOXICILLIN AND CLAVULANATE POTASSIUM 1 TABLET: 500; 125 TABLET, FILM COATED ORAL at 17:03

## 2021-01-20 RX ADMIN — IOPAMIDOL 80 ML: 755 INJECTION, SOLUTION INTRAVENOUS at 12:04

## 2021-01-20 RX ADMIN — OXCARBAZEPINE 300 MG: 300 TABLET, FILM COATED ORAL at 09:23

## 2021-01-20 RX ADMIN — SILVER SULFADIAZINE: 10 CREAM TOPICAL at 09:24

## 2021-01-20 RX ADMIN — GLUCAGON HYDROCHLORIDE 1 MG: KIT at 05:36

## 2021-01-20 RX ADMIN — ALBUMIN (HUMAN) 25 G: 0.25 INJECTION, SOLUTION INTRAVENOUS at 22:05

## 2021-01-20 RX ADMIN — Medication 10 ML: at 17:05

## 2021-01-20 RX ADMIN — DILTIAZEM HYDROCHLORIDE 120 MG: 120 CAPSULE, COATED, EXTENDED RELEASE ORAL at 09:22

## 2021-01-20 RX ADMIN — MIDODRINE HYDROCHLORIDE 2.5 MG: 2.5 TABLET ORAL at 19:38

## 2021-01-20 RX ADMIN — LEVOFLOXACIN 500 MG: 500 TABLET, FILM COATED ORAL at 17:03

## 2021-01-20 RX ADMIN — Medication 10 ML: at 04:20

## 2021-01-20 RX ADMIN — HEPARIN SODIUM 5000 UNITS: 1000 INJECTION INTRAVENOUS; SUBCUTANEOUS at 23:27

## 2021-01-20 RX ADMIN — ALBUMIN (HUMAN) 25 G: 0.25 INJECTION, SOLUTION INTRAVENOUS at 21:08

## 2021-01-20 NOTE — PROGRESS NOTES
PT treatment attempted with OT at 1330. Pt with RN, RN requesting therapy hold treatment at this time as he is receiving plasma and will have dialysis later today. Will hold treatment. Thank you for this referral.     2nd attempt at [phone], pt with RN and physician. Will continue to follow.        Neha Altamirano PT DPT

## 2021-01-20 NOTE — PROGRESS NOTES
Admit Date: 1/14/2021  Date of Service: 1/20/2021        Assessment:     1. 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 Acute hypoxic respiratory failure: multifactorial  3. Acute metabolic encephalopathy/delirium   4. A.fib:  Rate controlled; on Cardizem; Pradaxa on hold   5. Volume overload   6. Leukocytosis with mild lymphopenia  7. Chronic LE wounds with abscesses status post bilateral I&D  8. Hematuria   9. Pre-syncope  10. Surgically asplenic      Rapid Covid-19 negative 1/15/2021  Send out Covid19 negative 1/14/2021  Dialysis catheter placed by IR. Await renal biopsy  I received a call from the interventional radiologist earlier today. Given the patient's elevated INR at 1.6 (and that the patient had some bleeding around the tunneled dialysis catheter) he recommended to give 2 units of FFP today in anticipation of renal biopsy soon. He ordered a CTA which did not show any significant abnormality  Discussed with nephrologist and he plans hemodialysis again today  Neurology input noted  Neurology has started Trileptal  Echo report reviewedEF 55-60%  Continue Cardizem and atenolol  Continue Symbicort  Fall precautions  Continue antibiotics, wound care, podiatry is following. ID input noted  PT, OT  Discussed with patient, discussed with RN, discussed with nephrologist, discussed with interventional radiologist, discussed with     I called patient's wife at phone #[phone] and updated her regarding patient's care    Case discussed with:  [x]Patient  [x]Family  [x]Nursing  [x]Case Management  DVT Prophylaxis:  []Lovenox  [x]Hep SQ  []SCDs  []Coumadin   []On Heparin gtt   CODE STATUS: Full  Contact: Bernardino Ocasio (wife)  [phone], daughter [phone]      Lacy Christopher MD    January 20, 2021       Subjective:      Patient is sitting in bed in no apparent distress. Patient is awake and follows simple commands.   As per nursing staff patient has been intermittently confused and sometimes even agitated.       Objective:        Visit Vitals  BP (!) 97/56 (BP 1 Location: Left arm, BP Patient Position: At rest)   Pulse 88   Temp 99.3 °F (37.4 °C)   Resp 20   Ht 5' 10\" (1.778 m)   Wt 127 kg (280 lb)   SpO2 96%   BMI 40.18 kg/m²     Temp (24hrs), Av.3 °F (36.8 °C), Min:97.4 °F (36.3 °C), Max:99.3 °F (37.4 °C)      General:  Awake, follows commands  Cardiovascular:  S1S2+, RRR  Pulmonary:  CTA b/l  GI:  Soft, BS+, NT, ND, obese  Extremities: Both lower extremities with dressing, bilateral lower extremity edema          Labs: Results:   Chemistry Recent Labs     21  0521  04221  0618   GLU 63* 84 72*    136 138   K 4.6 4.8 4.7    99* 101   CO2 24 25 28   BUN 50* 94* 81*   CREA 7.32* 10.90* 9.12*   CA 8.3* 7.7* 7.8*   AGAP 11 12 9   BUCR 7* 9* 9*      CBC w/Diff Recent Labs     21  0521  04221  0345   WBC 13.7* 10.7  --    RBC 2.78* 3.22*  --    HGB 8.4* 9.8* 10.2*   HCT 26.7* 30.6* 31.5*    391  --    GRANS 75 82*  --    LYMPH 10* 8*  --    EOS 3 3  --         No results found for: SDES Lab Results   Component Value Date/Time    Culture result: NO GROWTH 4 DAYS 2021 08:46 AM    Culture result: NO GROWTH 4 DAYS 2021 08:46 AM    Culture result: NO GROWTH 4 DAYS 2021 08:46 AM    Culture result: NO GROWTH 4 DAYS 2021 08:46 AM    Culture result: (A) 2021 09:00 PM     LIGHT STAPHYLOCOCCUS EPIDERMIDIS (OXACILLIN RESISTANT)    Culture result: RARE STAPHYLOCOCCUS AUREUS (A) 2021 09:00 PM    Culture result: FEW CANDIDA PARAPSILOSIS (A) 2021 09:00 PM        Results     Procedure Component Value Units Date/Time    CULTURE, Diane Portillo STAIN [[id number]] Collected: 21    Order Status: Completed Specimen: Foot Updated: 21 104     Special Requests: RIGHT        GRAM STAIN NO ORGANISMS SEEN        Culture result: NO GROWTH 4 DAYS       CULTURE, ANAEROBIC [[id number]] Collected: 21    Order Status: Completed Specimen: Foot Updated: 01/20/21 1046     Special Requests: RIGHT        Culture result: NO GROWTH 4 DAYS       CULTURE, Dulcy Terri STAIN [[id number]] Collected: 01/16/21 0846    Order Status: Completed Specimen: Foot Updated: 01/20/21 1048     Special Requests: LEFT        GRAM STAIN NO ORGANISMS SEEN        Culture result: NO GROWTH 4 DAYS       CULTURE, ANAEROBIC [[id number]] Collected: 01/16/21 0846    Order Status: Completed Specimen: Foot Updated: 01/20/21 1047     Special Requests: LEFT        Culture result: NO GROWTH 4 DAYS       CULTURE, WOUND Saroj Sanchezs STAIN [[id number]]  (Abnormal)  (Susceptibility) Collected: 01/14/21 2100    Order Status: Completed Specimen: Wound from Foot Updated: 01/18/21 1426     Special Requests: NO SPECIAL REQUESTS        GRAM STAIN FEW WBCS SEEN         NO ORGANISMS SEEN        Culture result:       LIGHT STAPHYLOCOCCUS EPIDERMIDIS (OXACILLIN RESISTANT)                  RARE STAPHYLOCOCCUS AUREUS            FEW CANDIDA PARAPSILOSIS       Susceptibility      Staphylococcus epidermidis     GEORGINA     Ciprofloxacin ($) Susceptible     Erythromycin ($$$$) Resistant     Gentamicin ($) Susceptible     Inducible Clindamycin Susceptible     Levofloxacin ($) Susceptible     Linezolid ($$$$$) Susceptible     Moxifloxacin ($$$$) Susceptible     Oxacillin Resistant     Tetracycline Intermediate     Vancomycin ($) Susceptible                Susceptibility      Staphylococcus aureus     GEORGINA     Ciprofloxacin ($) Susceptible     Clindamycin ($) Susceptible     Daptomycin ($$$$$) Susceptible     Doxycycline ($$) Susceptible     Erythromycin ($$$$) Susceptible     Gentamicin ($) Susceptible     Levofloxacin ($) Susceptible     Linezolid ($$$$$) Susceptible     Moxifloxacin ($$$$) Susceptible     Oxacillin Susceptible     Tetracycline Susceptible     Trimeth/Sulfa Susceptible     Vancomycin ($) Susceptible                          Imaging:     Cta Neck    Addendum Date: 1/20/2021 Addendum: Addendum: The source images were reconstructed with virtual surface, MIP and intraluminal reconstruction Right subclavian: Normal Right carotid: normal right vertebral: Normal intact Left vertebral: Dominant intact unremarkable Left subclavian: Normal reconstruction arch: Type I arch identified    Result Date: 1/20/2021  The insertion site and course of the dialysis catheter intact normal Internal jugular above and below the dialysis catheter unremarkable no evidence for any injury to the jugular venous Cervical visceral space is unremarkable no adenopathy Mediastinal lipomatosis Some nonspecific postinflammatory changes in the lung apices. No soft tissue abnormalities identified.       Marielos Molina MD

## 2021-01-20 NOTE — PROGRESS NOTES
helped the patient Georgina Dash, who is a 56 y. o.,male, connect with the family with the [employer]. The  provided the following Interventions:   helped the patient Georgina Dash, who is a 56 y. o.,male, connect with the family with the [employer]. The following outcomes were achieved:  Zoom call with daughter Sae Kingston at 15:30 pm.    Assessment:  There are no known further spiritual or Anglican issues which require Spiritual Care Services interventions at this time. Plan:  Chaplains will continue to follow and will provide pastoral care on an as needed/requested basis.  recommends bedside caregivers page  on duty if patient shows signs of acute spiritual or emotional distress.      [address] [place] [address]  [phone]

## 2021-01-20 NOTE — PROGRESS NOTES
Verbal shift change report given to Carol Odom RN (oncoming nurse) by Aida Araujo RN (offgoing nurse). Report included the following information SBAR, MAR and Recent Results.

## 2021-01-20 NOTE — PROGRESS NOTES
Neurology Progress Note    Patient ID:  Junie Carrero  [id number]  56 y.o.  1960    Subjective:      Patient was seen today as follow-up for transient episodes of altered mental status which happened in the past in 2017 when he was admitted and was diagnosed with seizure disorder. The patient is doing better today he appears significantly improved from yesterday. Has no complaints.     Current Facility-Administered Medications   Medication Dose Route Frequency    levoFLOXacin (LEVAQUIN) tablet 500 mg  500 mg Oral Q48H    amoxicillin-clavulanate (AUGMENTIN) 500-125 mg per tablet 1 Tab  1 Tab Oral Q24H    0.9% sodium chloride infusion 250 mL  250 mL IntraVENous PRN    OXcarbazepine (TRILEPTAL) tablet 300 mg  300 mg Oral Q12H    [START ON 1/23/2021] OXcarbazepine (TRILEPTAL) tablet 600 mg  600 mg Oral BID    silver sulfADIAZINE (SILVADENE) 1 % topical cream   Topical DAILY    nicotine (NICODERM CQ) 14 mg/24 hr patch 1 Patch  1 Patch TransDERmal DAILY    0.9% sodium chloride infusion 250 mL  250 mL IntraVENous DIALYSIS PRN    heparin (porcine) 1,000 unit/mL injection 5,000 Units  5,000 Units InterCATHeter DIALYSIS PRN    dilTIAZem ER (CARDIZEM CD) capsule 120 mg  120 mg Oral Q12H    albumin human 25% (BUMINATE) solution 25 g  25 g IntraVENous DIALYSIS PRN    budesonide-formoteroL (SYMBICORT) 160-4.5 mcg/actuation HFA inhaler 2 Puff  2 Puff Inhalation BID RT    oxyCODONE-acetaminophen (PERCOCET) 5-325 mg per tablet 1 Tab  1 Tab Oral Q6H PRN    heparin (porcine) injection 5,000 Units  5,000 Units SubCUTAneous Q8H    insulin lispro (HUMALOG) injection   SubCUTAneous AC&HS    glucose chewable tablet 16 g  4 Tab Oral PRN    glucagon (GLUCAGEN) injection 1 mg  1 mg IntraMUSCular PRN    dextrose (D50W) injection syrg 12.5-25 g  25-50 mL IntraVENous PRN    sodium chloride (NS) flush 5-40 mL  5-40 mL IntraVENous Q8H    sodium chloride (NS) flush 5-40 mL  5-40 mL IntraVENous Q8H    sodium chloride (NS) flush 5-40 mL  5-40 mL IntraVENous PRN    acetaminophen (TYLENOL) tablet 650 mg  650 mg Oral Q6H PRN    Or    acetaminophen (TYLENOL) suppository 650 mg  650 mg Rectal Q6H PRN    polyethylene glycol (MIRALAX) packet 17 g  17 g Oral DAILY PRN    promethazine (PHENERGAN) tablet 12.5 mg  12.5 mg Oral Q6H PRN    Or    ondansetron (ZOFRAN) injection 4 mg  4 mg IntraVENous Q6H PRN    amitriptyline (ELAVIL) tablet 50 mg  50 mg Oral QHS    ergocalciferol capsule 50,000 Units  50,000 Units Oral Q7D    melatonin tablet 5 mg  5 mg Oral QHS     Facility-Administered Medications Ordered in Other Encounters   Medication Dose Route Frequency    fentaNYL citrate (PF) injection 12.5-50 mcg  12.5-50 mcg IntraVENous Multiple    midazolam (VERSED) injection 0.5-2 mg  0.5-2 mg IntraVENous Rad Multiple          Objective: Active hospital medications were reviewed    Lab results and neuroradiology studies from the last 24 hours were reviewed. Prior to Admission medications    Medication Sig Start Date End Date Taking? Authorizing Provider   magnesium 250 mg tab Take 1 Tab by mouth daily. Yes Provider, Historical   gabapentin (NEURONTIN) 800 mg tablet take 2 tablets by mouth three times a day  Patient taking differently: Take 1,200 mg by mouth two (2) times a day. 3/17/20  Yes Adan Alegre MD   atenolol (TENORMIN) 100 mg tablet Take 1 Tab by mouth daily. 12/26/19  Yes Charlie Mata PA-C   budesonide-formoterol [facility]) 160-4.5 mcg/actuation HFAA inhale 2 puffs by mouth twice a day 11/20/19  Yes Charlie Mata PA-C   dabigatran etexilate (PRADAXA) 150 mg capsule Take 1 Cap by mouth every twelve (12) hours. 3/28/19  Yes Charlie Mata PA-C   dilTIAZem CD (CARTIA XT) 180 mg ER capsule Take 1 Cap by mouth two (2) times a day. 3/28/19  Yes Charlie Mata PA-C   hydroCHLOROthiazide (HYDRODIURIL) 12.5 mg tablet Take 1 Tab by mouth daily. Patient taking differently: Take 25 mg by mouth daily.  11/30/18  Yes Adeola Phillip, DUANE Matute   ergocalciferol (ERGOCALCIFEROL) 50,000 unit capsule Take 1 Cap by mouth every seven (7) days. 3/28/19   Natalie Hull PA-C   saw palmetto xtr/zinc picolin (SAW PALMETTO EXTRACT PO) Take  by mouth. Provider, Historical   amitriptyline (ELAVIL) 25 mg tablet take 1 tablet by mouth NIGHTLY  Patient taking differently: 50 mg nightly. 3/20/19   Natalie Hull PA-C   melatonin tab tablet Take  by mouth nightly. Provider, Historical   cholecalciferol, vitamin D3, (VITAMIN D3) 2,000 unit tab Take  by mouth two (2) times a day. Provider, Historical   MULTIVIT-MIN/FA/LYCOPEN/LUTEIN (CENTRUM SILVER MEN PO) Take 1 Tab by mouth daily. Provider, Historical   potassium 99 mg tablet Take 99 mg by mouth daily. Provider, Historical   fish oil-dha-epa 1,200-144-216 mg cap Take 1 Tab by mouth daily. Provider, Historical   albuterol (PROAIR HFA) 90 mcg/actuation inhaler Take 2 Puffs by inhalation every four (4) hours as needed for Wheezing. 7/19/17   Natalie Hull PA-C   b complex vitamins tablet Take 1 Tab by mouth daily. Provider, Historical     Patient Vitals for the past 8 hrs:   BP Temp Pulse Resp SpO2 Height   01/20/21 1509 (!) (P) 117/39 (P) 97.4 °F (36.3 °C) (P) 77 (P) 20 (P) 94 %    01/20/21 1454 112/62 98.7 °F (37.1 °C) 76 20 96 %    01/20/21 1453      5' 10\" (1.778 m)   01/20/21 1359 (!) 99/58 98.9 °F (37.2 °C) 79 20 98 %    01/20/21 1344 (!) 98/53 98.3 °F (36.8 °C) 83 20 97 %    01/20/21 1303 102/66 97.8 °F (36.6 °C) 80 19 98 %    01/20/21 0800 100/64 99 °F (37.2 °C) 82 16 98 %    01/20/21 0755     98 %    [id number] 1901 - 01/20 0700  In: 140 [I.V.:140]  Out: 650 [Urine:50]  01/18 1901 - 01/20 0700  In: 140 [I.V.:140]  Out: 650 [Urine:50]RESULTRCNT(24h)Principal Problem:    Acute kidney injury ([facility] [address] (1/14/2021)    Active Problems:    ARF (acute renal failure) ([facility] [address] (1/14/2021)        Additional comments:I reviewed the patient's new clinical lab test results.     General Exam  No acute distress, mucous membranes normal color and hydration status    Neurologic Exam    Mental status: Awake, oriented to person, place, circumstances.  Significantly improved from yesterday.  Not somnolent today.  Language: normal fluency and comprehension  No visual spatial neglect or overt apraxia  Cranial nerves: PERRL, Extraocular movements intact and full, face symmetric to movement, Tongue midline with normal strength, palat symmetric    Motor: strength 5/5 throughout.  Negative myoclonus.  He is globally weak particularly with shoulder abduction but I suspect this is due to deconditioning.    Sensation is intact.        Assessment:     Ariel Nicole is a 60 y.o. man, , with history of A. fib on Pradaxa, seizures, alcohol abuse, diabetes, hypertension, splenectomy, AAA repair, bilateral wounds evaluated today for recurrent episodes of transient alteration of awareness suspicious for possible seizures.  Patient was started on Trileptal and seems to be doing well.    Plan:     -Continue Trileptal 300 mg twice a day until 22nd and then continue with 600 mg twice a day  -No other recommendations for now will follow up peripherally, please let me know if any changes      Signed:  Joseph Louis MD  Adult Neurologist  1/20/2021  3:27 PM

## 2021-01-20 NOTE — ROUTINE PROCESS
Assumed care of patient on dialysis-- sitter ordered for patient because of  Pulling out lines and lopez (times 2)-- Supervisor called to no sitter available Was informed about FFP that is ready for transfusion-- ORDER BY THE IR 
DOCTOR-- patient was still in dialysis until- 
 
 
 
2309 Called Dr. Tera Awad about the 2 units and the fact he is dialysis-- PATIENT IS NO LONGER BLEEDING FROM [facility]-- 
DR. YOO SAID NOT GIVE UNTIL JUST PRIOR TO PROCEDURE SCHEDULE TOMORROW  
 
2314-- SPOKE TO DR. Tatiana Davies AND SHE ALSO SAID HOLD OFF GIVING THE FFP UNTIL TOMORROW JUST PRIOR TO PROCEDURE 
 
2356-- PATIENT SLEEPING NO DISTRESS NOTED Bedside and Verbal shift change report given to [facility] RN (oncoming nurse) by Anoop Craven RN (offgoing nurse). Report given with SBAR, Kardex, Intake/Output, MAR, Accordion and Recent Results.

## 2021-01-20 NOTE — MED STUDENT NOTES
*ATTENTION:  This note has been created by a medical student for educational purposes only. Please do not refer to the content of this note for clinical decision-making, billing, or other purposes. Please see attending physicians note to obtain clinical information on this patient. * Progress Note Patient: Fernando Otero MRN: [id number]  SSN: [id number] YOB: 1960  Age: 56 y.o. Sex: male Admit Date: 1/14/2021 LOS: 6 days Subjective:  
 
Precious Avalos is a 56year old  male with past medical history significant for HTN, DM, atrial fibrillation, AAA, seizure disorder and alcohol use disorder who is at hospital day 6 after admission for severe JOE. At present, Mr. Merlyn Montenegro is more alert than yesterday, but remains confused as he stated he \"just got back from having a TV wire inserted. \"  He states he is tired and does not like the wrist restraints. Wrist restraints in place and I made note of in place tunneled dialysis catheter at right shoulder as well as a new line placed by angio on right arm. He denies chest pain, SOB, abdominal pain, nausea or vomiting. Objective:  
 
General appearance:  Obese male in no acute distress. Appears drowsy. Pleasant and cooperative. Nasal cannula in place. Wrist restraints in place. Vitals:  
 01/19/21 2033 01/20/21 0415 01/20/21 0755 01/20/21 0800 BP: 132/68 102/60  100/64 Pulse: (!) 101 84  82 Resp: 19 19 16 Temp: 97.6 °F (36.4 °C) 97.8 °F (36.6 °C)  99 °F (37.2 °C) TempSrc:      
SpO2: 99% 99% 98% 98% Weight:  280 lb (127 kg) Height:      
  
 
Intake and Output: 
Current Shift: No intake/output data recorded. Last three shifts: 01/18 1901 - 01/20 0700 In: 140 [I.V.:140] Out: 650 [Urine:50] Physical Exam:  
Physical Exam 
Constitutional:   
   General: He is awake. He is not in acute distress. Appearance: He is morbidly obese. Interventions: He is restrained. Nasal cannula in place. HENT:  
   Head: Normocephalic and atraumatic. Eyes:  
   General: No scleral icterus. Cardiovascular:  
   Rate and Rhythm: Normal rate and regular rhythm. Pulses:     
     Radial pulses are 2+ on the right side and 2+ on the left side. Heart sounds: Normal heart sounds, S1 normal and S2 normal. No murmur. Pulmonary:  
   Effort: Pulmonary effort is normal. No accessory muscle usage. Breath sounds: No wheezing or rales. Abdominal:  
   General: Bowel sounds are normal.  
   Tenderness: There is no abdominal tenderness. Musculoskeletal:  
   Right lower le+ Edema present. Left lower le+ Edema present. Feet:  
   Right foot:  
   Skin integrity: Erythema (b/l feet wrapped s/p I&D, wound care by podiatry) present. Left foot:  
   Skin integrity: Erythema (b/l feet wrapped s/p I&D, wound care by podiatry) present. Neurological:  
   Mental Status: He is confused. Psychiatric:     
   Behavior: Behavior is cooperative. Lab/Data Review: All lab results for the last 24 hours reviewed. Recent Results (from the past 24 hour(s)) GLUCOSE, POC Collection Time: 21  1:27 PM  
Result Value Ref Range Glucose (POC) 98 70 - 110 mg/dL PROTHROMBIN TIME + INR Collection Time: 21  2:02 PM  
Result Value Ref Range Prothrombin time 19.1 (H) 11.5 - 15.2 sec INR 1.6 (H) 0.8 - 1.2 AMMONIA Collection Time: 21  2:02 PM  
Result Value Ref Range Ammonia 19 11 - 32 UMOL/L  
TYPE & SCREEN Collection Time: 21  5:07 PM  
Result Value Ref Range Crossmatch Expiration 2021,2359 ABO/Rh(D) O POSITIVE Antibody screen NEG   
GLUCOSE, POC Collection Time: 21  5:28 AM  
Result Value Ref Range Glucose (POC) 69 (L) 70 - 110 mg/dL CBC WITH AUTOMATED DIFF Collection Time: 21  5:30 AM  
Result Value Ref Range WBC 13.7 (H) 4.6 - 13.2 K/uL RBC 2.78 (L) 4.70 - 5.50 M/uL HGB 8.4 (L) 13.0 - 16.0 g/dL HCT 26.7 (L) 36.0 - 48.0 % MCV 96.0 74.0 - 97.0 FL  
 MCH 30.2 24.0 - 34.0 PG  
 MCHC 31.5 31.0 - 37.0 g/dL  
 RDW 14.7 (H) 11.6 - 14.5 % PLATELET 913 752 - 389 K/uL MPV 11.2 9.2 - 11.8 FL  
 NEUTROPHILS 75 42 - 75 % LYMPHOCYTES 10 (L) 20 - 51 % MONOCYTES 12 (H) 2 - 9 % EOSINOPHILS 3 0 - 5 % BASOPHILS 0 0 - 3 %  
 ABS. NEUTROPHILS 10.3 (H) 1.8 - 8.0 K/UL  
 ABS. LYMPHOCYTES 1.4 0.8 - 3.5 K/UL  
 ABS. MONOCYTES 1.6 (H) 0 - 1.0 K/UL  
 ABS. EOSINOPHILS 0.4 0.0 - 0.4 K/UL  
 ABS. BASOPHILS 0.0 0.0 - 0.06 K/UL  
 DF MANUAL PLATELET COMMENTS ADEQUATE PLATELETS    
 RBC COMMENTS ANISOCYTOSIS 1+ 
    
 RBC COMMENTS HYPOCHROMIA 2+ 
    
 RBC COMMENTS ROULEAUX 
1+ METABOLIC PANEL, BASIC Collection Time: 01/20/21  5:30 AM  
Result Value Ref Range Sodium 136 136 - 145 mmol/L Potassium 4.6 3.5 - 5.5 mmol/L Chloride 101 100 - 111 mmol/L  
 CO2 24 21 - 32 mmol/L Anion gap 11 3.0 - 18 mmol/L Glucose 63 (L) 74 - 99 mg/dL BUN 50 (H) 7.0 - 18 MG/DL Creatinine 7.32 (H) 0.6 - 1.3 MG/DL  
 BUN/Creatinine ratio 7 (L) 12 - 20 GFR est AA 9 (L) >60 ml/min/1.73m2 GFR est non-AA 8 (L) >60 ml/min/1.73m2 Calcium 8.3 (L) 8.5 - 10.1 MG/DL MAGNESIUM Collection Time: 01/20/21  5:30 AM  
Result Value Ref Range Magnesium 2.8 (H) 1.6 - 2.6 mg/dL GLUCOSE, POC Collection Time: 01/20/21  5:58 AM  
Result Value Ref Range Glucose (POC) 79 70 - 110 mg/dL GLUCOSE, POC Collection Time: 01/20/21  6:35 AM  
Result Value Ref Range Glucose (POC) 84 70 - 110 mg/dL DUPLEX RENAL ART/JETT BILATERAL Collection Time: 01/20/21  8:23 AM  
Result Value Ref Range Right kidney length 11.01 cm Left kidney length 12.86 cm Right renal lower parenchyma max 15.2 cm/s Right renal lower parenchyma min 6.1 cm/s Right renal middle parenchyma max 4.7 cm/s Right renal middle parenchyma min 0.0 cm/s Right renal upper parenchyma max 15.2 cm/s Right renal upper parenchyma min 5.6 cm/s Right Interlobar PSV 24.0 cm/s Right Interlobar EDV 4.8 cm/s Right renal dist sys 55.8 cm/s Right renal dist tapia 10.1 cm/s Right renal mid sys 79.2 cm/s Right renal mid tapia 7.0 cm/s Left renal lower parenchyma max 17.0 cm/s Left renal lower parenchyma min 4.3 cm/s Left renal middle parenchyma max 17.0 cm/s Left renal middle parenchyma min 6.5 cm/s Left renal upper parenchyma max 12.6 cm/s Left renal upper parenchyma min 5.2 cm/s Left Interlobar PSV 21.3 cm/s Left Interlobar EDV 5.2 cm/s Right kidney width 5.95 cm Left kidney width 6.38 cm Right renal upper parenchyma RI 0.63 Right renal middle parenchyma RI 1.00 Right renal lower parenchyma RI 0.60 Left renal upper parenchyma RI 0.59 Left renal middle parenchyma RI 0.62 Left renal lower parenchyma RI 0.75 Right Interlobar RI 0.8 Left Interlobar RI 0.8 Right Renal Mid RI 0.91 Right Renal Dist RI 0.82 Assessment: 1. Severe JOE with proteinuria and hematuria; ATN vs RPGN; BUN and creatinine improving from previous day; 50 and 7.32, respectively on 1/20 2. Acute metabolic encephalopathy/delirium 3. Bleeding at tunneled dialysis catheter; resolved 4. Acute hypoxic respiratory failure; multifactorial; improving with SpO2 at 98% on 2L 5. Atrial fibrillation; rate controlled; on Cardizem; Pradaxa on hold 6. Leukocytosis; WBC 13.7 today 1/20 
7. Seizure disorder 8. Bilateral lower extremity cellulitis, wounds, abscess s/p I&D by Podiatry on 116 
9. Surgically asplenic Plan: 1. Hemodialysis per Nephrology 2. Renal biopsy per Nephrology later this week when INR appropriate 3.  2 units FFP to be administered per IR 
4.   Neurology consulted; begin Trileptal for seizure risk factors; Gabapentin d/c'd 
 5.  Continue Cefepime and Silvadene for lower extremity wounds; Podiatry following 6. Continue to monitor WBC, kidney function, electrolytes daily Signed By: Fiordaliza Bryant   
 January 20, 2021

## 2021-01-20 NOTE — PROGRESS NOTES
Progress  Note       57y M with PMH HTN, chronic pain, admitted for severe renal failure  Subjective      Overnight event noted  His kidney biopsy cancelled   CT neck negative for any blood vessel injury        IMPRESSION:   Acute kidney injury ATN vs RPGN, per history , has been having gross hematuria over last several weeks, brief use of nsaids, was on diuretics at home-- negative ANCA panel, negative anti GBM, negative hep B, C, HIV, negative JULIO  Proteinuria, nephrotic range  Hematuria,   H/o nose bleeding episode   HTN  Lower extremities edema with cellulits, blister  Atrial fibrillation , on anti coagulation   H/o spleenectomy   H/o heavy use of gabapentin ( about 2400mg daily for last 2 months)   PLAN:   Plan for HD today with 2K bath 3hrs, blood flow 350. UF goal 1-1.5L as tolerated. Plan to get midline for IV access  Awaiting for kidney biopsy   Follow up GN work up.     Discussed with Dr. Karan Salas,    Facility-Administered Medications: None       Current Facility-Administered Medications   Medication Dose Route Frequency    levoFLOXacin (LEVAQUIN) tablet 500 mg  500 mg Oral Q48H    amoxicillin-clavulanate (AUGMENTIN) 500-125 mg per tablet 1 Tab  1 Tab Oral Q24H    0.9% sodium chloride infusion 250 mL  250 mL IntraVENous PRN    OXcarbazepine (TRILEPTAL) tablet 300 mg  300 mg Oral Q12H    [START ON 1/23/2021] OXcarbazepine (TRILEPTAL) tablet 600 mg  600 mg Oral BID    silver sulfADIAZINE (SILVADENE) 1 % topical cream   Topical DAILY    nicotine (NICODERM CQ) 14 mg/24 hr patch 1 Patch  1 Patch TransDERmal DAILY    0.9% sodium chloride infusion 250 mL  250 mL IntraVENous DIALYSIS PRN    heparin (porcine) 1,000 unit/mL injection 5,000 Units  5,000 Units InterCATHeter DIALYSIS PRN    dilTIAZem ER (CARDIZEM CD) capsule 120 mg  120 mg Oral Q12H    albumin human 25% (BUMINATE) solution 25 g  25 g IntraVENous DIALYSIS PRN    budesonide-formoteroL (SYMBICORT) 160-4.5 mcg/actuation HFA inhaler 2 Puff 2 Puff Inhalation BID RT    oxyCODONE-acetaminophen (PERCOCET) 5-325 mg per tablet 1 Tab  1 Tab Oral Q6H PRN    heparin (porcine) injection 5,000 Units  5,000 Units SubCUTAneous Q8H    insulin lispro (HUMALOG) injection   SubCUTAneous AC&HS    glucose chewable tablet 16 g  4 Tab Oral PRN    glucagon (GLUCAGEN) injection 1 mg  1 mg IntraMUSCular PRN    dextrose (D50W) injection syrg 12.5-25 g  25-50 mL IntraVENous PRN    sodium chloride (NS) flush 5-40 mL  5-40 mL IntraVENous Q8H    sodium chloride (NS) flush 5-40 mL  5-40 mL IntraVENous Q8H    sodium chloride (NS) flush 5-40 mL  5-40 mL IntraVENous PRN    acetaminophen (TYLENOL) tablet 650 mg  650 mg Oral Q6H PRN    Or    acetaminophen (TYLENOL) suppository 650 mg  650 mg Rectal Q6H PRN    polyethylene glycol (MIRALAX) packet 17 g  17 g Oral DAILY PRN    promethazine (PHENERGAN) tablet 12.5 mg  12.5 mg Oral Q6H PRN    Or    ondansetron (ZOFRAN) injection 4 mg  4 mg IntraVENous Q6H PRN    amitriptyline (ELAVIL) tablet 50 mg  50 mg Oral QHS    ergocalciferol capsule 50,000 Units  50,000 Units Oral Q7D    melatonin tablet 5 mg  5 mg Oral QHS     Facility-Administered Medications Ordered in Other Encounters   Medication Dose Route Frequency    fentaNYL citrate (PF) injection 12.5-50 mcg  12.5-50 mcg IntraVENous Multiple    midazolam (VERSED) injection 0.5-2 mg  0.5-2 mg IntraVENous Rad Multiple       Review of Systems:     As above  Data Review:    Labs: Results:       Chemistry Recent Labs     01/20/21  0530 01/19/21  0420 01/18/21  0618   GLU 63* 84 72*    136 138   K 4.6 4.8 4.7    99* 101   CO2 24 25 28   BUN 50* 94* 81*   CREA 7.32* 10.90* 9.12*   CA 8.3* 7.7* 7.8*   AGAP 11 12 9   BUCR 7* 9* 9*      CBC w/Diff Recent Labs     01/20/21  0530 01/19/21  0420 01/18/21  0345   WBC 13.7* 10.7  --    RBC 2.78* 3.22*  --    HGB 8.4* 9.8* 10.2*   HCT 26.7* 30.6* 31.5*    391  --    GRANS 75 82*  --    LYMPH 10* 8*  --    EOS 3 3  --      Coagulation Recent Labs     01/19/21  1402 01/18/21  1908   PTP 19.1* 19.3*   INR 1.6* 1.7*       Iron/Ferritin No results for input(s): IRON in the last 72 hours.    No lab exists for component: TIBCCALC   BNP No results for input(s): BNPP in the last 72 hours.   Cardiac Enzymes No results for input(s): CPK, CKND1, RUSSEL in the last 72 hours.    No lab exists for component: CKRMB, TROIP   Liver Enzymes No results for input(s): TP, ALB, TBIL, AP in the last 72 hours.    No lab exists for component: SGOT, GPT, DBIL   Thyroid Studies Lab Results   Component Value Date/Time    TSH 1.94 01/14/2021 11:56 AM         EKG: atrial fibrilation     Physical Assessment:     Visit Vitals  BP (!) 97/56 (BP 1 Location: Left arm, BP Patient Position: At rest)   Pulse 88   Temp 99.3 °F (37.4 °C)   Resp 20   Ht 5' 10\" (1.778 m)   Wt 127 kg (280 lb)   SpO2 96%   BMI 40.18 kg/m²     Weight change:     Intake/Output Summary (Last 24 hours) at 1/20/2021 1754  Last data filed at 1/20/2021 1454  Gross per 24 hour   Intake 220 ml   Output 600 ml   Net -380 ml     Physical Exam:   General: comfortable, no acute distress   HEENT sclera anicteric, supple neck, no thyromegaly  CVS: S1S2 heard,  no rub  RS: + air entry b/l,   Abd: Soft, Non tender, Not distended,  Neuro: non focal, awake, alert , CN II-XII are grossly intact,  +tremor   Extrm: ++edema, no cyanosis, clubbing   Skin:  dry  Musculoskeletal: No gross joints or bone deformities     Procedures/imaging: see electronic medical records for all procedures, Xrays and details which were not copied into this note but were reviewed prior to creation of Plan        Kash Tucker MD  January 20, 2021  [facility]  Office [phone]

## 2021-01-20 NOTE — PROGRESS NOTES
OT withheld per Kelechi Barnard, pt receiving plasma and will leave for dialysis. Will continue to follow.

## 2021-01-20 NOTE — PROGRESS NOTES
Spouse Melita Show notified [phone] patient continues to pull on medical equipment bilateral wrist restraints applied will continue to monitor vascular, LOC, and safety

## 2021-01-20 NOTE — CONSULTS
Infectious Disease Consultation Note        Reason: Bilateral leg abscess, cellulitis    Current abx Prior abx   Cefepime since 1/14/21 Vancomycin 1/14-1/16     Lines:       Assessment :      56 y.o.  male  with history of alcoholism, atrial fibrillation presented to ED on 1/14/2021 with near syncope and weakness, dyspnea with exertion and swelling to extremities for the past 1 month. Clinical presentation consistent with bilateral leg abscess, cellulitis status post incision and drainage of bilateral leg abscesses on 1/16/2021. Intra-Op culture negative. Preop culture positive for methicillin susceptible Staph aureus, Staph epidermidis    Clinically improving    Recommendations:    1. Discontinue cefepime. Start p.o. Levaquin, Augmentin for 4 days  2. Wound care bilateral lower extremity per podiatrist    We will sign off. Please call if any new questions or concerns. HPI:    56 y.o.  male  with history of alcoholism, atrial fibrillation presented to ED on 1/14/2021 with near syncope and weakness, dyspnea with exertion and swelling to extremities for the past 1 month. In the ED was noted to have erythema and abscesses bilateral leg. He was evaluated by podiatrist.  Status post incision and drainage of bilateral leg abscesses on 1/16/2021. Intra-Op culture negative. Preop culture positive for methicillin susceptible Staph aureus, Staph epidermidis. I have been consulted for further recommendations. Patient is pleasantly confused. He is unable to tell me if he had any trauma to his lower extremity or not. He denies significant pain in his legs. Detailed review of system is not feasible.       Past Medical History:   Diagnosis Date    AAA (abdominal aortic aneurysm) (HCC)     [facility]ism ([facility]     Atrial fibrillation (HCC)     Bradycardia     Chronic pain     Degenerative disc disease, cervical     Degenerative disc disease, lumbar     Hypertension     Hypokalemia  Pneumonia 2016    caused permanent L lung problems    Thyroid disease        Past Surgical History:   Procedure Laterality Date    HX ORTHOPAEDIC      neck fracture    HX ORTHOPAEDIC      lumbar fusion    MD ABDOMEN SURGERY PROC UNLISTED  10/08/2017    spleenectomy       Home Medication List    Details   magnesium 250 mg tab Take 1 Tab by mouth daily. gabapentin (NEURONTIN) 800 mg tablet take 2 tablets by mouth three times a day  Qty: 180 Tab, Refills: 5    Associated Diagnoses: Neuropathy      atenolol (TENORMIN) 100 mg tablet Take 1 Tab by mouth daily. Qty: 90 Tab, Refills: 0    Associated Diagnoses: AAA (abdominal aortic aneurysm) without rupture ([address] Chronic atrial fibrillation ([address] Essential hypertension      budesonide-formoterol (SYMBICORT) 160-4.5 mcg/actuation HFAA inhale 2 puffs by mouth twice a day  Qty: 1 Inhaler, Refills: 2      dabigatran etexilate (PRADAXA) 150 mg capsule Take 1 Cap by mouth every twelve (12) hours. Qty: 180 Cap, Refills: 3      dilTIAZem CD (CARTIA XT) 180 mg ER capsule Take 1 Cap by mouth two (2) times a day. Qty: 180 Cap, Refills: 2    Associated Diagnoses: AAA (abdominal aortic aneurysm) without rupture ([address] Chronic atrial fibrillation ([address] Essential hypertension      hydroCHLOROthiazide (HYDRODIURIL) 12.5 mg tablet Take 1 Tab by mouth daily. Qty: 30 Tab, Refills: 2    Associated Diagnoses: Essential hypertension      ergocalciferol (ERGOCALCIFEROL) 50,000 unit capsule Take 1 Cap by mouth every seven (7) days. Qty: 12 Cap, Refills: 0    Associated Diagnoses: Vitamin D deficiency      saw palmetto xtr/zinc picolin (SAW PALMETTO EXTRACT PO) Take  by mouth. amitriptyline (ELAVIL) 25 mg tablet take 1 tablet by mouth NIGHTLY  Qty: 30 Tab, Refills: 1    Associated Diagnoses: Chronic pain syndrome; Depression, unspecified depression type; Neuropathy      melatonin tab tablet Take  by mouth nightly.       cholecalciferol, vitamin D3, (VITAMIN D3) 2,000 unit tab Take  by mouth two (2) times a day. MULTIVIT-MIN/FA/LYCOPEN/LUTEIN (CENTRUM SILVER MEN PO) Take 1 Tab by mouth daily. potassium 99 mg tablet Take 99 mg by mouth daily. fish oil-dha-epa 1,200-144-216 mg cap Take 1 Tab by mouth daily. albuterol (PROAIR HFA) 90 mcg/actuation inhaler Take 2 Puffs by inhalation every four (4) hours as needed for Wheezing. Qty: 1 Inhaler, Refills: 1      b complex vitamins tablet Take 1 Tab by mouth daily.              Current Facility-Administered Medications   Medication Dose Route Frequency    0.9% sodium chloride infusion 250 mL  250 mL IntraVENous PRN    OXcarbazepine (TRILEPTAL) tablet 300 mg  300 mg Oral Q12H    [START ON 1/23/2021] OXcarbazepine (TRILEPTAL) tablet 600 mg  600 mg Oral BID    silver sulfADIAZINE (SILVADENE) 1 % topical cream   Topical DAILY    nicotine (NICODERM CQ) 14 mg/24 hr patch 1 Patch  1 Patch TransDERmal DAILY    0.9% sodium chloride infusion 250 mL  250 mL IntraVENous DIALYSIS PRN    heparin (porcine) 1,000 unit/mL injection 5,000 Units  5,000 Units InterCATHeter DIALYSIS PRN    dilTIAZem ER (CARDIZEM CD) capsule 120 mg  120 mg Oral Q12H    albumin human 25% (BUMINATE) solution 25 g  25 g IntraVENous DIALYSIS PRN    budesonide-formoteroL (SYMBICORT) 160-4.5 mcg/actuation HFA inhaler 2 Puff  2 Puff Inhalation BID RT    oxyCODONE-acetaminophen (PERCOCET) 5-325 mg per tablet 1 Tab  1 Tab Oral Q6H PRN    heparin (porcine) injection 5,000 Units  5,000 Units SubCUTAneous Q8H    insulin lispro (HUMALOG) injection   SubCUTAneous AC&HS    glucose chewable tablet 16 g  4 Tab Oral PRN    glucagon (GLUCAGEN) injection 1 mg  1 mg IntraMUSCular PRN    dextrose (D50W) injection syrg 12.5-25 g  25-50 mL IntraVENous PRN    cefepime (MAXIPIME) 1 g in sterile water (preservative free) 10 mL IV syringe  1 g IntraVENous Q24H    sodium chloride (NS) flush 5-40 mL  5-40 mL IntraVENous Q8H    sodium chloride (NS) flush 5-40 mL  5-40 mL IntraVENous Q8H    sodium chloride (NS) flush 5-40 mL  5-40 mL IntraVENous PRN    acetaminophen (TYLENOL) tablet 650 mg  650 mg Oral Q6H PRN    Or    acetaminophen (TYLENOL) suppository 650 mg  650 mg Rectal Q6H PRN    polyethylene glycol (MIRALAX) packet 17 g  17 g Oral DAILY PRN    promethazine (PHENERGAN) tablet 12.5 mg  12.5 mg Oral Q6H PRN    Or    ondansetron (ZOFRAN) injection 4 mg  4 mg IntraVENous Q6H PRN    amitriptyline (ELAVIL) tablet 50 mg  50 mg Oral QHS    ergocalciferol capsule 50,000 Units  50,000 Units Oral Q7D    melatonin tablet 5 mg  5 mg Oral QHS     Facility-Administered Medications Ordered in Other Encounters   Medication Dose Route Frequency    fentaNYL citrate (PF) injection 12.5-50 mcg  12.5-50 mcg IntraVENous Multiple    midazolam (VERSED) injection 0.5-2 mg  0.5-2 mg IntraVENous Rad Multiple       Allergies: Patient has no known allergies.     Family History   Problem Relation Age of Onset    Heart Disease Father     Heart Disease Brother         AAA-  in his 42's    Heart Disease Paternal Aunt     Heart Disease Paternal Uncle     Heart Disease Paternal Grandmother     Lupus Mother      Social History     Socioeconomic History    Marital status:      Spouse name: Not on file    Number of children: Not on file    Years of education: Not on file    Highest education level: Not on file   Occupational History    Not on file   Social Needs    Financial resource strain: Not on file    Food insecurity     Worry: Not on file     Inability: Not on file    Transportation needs     Medical: Not on file     Non-medical: Not on file   Tobacco Use    Smoking status: Former Smoker     Quit date: 2017     Years since quitting: 3.4    Smokeless tobacco: Former User   Substance and Sexual Activity    Alcohol use: No     Comment: sober x 5 years    Drug use: No     Comment: former drug use    Sexual activity: Not on file   Lifestyle    Physical activity     Days per week: Not on file     Minutes per session: Not on file    Stress: Not on file   Relationships    Social connections     Talks on phone: Not on file     Gets together: Not on file     Attends Hoahaoism service: Not on file     Active member of club or organization: Not on file     Attends meetings of clubs or organizations: Not on file     Relationship status: Not on file    Intimate partner violence     Fear of current or ex partner: Not on file     Emotionally abused: Not on file     Physically abused: Not on file     Forced sexual activity: Not on file   Other Topics Concern    Not on file   Social History Narrative    Not on file     Social History     Tobacco Use   Smoking Status Former Smoker    Quit date: 2017    Years since quitting: 3.4   Smokeless Tobacco Former User        Temp (24hrs), Av.2 °F (36.8 °C), Min:97.6 °F (36.4 °C), Max:99 °F (37.2 °C)    Visit Vitals  /62 (BP 1 Location: Left arm, BP Patient Position: Sitting)   Pulse 76   Temp 98.7 °F (37.1 °C)   Resp 20   Ht 5' 10\" (1.778 m)   Wt 127 kg (280 lb)   SpO2 96%   BMI 40.18 kg/m²       ROS:  Detailed review of systems not feasible since patient is confused    Physical Exam:    General: Well developed, well nourished male laying on the bed, pleasantly confused in no acute distress. General:   awake alert and oriented   HEENT:  Normocephalic, atraumatic, no scleral icterus or pallor; no conjunctival hemmohage;  nasal and oral mucous are moist and without evidence of lesions. . Neck supple, no bruits. Lymph Nodes:   no cervical, axillary or inguinal adenopathy   Lungs:   non-labored, bilaterally clear to auscultation- no crackles wheezes rales or rhonchi   Heart:  RRR, s1 and s2; no  rubs or gallops, no edema, + pedal pulses   Abdomen:  soft, non-distended, active bowel sounds, no hepatomegaly, no splenomegaly.   Non-tender   Genitourinary:  deferred   Extremities:   Multiple superficial ulcers noted bilateral legs, minimal surrounding erythema. No significant tenderness bilateral legs. No induration bilateral legs. Full ROM of all large joints to the upper and lower extremities; muscle mass appropriate for age   Neurologic:  No gross focal sensory abnormalities; 5/5 muscle strength to upper and lower extremities. Speech appropriate. Cranial nerves intact                        Skin:   Multiple superficial ulcers bilateral legs as mentioned above   Back:  no spinal or paraspinal muscle tenderness or rigidity, no CVA tenderness     Psychiatric:   Confused         Labs: Results:   Chemistry Recent Labs     01/20/21  0530 01/19/21  0420 01/18/21  0618   GLU 63* 84 72*    136 138   K 4.6 4.8 4.7    99* 101   CO2 24 25 28   BUN 50* 94* 81*   CREA 7.32* 10.90* 9.12*   CA 8.3* 7.7* 7.8*   AGAP 11 12 9   BUCR 7* 9* 9*      CBC w/Diff Recent Labs     01/20/21  0530 01/19/21  0420 01/18/21  0345   WBC 13.7* 10.7  --    RBC 2.78* 3.22*  --    HGB 8.4* 9.8* 10.2*   HCT 26.7* 30.6* 31.5*    391  --    GRANS 75 82*  --    LYMPH 10* 8*  --    EOS 3 3  --       Microbiology No results for input(s): CULT in the last 72 hours.        RADIOLOGY:    All available imaging studies/reports in [facility] for this admission were reviewed    Dr. Aida Jackson, Infectious Disease Specialist  [phone]  January 20, 2021  3:01 PM

## 2021-01-20 NOTE — PROGRESS NOTES
Problem: Dysphagia (Adult)  Goal: *Acute Goals and Plan of Care (Insert Text)  Description:     Patient will:  1. Tolerate PO trials with 0 s/s overt distress in 4/5 trials  2. Utilize compensatory swallow strategies/maneuvers (decrease bite/sip, size/rate, alt. liq/sol) with min cues in 4/5 trials  3. Perform oral-motor/laryngeal exercises to increase oropharyngeal swallow function with min cues  4. Complete an objective swallow study (i.e., MBSS) to assess swallow integrity, r/o aspiration, and determine of safest LRD, min A    Rec:     Regular solid with thin liquids  Aspiration precautions  HOB >45 during po intake, remain >30 for 30-45 minutes after po   Small bites/sips; alternate liquid/solid with slow feeding rate   Oral care TID  Meds per pt preference  Okay for nursing to downgrade to nectar-thick liquids if mentation changes and pt demo difficulty with thin liquids    Outcome: Progressing Towards Goal    SPEECH LANGUAGE PATHOLOGY BEDSIDE SWALLOW EVALUATION    Patient: Nixon Kincaid (21 y.o. male)  Date: 1/20/2021  Primary Diagnosis: Acute kidney injury ([facility] [address] [N17.9]  ARF (acute renal failure) ([address] [N17.9]  Procedure(s) (LRB):  INCISION AND DRAINAGE BILATERAL FEET (Bilateral) 4 Days Post-Op   Precautions: aspiration  Fall  PLOF: As per H&P    ASSESSMENT :  Based on the objective data described below, the patient presents with mild oropharyngeal dysphagia. Order received per nursing concern of coughing/choking with thin liquids. Pt noted to intermittently cough both at baseline and x 1/12 thin liquid trials via straw. He also tolerated regular solids and nectar-thick liquids with no overt s/sx of aspiration. Laryngeal elevation appeared functional to palpation. Pt pleasant, but very confused throughout evaluation. He required mod cues to redirect to task. Recommend to continue regular solid diet with thin liquids, aspiration precautions, oral care TID, and meds as tolerated.  He will require assistance with PO as he is in restraints. Pt okay for nectar-thick liquids if nursing notes an altered mentation and if pt begins to demo difficulty with PO into the evening. ST will continue to follow. Patient will benefit from skilled intervention to address the above impairments. Patient's rehabilitation potential is considered to be Fair  Factors which may influence rehabilitation potential include:   []            None noted  [x]            Mental ability/status  [x]            Medical condition  []            Home/family situation and support systems  [x]            Safety awareness  []            Pain tolerance/management  []            Other:      PLAN :  Recommendations and Planned Interventions: See above  Frequency/Duration: Patient will be followed by speech-language pathology 1-2 times per day/4-7 days per week to address goals. Discharge Recommendations: [address], and To Be Determined     SUBJECTIVE:   Patient stated Cristine Patricia, is her name really Herminio Shipman? [place] - referring to Michelle the RN    OBJECTIVE:     Past Medical History:   Diagnosis Date    AAA (abdominal aortic aneurysm) ([facility] [address]     Alcoholism ([facility] [address]     Atrial fibrillation (HCC)     Bradycardia     Chronic pain     Degenerative disc disease, cervical     Degenerative disc disease, lumbar     Hypertension     Hypokalemia     Pneumonia 2016    caused permanent L lung problems    Thyroid disease      Past Surgical History:   Procedure Laterality Date    HX ORTHOPAEDIC      neck fracture    HX ORTHOPAEDIC      lumbar fusion    IN ABDOMEN SURGERY PROC UNLISTED  10/08/2017    spleenectomy     Prior Level of Function/Home Situation: see below  [address]: Apartment  # Steps to Enter: 3  One/Two Story Residence: One story  Living Alone: No  Support Systems: Spouse/Significant Other/Partner  Patient Expects to be Discharged to[de-identified] Apartment  Current DME Used/Available at Home: None  Tub or Shower Type: Shower(min height step to enter)    Diet prior to admission: reg solid with thin  Current Diet:  reg solid with thin      Cognitive and Communication Status:  Neurologic State: Alert, Confused  Orientation Level: Oriented to person, Oriented to place, Oriented to situation  Cognition: Decreased attention/concentration  Perception: Appears intact  Perseveration: No perseveration noted  Safety/Judgement: Insight into deficits  Oral Assessment:  Oral Assessment  Labial: No impairment  Dentition: Natural;Intact  Oral Hygiene: adequate  Lingual: No impairment  Velum: No impairment  Mandible: No impairment  P.O. Trials:  Patient Position: 55 at [address]  Vocal quality prior to P.O.: No impairment  Consistency Presented: Thin liquid; Solid; Nectar thick liquid  How Presented: SLP-fed/presented;Cup/sip;Straw;Successive swallows  Bolus Acceptance: No impairment  Bolus Formation/Control: Impaired  Type of Impairment: Delayed;Mastication  Propulsion: No impairment  Oral Residue: None  Initiation of Swallow: No impairment  Laryngeal Elevation: Functional  Aspiration Signs/Symptoms: Strong cough;Clear throat  Pharyngeal Phase Characteristics: No impairment, issues, or problems   Effective Modifications: Small sips and bites  Cues for Modifications: Moderate-maximal     Oral Phase Severity: Mild  Pharyngeal Phase Severity : Mild    PAIN:  Start of Eval: 0  End of Eval: 0     After treatment:   []            Patient left in no apparent distress sitting up in chair  [x]            Patient left in no apparent distress in bed  [x]            Call bell left within reach  [x]            Nursing notified  []            Family present  []            Caregiver present  []            Bed alarm activated    COMMUNICATION/EDUCATION:   [x]            Aspiration precautions; swallow safety; compensatory techniques. []            Patient/family have participated as able in goal setting and plan of care.   [x]            Patient/family agree to work toward stated goals and plan of care. []            Patient understands intent and goals of therapy; neutral about participation. []            Patient unable to participate in goal setting/plan of care; educ ongoing with interdisciplinary staff  [x]         Posted safety precautions in patient's room.     Thank you for this referral.    Muriel Pedraza M.S. CCC-SLP/L  Speech-Language Pathologist

## 2021-01-20 NOTE — PROGRESS NOTES
Reason for Admission:  Acute kidney injury ([facility] [address] [N17.9]  ARF (acute renal failure) ([facility] [address] [N17.9]                 RUR Score:    24            Plan for utilizing home health: If needed                      Likelihood of Readmission:   Moderate                         Do you (patient/family) have any concerns for transition/discharge?  no    Transition of Care Plan:       Initial assessment completed with relative(s). Cognitive status of patient: disoriented. Face sheet information confirmed:  yes. The patient designates Jose Antonio Cross to participate in his discharge plan and to receive any needed information. This patient lives in a single family home with patient and spouse. Patient is not able to navigate steps as needed. Prior to hospitalization, patient was considered to be independent with ADLs/IADLS : yes . Patient has a current ACP document on file: no  The patient and other:  BLS will be available to transport patient home upon discharge. The patient already has Shannon Decree, [employer],  medical equipment available in the home. Patient is not currently active with home health. Patient has not stayed in a skilled nursing facility or rehab. This patient is on dialysis :no    List of available SNF agencies were provided and reviewed with the patient prior to discharge. Freedom of choice signed: yes, for Any in [facility]. Currently, the discharge plan is SNF. The patient states that he can obtain his medications from the pharmacy, and take his medications as directed. Patient's current insurance is Generic Commercial      Care Management Interventions  PCP Verified by CM:  Yes  Mode of Transport at Discharge: BLS  Physical Therapy Consult: Yes  Occupational Therapy Consult: Yes  Speech Therapy Consult: Yes  Current Support Network: Lives with Spouse  Confirm Follow Up Transport: Family  The Plan for Transition of Care is Related to the Following Treatment Goals : Skilled Nursing facility  The Patient and/or Patient Representative was Provided with a Choice of Provider and Agrees with the Discharge Plan?: Yes  Name of the Patient Representative Who was Provided with a Choice of Provider and Agrees with the Discharge Plan: Charmayne Sander  Freedom of Choice List was Provided with Basic Dialogue that Supports the Patient's Individualized Plan of Care/Goals, Treatment Preferences and Shares the Quality Data Associated with the Providers?: Yes  Discharge Location  Discharge Placement: Skilled nursing facility        Guido Chu RN BSN  Care Manager  [phone]

## 2021-01-21 ENCOUNTER — APPOINTMENT (OUTPATIENT)
Dept: LAB | Age: 61
DRG: 673 | End: 2021-01-21
Payer: COMMERCIAL

## 2021-01-21 ENCOUNTER — APPOINTMENT (OUTPATIENT)
Dept: CT IMAGING | Age: 61
DRG: 673 | End: 2021-01-21
Attending: PHYSICIAN ASSISTANT
Payer: COMMERCIAL

## 2021-01-21 LAB
ANION GAP SERPL CALC-SCNC: 10 MMOL/L (ref 3–18)
BASOPHILS # BLD: 0 K/UL (ref 0–0.1)
BASOPHILS NFR BLD: 0 % (ref 0–2)
BLD PROD TYP BPU: NORMAL
BLD PROD TYP BPU: NORMAL
BPU ID: NORMAL
BPU ID: NORMAL
BUN SERPL-MCNC: 27 MG/DL (ref 7–18)
BUN/CREAT SERPL: 5 (ref 12–20)
CALCIUM SERPL-MCNC: 7.8 MG/DL (ref 8.5–10.1)
CALLED TO:,BCALL1: NORMAL
CHLORIDE SERPL-SCNC: 100 MMOL/L (ref 100–111)
CO2 SERPL-SCNC: 27 MMOL/L (ref 21–32)
CREAT SERPL-MCNC: 4.97 MG/DL (ref 0.6–1.3)
DIFFERENTIAL METHOD BLD: ABNORMAL
EOSINOPHIL # BLD: 0.4 K/UL (ref 0–0.4)
EOSINOPHIL NFR BLD: 4 % (ref 0–5)
ERYTHROCYTE [DISTWIDTH] IN BLOOD BY AUTOMATED COUNT: 14.6 % (ref 11.6–14.5)
GLUCOSE BLD STRIP.AUTO-MCNC: 126 MG/DL (ref 70–110)
GLUCOSE BLD STRIP.AUTO-MCNC: 82 MG/DL (ref 70–110)
GLUCOSE BLD STRIP.AUTO-MCNC: 85 MG/DL (ref 70–110)
GLUCOSE BLD STRIP.AUTO-MCNC: 88 MG/DL (ref 70–110)
GLUCOSE BLD STRIP.AUTO-MCNC: 91 MG/DL (ref 70–110)
GLUCOSE SERPL-MCNC: 76 MG/DL (ref 74–99)
HCT VFR BLD AUTO: 23.5 % (ref 36–48)
HCT VFR BLD AUTO: 25.1 % (ref 36–48)
HGB BLD-MCNC: 7.6 G/DL (ref 13–16)
HGB BLD-MCNC: 7.9 G/DL (ref 13–16)
INR PPP: 1.4 (ref 0.8–1.2)
LYMPHOCYTES # BLD: 1.1 K/UL (ref 0.9–3.6)
LYMPHOCYTES NFR BLD: 10 % (ref 21–52)
MCH RBC QN AUTO: 30.4 PG (ref 24–34)
MCHC RBC AUTO-ENTMCNC: 32.3 G/DL (ref 31–37)
MCV RBC AUTO: 94 FL (ref 74–97)
MONOCYTES # BLD: 1.8 K/UL (ref 0.05–1.2)
MONOCYTES NFR BLD: 16 % (ref 3–10)
NEUTS SEG # BLD: 8 K/UL (ref 1.8–8)
NEUTS SEG NFR BLD: 70 % (ref 40–73)
PLATELET # BLD AUTO: 331 K/UL (ref 135–420)
PMV BLD AUTO: 10.8 FL (ref 9.2–11.8)
POTASSIUM SERPL-SCNC: 3.9 MMOL/L (ref 3.5–5.5)
PROTHROMBIN TIME: 16.8 SEC (ref 11.5–15.2)
RBC # BLD AUTO: 2.5 M/UL (ref 4.7–5.5)
SODIUM SERPL-SCNC: 137 MMOL/L (ref 136–145)
STATUS OF UNIT,%ST: NORMAL
STATUS OF UNIT,%ST: NORMAL
UNIT DIVISION, %UDIV: 0
UNIT DIVISION, %UDIV: 0
WBC # BLD AUTO: 11.4 K/UL (ref 4.6–13.2)

## 2021-01-21 PROCEDURE — 82962 GLUCOSE BLOOD TEST: CPT

## 2021-01-21 PROCEDURE — 94761 N-INVAS EAR/PLS OXIMETRY MLT: CPT

## 2021-01-21 PROCEDURE — 80048 BASIC METABOLIC PNL TOTAL CA: CPT

## 2021-01-21 PROCEDURE — 77030036720 CT BX RENAL

## 2021-01-21 PROCEDURE — 94640 AIRWAY INHALATION TREATMENT: CPT

## 2021-01-21 PROCEDURE — 88305 TISSUE EXAM BY PATHOLOGIST: CPT

## 2021-01-21 PROCEDURE — 0T913ZX DRAINAGE OF LEFT KIDNEY, PERCUTANEOUS APPROACH, DIAGNOSTIC: ICD-10-PCS | Performed by: RADIOLOGY

## 2021-01-21 PROCEDURE — 74011250636 HC RX REV CODE- 250/636: Performed by: INTERNAL MEDICINE

## 2021-01-21 PROCEDURE — 88346 IMFLUOR 1ST 1ANTB STAIN PX: CPT

## 2021-01-21 PROCEDURE — 85025 COMPLETE CBC W/AUTO DIFF WBC: CPT

## 2021-01-21 PROCEDURE — 65270000029 HC RM PRIVATE

## 2021-01-21 PROCEDURE — 77010033678 HC OXYGEN DAILY

## 2021-01-21 PROCEDURE — C1751 CATH, INF, PER/CENT/MIDLINE: HCPCS

## 2021-01-21 PROCEDURE — 36415 COLL VENOUS BLD VENIPUNCTURE: CPT

## 2021-01-21 PROCEDURE — 74011250637 HC RX REV CODE- 250/637: Performed by: PODIATRIST

## 2021-01-21 PROCEDURE — 74011250637 HC RX REV CODE- 250/637: Performed by: INTERNAL MEDICINE

## 2021-01-21 PROCEDURE — 2709999900 HC NON-CHARGEABLE SUPPLY

## 2021-01-21 PROCEDURE — 85610 PROTHROMBIN TIME: CPT

## 2021-01-21 PROCEDURE — 88350 IMFLUOR EA ADDL 1ANTB STN PX: CPT

## 2021-01-21 PROCEDURE — 74011250637 HC RX REV CODE- 250/637: Performed by: EMERGENCY MEDICINE

## 2021-01-21 PROCEDURE — 88348 ELECTRON MICROSCOPY DX: CPT

## 2021-01-21 PROCEDURE — 99232 SBSQ HOSP IP/OBS MODERATE 35: CPT | Performed by: EMERGENCY MEDICINE

## 2021-01-21 PROCEDURE — 74011250636 HC RX REV CODE- 250/636: Performed by: FAMILY MEDICINE

## 2021-01-21 PROCEDURE — 74011250637 HC RX REV CODE- 250/637: Performed by: HOSPITALIST

## 2021-01-21 PROCEDURE — 74011250637 HC RX REV CODE- 250/637: Performed by: PSYCHIATRY & NEUROLOGY

## 2021-01-21 PROCEDURE — 85018 HEMOGLOBIN: CPT

## 2021-01-21 PROCEDURE — 90935 HEMODIALYSIS ONE EVALUATION: CPT

## 2021-01-21 PROCEDURE — 88313 SPECIAL STAINS GROUP 2: CPT

## 2021-01-21 RX ADMIN — Medication 10 ML: at 00:34

## 2021-01-21 RX ADMIN — OXCARBAZEPINE 300 MG: 300 TABLET, FILM COATED ORAL at 09:21

## 2021-01-21 RX ADMIN — OXCARBAZEPINE 300 MG: 300 TABLET, FILM COATED ORAL at 21:00

## 2021-01-21 RX ADMIN — AMITRIPTYLINE HYDROCHLORIDE 50 MG: 50 TABLET, FILM COATED ORAL at 00:33

## 2021-01-21 RX ADMIN — HEPARIN SODIUM 5000 UNITS: 1000 INJECTION INTRAVENOUS; SUBCUTANEOUS at 12:45

## 2021-01-21 RX ADMIN — DILTIAZEM HYDROCHLORIDE 120 MG: 120 CAPSULE, COATED, EXTENDED RELEASE ORAL at 21:00

## 2021-01-21 RX ADMIN — AMOXICILLIN AND CLAVULANATE POTASSIUM 1 TABLET: 500; 125 TABLET, FILM COATED ORAL at 17:43

## 2021-01-21 RX ADMIN — DILTIAZEM HYDROCHLORIDE 120 MG: 120 CAPSULE, COATED, EXTENDED RELEASE ORAL at 00:33

## 2021-01-21 RX ADMIN — AMITRIPTYLINE HYDROCHLORIDE 50 MG: 50 TABLET, FILM COATED ORAL at 21:00

## 2021-01-21 RX ADMIN — Medication 10 ML: at 00:35

## 2021-01-21 RX ADMIN — DILTIAZEM HYDROCHLORIDE 120 MG: 120 CAPSULE, COATED, EXTENDED RELEASE ORAL at 09:23

## 2021-01-21 RX ADMIN — Medication 5 MG: at 21:00

## 2021-01-21 RX ADMIN — BUDESONIDE AND FORMOTEROL FUMARATE DIHYDRATE 2 PUFF: 160; 4.5 AEROSOL RESPIRATORY (INHALATION) at 21:06

## 2021-01-21 RX ADMIN — BUDESONIDE AND FORMOTEROL FUMARATE DIHYDRATE 2 PUFF: 160; 4.5 AEROSOL RESPIRATORY (INHALATION) at 08:43

## 2021-01-21 RX ADMIN — HEPARIN SODIUM 5000 UNITS: 5000 INJECTION INTRAVENOUS; SUBCUTANEOUS at 05:35

## 2021-01-21 RX ADMIN — Medication 10 ML: at 17:45

## 2021-01-21 RX ADMIN — OXCARBAZEPINE 300 MG: 300 TABLET, FILM COATED ORAL at 00:33

## 2021-01-21 RX ADMIN — SILVER SULFADIAZINE: 10 CREAM TOPICAL at 17:53

## 2021-01-21 RX ADMIN — Medication 10 ML: at 21:00

## 2021-01-21 RX ADMIN — Medication 5 MG: at 00:33

## 2021-01-21 NOTE — PROGRESS NOTES
SLP Note:    Pt NPO for renal biopsy later this date. Will continue to follow per POC.      Ethan Barba M.S., [address] [facility]  Speech-Language Pathologist

## 2021-01-21 NOTE — ROUTINE PROCESS
Bedside and Verbal shift change report given to [facility] RN (oncoming nurse) by Wilton De Jesus RN (offgoing nurse). Report given with SBAR, Kardex, Intake/Output, MAR, Accordion and Recent Results.

## 2021-01-21 NOTE — PROGRESS NOTES
conducted a Follow up consultation and Spiritual Assessment for Lesli Larson, who is a 56 y. o.,male. The  provided the following Interventions:  Continued the relationship of care and support. Listened empathically. Offered prayer and assurance of continued prayer on patients behalf. Chart reviewed. The following outcomes were achieved:  Patient expressed gratitude for 's visit. Assessment:  There are no further spiritual or Yazidism issues which require Spiritual Care Services interventions at this time. Plan:  Chaplains will continue to follow and will provide pastoral care on an as needed/requested basis.  recommends bedside caregivers page  on duty if patient shows signs of acute spiritual or emotional distress.        [address]   [phone]

## 2021-01-21 NOTE — PROGRESS NOTES
ACUTE HEMODIALYSIS FLOW SHEET    HEMODIALYSIS ORDERS: Physician: Dr. Kalani Stephens: [place]   Duration: 3 hr  BFR: 350   DFR: 600   Dialysate:  Temp 36.0   K+   2    Ca+  2.5   Na 138   Bicarb 35   Wt Readings from Last 1 Encounters:   01/20/21 127 kg (280 lb)    Patient Chart [x]   Unable to Obtain []  Dry weight/UF Goal: 1500 ml  Access RIJ     Heparin []  Bolus    Units    [] Hourly    Units    [x]None      Catheter locking solution Heparin 1:1000   Pre BP:   145/68    Pulse:  74   Respirations: 16    Temperature:  98.3    Tx: NSS   ml/Bolus   [x] N/A   Labs: []  Pre  []  Post:   [x] N/A   Additional Orders(medications, blood products, hypotension management): [x] Yes   [] No     [x]  [facility] Consent Verified     CATHETER ACCESS:  []N/A   [x]Right   []Left   [x]IJ     []Fem   []Chest wall   [] First use X-ray verified     [x]Tunnel    [] Non Tunneled   [x]No S/S infection  []Redness  []Drainage []Cultured []Swelling []Pain   [x]Medical Aseptic Prep Utilized   [x]Dressing Changed  [x] Biopatch  Date: 1/21/21   []Clotted   [x]Patent   Flows: [x]Good  []Poor  []Reversed   If access problem,  notified: []Yes    [x]N/A            GENERAL ASSESSMENT:    LUNGS:   SaO2%  99    [] Clear  [x] Coarse  [] Crackles  [] Wheezing                                                [] Diminished     Location : []RLL   []LLL    [x]RUL  [x]SUKHJINDER   Cough: []Productive  []Dry  [x]N/A   Respirations:  [x]Easy  []Labored   Therapy:  []RA  [x]NC 2/min    Mask: []NRB  [] Venti    O2%                  []Ventilator  []Intubated  [] Trach  [] BiPaP   CARDIAC: []Regular      [x] Irregular afib   [] Pericardial Rub  [] JVD          []  Monitored  [] Bedside  [] Remotely monitored     EDEMA: [] None  []Generalized  [x] Pitting [] 1    [] 2    [x] 3    [] 4                 [] Facial  [] Pedal  [x]  UE  [x] LE   SKIN:   [x] Warm  [] Hot     [] Cold   [x] Dry     [] Pale   [] Diaphoretic                  [] Flushed  [] Jaundiced  [] Cyanotic  [] Rash  [] Weeping   LOC:    [x] Alert      [x]Oriented:    [x] Person     [] Place  []Time               [] Confused  [] Lethargic  [] Medicated  [] Non-responsive  [] Non-Verbal   GI / ABDOMEN:                     [] Flat    [] Distended    [x] Soft    [] Firm   []  Obese                   [] Diarrhea  [x] Bowel Sounds  [] Nausea  [] Vomiting     / URINE ASSESSMENT:                   [x] Voiding   [] Oliguria  [] Anuria   []  Garza                  [] Incontinent  []  Incontinent Brief []  Fecal Management System     PAIN:  [x] 0 []1  []2   []3   []4   []5   []6   []7   []8   []9   []10            Scale 0-10  Action/Follow Up:    MOBILITY:  [x] Bed    [] Stretcher      All Vitals and Treatment Details on Attached [address]: [facility]          Room # 724/75   [] 1st Time Acute      [] Stat       [x] Routine      [] Urgent     [x] Acute Room  []  Bedside  [] ICU/CCU  [] ER   Isolation Precautions:  [x] Dialysis    There are currently no Active Isolations       ALLERGIES:     No Known Allergies   Code Status:  Full Code     Hepatitis Status     Lab Results   Component Value Date/Time    Hepatitis B surface Ag <0.10 01/14/2021 09:00 PM    Hepatitis B surface Ab 4.78 (L) 01/14/2021 09:00 PM    Hepatitis B core, IgM Negative 01/14/2021 09:00 PM    Hepatitis C virus Ab 0.03 01/14/2021 09:00 PM        Current Labs:      Lab Results   Component Value Date/Time    WBC 13.7 (H) 01/20/2021 05:30 AM    HGB 8.4 (L) 01/20/2021 05:30 AM    HCT 26.7 (L) 01/20/2021 05:30 AM    PLATELET 952 18/39/9966 05:30 AM    MCV 96.0 01/20/2021 05:30 AM     Lab Results   Component Value Date/Time    Sodium 136 01/20/2021 05:30 AM    Potassium 4.6 01/20/2021 05:30 AM    Chloride 101 01/20/2021 05:30 AM    CO2 24 01/20/2021 05:30 AM    Anion gap 11 01/20/2021 05:30 AM    Glucose 63 (L) 01/20/2021 05:30 AM    BUN 50 (H) 01/20/2021 05:30 AM    Creatinine 7.32 (H) 01/20/2021 05:30 AM    BUN/Creatinine ratio 7 (L) 01/20/2021 05:30 AM    GFR est AA 9 (L) 01/20/2021 05:30 AM    GFR est non-AA 8 (L) 01/20/2021 05:30 AM    Calcium 8.3 (L) 01/20/2021 05:30 AM          DIET:  DIET RENAL  DIET NUTRITIONAL SUPPLEMENTS      PRIMARY NURSE REPORT:   Pre Dialysis: CONSUELO Ortiz RN     Time: 1928        EDUCATION:    [x] Patient [] Other           Knowledge Basis: []None [x]Minimal [] Substantial   Barriers to learning  [x]N/A   [] Access Care     [] S&S of infection  [] Fluid Management  [] K+   [x] Procedural    []Albumin   [] Medications   [] Tx Options   [] Transplant   [] Diet   [] Other   Teaching Tools:  [x] Explain  [] Demo  [] Handouts [] Video  Patient response: [x] Verbalized understanding  [] Teach back  [] Return demonstration   [x] Requires follow up      [x]Time Out/Safety Check  [x] Extracorporeal Circuit Tested for integrity       RO/HEMODIALYSIS MACHINE SAFETY CHECKS  Before each treatment:     Machine Number:                   [facility]                                    [x] Unit Machine # 6 with centralized RO                                                                                                           Alarm Test:  Pass time 1929            [x] RO/Machine Log Complete    Machine Temp    36.0             Dialysate: pH  7.4    Conductivity: Meter 13.8     HD Machine  13.9      TCD: 13.8  Dialyzer Lot # [id number]     Blood Tubing Lot # [id number]    Saline Lot # [id number]     CHLORINE TESTING-Before each treatment and every 4 hours    Total Chlorine: [x] less than 0.1 ppm  Initial Time Check: 1700       4 Hr/2nd Check Time: 2100   (if greater than 0.1 ppm from Primary then every 30 minutes from Secondary)     TREATMENT INITIATION  with Dialysis Precautions:   [x] All Connections Secured              [x] Saline Line Double Clamped   [x] Venous Parameters Set               [x] Arterial Parameters Set    [x] Prime Given 250ml NSS              [x]Air Foam Detector Engaged      Treatment Initiation Note:  2002  Pt arrived to HD without any complaints. Pt A &O X 3, follows commands, no distress noted. Pt on 2L NC, placed on bedside monitor at this time. During Treatment Notes:  2005  RI CVC assessed no abnormalities noted, line patent with good flow. CVC accessed without any difficulty, pt tolerated well. Vascular access visible with arterial and venous line connections intact. 2100  Pt is constantly moving in around in bed. Vascular access visible with arterial and venous line connections intact. 2200    Vascular access visible with arterial and venous line connections intact. 2300  Vascular access visible with arterial and venous line connections intact. Medication Dose Volume Route Time Janak Nurse, Title   albumin 25g 100ml HD 2108 Douglas Mcmahan RN   albumin 25g 100ml HD [address] Hernandez, RN     Post Assessment  Dialyzer Cleared:[] Good [x] Fair  [] Poor  Blood processed:  64.8 L  UF Removed:  1500 Ml  Post /70  Pulse  80 Resp  14   Temp 98.3          CVC Catheter: [] N/A  Locking solution: Heparin 1:1000 U  Arterial port 1.9 ml   Venous port 1.9ml         Skin:[x] Warm  [x] Dry [] Diaphoretic               [] Flushed  [] Pale [] Cyanotic   Pain:  [x]0  []1 []2  []3 []4  []5  []6 []7 []8  []9  []10       Post Treatment Note:   1052  HD completed at this time, pt tolerated well. Dressing clean, dry and intact. POST TREATMENT PRIMARY NURSE HANDOFF REPORT:   Post Dialysis: ANUSHKA Johns RN                Time:  4657       Abbreviations: AVG-arterial venous graft, AVF-arterial venous fistula, IJ-Internal Jugular, Subcl-Subclavian, Fem-Femoral, Tx-treatment, AP/HR-apical heart rate, DFR-dialysate flow rate, BFR-blood flow rate, AP-arterial pressure, -venous pressure, UF-ultrafiltrate, TMP-transmembrane pressure, Elkin-Venous, Art-Arterial, RO-Reverse Osmosis

## 2021-01-21 NOTE — PROGRESS NOTES
Progress  Note       57y M with PMH HTN, chronic pain, admitted for severe renal failure  Subjective      Overnight event noted  Examined during dialysis  Awaiting for kidney biopsy   Appears more alert and oriented         IMPRESSION:   Acute kidney injury ATN vs RPGN, per history , has been having gross hematuria over last several weeks, brief use of nsaids, was on diuretics at home-- negative ANCA panel, negative anti GBM, negative hep B, C, HIV, negative JULIO  Proteinuria, nephrotic range  Hematuria,   H/o nose bleeding episode   HTN  Lower extremities edema with cellulits, blister  Atrial fibrillation , on anti coagulation   H/o spleenectomy   H/o heavy use of gabapentin ( about 2400mg daily for last 2 months)   PLAN:   HD today with 2K bath UF 1.5-2L as tolerated. Awaiting for kidney biopsy , monitor Hb very closely post biopsy . Follow up GN work up. Discussed with Dr. Won Clements,  At 12:20 PM on 1/21/2021, I saw and examined patient during hemodialysis treatment. The patient was receiving hemodialysis for treatment of  renal failure. I have also reviewed vital signs, intake and output, lab results and recent events, and agreed with today's dialysis order.         Facility-Administered Medications: None       Current Facility-Administered Medications   Medication Dose Route Frequency    levoFLOXacin (LEVAQUIN) tablet 500 mg  500 mg Oral Q48H    amoxicillin-clavulanate (AUGMENTIN) 500-125 mg per tablet 1 Tab  1 Tab Oral Q24H    0.9% sodium chloride infusion 250 mL  250 mL IntraVENous PRN    OXcarbazepine (TRILEPTAL) tablet 300 mg  300 mg Oral Q12H    [START ON 1/23/2021] OXcarbazepine (TRILEPTAL) tablet 600 mg  600 mg Oral BID    silver sulfADIAZINE (SILVADENE) 1 % topical cream   Topical DAILY    nicotine (NICODERM CQ) 14 mg/24 hr patch 1 Patch  1 Patch TransDERmal DAILY    0.9% sodium chloride infusion 250 mL  250 mL IntraVENous DIALYSIS PRN    heparin (porcine) 1,000 unit/mL injection 5,000 Units  5,000 Units InterCATHeter DIALYSIS PRN    dilTIAZem ER (CARDIZEM CD) capsule 120 mg  120 mg Oral Q12H    albumin human 25% (BUMINATE) solution 25 g  25 g IntraVENous DIALYSIS PRN    budesonide-formoteroL (SYMBICORT) 160-4.5 mcg/actuation HFA inhaler 2 Puff  2 Puff Inhalation BID RT    oxyCODONE-acetaminophen (PERCOCET) 5-325 mg per tablet 1 Tab  1 Tab Oral Q6H PRN    heparin (porcine) injection 5,000 Units  5,000 Units SubCUTAneous Q8H    insulin lispro (HUMALOG) injection   SubCUTAneous AC&HS    glucose chewable tablet 16 g  4 Tab Oral PRN    glucagon (GLUCAGEN) injection 1 mg  1 mg IntraMUSCular PRN    dextrose (D50W) injection syrg 12.5-25 g  25-50 mL IntraVENous PRN    sodium chloride (NS) flush 5-40 mL  5-40 mL IntraVENous Q8H    sodium chloride (NS) flush 5-40 mL  5-40 mL IntraVENous Q8H    sodium chloride (NS) flush 5-40 mL  5-40 mL IntraVENous PRN    acetaminophen (TYLENOL) tablet 650 mg  650 mg Oral Q6H PRN    Or    acetaminophen (TYLENOL) suppository 650 mg  650 mg Rectal Q6H PRN    polyethylene glycol (MIRALAX) packet 17 g  17 g Oral DAILY PRN    promethazine (PHENERGAN) tablet 12.5 mg  12.5 mg Oral Q6H PRN    Or    ondansetron (ZOFRAN) injection 4 mg  4 mg IntraVENous Q6H PRN    amitriptyline (ELAVIL) tablet 50 mg  50 mg Oral QHS    ergocalciferol capsule 50,000 Units  50,000 Units Oral Q7D    melatonin tablet 5 mg  5 mg Oral QHS     Facility-Administered Medications Ordered in Other Encounters   Medication Dose Route Frequency    fentaNYL citrate (PF) injection 12.5-50 mcg  12.5-50 mcg IntraVENous Multiple    midazolam (VERSED) injection 0.5-2 mg  0.5-2 mg IntraVENous Rad Multiple       Review of Systems:     As above  Data Review:    Labs: Results:       Chemistry Recent Labs     01/21/21  0428 01/20/21  0530 01/19/21  0420   GLU 76 63* 84    136 136   K 3.9 4.6 4.8    101 99*   CO2 27 24 25   BUN 27* 50* 94*   CREA 4.97* 7.32* 10.90*   CA 7.8* 8.3* 7.7*   AGAP 10 11 12   BUCR 5* 7* 9*      CBC w/Diff Recent Labs     01/21/21  0428 01/20/21  0530 01/19/21  0420   WBC 11.4 13.7* 10.7   RBC 2.50* 2.78* 3.22*   HGB 7.6* 8.4* 9.8*   HCT 23.5* 26.7* 30.6*    360 391   GRANS 70 75 82*   LYMPH 10* 10* 8*   EOS 4 3 3      Coagulation Recent Labs     01/21/21  0428 01/19/21  1402   PTP 16.8* 19.1*   INR 1.4* 1.6*       Iron/Ferritin No results for input(s): IRON in the last 72 hours. No lab exists for component: TIBCCALC   BNP No results for input(s): BNPP in the last 72 hours. Cardiac Enzymes No results for input(s): CPK, CKND1, RUSSEL in the last 72 hours. No lab exists for component: CKRMB, TROIP   Liver Enzymes No results for input(s): TP, ALB, TBIL, AP in the last 72 hours.     No lab exists for component: SGOT, GPT, DBIL   Thyroid Studies Lab Results   Component Value Date/Time    TSH 1.94 01/14/2021 11:56 AM         EKG: atrial fibrilation     Physical Assessment:     Visit Vitals  /81   Pulse (!) 103   Temp 98.3 °F (36.8 °C) (Axillary)   Resp 18   Ht 5' 10\" (1.778 m)   Wt 127 kg (280 lb)   SpO2 94%   BMI 40.18 kg/m²     Weight change: 0 kg (0 lb)    Intake/Output Summary (Last 24 hours) at 1/21/2021 1215  Last data filed at 1/20/2021 2321  Gross per 24 hour   Intake 220 ml   Output 1500 ml   Net -1280 ml     Physical Exam:   General: comfortable, no acute distress   HEENT sclera anicteric, supple neck, no thyromegaly  CVS: S1S2 heard,  no rub  RS: + air entry b/l,   Abd: Soft, Non tender, Not distended,  Neuro: non focal, awake, alert , CN II-XII are grossly intact,  +tremor   Extrm: ++edema, no cyanosis, clubbing   Skin:  dry  Musculoskeletal: No gross joints or bone deformities     Procedures/imaging: see electronic medical records for all procedures, Xrays and details which were not copied into this note but were reviewed prior to creation of Domingo Myles MD  January 21, 2021  [facility]  Office [phone]

## 2021-01-21 NOTE — PROGRESS NOTES
Interventional Radiology    Patient seen in follow up for renal biopsy  Planning to proceed with random renal biopsy today as schedule allows  Heparin was given at 5 AM so we will plan to do the biopsy this afternoon  Please hold heparin for the rest of the day and maintain n.p.o. until after the procedure  Discussions held between Dr. Harry Kulkarni and Dr. Asia Munson    Thank you,  MANISHA Colvin

## 2021-01-21 NOTE — PROGRESS NOTES
PT orders received, chart reviewed. Pt unable to participate with PT due to:  []  Nausea/vomiting  []  Eating  []  Pain  []  Pt lethargic  [x]  Off Unit at 1008 and 1155 and 1410  []  Pt refused  []  Other   Will f/u later as patient's schedule allows.  Thank you for this referral.  Candida Fuentes, PT, DPT

## 2021-01-21 NOTE — PROGRESS NOTES
Returned from [facility] warmed up, removed restraints will continue to monitor LOC, safety, vitals, and dressing to left back for any bleeding, sitter at bedside

## 2021-01-21 NOTE — PROGRESS NOTES
Admit Date: 2021  Date of Service: 2021        Assessment:     1. 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 Acute hypoxic respiratory failure: multifactorial  3. Acute metabolic encephalopathy/delirium   4. A.fib:  Rate controlled; on Cardizem; Pradaxa on hold   5. Volume overload   6. Leukocytosis with mild lymphopenia  7. Chronic LE wounds with abscesses status post bilateral I&D  8. Hematuria   9. Pre-syncope  10. Surgically asplenic      Rapid Covid-19 negative 1/15/2021  Send out Covid19 negative 2021  Status post renal biopsy  Continue hemodialysis as per nephrology  Monitor hemoglobin and hematocrit post renal biopsy  Neurology has started Trileptal  Echo report reviewedEF 55-60%  Continue Cardizem and atenolol  Continue Symbicort  Fall precautions  Continue antibiotics, wound care, podiatry is following.   ID input noted  Continue PT OT  Discussed with patient, discussed with RN, discussed with nephrologist, discussed with     I called patient's daughter at phone #[phone] her regarding patient's care    Case discussed with:  [x]Patient  [x]Family  [x]Nursing  [x]Case Management  DVT Prophylaxis:  []Lovenox  [x]Hep SQ  []SCDs  []Coumadin   []On Heparin gtt   CODE STATUS: Full  Contact: Jose Antonio Cross (wife)  [phone], daughter [phone]      Nicolás Duran MD    2021       Subjective:      Patient is sitting in bed in no apparent distress, awake, follows simple commands, appears less confused today    Objective:        Visit Vitals  /68 (BP 1 Location: Left arm)   Pulse 94   Temp 97.9 °F (36.6 °C)   Resp 20   Ht 5' 10\" (1.778 m)   Wt 127 kg (280 lb)   SpO2 98%   BMI 40.18 kg/m²     Temp (24hrs), Av.1 °F (36.7 °C), Min:97.9 °F (36.6 °C), Max:98.3 °F (36.8 °C)      General:  Awake, follows simple commands  Cardiovascular:  S1S2+, RRR  Pulmonary:  CTA b/l  GI:  Soft, BS+, NT, ND  Extremities: Bilateral lower extremity with dressings          Labs: Results:   Chemistry Recent Labs     01/21/21 0428 01/20/21  0530 01/19/21  0420   GLU 76 63* 84    136 136   K 3.9 4.6 4.8    101 99*   CO2 27 24 25   BUN 27* 50* 94*   CREA 4.97* 7.32* 10.90*   CA 7.8* 8.3* 7.7*   AGAP 10 11 12   BUCR 5* 7* 9*      CBC w/Diff Recent Labs     01/21/21 0428 01/20/21  0530 01/19/21  0420   WBC 11.4 13.7* 10.7   RBC 2.50* 2.78* 3.22*   HGB 7.6* 8.4* 9.8*   HCT 23.5* 26.7* 30.6*    360 391   GRANS 70 75 82*   LYMPH 10* 10* 8*   EOS 4 3 3        No results found for: SDES Lab Results   Component Value Date/Time    Culture result: NO GROWTH 4 DAYS 01/16/2021 08:46 AM    Culture result: NO GROWTH 4 DAYS 01/16/2021 08:46 AM    Culture result: NO GROWTH 4 DAYS 01/16/2021 08:46 AM    Culture result: NO GROWTH 4 DAYS 01/16/2021 08:46 AM    Culture result: (A) 01/14/2021 09:00 PM     LIGHT STAPHYLOCOCCUS EPIDERMIDIS (OXACILLIN RESISTANT)    Culture result: RARE STAPHYLOCOCCUS AUREUS (A) 01/14/2021 09:00 PM    Culture result: FEW CANDIDA PARAPSILOSIS (A) 01/14/2021 09:00 PM        Results     Procedure Component Value Units Date/Time    CULTURE, Venkata Merl STAIN [[id number]] Collected: 01/16/21 0846    Order Status: Completed Specimen: Foot Updated: 01/20/21 1046     Special Requests: RIGHT        GRAM STAIN NO ORGANISMS SEEN        Culture result: NO GROWTH 4 DAYS       CULTURE, ANAEROBIC [[id number]] Collected: 01/16/21 0846    Order Status: Completed Specimen: Foot Updated: 01/20/21 1046     Special Requests: RIGHT        Culture result: NO GROWTH 4 DAYS       CULTURE, Venkata Merl STAIN [[id number]] Collected: 01/16/21 0846    Order Status: Completed Specimen: Foot Updated: 01/20/21 1048     Special Requests: LEFT        GRAM STAIN NO ORGANISMS SEEN        Culture result: NO GROWTH 4 DAYS       CULTURE, ANAEROBIC [[id number]] Collected: 01/16/21 0846    Order Status: Completed Specimen: Foot Updated: 01/20/21 1047     Special Requests: LEFT        Culture result: NO GROWTH 4 DAYS       CULTURE, Dorothe Mikal STAIN [[id number]]  (Abnormal)  (Susceptibility) Collected: 01/14/21 2100    Order Status: Completed Specimen: Wound from Foot Updated: 01/18/21 1426     Special Requests: NO SPECIAL REQUESTS        GRAM STAIN FEW WBCS SEEN         NO ORGANISMS SEEN        Culture result:       LIGHT STAPHYLOCOCCUS EPIDERMIDIS (OXACILLIN RESISTANT)                  RARE STAPHYLOCOCCUS AUREUS            FEW CANDIDA PARAPSILOSIS       Susceptibility      Staphylococcus epidermidis     GEORGINA     Ciprofloxacin ($) Susceptible     Erythromycin ($$$$) Resistant     Gentamicin ($) Susceptible     Inducible Clindamycin Susceptible     Levofloxacin ($) Susceptible     Linezolid ($$$$$) Susceptible     Moxifloxacin ($$$$) Susceptible     Oxacillin Resistant     Tetracycline Intermediate     Vancomycin ($) Susceptible                Susceptibility      Staphylococcus aureus     GEORGINA     Ciprofloxacin ($) Susceptible     Clindamycin ($) Susceptible     Daptomycin ($$$$$) Susceptible     Doxycycline ($$) Susceptible     Erythromycin ($$$$) Susceptible     Gentamicin ($) Susceptible     Levofloxacin ($) Susceptible     Linezolid ($$$$$) Susceptible     Moxifloxacin ($$$$) Susceptible     Oxacillin Susceptible     Tetracycline Susceptible     Trimeth/Sulfa Susceptible     Vancomycin ($) Susceptible                          Imaging:     Ir Insert Flakita Burnett Cvc W/o Port Over 5 Yr    Result Date: 1/21/2021  Technically successful placement of a right jugular tunneled dialysis catheter 14.5 Latvian 23 cm length palindrome dialysis catheter    Cta Neck    Addendum Date: 1/20/2021    Addendum: Addendum: The source images were reconstructed with virtual surface, MIP and intraluminal reconstruction Right subclavian: Normal Right carotid: normal right vertebral: Normal intact Left vertebral: Dominant intact unremarkable Left subclavian: Normal reconstruction arch: Type I arch identified    Result Date: 1/20/2021  The insertion site and course of the dialysis catheter intact normal Internal jugular above and below the dialysis catheter unremarkable no evidence for any injury to the jugular venous Cervical visceral space is unremarkable no adenopathy Mediastinal lipomatosis Some nonspecific postinflammatory changes in the lung apices. No soft tissue abnormalities identified. Ir Us Guided Vascular Access    Result Date: 1/21/2021  Successful placement of a single lumen midline catheter.        Dyan Owens MD

## 2021-01-21 NOTE — DIALYSIS
JANAK        ACUTE HEMODIALYSIS FLOW SHEET      HEMODIALYSIS ORDERS: Physician: Isabelle Mauro     Dialyzer: revaclear   Duration: 3 hr  BFR: 350   DFR: 600   Dialysate:  Temp 36-37*C  K+   2    Ca+  3 Na 138 Bicarb 35   Weight:  127 kg    Patient Chart [x]     Unable to Obtain []   Dry weight/UF Goal: 1500   Access: right chest TDC    Heparin []  Bolus      Units    [] Hourly       Units    [x]None      Catheter locking solution: heparin    Pre BP:   124/75    Pulse:     88       Respirations: 18  Temperature:   98.3 ax   Labs: Pre        Post:         [x] N/A   Additional Orders(medications, blood products, hypotension management):       [x] N/A     [x] Janak Consent Verified     CATHETER ACCESS: []N/A   [x]Right chest  []Left   []IJ     []Fem   []transhepatic   [] First use X-ray verified     [x]Permanent                [] Temporary   [x]No S/S infection  []Redness  []Drainage []Cultured []Swelling []Pain   [x]Medical Aseptic Prep Utilized   []Dressing Changed  [x] Biopatch  Date: 01/20/21       []Clotted   [x]Patent   Flows: [x]Good  []Poor  []Reversed   If access problem,  notified: []Yes    [x]N/A  Date:           GRAFT/FISTULA ACCESS:  [x]N/A                              GENERAL ASSESSMENT:      LUNGS:  Rate 18 SaO2%        [] N/A    [x] Clear  [] Coarse  [] Crackles  [] Wheezing        [] Diminished     Location : []RLL   []LLL    []RUL  []SUKHJINDER     Cough: []Productive  []Dry  [x]N/A   Respirations:  [x]Easy  []Labored     Therapy:   []RA  [x]NC 3 l/min    Mask: []NRB []Venti       O2%                  []Ventilator  []Intubated  [] Trach  [] BiPaP     CARDIAC: [x]Regular      [] Irregular   [] Pericardial Rub  [] JVD        []  Monitored  [] Bedside  [] Remotely monitored [x] N/A       EDEMA: [] None   [x]Generalized  [] Pitting [] 1    [] 2    [] 3    [] 4                 [] Facial  [] Pedal  []  UE  [] LE     SKIN:   [x] Warm   [] Hot     [] Cold   [x] Dry     [] Pale   [] Diaphoretic                  [] Flushed [] Jaundiced  [] Cyanotic  [] Rash  [] Weeping     LOC:    [x] Alert      [x]Oriented:    [x] Person     [x] Place  [x]Time               [] Confused  [] Lethargic  [] Medicated  [] Non-responsive     GI / ABDOMEN:  [] Flat    [] Distended    [x] Soft    [] Firm   []  Obese                             [] Diarrhea  [x] Bowel Sounds  [] Nausea  [] Vomiting       / URINE ASSESSMENT:[] Voiding   [x] Oliguria  [] Anuria   []  Garza     [] Incontinent    []  Incontinent Brief      []  Bathroom Privileges       PAIN: [x] 0 []1  []2   []3   []4   []5   []6   []7   []8   []9   []10              Scale 0-10  Action/Follow Up:      MOBILITY:  [] Amb    [] Amb/Assist    [x] Bed    [] Wheelchair  [] Stretcher      All Vitals and Treatment Details on Attached [address]: [facility]          Room # 993/33      [] 1st Time Acute  [] Stat  [x] Routine  [] Urgent     [x] Acute Room  []  Bedside  [] ICU/CCU  [] ER   Isolation Precautions:   There are currently no Active Isolations      Special Considerations:         [] Blood Consent Verified [x]N/A      ALLERGIES:   No Known Allergies            Code Status:Full Code        Hepatitis Status:    2nd RN check: BAV                    Lab Results   Component Value Date/Time    Hepatitis B surface Ag <0.10 01/14/2021 09:00 PM    Hepatitis B surface Ab 4.78 (L) 01/14/2021 09:00 PM    Hepatitis B core, IgM Negative 01/14/2021 09:00 PM    Hepatitis C virus Ab 0.03 01/14/2021 09:00 PM                     Current Labs:   Lab Results   Component Value Date/Time    Sodium 137 01/21/2021 04:28 AM    Potassium 3.9 01/21/2021 04:28 AM    Chloride 100 01/21/2021 04:28 AM    CO2 27 01/21/2021 04:28 AM    Anion gap 10 01/21/2021 04:28 AM    Glucose 76 01/21/2021 04:28 AM    BUN 27 (H) 01/21/2021 04:28 AM    Creatinine 4.97 (H) 01/21/2021 04:28 AM    BUN/Creatinine ratio 5 (L) 01/21/2021 04:28 AM    GFR est AA 14 (L) 01/21/2021 04:28 AM    GFR est non-AA 12 (L) 01/21/2021 04:28 AM    Calcium 7.8 (L) 01/21/2021 04:28 AM      Lab Results   Component Value Date/Time    WBC 11.4 01/21/2021 04:28 AM    HGB 7.6 (L) 01/21/2021 04:28 AM    HCT 23.5 (L) 01/21/2021 04:28 AM    PLATELET 790 84/94/9816 04:28 AM    MCV 94.0 01/21/2021 04:28 AM                                                                                     DIET:   DIET RENAL  DIET NUTRITIONAL SUPPLEMENTS       PRIMARY NURSE REPORT: First initial/Last name/Title      Pre Dialysis: ANUSHKA Machado RN      Time: 0900      EDUCATION:    [x] Patient [] Other         Knowledge Basis: []None [x]Minimal [] Substantial   Barriers to learning  [x]N/A   [] Access Care     [] S&S of infection     [] Fluid Management     []K+     [x]Procedural    []Albumin     [] Medications     [] Tx Options     [] Transplant     [] Diet     [] Other   Teaching Tools:  [x] Explain  [x] Demo  [] Handouts [] Video  Patient response:  [x] Verbalized understanding  [] Teach back  [] Return demonstration [] Requires follow up   Inappropriate due to:            [x]Time Out/Safety Check  [x]Extracorporeal Circuit Tested for integrity       RO/HEMODIALYSIS MACHINE SAFETY CHECKS  Before each treatment:     Machine Number:                   [facility]                                   [x] Unit Machine # 7 with centralized RO                                      Alarm Test:  Pass time 5362               [x] RO/Machine Log Complete      Temp   36             Dialysate: pH  7.4 Conductivity: Meter   13.8     HD Machine   13.8                  TCD: 13.7  Dialyzer Lot # [id number]            Blood Tubing Lot # [id number]          Saline Lot #  [id number]     CHLORINE TESTING-Before each treatment and every 4 hours    Total Chlorine: [x] less than 0.1 ppm  Time: 0900   (if greater than 0.1 ppm from Primary then every 30 minutes from Secondary)     TREATMENT INITIATION  with Dialysis Precautions:   [x] All Connections Secured                 [x] Saline Line Double Clamped   [x] Venous Parameters Set                  [x] Arterial Parameters Set    [x] Prime Given 250ml                          [x]Air Foam Detector Engaged      Treatment Initiation Note:    Pt arrived to HD unit via bed in stable condition. A&Ox4 with periods of confusion. Hallucinating bugs in the bed but is remaining calm. VSS with O2 @ 3 LPM NC. Right chest TDC assessed with no s/s complications. HD initiated without difficulty. During Treatment Notes:  1000 Pt awake and alert. Face and access in view with connection secure. 1030 Pt awake and alert. Face and access in view with connection secure. 1050 Lines reversed due to arterial pressure. 1100 Pt awake and alert. Face and access in view with connection secure. 1130 Pt awake and alert. Face and access in view with connection secure. 1200 Pt awake and alert. Face and access in view with connection secure. 1230 Pt awake and alert. Face and access in view with connection secure. Medication Dose Volume Route Time [facility] name Title   none     SARAH Amado RN                   Post Assessment:   Dialyzer Cleared: [] Good [x] Fair  [] Poor  Blood processed:  60.4 L  UF Removed  2000 Ml  POst BP:   130/62       Pulse: 60        Respirations: 18  Temperature: 98.3 Lungs:     [x] Clear      [] Course         [] Crackles    [] Wheezing         [] Diminished   Post Tx Vascular Access:      N/A Cardiac:  [x] Regular   [] Irregular   [] Monitor  [x] N/A        Catheter:   Locking solution: Heparin 1:1000   Art. 1.9   Elkin. 1.9     Skin:   Pain:   [x] Warm  [x] Dry [] Diaphoretic    [] Flushed    [] Pale [] Cyanotic [x]0  []1  []2   []3  []4   []5   []6   []7   []8   []9   []10     Post Treatment Note:  Pt tolerated treatment well. Net 1.5 L UF removed. POST TREATMENT PRIMARY NURSE HANDOFF REPORT:     First initial/Last name/Title         Post Dialysis: CONSUELO Mireles RN   Time:  0145     Abbreviations: AVG-arterial venous graft, AVF-arterial venous fistula, IJ-Internal Jugular, Subcl-Subclavian, Fem-Femoral, Tx-treatment, AP/HR-apical heart rate, DFR-dialysate flow rate, BFR-blood flow rate, AP-arterial pressure, -venous pressure, UF-ultrafiltrate, TMP-transmembrane pressure, Elkin-Venous, Art-Arterial, RO-Reverse Osmosis

## 2021-01-21 NOTE — PROGRESS NOTES
Preprocedure Assessment      Today 1/21/2021     Indication/Symptoms:   Virgil Bradshaw is a 56 y.o. male with JOE  Of uncertain etiology requiring further diagnostics, here for a random renal biopsy. The H & P and/or progress notes and any available imaging were reviewed. The risks, indications and possible alternatives to the procedure, including doing nothing, were discussed and informed consent was obtained. Physical Exam:      Heart:   RRR   Lungs:   Normal work of breathing on NC    The patient is an appropriate candidate to undergo the planned procedure and sedation.     Tucker Wiseman, PA

## 2021-01-21 NOTE — PROGRESS NOTES
Attempted to see patient for skilled OT treatment session, patient off the unit for HD x 2 attempts; 1007 and 1230. Will continue to follow and see patient when available/as appropriate.             Thank you,   Tonny Bullard MS, OTR/L

## 2021-01-22 ENCOUNTER — APPOINTMENT (OUTPATIENT)
Dept: CT IMAGING | Age: 61
DRG: 673 | End: 2021-01-22
Attending: RADIOLOGY
Payer: COMMERCIAL

## 2021-01-22 LAB
ANION GAP SERPL CALC-SCNC: 10 MMOL/L (ref 3–18)
BUN SERPL-MCNC: 29 MG/DL (ref 7–18)
BUN/CREAT SERPL: 5 (ref 12–20)
CALCIUM SERPL-MCNC: 8.3 MG/DL (ref 8.5–10.1)
CHLORIDE SERPL-SCNC: 98 MMOL/L (ref 100–111)
CO2 SERPL-SCNC: 28 MMOL/L (ref 21–32)
CREAT SERPL-MCNC: 5.95 MG/DL (ref 0.6–1.3)
FERRITIN SERPL-MCNC: 128 NG/ML (ref 8–388)
GLUCOSE BLD STRIP.AUTO-MCNC: 83 MG/DL (ref 70–110)
GLUCOSE BLD STRIP.AUTO-MCNC: 90 MG/DL (ref 70–110)
GLUCOSE BLD STRIP.AUTO-MCNC: 97 MG/DL (ref 70–110)
GLUCOSE BLD STRIP.AUTO-MCNC: 98 MG/DL (ref 70–110)
GLUCOSE SERPL-MCNC: 85 MG/DL (ref 74–99)
HCT VFR BLD AUTO: 23.9 % (ref 36–48)
HCT VFR BLD AUTO: 24.4 % (ref 36–48)
HGB BLD-MCNC: 7.8 G/DL (ref 13–16)
HGB BLD-MCNC: 8 G/DL (ref 13–16)
IRON SATN MFR SERPL: 7 % (ref 20–50)
IRON SERPL-MCNC: 16 UG/DL (ref 50–175)
POTASSIUM SERPL-SCNC: 4.3 MMOL/L (ref 3.5–5.5)
SODIUM SERPL-SCNC: 136 MMOL/L (ref 136–145)
TIBC SERPL-MCNC: 220 UG/DL (ref 250–450)

## 2021-01-22 PROCEDURE — 85014 HEMATOCRIT: CPT

## 2021-01-22 PROCEDURE — 74011250636 HC RX REV CODE- 250/636: Performed by: FAMILY MEDICINE

## 2021-01-22 PROCEDURE — 94761 N-INVAS EAR/PLS OXIMETRY MLT: CPT

## 2021-01-22 PROCEDURE — 97530 THERAPEUTIC ACTIVITIES: CPT

## 2021-01-22 PROCEDURE — 83540 ASSAY OF IRON: CPT

## 2021-01-22 PROCEDURE — 74011250637 HC RX REV CODE- 250/637: Performed by: EMERGENCY MEDICINE

## 2021-01-22 PROCEDURE — 94640 AIRWAY INHALATION TREATMENT: CPT

## 2021-01-22 PROCEDURE — 74170 CT ABD WO CNTRST FLWD CNTRST: CPT

## 2021-01-22 PROCEDURE — 65270000029 HC RM PRIVATE

## 2021-01-22 PROCEDURE — 82728 ASSAY OF FERRITIN: CPT

## 2021-01-22 PROCEDURE — 74011000636 HC RX REV CODE- 636: Performed by: RADIOLOGY

## 2021-01-22 PROCEDURE — 74011250637 HC RX REV CODE- 250/637: Performed by: PODIATRIST

## 2021-01-22 PROCEDURE — 74011250637 HC RX REV CODE- 250/637: Performed by: INTERNAL MEDICINE

## 2021-01-22 PROCEDURE — 99232 SBSQ HOSP IP/OBS MODERATE 35: CPT | Performed by: EMERGENCY MEDICINE

## 2021-01-22 PROCEDURE — 82962 GLUCOSE BLOOD TEST: CPT

## 2021-01-22 PROCEDURE — 2709999900 HC NON-CHARGEABLE SUPPLY

## 2021-01-22 PROCEDURE — 74011250637 HC RX REV CODE- 250/637: Performed by: HOSPITALIST

## 2021-01-22 PROCEDURE — 80048 BASIC METABOLIC PNL TOTAL CA: CPT

## 2021-01-22 PROCEDURE — 74011250637 HC RX REV CODE- 250/637: Performed by: PSYCHIATRY & NEUROLOGY

## 2021-01-22 PROCEDURE — 83970 ASSAY OF PARATHORMONE: CPT

## 2021-01-22 PROCEDURE — 74011250637 HC RX REV CODE- 250/637: Performed by: FAMILY MEDICINE

## 2021-01-22 RX ORDER — IODIXANOL 320 MG/ML
100 INJECTION, SOLUTION INTRAVASCULAR
Status: COMPLETED | OUTPATIENT
Start: 2021-01-22 | End: 2021-01-22

## 2021-01-22 RX ADMIN — Medication 10 ML: at 15:06

## 2021-01-22 RX ADMIN — HEPARIN SODIUM 5000 UNITS: 5000 INJECTION INTRAVENOUS; SUBCUTANEOUS at 04:47

## 2021-01-22 RX ADMIN — DILTIAZEM HYDROCHLORIDE 120 MG: 120 CAPSULE, COATED, EXTENDED RELEASE ORAL at 22:34

## 2021-01-22 RX ADMIN — Medication 10 ML: at 05:50

## 2021-01-22 RX ADMIN — BUDESONIDE AND FORMOTEROL FUMARATE DIHYDRATE 2 PUFF: 160; 4.5 AEROSOL RESPIRATORY (INHALATION) at 22:00

## 2021-01-22 RX ADMIN — LEVOFLOXACIN 500 MG: 500 TABLET, FILM COATED ORAL at 16:44

## 2021-01-22 RX ADMIN — OXCARBAZEPINE 300 MG: 300 TABLET, FILM COATED ORAL at 10:37

## 2021-01-22 RX ADMIN — Medication 5 MG: at 22:34

## 2021-01-22 RX ADMIN — AMITRIPTYLINE HYDROCHLORIDE 50 MG: 50 TABLET, FILM COATED ORAL at 22:34

## 2021-01-22 RX ADMIN — HEPARIN SODIUM 5000 UNITS: 5000 INJECTION INTRAVENOUS; SUBCUTANEOUS at 15:05

## 2021-01-22 RX ADMIN — HEPARIN SODIUM 5000 UNITS: 5000 INJECTION INTRAVENOUS; SUBCUTANEOUS at 22:35

## 2021-01-22 RX ADMIN — DILTIAZEM HYDROCHLORIDE 120 MG: 120 CAPSULE, COATED, EXTENDED RELEASE ORAL at 10:37

## 2021-01-22 RX ADMIN — IODIXANOL 100 ML: 320 INJECTION, SOLUTION INTRAVASCULAR at 15:00

## 2021-01-22 RX ADMIN — OXCARBAZEPINE 300 MG: 300 TABLET, FILM COATED ORAL at 22:34

## 2021-01-22 RX ADMIN — BUDESONIDE AND FORMOTEROL FUMARATE DIHYDRATE 2 PUFF: 160; 4.5 AEROSOL RESPIRATORY (INHALATION) at 08:00

## 2021-01-22 RX ADMIN — AMOXICILLIN AND CLAVULANATE POTASSIUM 1 TABLET: 500; 125 TABLET, FILM COATED ORAL at 18:53

## 2021-01-22 RX ADMIN — SILVER SULFADIAZINE: 10 CREAM TOPICAL at 15:07

## 2021-01-22 RX ADMIN — Medication 10 ML: at 22:42

## 2021-01-22 RX ADMIN — OXYCODONE HYDROCHLORIDE AND ACETAMINOPHEN 1 TABLET: 5; 325 TABLET ORAL at 16:44

## 2021-01-22 RX ADMIN — ERGOCALCIFEROL 50000 UNITS: 1.25 CAPSULE ORAL at 10:37

## 2021-01-22 NOTE — PROGRESS NOTES
Met with patient at bedside. Plan remains SNF. Patient new HD. Will need HD chair time set up.      Mackenzie Duggna RN BSN  Care Manager  [phone]

## 2021-01-22 NOTE — PROGRESS NOTES
Bedside and Verbal shift change report [facility] ([facility] nurse) by Jaja Schulte RN   (offgoing nurse). Report given with SBAR, Maggie, MAR and Recent Results.

## 2021-01-22 NOTE — PROGRESS NOTES
Problem: Mobility Impaired (Adult and Pediatric)  Goal: *Acute Goals and Plan of Care (Insert Text)  Description: Physical Therapy Goals  Initiated 1/15/2021, re-evaluated 1/22/21 and goals adjusted and to be accomplished within 7 day(s)  1. Patient will move from supine to sit and sit to supine  and roll side to side in bed with CGA    2. Patient will transfer from bed to chair and chair to bed with moderate assistance using the least restrictive device. 3.  Patient will perform sit to stand with SBA. 4.  Patient will ambulate with moderate assist for 10 feet with the least restrictive device. PLOF: Patient reports he began using RW about 2 weeks ago, prior to that he was ambulating without AD. He lives with spouse in single story home. Outcome: Progressing Towards Goal    PHYSICAL THERAPY RE-EVALUATION    Patient: Georgina Dash (42 y.o. male)  Date: 1/22/2021  Primary Diagnosis: Acute kidney injury ([facility] [N17.9]  ARF (acute renal failure) ([facility] [N17.9]  Procedure(s) (LRB):  INCISION AND DRAINAGE BILATERAL FEET (Bilateral) 6 Days Post-Op   Precautions:  Fall, Aspiration, WBAT(BLEs )  PLOF: see above    ASSESSMENT :  Pt cleared to participate in PT session, pt received semi-reclined in bed and agreeable to therapy session. Based on the objective data described below, the patient presents with decreased endurance, decreased strength, decreased balance reactions, gait deviations, increased pain, and decreased independence in functional mobility. Pt with O2 in bed, donned for session. Pt completing rolling with CGA and use of bedrail, sidelying to sit with Manasa. Sitting on EOB with SBA. Demonstrating good LE strength during gross MMT in short sitting. Pt reporting increased pain with weight bearing through LEs in sitting. Pt educated on proper UE positioning for standing to RW. Manasa to stand x2 reps, reporting pain with weight bearing. Pt unable to lift LEs from floor, unable to take side steps.  Pt then completing LE exercise outlined below. Pt returning to supine with CGA and scooting up toward [facility][1] with Louise. Pt left with all needs met and call bell in reach. Patient will benefit from skilled intervention to address the above impairments. Patient's rehabilitation potential is considered to be Fair  Factors which may influence rehabilitation potential include:   []         None noted  [x]         Mental ability/status  [x]         Medical condition  []         Home/family situation and support systems  []         Safety awareness  []         Pain tolerance/management  []         Other:      PLAN :  Recommendations and Planned Interventions:   [x]           Bed Mobility Training             [x]    Neuromuscular Re-Education  [x]           Transfer Training                   []    Orthotic/Prosthetic Training  [x]           Gait Training                          []    Modalities  [x]           Therapeutic Exercises           [x]    Edema Management/Control  [x]           Therapeutic Activities            [x]    Family Training/Education  [x]           Patient Education  [x]           Other (comment):    Frequency/Duration: Patient will be followed by physical therapy 1-2 times per day/4-7 days per week to address goals. Discharge Recommendations: Rehab  Further Equipment Recommendations for Discharge: rolling walker, possible wheelchair     SUBJECTIVE:   Patient stated I don't know if I can stand.     OBJECTIVE DATA SUMMARY:   Hospital course since last seen and reason for re-evaluation: Pt due for re-evaluation. Pt improving in confusion, following commands this session. Pt meeting 2 goals and goal adjusted for new mobility level. Pt able to stand with assist and RW but unable to take steps due to foot pain. Pt would continue to benefit from skilled PT while admitted to further progress towards goals.    Past Medical History:   Diagnosis Date    AAA (abdominal aortic aneurysm) ([facility]     Alcoholism ([facility] Atrial fibrillation (HCC)     Bradycardia     Chronic pain     Degenerative disc disease, cervical     Degenerative disc disease, lumbar     Hypertension     Hypokalemia     Pneumonia 2016    caused permanent L lung problems    Thyroid disease      Past Surgical History:   Procedure Laterality Date    HX ORTHOPAEDIC      neck fracture    HX ORTHOPAEDIC      lumbar fusion    IR INSERT TUNL CVC W/O PORT OVER 5 YR  1/19/2021    [place] ABDOMEN SURGERY PROC UNLISTED  10/08/2017    spleenectomy     Barriers to Learning/Limitations: yes;  altered mental status (i.e.Sedation, Confusion)  Compensate with: Visual Cues, Verbal Cues, and Tactile Cues  Home Situation:   Home Situation  Home Environment: Apartment  # Steps to Enter: 3  One/Two Story Residence: One story  Living Alone: No  Support Systems: Spouse/Significant Other/Partner  Patient Expects to be Discharged to[de-identified] Apartment  Current DME Used/Available at Home: None  Tub or Shower Type: Shower(min height step to enter)  Critical Behavior:  Neurologic State: Alert  Orientation Level: Oriented to person;Oriented to place; Disoriented to time;Oriented to situation  Cognition: Follows commands     Psychosocial  Patient Behaviors: Calm  Needs Expressed: Emotional;Educational  Purposeful Interaction: Yes  Pt Identified Daily Priority: Clinical issues (comment)  Car[employer] Process: Teaching/learning;Establish trust  Caring Interventions: Therapeutic modalities; Reassure  Reassure: Informing;Caring rounds  Therapeutic Modalities: Intentional therapeutic touch;Humor  Skin Condition/Temp: Warm     Skin Integrity: Blister; Other (comment)(puncture left flank, open blisters on feet)  Skin Integumentary  Skin Color: Appropriate for ethnicity  Skin Condition/Temp: Warm  Skin Integrity: Blister; Other (comment)(puncture left flank, open blisters on feet)  Turgor: Non-tenting  Hair Growth: Present  Varicosities: Absent  Nails: Exceptions to WDL  Exceptions to WDL: Thick     Strength: Strength: Generally decreased, functional    Tone & Sensation:   Tone: Normal    Sensation: (unable to fully assess)    Range Of Motion:  AROM: Within functional limits    Posture:  Posture (WDL): Exceptions to WDL  Posture Assessment: Forward head  Functional Mobility:  Bed Mobility:  Rolling: Contact guard assistance(with use of bedrail )  Supine to Sit: Minimum assistance  Sit to Supine: Contact guard assistance  Scooting: Minimum assistance  Transfers:  Sit to Stand: Minimum assistance  Stand to Sit: Contact guard assistance    Balance:   Sitting: Intact  Standing: Impaired; With support  Standing - Static: Fair  Standing - Dynamic : Fair;Poor    Ambulation/Gait Training:    Gait Description (WDL): (unable to lift feet from floor )     Right Side Weight Bearing: As tolerated  Left Side Weight Bearing: As tolerated       Therapeutic Exercises:   Instructed in and completed the following: ankle pumps and LAQ in short sitting x10 reps each side, limited range of motion. Pain:  Pain level pre-treatment: 10/10   Pain level post-treatment: 10/10   B foot pain     Activity Tolerance:   Fair activity tolerance, limited in mobility due to pain. Please refer to the flowsheet for vital signs taken during this treatment. After treatment:   []         Patient left in no apparent distress sitting up in chair  [x]         Patient left in no apparent distress in bed  [x]         Call bell left within reach  [x]         Nursing notified  []         Caregiver present  [x]         Bed alarm activated  []         SCDs applied    COMMUNICATION/EDUCATION:   [x]         Role of Physical Therapy in the acute care setting. [x]         Fall prevention education was provided and the patient/caregiver indicated understanding. [x]         Patient/family have participated as able in goal setting and plan of care. [x]         Patient/family agree to work toward stated goals and plan of care.   []         Patient understands intent and goals of therapy, but is neutral about his/her participation. []         Patient is unable to participate in goal setting/plan of care: ongoing with therapy staff.  []         Other:     Thank you for this referral.  Elie Burch, PT   Time Calculation: 30 mins

## 2021-01-22 NOTE — PROGRESS NOTES
Speech Pathology Note:    Pt attempted to be seen this date for dysphagia tx follow up.     1006: Pt unavailable; working with PT, Hieu Friends    1046:  Pt unavailable; on a zoom call with family/    [phone]:  Pt unavailable; Henna Spani will continue to follow and attempt visit on next business day.     Thank you for this referral,   Dane Loyola, [address]

## 2021-01-22 NOTE — PROGRESS NOTES
Problem: Patient Education: Go to Patient Education Activity  Goal: Patient/Family Education  Outcome: Progressing Towards Goal     Problem: Pain  Goal: *Control of Pain  Outcome: Progressing Towards Goal     Problem: Patient Education: Go to Patient Education Activity  Goal: Patient/Family Education  Outcome: Progressing Towards Goal     Problem: Impaired Skin Integrity/Pressure Injury Treatment  Goal: *Improvement of Existing Pressure Injury  Outcome: Progressing Towards Goal  Goal: *Prevention of pressure injury  Description: Document Oswaldo Scale and appropriate interventions in the flowsheet. Outcome: Progressing Towards Goal  Note: Pressure Injury Interventions:  Sensory Interventions: Assess changes in LOC, Discuss PT/OT consult with provider, Keep linens dry and wrinkle-free, Maintain/enhance activity level, Minimize linen layers, Pressure redistribution bed/mattress (bed type)    Moisture Interventions: Absorbent underpads, Apply protective barrier, creams and emollients, Check for incontinence Q2 hours and as needed, Maintain skin hydration (lotion/cream), Minimize layers    Activity Interventions: Increase time out of bed, Pressure redistribution bed/mattress(bed type), PT/OT evaluation    Mobility Interventions: Float heels, HOB 30 degrees or less, Pressure redistribution bed/mattress (bed type), PT/OT evaluation, Turn and reposition approx.  every two hours(pillow and wedges)    Nutrition Interventions: Document food/fluid/supplement intake, Offer support with meals,snacks and hydration    Friction and Shear Interventions: HOB 30 degrees or less                Problem: Patient Education: Go to Patient Education Activity  Goal: Patient/Family Education  Outcome: Progressing Towards Goal     Problem: Patient Education: Go to Patient Education Activity  Goal: Patient/Family Education  Outcome: Progressing Towards Goal     Problem: Non-Violent Restraints  Goal: *Removal from restraints as soon as assessed to be safe  Outcome: Progressing Towards Goal  Goal: *No harm/injury to patient while restraints in use  Outcome: Progressing Towards Goal  Goal: *Patient's dignity will be maintained  Outcome: Progressing Towards Goal  Goal: *Patient Specific Goal (EDIT GOAL, INSERT TEXT)  Outcome: Progressing Towards Goal  Goal: Non-violent Restaints:Standard Interventions  Outcome: Progressing Towards Goal  Goal: Non-violent Restraints:Patient Interventions  Outcome: Progressing Towards Goal  Goal: Patient/Family Education  Outcome: Progressing Towards Goal     Problem: Falls - Risk of  Goal: *Absence of Falls  Description: Document David Fall Risk and appropriate interventions in the flowsheet. Outcome: Progressing Towards Goal  Note: Fall Risk Interventions:       Mentation Interventions: Adequate sleep, hydration, pain control, Bed/chair exit alarm, Door open when patient unattended, More frequent rounding, Increase mobility, Reorient patient, Room close to nurse's station, Toileting rounds, Update white board    Medication Interventions: Bed/chair exit alarm, Patient to call before getting OOB, Teach patient to arise slowly    Elimination Interventions: Bed/chair exit alarm, Call light in reach, Patient to call for help with toileting needs, Toileting schedule/hourly rounds              Problem: Patient Education: Go to Patient Education Activity  Goal: Patient/Family Education  Outcome: Progressing Towards Goal     Problem: Pressure Injury - Risk of  Goal: *Prevention of pressure injury  Description: Document Oswaldo Scale and appropriate interventions in the flowsheet.   Outcome: Progressing Towards Goal  Note: Pressure Injury Interventions:  Sensory Interventions: Assess changes in LOC, Discuss PT/OT consult with provider, Keep linens dry and wrinkle-free, Maintain/enhance activity level, Minimize linen layers, Pressure redistribution bed/mattress (bed type)    Moisture Interventions: Absorbent underpads, Apply protective barrier, creams and emollients, Check for incontinence Q2 hours and as needed, Maintain skin hydration (lotion/cream), Minimize layers    Activity Interventions: Increase time out of bed, Pressure redistribution bed/mattress(bed type), PT/OT evaluation    Mobility Interventions: Float heels, HOB 30 degrees or less, Pressure redistribution bed/mattress (bed type), PT/OT evaluation, Turn and reposition approx.  every two hours(pillow and wedges)    Nutrition Interventions: Document food/fluid/supplement intake, Offer support with meals,snacks and hydration    Friction and Shear Interventions: HOB 30 degrees or less                Problem: Patient Education: Go to Patient Education Activity  Goal: Patient/Family Education  Outcome: Progressing Towards Goal     Problem: Patient Education: Go to Patient Education Activity  Goal: Patient/Family Education  Outcome: Progressing Towards Goal     Problem: Nutrition Deficit  Goal: *Optimize nutritional status  Outcome: Progressing Towards Goal

## 2021-01-22 NOTE — MED STUDENT NOTES
*ATTENTION:  This note has been created by a medical student for educational purposes only. Please do not refer to the content of this note for clinical decision-making, billing, or other purposes. Please see attending physicians note to obtain clinical information on this patient. * Progress Note Patient: Michelle Morton MRN: [id number]  SSN: [id number] YOB: 1960  Age: 56 y.o. Sex: male Admit Date: 1/14/2021 LOS: 8 days Subjective:  
 
Ratna Cervantes is a 56year old  male with past medical history significant for HTN, DM, atrial fibrillation, AAA, seizure disorder and alcohol use disorder who is at hospital day 8 after admission for severe JOE. At present, Mr. Silvia Jules is on a video call with family, has nasal cannula in place at 3L, and still with some confusion. He reports he \"did not have a kidney biopsy and your  was in here earlier and told me that. \"  He denies chest pain, SOB, abdominal pain, nausea or vomiting. Objective:  
 
General appearance:  Obese male, lying in bed in no acute distress. Cooperative, but with confusion. Vitals:  
 01/22/21 0400 01/22/21 0448 01/22/21 0842 01/22/21 1230 BP: 125/78  114/69 110/65 Pulse: 79  91 Resp: 19  19 20 Temp: 98 °F (36.7 °C)  98 °F (36.7 °C) 97.8 °F (36.6 °C) TempSrc:      
SpO2: 94%  95% 96% Weight:  124.2 kg (273 lb 12.8 oz) Height:      
  
 
Intake and Output: 
Current Shift: 01/22 0701 - 01/22 1900 In: 120 [P.O.:120] Out: - Last three shifts: 01/20 1901 - 01/22 0700 In: 120 [P.O.:120] Out: 3000 Physical Exam:  
Physical Exam 
Constitutional:   
   General: He is not in acute distress. Eyes:  
   General: No scleral icterus. Cardiovascular:  
   Rate and Rhythm: Regular rhythm. Heart sounds: Normal heart sounds, S1 normal and S2 normal. No murmur. Pulmonary:  
   Effort: Pulmonary effort is normal.  
   Breath sounds: Normal breath sounds. Abdominal: General: Bowel sounds are normal.  
   Tenderness: There is no abdominal tenderness. Musculoskeletal:  
   Right lower le+ Edema present. Left lower le+ Edema present. Feet:  
   Comments: B/l LE dressings; s/p wound care Neurological:  
   Mental Status: He is alert. Psychiatric:     
   Behavior: Behavior is cooperative. Lab/Data Review: All lab results for the last 24 hours reviewed. Recent Results (from the past 24 hour(s)) GLUCOSE, POC Collection Time: 21  4:20 PM  
Result Value Ref Range Glucose (POC) 126 (H) 70 - 110 mg/dL HGB & HCT Collection Time: 21  7:37 PM  
Result Value Ref Range HGB 7.9 (L) 13.0 - 16.0 g/dL HCT 25.1 (L) 36.0 - 48.0 % GLUCOSE, POC Collection Time: 21  9:47 PM  
Result Value Ref Range Glucose (POC) 88 70 - 110 mg/dL HGB & HCT Collection Time: 21  3:50 AM  
Result Value Ref Range HGB 8.0 (L) 13.0 - 16.0 g/dL HCT 24.4 (L) 36.0 - 48.0 % GLUCOSE, POC Collection Time: 21  6:35 AM  
Result Value Ref Range Glucose (POC) 83 70 - 110 mg/dL GLUCOSE, POC Collection Time: 21 12:28 PM  
Result Value Ref Range Glucose (POC) 90 70 - 110 mg/dL Assessment: 1. Severe JOE with proteinuria and hematuria; ATN vs RPGN; s/p renal bx ; BUN and creatinine improving; negative ANCA panel, negative anti GBM, negative hep B, C, HIV, negative JULIO 2. Acute metabolic encephalopathy/delirium; confusion improving 3. Bleeding at tunneled dialysis catheter; resolved 4. Acute hypoxic respiratory failure; multifactorial; stable with SpO2 at 96% on 3L 5. Atrial fibrillation; rate controlled on Cardizem; Pradaxa on hold 6. Leukocytosis; improved with WBC 11/4 on  7. Seizure disorder; on Trileptal 
8. Bilateral lower extremity cellulitis, wounds, abscess; s/p I&D by Podiatry on ; Podiatry follows 9. Surgically asplenic Plan: 1. Nephrology on treatment plan: - Hemodialysis per Nephrology; Nephro will arrange outpatient dialysis upon d/c - Awaiting renal bx results - Pending labs:  PTH and iron profile, start on hecterol and epogen/iron based on lab. 2.  Monitor Hgb/Hct post procedure 3. Continue Levaquin and Augmentin per ID for b/l lower leg abscess/cellulitis; Podiatry following 4. Continue to monitor WBC, kidney function, electrolytes daily 5. Monitor SpO2 sats; Continue O2 supplementation via nasal cannula 3L; Continue Symbicort 6. Continue PT/OT - PT currently recommending rehab upon d/c Signed By: Mis Lora   
 January 22, 2021

## 2021-01-22 NOTE — PROGRESS NOTES
Multiple attempts made for OT today. Pt on Zoom call w/ this am.    SCOTT for CT 1p. And now, with NSG for BLE dressing changes. Will continue to follow.

## 2021-01-22 NOTE — PROGRESS NOTES
Progress  Note       57y M with PMH HTN, chronic pain, admitted for severe renal failure  Subjective      Overnight event noted  S/p kidney biopsy yesterday   CT abdomen from this morning pending  Urine output remain low  No available serum chemistry this morning         IMPRESSION:   Acute kidney injury ATN vs RPGN, per history , has been having gross hematuria over last several weeks, brief use of nsaids, was on diuretics at home-- negative ANCA panel, negative anti GBM, negative hep B, C, HIV, negative JULIO  Proteinuria, nephrotic range  Hematuria,   H/o nose bleeding episode   HTN  Lower extremities edema with cellulits, blister  Atrial fibrillation , on anti coagulation   H/o spleenectomy   H/o heavy use of gabapentin ( about 2400mg daily for last 2 months)   PLAN:   NO plan for HD today, I called biopsy center, not received biopsy sample yet, likely have final result on Monday. Mr. Lilia Strauss will need ongoing HD support, I will arrange dialysis in outpatient  Center ( as  JOE. )  Pending lab, PTH and iron profile, start on hecterol and epogen/iron base on lab.     Discussed with Dr. Evelena Goodell,      Facility-Administered Medications: None       Current Facility-Administered Medications   Medication Dose Route Frequency    levoFLOXacin (LEVAQUIN) tablet 500 mg  500 mg Oral Q48H    amoxicillin-clavulanate (AUGMENTIN) 500-125 mg per tablet 1 Tab  1 Tab Oral Q24H    0.9% sodium chloride infusion 250 mL  250 mL IntraVENous PRN    OXcarbazepine (TRILEPTAL) tablet 300 mg  300 mg Oral Q12H    [START ON 1/23/2021] OXcarbazepine (TRILEPTAL) tablet 600 mg  600 mg Oral BID    silver sulfADIAZINE (SILVADENE) 1 % topical cream   Topical DAILY    nicotine (NICODERM CQ) 14 mg/24 hr patch 1 Patch  1 Patch TransDERmal DAILY    0.9% sodium chloride infusion 250 mL  250 mL IntraVENous DIALYSIS PRN    heparin (porcine) 1,000 unit/mL injection 5,000 Units  5,000 Units InterCATHeter DIALYSIS PRN    dilTIAZem ER (CARDIZEM CD) capsule 120 mg  120 mg Oral Q12H    albumin human 25% (BUMINATE) solution 25 g  25 g IntraVENous DIALYSIS PRN    budesonide-formoteroL (SYMBICORT) 160-4.5 mcg/actuation HFA inhaler 2 Puff  2 Puff Inhalation BID RT    oxyCODONE-acetaminophen (PERCOCET) 5-325 mg per tablet 1 Tab  1 Tab Oral Q6H PRN    heparin (porcine) injection 5,000 Units  5,000 Units SubCUTAneous Q8H    insulin lispro (HUMALOG) injection   SubCUTAneous AC&HS    glucose chewable tablet 16 g  4 Tab Oral PRN    glucagon (GLUCAGEN) injection 1 mg  1 mg IntraMUSCular PRN    dextrose (D50W) injection syrg 12.5-25 g  25-50 mL IntraVENous PRN    sodium chloride (NS) flush 5-40 mL  5-40 mL IntraVENous Q8H    sodium chloride (NS) flush 5-40 mL  5-40 mL IntraVENous Q8H    sodium chloride (NS) flush 5-40 mL  5-40 mL IntraVENous PRN    acetaminophen (TYLENOL) tablet 650 mg  650 mg Oral Q6H PRN    Or    acetaminophen (TYLENOL) suppository 650 mg  650 mg Rectal Q6H PRN    polyethylene glycol (MIRALAX) packet 17 g  17 g Oral DAILY PRN    promethazine (PHENERGAN) tablet 12.5 mg  12.5 mg Oral Q6H PRN    Or    ondansetron (ZOFRAN) injection 4 mg  4 mg IntraVENous Q6H PRN    amitriptyline (ELAVIL) tablet 50 mg  50 mg Oral QHS    ergocalciferol capsule 50,000 Units  50,000 Units Oral Q7D    melatonin tablet 5 mg  5 mg Oral QHS     Facility-Administered Medications Ordered in Other Encounters   Medication Dose Route Frequency    fentaNYL citrate (PF) injection 12.5-50 mcg  12.5-50 mcg IntraVENous Multiple    midazolam (VERSED) injection 0.5-2 mg  0.5-2 mg IntraVENous Rad Multiple       Review of Systems:     As above  Data Review:    Labs: Results:       Chemistry Recent Labs     01/21/21  0428 01/20/21  0530   GLU 76 63*    136   K 3.9 4.6    101   CO2 27 24   BUN 27* 50*   CREA 4.97* 7.32*   CA 7.8* 8.3*   AGAP 10 11   BUCR 5* 7*      CBC w/Diff Recent Labs     01/22/21  0350 01/21/21  1937 01/21/21  0428 01/20/21  0530   WBC  --   -- 11.4 13.7*   RBC  --   --  2.50* 2.78*   HGB 8.0* 7.9* 7.6* 8.4*   HCT 24.4* 25.1* 23.5* 26.7*   PLT  --   --  331 360   GRANS  --   --  70 75   LYMPH  --   --  10* 10*   EOS  --   --  4 3      Coagulation Recent Labs     01/21/21  0428 01/19/21  1402   PTP 16.8* 19.1*   INR 1.4* 1.6*       Iron/Ferritin No results for input(s): IRON in the last 72 hours. No lab exists for component: TIBCCALC   BNP No results for input(s): BNPP in the last 72 hours. Cardiac Enzymes No results for input(s): CPK, CKND1, RUSSEL in the last 72 hours. No lab exists for component: CKRMB, TROIP   Liver Enzymes No results for input(s): TP, ALB, TBIL, AP in the last 72 hours.     No lab exists for component: SGOT, GPT, DBIL   Thyroid Studies Lab Results   Component Value Date/Time    TSH 1.94 01/14/2021 11:56 AM         EKG: atrial fibrilation     Physical Assessment:     Visit Vitals  /65   Pulse 91   Temp 97.8 °F (36.6 °C)   Resp 20   Ht 5' 10\" (1.778 m)   Wt 124.2 kg (273 lb 12.8 oz)   SpO2 96%   BMI 39.29 kg/m²     Weight change: -2.812 kg (-6 lb 3.2 oz)    Intake/Output Summary (Last 24 hours) at 1/22/2021 1354  Last data filed at 1/22/2021 5734  Gross per 24 hour   Intake 240 ml   Output    Net 240 ml     Physical Exam:   General: comfortable, no acute distress   HEENT sclera anicteric, supple neck, no thyromegaly  CVS: S1S2 heard,  no rub  RS: + air entry b/l,   Abd: Soft, Non tender, Not distended,  Neuro: non focal, awake, alert , CN II-XII are grossly intact,  +tremor   Extrm: ++edema, no cyanosis, clubbing   Skin:  dry  Musculoskeletal: No gross joints or bone deformities     Procedures/imaging: see electronic medical records for all procedures, Xrays and details which were not copied into this note but were reviewed prior to creation of Issac Mann MD  January 22, 2021  [facility]  Office [phone]

## 2021-01-23 LAB
ANION GAP SERPL CALC-SCNC: 9 MMOL/L (ref 3–18)
BASOPHILS # BLD: 0.1 K/UL (ref 0–0.1)
BASOPHILS NFR BLD: 1 % (ref 0–2)
BUN SERPL-MCNC: 34 MG/DL (ref 7–18)
BUN/CREAT SERPL: 5 (ref 12–20)
CALCIUM SERPL-MCNC: 8.4 MG/DL (ref 8.5–10.1)
CALCIUM SERPL-MCNC: 8.7 MG/DL (ref 8.5–10.1)
CHLORIDE SERPL-SCNC: 99 MMOL/L (ref 100–111)
CO2 SERPL-SCNC: 29 MMOL/L (ref 21–32)
CREAT SERPL-MCNC: 6.89 MG/DL (ref 0.6–1.3)
DIFFERENTIAL METHOD BLD: ABNORMAL
EOSINOPHIL # BLD: 0.5 K/UL (ref 0–0.4)
EOSINOPHIL NFR BLD: 4 % (ref 0–5)
ERYTHROCYTE [DISTWIDTH] IN BLOOD BY AUTOMATED COUNT: 15.2 % (ref 11.6–14.5)
GLUCOSE BLD STRIP.AUTO-MCNC: 104 MG/DL (ref 70–110)
GLUCOSE BLD STRIP.AUTO-MCNC: 127 MG/DL (ref 70–110)
GLUCOSE BLD STRIP.AUTO-MCNC: 145 MG/DL (ref 70–110)
GLUCOSE BLD STRIP.AUTO-MCNC: 98 MG/DL (ref 70–110)
GLUCOSE SERPL-MCNC: 86 MG/DL (ref 74–99)
HCT VFR BLD AUTO: 25.3 % (ref 36–48)
HGB BLD-MCNC: 8 G/DL (ref 13–16)
INR PPP: 1.3 (ref 0.8–1.2)
LYMPHOCYTES # BLD: 1.4 K/UL (ref 0.9–3.6)
LYMPHOCYTES NFR BLD: 11 % (ref 21–52)
MAGNESIUM SERPL-MCNC: 2.6 MG/DL (ref 1.6–2.6)
MCH RBC QN AUTO: 30.3 PG (ref 24–34)
MCHC RBC AUTO-ENTMCNC: 31.6 G/DL (ref 31–37)
MCV RBC AUTO: 95.8 FL (ref 74–97)
MONOCYTES # BLD: 1.9 K/UL (ref 0.05–1.2)
MONOCYTES NFR BLD: 15 % (ref 3–10)
NEUTS SEG # BLD: 9 K/UL (ref 1.8–8)
NEUTS SEG NFR BLD: 69 % (ref 40–73)
PLATELET # BLD AUTO: 406 K/UL (ref 135–420)
PMV BLD AUTO: 10.9 FL (ref 9.2–11.8)
POTASSIUM SERPL-SCNC: 4 MMOL/L (ref 3.5–5.5)
PROTHROMBIN TIME: 16.2 SEC (ref 11.5–15.2)
PTH-INTACT SERPL-MCNC: 211.6 PG/ML (ref 18.4–88)
RBC # BLD AUTO: 2.64 M/UL (ref 4.7–5.5)
SODIUM SERPL-SCNC: 137 MMOL/L (ref 136–145)
WBC # BLD AUTO: 12.8 K/UL (ref 4.6–13.2)

## 2021-01-23 PROCEDURE — 74011250637 HC RX REV CODE- 250/637: Performed by: PODIATRIST

## 2021-01-23 PROCEDURE — 36592 COLLECT BLOOD FROM PICC: CPT

## 2021-01-23 PROCEDURE — 85610 PROTHROMBIN TIME: CPT

## 2021-01-23 PROCEDURE — 97168 OT RE-EVAL EST PLAN CARE: CPT

## 2021-01-23 PROCEDURE — 94640 AIRWAY INHALATION TREATMENT: CPT

## 2021-01-23 PROCEDURE — 99232 SBSQ HOSP IP/OBS MODERATE 35: CPT | Performed by: EMERGENCY MEDICINE

## 2021-01-23 PROCEDURE — 74011250637 HC RX REV CODE- 250/637: Performed by: EMERGENCY MEDICINE

## 2021-01-23 PROCEDURE — 85025 COMPLETE CBC W/AUTO DIFF WBC: CPT

## 2021-01-23 PROCEDURE — 74011000258 HC RX REV CODE- 258: Performed by: INTERNAL MEDICINE

## 2021-01-23 PROCEDURE — 74011250637 HC RX REV CODE- 250/637: Performed by: INTERNAL MEDICINE

## 2021-01-23 PROCEDURE — 80048 BASIC METABOLIC PNL TOTAL CA: CPT

## 2021-01-23 PROCEDURE — 2709999900 HC NON-CHARGEABLE SUPPLY

## 2021-01-23 PROCEDURE — 83735 ASSAY OF MAGNESIUM: CPT

## 2021-01-23 PROCEDURE — 65270000029 HC RM PRIVATE

## 2021-01-23 PROCEDURE — 82962 GLUCOSE BLOOD TEST: CPT

## 2021-01-23 PROCEDURE — 77010033678 HC OXYGEN DAILY

## 2021-01-23 PROCEDURE — 74011250637 HC RX REV CODE- 250/637: Performed by: PSYCHIATRY & NEUROLOGY

## 2021-01-23 PROCEDURE — 74011250636 HC RX REV CODE- 250/636: Performed by: INTERNAL MEDICINE

## 2021-01-23 PROCEDURE — 74011250636 HC RX REV CODE- 250/636: Performed by: FAMILY MEDICINE

## 2021-01-23 PROCEDURE — 90935 HEMODIALYSIS ONE EVALUATION: CPT

## 2021-01-23 PROCEDURE — 74011250637 HC RX REV CODE- 250/637: Performed by: FAMILY MEDICINE

## 2021-01-23 PROCEDURE — 97530 THERAPEUTIC ACTIVITIES: CPT

## 2021-01-23 PROCEDURE — 74011250637 HC RX REV CODE- 250/637: Performed by: HOSPITALIST

## 2021-01-23 RX ORDER — DOXERCALCIFEROL 4 UG/2ML
2 INJECTION INTRAVENOUS
Status: DISCONTINUED | OUTPATIENT
Start: 2021-01-25 | End: 2021-02-01 | Stop reason: HOSPADM

## 2021-01-23 RX ORDER — AMOXICILLIN AND CLAVULANATE POTASSIUM 500; 125 MG/1; MG/1
1 TABLET, FILM COATED ORAL ONCE
Status: COMPLETED | OUTPATIENT
Start: 2021-01-23 | End: 2021-01-23

## 2021-01-23 RX ADMIN — BUDESONIDE AND FORMOTEROL FUMARATE DIHYDRATE 2 PUFF: 160; 4.5 AEROSOL RESPIRATORY (INHALATION) at 20:00

## 2021-01-23 RX ADMIN — Medication 10 ML: at 21:56

## 2021-01-23 RX ADMIN — Medication 10 ML: at 13:47

## 2021-01-23 RX ADMIN — SILVER SULFADIAZINE: 10 CREAM TOPICAL at 08:30

## 2021-01-23 RX ADMIN — AMOXICILLIN AND CLAVULANATE POTASSIUM 1 TABLET: 500; 125 TABLET, FILM COATED ORAL at 22:06

## 2021-01-23 RX ADMIN — OXYCODONE HYDROCHLORIDE AND ACETAMINOPHEN 1 TABLET: 5; 325 TABLET ORAL at 09:00

## 2021-01-23 RX ADMIN — BUDESONIDE AND FORMOTEROL FUMARATE DIHYDRATE 2 PUFF: 160; 4.5 AEROSOL RESPIRATORY (INHALATION) at 08:00

## 2021-01-23 RX ADMIN — DILTIAZEM HYDROCHLORIDE 120 MG: 120 CAPSULE, COATED, EXTENDED RELEASE ORAL at 21:45

## 2021-01-23 RX ADMIN — DILTIAZEM HYDROCHLORIDE 120 MG: 120 CAPSULE, COATED, EXTENDED RELEASE ORAL at 08:38

## 2021-01-23 RX ADMIN — AMITRIPTYLINE HYDROCHLORIDE 50 MG: 50 TABLET, FILM COATED ORAL at 21:45

## 2021-01-23 RX ADMIN — Medication 5 MG: at 21:45

## 2021-01-23 RX ADMIN — OXCARBAZEPINE 600 MG: 300 TABLET, FILM COATED ORAL at 21:45

## 2021-01-23 RX ADMIN — AMOXICILLIN AND CLAVULANATE POTASSIUM 1 TABLET: 500; 125 TABLET, FILM COATED ORAL at 22:08

## 2021-01-23 RX ADMIN — HEPARIN SODIUM 5000 UNITS: 5000 INJECTION INTRAVENOUS; SUBCUTANEOUS at 21:56

## 2021-01-23 RX ADMIN — Medication 10 ML: at 21:57

## 2021-01-23 RX ADMIN — HEPARIN SODIUM 5000 UNITS: 5000 INJECTION INTRAVENOUS; SUBCUTANEOUS at 13:48

## 2021-01-23 RX ADMIN — OXCARBAZEPINE 600 MG: 300 TABLET, FILM COATED ORAL at 08:30

## 2021-01-23 RX ADMIN — IRON SUCROSE 100 MG: 20 INJECTION, SOLUTION INTRAVENOUS at 21:47

## 2021-01-23 RX ADMIN — Medication 10 ML: at 13:46

## 2021-01-23 NOTE — PROGRESS NOTES
Admit Date: 2021  Date of Service: 2021        Assessment:     1. 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 Acute hypoxic respiratory failure: multifactorial  3. Acute metabolic encephalopathy/delirium   4. A.fib:  Rate controlled; on Cardizem; Pradaxa on hold   5. Volume overload   6. Leukocytosis with mild lymphopenia  7. Chronic LE wounds with abscesses status post bilateral I&D  8. Hematuria   9. Pre-syncope  10. Surgically asplenic      Rapid Covid-19 negative 1/15/2021  Send out Covid19 negative 2021  Status post renal biopsy on . Pathology is pending  We will consider resuming anticoagulation soon if H&H remained stable  Continue hemodialysis as per nephrology  On Trileptal per neurology  Echo report reviewedEF 55-60%  On Cardizem and atenolol  Continue Symbicort  Fall precautions  Continue antibiotics, wound care, podiatry is following.   ID input noted  PT recommends rehab  Discussed with patient, discussed with nephrologist, discussed with   Discussed with nephrology      Case discussed with:  [x]Patient  [x]Family  [x]Nursing  [x]Case Management  DVT Prophylaxis:  []Lovenox  [x]Hep SQ  []SCDs  []Coumadin   []On Heparin gtt   CODE STATUS: Full  Contact: Neela Bridges (wife)  [phone], daughter [phone]      Edwin York MD    2021       Subjective:      Patient is sitting in bed in no apparent distress, awake, follows simple commands    Objective:        Visit Vitals  /75 (BP 1 Location: Left arm, BP Patient Position: Supine)   Pulse (!) 101   Temp 97.6 °F (36.4 °C)   Resp 19   Ht 5' 10\" (1.778 m)   Wt 124.2 kg (273 lb 12.8 oz)   SpO2 96%   BMI 39.29 kg/m²     Temp (24hrs), Av.9 °F (36.6 °C), Min:97.6 °F (36.4 °C), Max:98.1 °F (36.7 °C)      General:  Awake, follows simple commands  Cardiovascular:  S1S2+, RRR  Pulmonary:  CTA b/l  GI:  Soft, BS+, NT, ND  Extremities: Bilateral lower extremity with dressing          Labs: Results: Chemistry Recent Labs     01/22/21  1515 01/21/21  0428 01/20/21  0530   GLU 85 76 63*    137 136   K 4.3 3.9 4.6   CL 98* 100 101   CO2 28 27 24   BUN 29* 27* 50*   CREA 5.95* 4.97* 7.32*   CA 8.3* 7.8* 8.3*   AGAP 10 10 11   BUCR 5* 5* 7*      CBC w/Diff Recent Labs     01/22/21  1515 01/22/21  0350 01/21/21  1937 01/21/21  0428 01/20/21  0530   WBC  --   --   --  11.4 13.7*   RBC  --   --   --  2.50* 2.78*   HGB 7.8* 8.0* 7.9* 7.6* 8.4*   HCT 23.9* 24.4* 25.1* 23.5* 26.7*   PLT  --   --   --  331 360   GRANS  --   --   --  70 75   LYMPH  --   --   --  10* 10*   EOS  --   --   --  4 3        No results found for: SDES Lab Results   Component Value Date/Time    Culture result: NO GROWTH 4 DAYS 01/16/2021 08:46 AM    Culture result: NO GROWTH 4 DAYS 01/16/2021 08:46 AM    Culture result: NO GROWTH 4 DAYS 01/16/2021 08:46 AM    Culture result: NO GROWTH 4 DAYS 01/16/2021 08:46 AM    Culture result: (A) 01/14/2021 09:00 PM     LIGHT STAPHYLOCOCCUS EPIDERMIDIS (OXACILLIN RESISTANT)    Culture result: RARE STAPHYLOCOCCUS AUREUS (A) 01/14/2021 09:00 PM    Culture result: FEW CANDIDA PARAPSILOSIS (A) 01/14/2021 09:00 PM        Results     Procedure Component Value Units Date/Time    CULTURE, Saintclair Milling STAIN [[id number]] Collected: 01/16/21 0846    Order Status: Completed Specimen: Foot Updated: 01/20/21 1046     Special Requests: RIGHT        GRAM STAIN NO ORGANISMS SEEN        Culture result: NO GROWTH 4 DAYS       CULTURE, ANAEROBIC [[id number]] Collected: 01/16/21 0846    Order Status: Completed Specimen: Foot Updated: 01/20/21 1046     Special Requests: RIGHT        Culture result: NO GROWTH 4 DAYS       CULTURE, Saintclair Milling STAIN [[id number]] Collected: 01/16/21 0846    Order Status: Completed Specimen: Foot Updated: 01/20/21 1048     Special Requests: LEFT        GRAM STAIN NO ORGANISMS SEEN        Culture result: NO GROWTH 4 DAYS       CULTURE, ANAEROBIC [[id number]] Collected: 01/16/21 0846    Order Status: Completed Specimen: Foot Updated: 01/20/21 1047     Special Requests: LEFT        Culture result: NO GROWTH 4 DAYS       CULTURE, WOUND Lavada Gambles STAIN [[id number]]  (Abnormal)  (Susceptibility) Collected: 01/14/21 2100    Order Status: Completed Specimen: Wound from Foot Updated: 01/18/21 1426     Special Requests: NO SPECIAL REQUESTS        GRAM STAIN FEW WBCS SEEN         NO ORGANISMS SEEN        Culture result:       LIGHT STAPHYLOCOCCUS EPIDERMIDIS (OXACILLIN RESISTANT)                  RARE STAPHYLOCOCCUS AUREUS            FEW CANDIDA PARAPSILOSIS       Susceptibility      Staphylococcus epidermidis     GEORGINA     Ciprofloxacin ($) Susceptible     Erythromycin ($$$$) Resistant     Gentamicin ($) Susceptible     Inducible Clindamycin Susceptible     Levofloxacin ($) Susceptible     Linezolid ($$$$$) Susceptible     Moxifloxacin ($$$$) Susceptible     Oxacillin Resistant     Tetracycline Intermediate     Vancomycin ($) Susceptible                Susceptibility      Staphylococcus aureus     GEORGINA     Ciprofloxacin ($) Susceptible     Clindamycin ($) Susceptible     Daptomycin ($$$$$) Susceptible     Doxycycline ($$) Susceptible     Erythromycin ($$$$) Susceptible     Gentamicin ($) Susceptible     Levofloxacin ($) Susceptible     Linezolid ($$$$$) Susceptible     Moxifloxacin ($$$$) Susceptible     Oxacillin Susceptible     Tetracycline Susceptible     Trimeth/Sulfa Susceptible     Vancomycin ($) Susceptible                          Imaging:     Ir Insert Minta Backers Cvc W/o Port Over 5 Yr    Result Date: 1/21/2021  Technically successful placement of a right jugular tunneled dialysis catheter 14.5 Maori 23 cm length palindrome dialysis catheter    Cta Neck    Addendum Date: 1/20/2021    Addendum: Addendum: The source images were reconstructed with virtual surface, MIP and intraluminal reconstruction Right subclavian: Normal Right carotid: normal right vertebral: Normal intact Left vertebral: Dominant intact unremarkable Left subclavian: Normal reconstruction arch: Type I arch identified    Result Date: 1/20/2021  The insertion site and course of the dialysis catheter intact normal Internal jugular above and below the dialysis catheter unremarkable no evidence for any injury to the jugular venous Cervical visceral space is unremarkable no adenopathy Mediastinal lipomatosis Some nonspecific postinflammatory changes in the lung apices. No soft tissue abnormalities identified. Ct Abd W Wo Cont    Result Date: 1/22/2021  : 1.  Status post left lower pole kidney biopsy for acute kidney insufficiency demonstrates some trace density in the upper collecting system of the biopsy left kidney suggesting a small amount of blood not felt to be significant clinically. The nephrogenic and excretory phase images demonstrate nothing to suggest a post biopsy complication. Ir Us Guided Vascular Access    Result Date: 1/21/2021  Successful placement of a single lumen midline catheter. Ct Bx Renal    Result Date: 1/22/2021  Impression: 1. Successful CT-guided lower pole left kidney biopsy.       Rosalie Mari MD

## 2021-01-23 NOTE — PROGRESS NOTES
Progress  Note       57y M with PMH HTN, chronic pain, admitted for severe renal failure  Subjective      Overnight event noted  S/p kidney biopsy 9/21  Urine output remain low  Feels ok  Tired but no specific chest pain,sob,nausea,vomiting,abdominal pain      IMPRESSION:   Acute kidney injury ATN vs RPGN, per history , has been having gross hematuria over last several weeks, brief use of nsaids, was on diuretics at home-- negative ANCA panel, negative anti GBM, negative hep B, C, HIV, negative JULIO  Proteinuria, nephrotic range with hematuria  S/p kidney biopsy on 1/21/2021  Anemia of chronic disease,iron deficiency  H/o nose bleeding episode   HTN  Lower extremities edema with cellulits, blister  Atrial fibrillation , on anti coagulation   H/o splenectomy   H/o heavy use of gabapentin ( about 2400mg daily for last 2 months)  Secondary hyperparathyroidism   PLAN:   HD on Monday again  No urine output  Start loading iron iv venofer   Start hectorol 2 mcg with each Rx  Epogen  Once iron repleted  Get phosphorus level tomorrow  Kidney biopsy report awaited         Facility-Administered Medications: None       Current Facility-Administered Medications   Medication Dose Route Frequency    amoxicillin-clavulanate (AUGMENTIN) 500-125 mg per tablet 1 Tab  1 Tab Oral Q24H    0.9% sodium chloride infusion 250 mL  250 mL IntraVENous PRN    OXcarbazepine (TRILEPTAL) tablet 600 mg  600 mg Oral BID    silver sulfADIAZINE (SILVADENE) 1 % topical cream   Topical DAILY    nicotine (NICODERM CQ) 14 mg/24 hr patch 1 Patch  1 Patch TransDERmal DAILY    0.9% sodium chloride infusion 250 mL  250 mL IntraVENous DIALYSIS PRN    heparin (porcine) 1,000 unit/mL injection 5,000 Units  5,000 Units InterCATHeter DIALYSIS PRN    dilTIAZem ER (CARDIZEM CD) capsule 120 mg  120 mg Oral Q12H    albumin human 25% (BUMINATE) solution 25 g  25 g IntraVENous DIALYSIS PRN    budesonide-formoteroL (SYMBICORT) 160-4.5 mcg/actuation HFA inhaler 2 Puff  2 Puff Inhalation BID RT    oxyCODONE-acetaminophen (PERCOCET) 5-325 mg per tablet 1 Tab  1 Tab Oral Q6H PRN    heparin (porcine) injection 5,000 Units  5,000 Units SubCUTAneous Q8H    insulin lispro (HUMALOG) injection   SubCUTAneous AC&HS    glucose chewable tablet 16 g  4 Tab Oral PRN    glucagon (GLUCAGEN) injection 1 mg  1 mg IntraMUSCular PRN    dextrose (D50W) injection syrg 12.5-25 g  25-50 mL IntraVENous PRN    sodium chloride (NS) flush 5-40 mL  5-40 mL IntraVENous Q8H    sodium chloride (NS) flush 5-40 mL  5-40 mL IntraVENous Q8H    sodium chloride (NS) flush 5-40 mL  5-40 mL IntraVENous PRN    acetaminophen (TYLENOL) tablet 650 mg  650 mg Oral Q6H PRN    Or    acetaminophen (TYLENOL) suppository 650 mg  650 mg Rectal Q6H PRN    polyethylene glycol (MIRALAX) packet 17 g  17 g Oral DAILY PRN    promethazine (PHENERGAN) tablet 12.5 mg  12.5 mg Oral Q6H PRN    Or    ondansetron (ZOFRAN) injection 4 mg  4 mg IntraVENous Q6H PRN    amitriptyline (ELAVIL) tablet 50 mg  50 mg Oral QHS    ergocalciferol capsule 50,000 Units  50,000 Units Oral Q7D    melatonin tablet 5 mg  5 mg Oral QHS       Review of Systems:     As above  Data Review:    Labs: Results:       Chemistry Recent Labs     01/23/21 0213 01/22/21  1515 01/21/21 0428   GLU 86 85 76    136 137   K 4.0 4.3 3.9   CL 99* 98* 100   CO2 29 28 27   BUN 34* 29* 27*   CREA 6.89* 5.95* 4.97*   CA 8.4* 8.7  8.3* 7.8*   AGAP 9 10 10   BUCR 5* 5* 5*      CBC w/Diff Recent Labs     01/23/21 0213 01/22/21  1515 01/22/21  0350 01/21/21  0428 01/21/21 0428   WBC 12.8  --   --   --  11.4   RBC 2.64*  --   --   --  2.50*   HGB 8.0* 7.8* 8.0*   < > 7.6*   HCT 25.3* 23.9* 24.4*   < > 23.5*     --   --   --  331   GRANS 69  --   --   --  70   LYMPH 11*  --   --   --  10*   EOS 4  --   --   --  4    < > = values in this interval not displayed.       Coagulation Recent Labs     01/23/21 0213 01/21/21  0428   PTP 16.2* 16.8* INR 1.3* 1.4*       Iron/Ferritin Recent Labs     01/22/21  1515   IRON 16*      BNP No results for input(s): BNPP in the last 72 hours. Cardiac Enzymes No results for input(s): CPK, CKND1, RUSSEL in the last 72 hours. No lab exists for component: CKRMB, TROIP   Liver Enzymes No results for input(s): TP, ALB, TBIL, AP in the last 72 hours.     No lab exists for component: SGOT, GPT, DBIL   Thyroid Studies Lab Results   Component Value Date/Time    TSH 1.94 01/14/2021 11:56 AM         EKG: atrial fibrilation     Physical Assessment:     Visit Vitals  BP (!) 146/73 (BP 1 Location: Left arm, BP Patient Position: Sitting)   Pulse 77   Temp 98.2 °F (36.8 °C)   Resp 18   Ht 5' 10\" (1.778 m)   Wt 124.2 kg (273 lb 12.8 oz)   SpO2 97%   BMI 39.29 kg/m²     Weight change:     Intake/Output Summary (Last 24 hours) at 1/23/2021 1459  Last data filed at 1/23/2021 7448  Gross per 24 hour   Intake 120 ml   Output    Net 120 ml     Physical Exam:   General: comfortable, no acute distress   HEENT sclera anicteric, supple neck, no thyromegaly  CVS: S1S2 heard,  no rub  RS: + air entry b/l,   Abd: Soft, Non tender, Not distended,  Neuro: non focal, awake, alert , CN II-XII are grossly intact,  +tremor   Extrm: ++edema, no cyanosis, clubbing   Skin:  dry  Musculoskeletal: No gross joints or bone deformities     Procedures/imaging: see electronic medical records for all procedures, Xrays and details which were not copied into this note but were reviewed prior to creation of Page Oh MD  January 23, 2021  [facility]  Office [phone]

## 2021-01-23 NOTE — PROGRESS NOTES
Admit Date: 2021  Date of Service: 2021        Assessment:     1. 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 Acute hypoxic respiratory failure: multifactorial  3. Acute metabolic encephalopathy/delirium   4. A.fib:  Rate controlled; on Cardizem; Pradaxa on hold   5. Volume overload   6. Leukocytosis with mild lymphopenia  7. Chronic LE wounds with abscesses status post bilateral I&D  8. Hematuria   9. Pre-syncope  10. Surgically asplenic      Rapid Covid-19 negative 1/15/2021  Send out Covid19 negative 2021  Status post renal biopsy on . Pathology is pending  Will transition to heparin drip tomorrow if hemoglobin and hematocrit remained stable  Continue hemodialysis  On Trileptal per neurology  Echo report reviewedEF 55-60%  Continue atenolol and Cardizem  On Symbicort  Fall precautions  Continue antibiotics, wound care, podiatry is following. ID input noted  PT recommends rehab  Discussed with patient  I called patient's daughter at [phone] and updated her regarding patient's care      Case discussed with:  [x]Patient  [x]Family  [x]Nursing  [x]Case Management  DVT Prophylaxis:  []Lovenox  [x]Hep SQ  []SCDs  []Coumadin   []On Heparin gtt   CODE STATUS: Full  Contact: Sendy Degree (wife)  [phone], daughter [phone]      Deidre Wilson MD    2021       Subjective:      Patient is sitting in bed in no apparent distress, awake, follows commands.  Denies any pain or discomfort    Objective:        Visit Vitals  BP (!) 97/56 (BP 1 Location: Left arm, BP Patient Position: Sitting)   Pulse 84   Temp 98.1 °F (36.7 °C)   Resp 18   Ht 5' 10\" (1.778 m)   Wt 131.8 kg (290 lb 8 oz)   SpO2 97%   BMI 41.68 kg/m²     Temp (24hrs), Av.1 °F (36.7 °C), Min:97.8 °F (36.6 °C), Max:98.4 °F (36.9 °C)      General:  Awake, follows commands  Cardiovascular:  S1S2+, RRR  Pulmonary:  CTA b/l  GI:  Soft, BS+, NT, ND, obese  Extremities: Bilateral feet and legs with dressing        Labs: Results:   Chemistry Recent Labs     01/23/21 0213 01/22/21  1515 01/21/21 0428   GLU 86 85 76    136 137   K 4.0 4.3 3.9   CL 99* 98* 100   CO2 29 28 27   BUN 34* 29* 27*   CREA 6.89* 5.95* 4.97*   CA 8.4* 8.7  8.3* 7.8*   AGAP 9 10 10   BUCR 5* 5* 5*      CBC w/Diff Recent Labs     01/23/21 0213 01/22/21  1515 01/22/21  0350 01/21/21 0428 01/21/21 0428   WBC 12.8  --   --   --  11.4   RBC 2.64*  --   --   --  2.50*   HGB 8.0* 7.8* 8.0*   < > 7.6*   HCT 25.3* 23.9* 24.4*   < > 23.5*     --   --   --  331   GRANS 69  --   --   --  70   LYMPH 11*  --   --   --  10*   EOS 4  --   --   --  4    < > = values in this interval not displayed.         No results found for: SDES Lab Results   Component Value Date/Time    Culture result: NO GROWTH 4 DAYS 01/16/2021 08:46 AM    Culture result: NO GROWTH 4 DAYS 01/16/2021 08:46 AM    Culture result: NO GROWTH 4 DAYS 01/16/2021 08:46 AM    Culture result: NO GROWTH 4 DAYS 01/16/2021 08:46 AM    Culture result: (A) 01/14/2021 09:00 PM     LIGHT STAPHYLOCOCCUS EPIDERMIDIS (OXACILLIN RESISTANT)    Culture result: RARE STAPHYLOCOCCUS AUREUS (A) 01/14/2021 09:00 PM    Culture result: FEW CANDIDA PARAPSILOSIS (A) 01/14/2021 09:00 PM        Results     Procedure Component Value Units Date/Time    CULTURE, Nyoka English STAIN [[id number]] Collected: 01/16/21 0846    Order Status: Completed Specimen: Foot Updated: 01/20/21 1046     Special Requests: RIGHT        GRAM STAIN NO ORGANISMS SEEN        Culture result: NO GROWTH 4 DAYS       CULTURE, ANAEROBIC [[id number]] Collected: 01/16/21 0846    Order Status: Completed Specimen: Foot Updated: 01/20/21 1046     Special Requests: RIGHT        Culture result: NO GROWTH 4 DAYS       CULTURE, Nyoka English STAIN [[id number]] Collected: 01/16/21 0846    Order Status: Completed Specimen: Foot Updated: 01/20/21 1048     Special Requests: LEFT        GRAM STAIN NO ORGANISMS SEEN        Culture result: NO GROWTH 4 DAYS       CULTURE, ANAEROBIC [[id number]] Collected: 01/16/21 0846    Order Status: Completed Specimen: Foot Updated: 01/20/21 1047     Special Requests: LEFT        Culture result: NO GROWTH 4 DAYS       CULTURE, WOUND Patrizia Jasvir STAIN [[id number]]  (Abnormal)  (Susceptibility) Collected: 01/14/21 2100    Order Status: Completed Specimen: Wound from Foot Updated: 01/18/21 1426     Special Requests: NO SPECIAL REQUESTS        GRAM STAIN FEW WBCS SEEN         NO ORGANISMS SEEN        Culture result:       LIGHT STAPHYLOCOCCUS EPIDERMIDIS (OXACILLIN RESISTANT)                  RARE STAPHYLOCOCCUS AUREUS            FEW CANDIDA PARAPSILOSIS       Susceptibility      Staphylococcus epidermidis     GEORGINA     Ciprofloxacin ($) Susceptible     Erythromycin ($$$$) Resistant     Gentamicin ($) Susceptible     Inducible Clindamycin Susceptible     Levofloxacin ($) Susceptible     Linezolid ($$$$$) Susceptible     Moxifloxacin ($$$$) Susceptible     Oxacillin Resistant     Tetracycline Intermediate     Vancomycin ($) Susceptible                Susceptibility      Staphylococcus aureus     GEORGINA     Ciprofloxacin ($) Susceptible     Clindamycin ($) Susceptible     Daptomycin ($$$$$) Susceptible     Doxycycline ($$) Susceptible     Erythromycin ($$$$) Susceptible     Gentamicin ($) Susceptible     Levofloxacin ($) Susceptible     Linezolid ($$$$$) Susceptible     Moxifloxacin ($$$$) Susceptible     Oxacillin Susceptible     Tetracycline Susceptible     Trimeth/Sulfa Susceptible     Vancomycin ($) Susceptible                          Imaging:     Ir Insert Dario Viviana Cvc W/o Port Over 5 Yr    Result Date: 1/21/2021  Technically successful placement of a right jugular tunneled dialysis catheter 14.5 Upper sorbian 23 cm length palindrome dialysis catheter    Cta Neck    Addendum Date: 1/20/2021    Addendum: Addendum: The source images were reconstructed with virtual surface, MIP and intraluminal reconstruction Right subclavian: Normal Right carotid: normal right vertebral: Normal intact Left vertebral: Dominant intact unremarkable Left subclavian: Normal reconstruction arch: Type I arch identified    Result Date: 1/20/2021  The insertion site and course of the dialysis catheter intact normal Internal jugular above and below the dialysis catheter unremarkable no evidence for any injury to the jugular venous Cervical visceral space is unremarkable no adenopathy Mediastinal lipomatosis Some nonspecific postinflammatory changes in the lung apices. No soft tissue abnormalities identified. Ct Abd W Wo Cont    Result Date: 1/22/2021  : 1.  Status post left lower pole kidney biopsy for acute kidney insufficiency demonstrates some trace density in the upper collecting system of the biopsy left kidney suggesting a small amount of blood not felt to be significant clinically. The nephrogenic and excretory phase images demonstrate nothing to suggest a post biopsy complication. Ir Us Guided Vascular Access    Result Date: 1/21/2021  Successful placement of a single lumen midline catheter. Ct Bx Renal    Result Date: 1/22/2021  Impression: 1. Successful CT-guided lower pole left kidney biopsy.       Candance Bottcher, MD

## 2021-01-23 NOTE — ROUTINE PROCESS
Bedside shift change report given to Spenser Charles RN (oncoming nurse) by [facility] [place], RN (offgoing nurse). Report included the following information SBAR, Kardex, MAR and Cardiac Rhythm Afib.

## 2021-01-23 NOTE — PROGRESS NOTES
ACUTE HEMODIALYSIS FLOW SHEET    HEMODIALYSIS ORDERS: Physician: Dr. Yeimi Armijo: Kassy   Duration: 3 hr  BFR: 350   DFR: 600   Dialysate:  Temp 36.0   K+   2    Ca+  2.5   Na 138   Bicarb 35   Wt Readings from Last 1 Encounters:   01/23/21 131.8 kg (290 lb 8 oz)    Patient Chart [x]   Unable to Obtain []  Dry weight/UF Goal: 2000 ml  Access RIJ     Heparin []  Bolus    Units    [] Hourly    Units    [x]None      Catheter locking solution Heparin 1:1000   Pre BP:   127/58    Pulse:  85   Respirations: 18    Temperature:  98.4    Tx: NSS   ml/Bolus   [x] N/A   Labs: []  Pre  []  Post:   [x] N/A   Additional Orders(medications, blood products, hypotension management): [x] Yes   [] No     [x]  Janak Consent Verified     CATHETER ACCESS:  []N/A   [x]Right   []Left   [x]IJ     []Fem   []Chest wall   [] First use X-ray verified     [x]Tunnel    [] Non Tunneled   [x]No S/S infection  []Redness  []Drainage []Cultured []Swelling []Pain   [x]Medical Aseptic Prep Utilized   [x]Dressing Changed  [x] Biopatch  Date: 1/21/21   []Clotted   [x]Patent   Flows: [x]Good  []Poor  []Reversed   If access problem,  notified: []Yes    [x]N/A            GENERAL ASSESSMENT:    LUNGS:   SaO2%  99    [] Clear  [x] Coarse  [] Crackles  [] Wheezing                                                [] Diminished     Location : []RLL   []LLL    [x]RUL  [x]SUKHJINDER   Cough: []Productive  []Dry  [x]N/A   Respirations:  [x]Easy  []Labored   Therapy:  []RA  [x]NC 2/min    Mask: []NRB  [] Venti    O2%                  []Ventilator  []Intubated  [] Trach  [] BiPaP   CARDIAC: []Regular      [x] Irregular afib   [] Pericardial Rub  [] JVD          []  Monitored  [] Bedside  [] Remotely monitored     EDEMA: [] None  []Generalized  [x] Pitting [] 1    [] 2    [x] 3    [] 4                 [] Facial  [] Pedal  [x]  UE  [x] LE   SKIN:   [x] Warm  [] Hot     [] Cold   [x] Dry     [] Pale   [] Diaphoretic                  [] Flushed  [] Jaundiced  [] Cyanotic  [] Rash  [] Weeping   LOC:    [x] Alert      [x]Oriented:    [x] Person     [] Place  []Time               [] Confused  [] Lethargic  [] Medicated  [] Non-responsive  [] Non-Verbal   GI / ABDOMEN:                     [] Flat    [] Distended    [x] Soft    [] Firm   []  Obese                   [] Diarrhea  [x] Bowel Sounds  [] Nausea  [] Vomiting     / URINE ASSESSMENT:                   [x] Voiding   [] Oliguria  [] Anuria   []  Garza                  [] Incontinent  []  Incontinent Brief []  Fecal Management System     PAIN:  [x] 0 []1  []2   []3   []4   []5   []6   []7   []8   []9   []10            Scale 0-10  Action/Follow Up:    MOBILITY:  [x] Bed    [] Stretcher      All Vitals and Treatment Details on Attached [address]: [facility]          Room # 885/41   [] 1st Time Acute      [] Stat       [x] Routine      [] Urgent     [x] Acute Room  []  Bedside  [] ICU/CCU  [] ER   Isolation Precautions:  [x] Dialysis    There are currently no Active Isolations       ALLERGIES:     No Known Allergies   Code Status:  Full Code     Hepatitis Status     Lab Results   Component Value Date/Time    Hepatitis B surface Ag <0.10 01/14/2021 09:00 PM    Hepatitis B surface Ab 4.78 (L) 01/14/2021 09:00 PM    Hepatitis B core, IgM Negative 01/14/2021 09:00 PM    Hepatitis C virus Ab 0.03 01/14/2021 09:00 PM        Current Labs:      Lab Results   Component Value Date/Time    WBC 12.8 01/23/2021 02:13 AM    HGB 8.0 (L) 01/23/2021 02:13 AM    HCT 25.3 (L) 01/23/2021 02:13 AM    PLATELET 823 57/74/3243 02:13 AM    MCV 95.8 01/23/2021 02:13 AM     Lab Results   Component Value Date/Time    Sodium 137 01/23/2021 02:13 AM    Potassium 4.0 01/23/2021 02:13 AM    Chloride 99 (L) 01/23/2021 02:13 AM    CO2 29 01/23/2021 02:13 AM    Anion gap 9 01/23/2021 02:13 AM    Glucose 86 01/23/2021 02:13 AM    BUN 34 (H) 01/23/2021 02:13 AM    Creatinine 6.89 (H) 01/23/2021 02:13 AM    BUN/Creatinine ratio 5 (L) 01/23/2021 02:13 AM    GFR est AA 10 (L) 01/23/2021 02:13 AM    GFR est non-AA 8 (L) 01/23/2021 02:13 AM    Calcium 8.4 (L) 01/23/2021 02:13 AM          DIET:  DIET RENAL  DIET NUTRITIONAL SUPPLEMENTS      PRIMARY NURSE REPORT:   Pre Dialysis: Shyann Jackson RN     Time: 1392        EDUCATION:    [x] Patient [] Other           Knowledge Basis: []None [x]Minimal [] Substantial   Barriers to learning  [x]N/A   [x] Access Care     [x] S&S of infection  [] Fluid Management  [] K+   [x] Procedural    []Albumin   [] Medications   [] Tx Options   [] Transplant   [] Diet   [] Other   Teaching Tools:  [x] Explain  [] Demo  [] Handouts [] Video  Patient response: [x] Verbalized understanding  [] Teach back  [] Return demonstration   [x] Requires follow up      [x]Time Out/Safety Check  [x] Extracorporeal Circuit Tested for integrity       RO/HEMODIALYSIS MACHINE SAFETY CHECKS  Before each treatment:     Machine Number:               [facility]                                    [x] Unit Machine # 8 with centralized RO                                                                                                           Alarm Test:  Pass time 2524            [x] RO/Machine Log Complete    Machine Temp    36.0             Dialysate: pH  7.4    Conductivity: Meter 14     HD Machine  14.1      TCD: 14  Dialyzer Lot # [id number]     Blood Tubing Lot # [id number]    Saline Lot # [id number]     CHLORINE TESTING-Before each treatment and every 4 hours    Total Chlorine: [x] less than 0.1 ppm  Initial Time Check: 1300       4 Hr/2nd Check Time: 1700   (if greater than 0.1 ppm from Primary then every 30 minutes from Secondary)     TREATMENT INITIATION  with Dialysis Precautions:   [x] All Connections Secured              [x] Saline Line Double Clamped   [x] Venous Parameters Set               [x] Arterial Parameters Set    [x] Prime Given 250ml NSS              [x]Air Foam Detector Engaged      Treatment Initiation Note:  4976 Pt arrived to HD without any complaints. Pt A &O X 3, follows commands, no distress noted. Pt on 2L NC, placed on bedside monitor at this time. During Treatment Notes:  9385  XYT WML assessed no abnormalities noted, line patent with good flow. CVC accessed without any difficulty, pt tolerated well. Vascular access visible with arterial and venous line connections intact. 80 Timeout completed prior to HD, confirmed names and orders  [phone]  HD initiated with no complications  6918    Vascular access visible with arterial and venous line connections intact. 1830  Vascular access visible with arterial and venous line connections intact. 1845 eyes closed  2017 vascular access visible, lines intact  2039 HD end , no adverse events, denies complaints  2048 report given to primary nurse     Medication Dose Volume Route Time Janak Nurse, Title                     Post Assessment  Dialyzer Cleared:[] Good [x] Fair  [] Poor  Blood processed:  58.3 L  UF Removed:  1500 Ml  Post /73  Pulse  80 Resp  18  Temp 98.3          CVC Catheter: [] N/A  Locking solution: Heparin 1:1000 U  Arterial port 1.9 ml   Venous port 1.9ml         Skin:[x] Warm  [x] Dry [] Diaphoretic               [] Flushed  [] Pale [] Cyanotic   Pain:  [x]0  []1 []2  []3 []4  []5  []6 []7 []8  []9  []10       Post Treatment Note:  2039  HD completed at this time, no adverse event pt tolerated well. Dressing clean, dry and intact.      POST TREATMENT PRIMARY NURSE HANDOFF REPORT:   Post Dialysis: Ximena Gilliam RN                Time:  2048       Abbreviations: AVG-arterial venous graft, AVF-arterial venous fistula, IJ-Internal Jugular, Subcl-Subclavian, Fem-Femoral, Tx-treatment, AP/HR-apical heart rate, DFR-dialysate flow rate, BFR-blood flow rate, AP-arterial pressure, -venous pressure, UF-ultrafiltrate, TMP-transmembrane pressure, Elkin-Venous, Art-Arterial, RO-Reverse Osmosis

## 2021-01-23 NOTE — PROGRESS NOTES
Bedside shift change report given to Tosha Cedillo (oncoming nurse) by Thanh Durand (offgoing nurse). Report included the following information SBAR, Intake/Output and MAR.

## 2021-01-23 NOTE — PROGRESS NOTES
Problem: Self Care Deficits Care Plan (Adult)  Goal: *Acute Goals and Plan of Care (Insert Text)  Description: Occupational Therapy Goals  Initiated 1/17/2021 within 7 day(s). Re-evaluated on 1/23/2021. Goal 2 Met, continue with all remaining goals. 1.  Patient will perform grooming with supervision/set-up. 2.  Patient will perform self-feeding with supervision/set-up. (Met 1/23/21 - DC goal)  3. Patient will perform upper body dressing with supervision/set-up. 4.  Patient will perform toilet transfers with contact guard assist.  5.  Patient will perform all aspects of toileting with contact guard assist.  6.  Patient will participate in upper extremity therapeutic exercise/activities with supervision/set-up for 5 minutes. 7.  Patient will utilize energy conservation techniques during functional activities with verbal and visual cues. Prior Level of Function: Pt reports he was independent with ADLs/IADLs prior to this hospital admission. Updated 1/23/21: pt reports his spouse has been assisting him with LB ADLs for several months. Outcome: Progressing Towards Goal  OCCUPATIONAL THERAPY RE-EVALUATION    Patient: Gila Randolph (15 y.o. male)  Date: 1/23/2021  Primary Diagnosis: Acute kidney injury ([address] [N17.9]  ARF (acute renal failure) ([address] [N17.9]  Procedure(s) (LRB):  INCISION AND DRAINAGE BILATERAL FEET (Bilateral) 7 Days Post-Op   Precautions:   Fall, WBAT    ASSESSMENT :  Based on the objective data described below, the patient presents with improved bed mobility and UE coordination and strength, meeting his goal related to self-feeding this session. Pt continues to be limited by pain in BLE, decreased endurance and activity tolerance, and continues to have difficulty with performing most of his ADLs. Pt is with mostly flat affect during the session, however, follows commands and answers questions appropriately.  Pt maneuvered to EOB with SBA, participated in weight shifting fwd and bkw in sitting to facilitate postural awareness and WB through BLE. Pt then was able to std with Min A in preparation for functional txfr training, however, demod bilateral knee buckling and was assisted to sit. Upon 2nd attempt pt tolerated std for ~ 15 sec, however, was not able to clear BLE from the floor for toilet txfr training. Pt was able to perform scooting to [facility] with SBA demonstrating good use of BUE and BLE. Pt may benefit from transfer board training versus SPT to the [facility] to maximize pt's independence with functional txfrs and ADLs OOB. Recommend coordinating therapy sessions with pain medications for the pt in order to improve pt's tolerance of sustained activity at EOB and OOB. Patient will benefit from skilled intervention to address the above impairments. Patient's rehabilitation potential is considered to be Fair  Factors which may influence rehabilitation potential include:   []             None noted  []             Mental ability/status  [x]             Medical condition  [x]             Home/family situation and support systems  [x]             Safety awareness  [x]             Pain tolerance/management  []             Other:      PLAN :  Recommendations and Planned Interventions:   [x]               Self Care Training                  [x]      Therapeutic Activities  [x]               Functional Mobility Training   []      Cognitive Retraining  [x]               Therapeutic Exercises           [x]      Endurance Activities  [x]               Balance Training                    [x]      Neuromuscular Re-Education  []               Visual/Perceptual Training     [x]      Home Safety Training  [x]               Patient Education                   []      Family Training/Education  []               Other (comment):    Frequency/Duration: Patient will be followed by occupational therapy 1-2 times per day/4-7 days per week to address goals.   Discharge Recommendations: Rehab  Further Equipment Recommendations for Discharge: bedside commode     SUBJECTIVE:   Patient stated I can try, but my feet don't let me do much.     OBJECTIVE DATA SUMMARY:   Hospital course since last seen and reason for reevaluation: Pt is due for OT re-evaluation. Pt has not been seen for OT since evaluation due to various medical procedures and testing, and being off unit for dialysis. Since evaluation pt demonstrates improvement in his ADLs, goals were updated to reflect pt's progress. OT will continue to follow the patient for further intervention during this hospitalization, in order to maximize ADL performance and overall functional independence. Past Medical History:   Diagnosis Date    AAA (abdominal aortic aneurysm) (HCC)     Alcoholism ([facility]     Atrial fibrillation (HCC)     Bradycardia     Chronic pain     Degenerative disc disease, cervical     Degenerative disc disease, lumbar     Hypertension     Hypokalemia     Pneumonia 2016    caused permanent L lung problems    Thyroid disease      Past Surgical History:   Procedure Laterality Date    HX ORTHOPAEDIC      neck fracture    HX ORTHOPAEDIC      lumbar fusion    IR INSERT TUNL CVC W/O PORT OVER 5 YR  1/19/2021    IL ABDOMEN SURGERY PROC UNLISTED  10/08/2017    spleenectomy     Barriers to Learning/Limitations: None  Compensate with: visual, verbal, tactile, kinesthetic cues/model    Home Situation:   Home Situation  Home Environment: Apartment  # Steps to Enter: 3  One/Two Story Residence: One story  Living Alone: No  Support Systems: Spouse/Significant Other/Partner  Patient Expects to be Discharged to[de-identified] Apartment  Current DME Used/Available at Home: None  Tub or Shower Type: Shower(min height step to enter)  [x]  Right hand dominant   []  Left hand dominant    Cognitive/Behavioral Status:  Neurologic State: Alert  Orientation Level: Oriented X4  Cognition: Follows commands  Safety/Judgement: Fall prevention; Awareness of environment    Skin: visible skin intact  Edema: Min-mod in BUE    Vision/Perceptual:       Acuity: Able to read clock/calendar on wall without difficulty    Corrective Lenses: Glasses  Coordination: BUE  Fine Motor Skills-Upper: Left Impaired;Right Impaired    Gross Motor Skills-Upper: Left Impaired;Right Impaired  Balance:  Sitting: Intact  Standing: Impaired; With support  Standing - Static: Fair(Fair to Fair minus)  Standing - Dynamic : Poor  Strength: BUE  Strength: Generally decreased, functional   Tone & Sensation: BUE  Tone: Normal  Sensation: Impaired   Range of Motion: BUE  AROM: Generally decreased, functional  PROM: Generally decreased, functional  Functional Mobility and Transfers for ADLs:  Bed Mobility:     Supine to Sit: Stand-by assistance  Sit to Supine: Stand-by assistance  Scooting: Stand-by assistance  Transfers:  Sit to Stand: Minimum assistance  Stand to Sit: Minimum assistance   ADL Assessment:   Feeding: Setup  Oral Facial Hygiene/Grooming: Stand-by assistance  Bathing: Maximum assistance  Upper Body Dressing: Minimum assistance  Lower Body Dressing: Maximum assistance  Toileting: Maximum assistance   ADL Intervention:     Cognitive Retraining  Safety/Judgement: Fall prevention; Awareness of environment    Pain:  Pain level pre-treatment: 7/10   Pain level post-treatment: 7/10  Pain Intervention(s): Medication administered by pt's nurse prior to OT session    Activity Tolerance:   Fair  Please refer to the flowsheet for vital signs taken during this treatment. After treatment:   [] Patient left in no apparent distress sitting up in chair  [x] Patient left in no apparent distress in bed  [x] Call bell left within reach  [x] Nursing notified  [] Caregiver present  [x] Bed alarm activated    COMMUNICATION/EDUCATION:   [x] Role of Occupational Therapy in the acute care setting  [x] Home safety education was provided and the patient/caregiver indicated understanding.   [x] Patient/family have participated as able in goal setting and plan of care. [x] Patient/family agree to work toward stated goals and plan of care. [] Patient understands intent and goals of therapy, but is neutral about his/her participation. [] Patient is unable to participate in goal setting and plan of care.     Thank you for this referral.  Melita Clark, OTR/L  Time Calculation: 24 mins

## 2021-01-24 LAB
25(OH)D3 SERPL-MCNC: 22.5 NG/ML (ref 30–100)
ANION GAP SERPL CALC-SCNC: 9 MMOL/L (ref 3–18)
BUN SERPL-MCNC: 22 MG/DL (ref 7–18)
BUN/CREAT SERPL: 4 (ref 12–20)
CALCIUM SERPL-MCNC: 8.6 MG/DL (ref 8.5–10.1)
CHLORIDE SERPL-SCNC: 103 MMOL/L (ref 100–111)
CO2 SERPL-SCNC: 28 MMOL/L (ref 21–32)
CREAT SERPL-MCNC: 5.61 MG/DL (ref 0.6–1.3)
GLUCOSE BLD STRIP.AUTO-MCNC: 102 MG/DL (ref 70–110)
GLUCOSE BLD STRIP.AUTO-MCNC: 106 MG/DL (ref 70–110)
GLUCOSE BLD STRIP.AUTO-MCNC: 98 MG/DL (ref 70–110)
GLUCOSE SERPL-MCNC: 93 MG/DL (ref 74–99)
HCT VFR BLD AUTO: 23.8 % (ref 36–48)
HGB BLD-MCNC: 7.5 G/DL (ref 13–16)
PHOSPHATE SERPL-MCNC: 4.4 MG/DL (ref 2.5–4.9)
POTASSIUM SERPL-SCNC: 3.7 MMOL/L (ref 3.5–5.5)
SODIUM SERPL-SCNC: 140 MMOL/L (ref 136–145)

## 2021-01-24 PROCEDURE — 74011250637 HC RX REV CODE- 250/637: Performed by: INTERNAL MEDICINE

## 2021-01-24 PROCEDURE — 82306 VITAMIN D 25 HYDROXY: CPT

## 2021-01-24 PROCEDURE — 77010033678 HC OXYGEN DAILY

## 2021-01-24 PROCEDURE — 94640 AIRWAY INHALATION TREATMENT: CPT

## 2021-01-24 PROCEDURE — 74011250636 HC RX REV CODE- 250/636: Performed by: FAMILY MEDICINE

## 2021-01-24 PROCEDURE — 74011250637 HC RX REV CODE- 250/637: Performed by: PODIATRIST

## 2021-01-24 PROCEDURE — 74011250637 HC RX REV CODE- 250/637: Performed by: PSYCHIATRY & NEUROLOGY

## 2021-01-24 PROCEDURE — 74011250637 HC RX REV CODE- 250/637: Performed by: FAMILY MEDICINE

## 2021-01-24 PROCEDURE — 74011250637 HC RX REV CODE- 250/637: Performed by: EMERGENCY MEDICINE

## 2021-01-24 PROCEDURE — 74011250636 HC RX REV CODE- 250/636: Performed by: INTERNAL MEDICINE

## 2021-01-24 PROCEDURE — 65270000029 HC RM PRIVATE

## 2021-01-24 PROCEDURE — 94761 N-INVAS EAR/PLS OXIMETRY MLT: CPT

## 2021-01-24 PROCEDURE — 99232 SBSQ HOSP IP/OBS MODERATE 35: CPT | Performed by: EMERGENCY MEDICINE

## 2021-01-24 PROCEDURE — 74011000258 HC RX REV CODE- 258: Performed by: INTERNAL MEDICINE

## 2021-01-24 PROCEDURE — 80048 BASIC METABOLIC PNL TOTAL CA: CPT

## 2021-01-24 PROCEDURE — 74011250637 HC RX REV CODE- 250/637: Performed by: HOSPITALIST

## 2021-01-24 PROCEDURE — 85018 HEMOGLOBIN: CPT

## 2021-01-24 PROCEDURE — 84100 ASSAY OF PHOSPHORUS: CPT

## 2021-01-24 PROCEDURE — 36592 COLLECT BLOOD FROM PICC: CPT

## 2021-01-24 PROCEDURE — 82962 GLUCOSE BLOOD TEST: CPT

## 2021-01-24 RX ADMIN — DILTIAZEM HYDROCHLORIDE 120 MG: 120 CAPSULE, COATED, EXTENDED RELEASE ORAL at 09:30

## 2021-01-24 RX ADMIN — Medication 10 ML: at 20:00

## 2021-01-24 RX ADMIN — HEPARIN SODIUM 5000 UNITS: 5000 INJECTION INTRAVENOUS; SUBCUTANEOUS at 06:35

## 2021-01-24 RX ADMIN — POLYETHYLENE GLYCOL 3350 17 G: 17 POWDER, FOR SOLUTION ORAL at 06:38

## 2021-01-24 RX ADMIN — OXCARBAZEPINE 600 MG: 300 TABLET, FILM COATED ORAL at 18:55

## 2021-01-24 RX ADMIN — OXCARBAZEPINE 600 MG: 300 TABLET, FILM COATED ORAL at 09:30

## 2021-01-24 RX ADMIN — APIXABAN 2.5 MG: 5 TABLET, FILM COATED ORAL at 22:10

## 2021-01-24 RX ADMIN — Medication 5 MG: at 22:10

## 2021-01-24 RX ADMIN — SILVER SULFADIAZINE: 10 CREAM TOPICAL at 09:32

## 2021-01-24 RX ADMIN — OXYCODONE HYDROCHLORIDE AND ACETAMINOPHEN 1 TABLET: 5; 325 TABLET ORAL at 15:55

## 2021-01-24 RX ADMIN — IRON SUCROSE 100 MG: 20 INJECTION, SOLUTION INTRAVENOUS at 18:59

## 2021-01-24 RX ADMIN — HEPARIN SODIUM 5000 UNITS: 5000 INJECTION INTRAVENOUS; SUBCUTANEOUS at 13:42

## 2021-01-24 RX ADMIN — Medication 10 ML: at 13:42

## 2021-01-24 RX ADMIN — Medication 10 ML: at 04:00

## 2021-01-24 RX ADMIN — BUDESONIDE AND FORMOTEROL FUMARATE DIHYDRATE 2 PUFF: 160; 4.5 AEROSOL RESPIRATORY (INHALATION) at 21:43

## 2021-01-24 RX ADMIN — BUDESONIDE AND FORMOTEROL FUMARATE DIHYDRATE 2 PUFF: 160; 4.5 AEROSOL RESPIRATORY (INHALATION) at 07:45

## 2021-01-24 RX ADMIN — AMITRIPTYLINE HYDROCHLORIDE 50 MG: 50 TABLET, FILM COATED ORAL at 22:10

## 2021-01-24 RX ADMIN — Medication 10 ML: at 13:43

## 2021-01-24 RX ADMIN — DILTIAZEM HYDROCHLORIDE 120 MG: 120 CAPSULE, COATED, EXTENDED RELEASE ORAL at 22:10

## 2021-01-24 NOTE — PROGRESS NOTES
supported Jamaica Thompson, who is a 56 y. o.,male according to his family's request  By assuring his phone was within reach     The  provided the following Interventions:  Continued the relationship of care and support with Mr. Petr Vasquez family. Listened empathically to Ileana Aviles request and ensured his phone was in reach  As he was sleeping, allowed him to continue resting. Chart reviewed. The following outcomes were achieved:  Patient expressed gratitude for 's visit. Assessment:  There are no further spiritual or Zoroastrianism issues which require Spiritual Care Services interventions at this time. Plan:  Chaplains will continue to follow and will provide pastoral care on an as needed/requested basis.  recommends bedside caregivers page  on duty if patient shows signs of acute spiritual or emotional distress.        [address] [place]   [phone]

## 2021-01-24 NOTE — PROGRESS NOTES
Progress  Note       57y M with PMH HTN, chronic pain, admitted for severe renal failure  Subjective    \"My butt hurts\"  Overnight event noted  S/p kidney biopsy 9/21  Urine output remain low to nil  Feels ok   no specific chest pain,sob,nausea,vomiting,abdominal pain      IMPRESSION:   Acute kidney injury ATN vs RPGN, per history , has been having gross hematuria over last several weeks, brief use of nsaids, was on diuretics at home-- negative ANCA panel, negative anti GBM, negative hep B, C, HIV, negative JULIO  Proteinuria, nephrotic range with hematuria  S/p kidney biopsy on 1/21/2021  Anemia of chronic disease,iron deficiency  H/o nose bleeding episode   HTN  Lower extremities edema with cellulits, blister  Atrial fibrillation , on anti coagulation   H/o splenectomy   H/o heavy use of gabapentin ( about 2400mg daily for last 2 months)  Secondary hyperparathyroidism   PLAN:   HD on Monday again  No urine output  C/w  loading iron iv venofer   Start hectorol 2 mcg with each Rx  Epogen  Once iron repleted  Phos level today is 4.4.   Kidney biopsy report awaited         Facility-Administered Medications: None       Current Facility-Administered Medications   Medication Dose Route Frequency    iron sucrose (VENOFER) 100 mg in 0.9% sodium chloride 100 mL IVPB  100 mg IntraVENous Q24H    [START ON 1/25/2021] doxercalciferoL (HECTOROL) 4 mcg/2 mL injection 2 mcg  2 mcg IntraVENous DIALYSIS MON, WED & FRI    0.9% sodium chloride infusion 250 mL  250 mL IntraVENous PRN    OXcarbazepine (TRILEPTAL) tablet 600 mg  600 mg Oral BID    silver sulfADIAZINE (SILVADENE) 1 % topical cream   Topical DAILY    nicotine (NICODERM CQ) 14 mg/24 hr patch 1 Patch  1 Patch TransDERmal DAILY    0.9% sodium chloride infusion 250 mL  250 mL IntraVENous DIALYSIS PRN    heparin (porcine) 1,000 unit/mL injection 5,000 Units  5,000 Units InterCATHeter DIALYSIS PRN    dilTIAZem ER (CARDIZEM CD) capsule 120 mg  120 mg Oral Q12H    albumin human 25% (BUMINATE) solution 25 g  25 g IntraVENous DIALYSIS PRN    budesonide-formoteroL (SYMBICORT) 160-4.5 mcg/actuation HFA inhaler 2 Puff  2 Puff Inhalation BID RT    oxyCODONE-acetaminophen (PERCOCET) 5-325 mg per tablet 1 Tab  1 Tab Oral Q6H PRN    heparin (porcine) injection 5,000 Units  5,000 Units SubCUTAneous Q8H    insulin lispro (HUMALOG) injection   SubCUTAneous AC&HS    glucose chewable tablet 16 g  4 Tab Oral PRN    glucagon (GLUCAGEN) injection 1 mg  1 mg IntraMUSCular PRN    dextrose (D50W) injection syrg 12.5-25 g  25-50 mL IntraVENous PRN    sodium chloride (NS) flush 5-40 mL  5-40 mL IntraVENous Q8H    sodium chloride (NS) flush 5-40 mL  5-40 mL IntraVENous Q8H    sodium chloride (NS) flush 5-40 mL  5-40 mL IntraVENous PRN    acetaminophen (TYLENOL) tablet 650 mg  650 mg Oral Q6H PRN    Or    acetaminophen (TYLENOL) suppository 650 mg  650 mg Rectal Q6H PRN    polyethylene glycol (MIRALAX) packet 17 g  17 g Oral DAILY PRN    promethazine (PHENERGAN) tablet 12.5 mg  12.5 mg Oral Q6H PRN    Or    ondansetron (ZOFRAN) injection 4 mg  4 mg IntraVENous Q6H PRN    amitriptyline (ELAVIL) tablet 50 mg  50 mg Oral QHS    melatonin tablet 5 mg  5 mg Oral QHS       Review of Systems:     As above  Data Review:    Labs: Results:       Chemistry Recent Labs     01/24/21  0348 01/23/21  0213 01/22/21  1515   GLU 93 86 85    137 136   K 3.7 4.0 4.3    99* 98*   CO2 28 29 28   BUN 22* 34* 29*   CREA 5.61* 6.89* 5.95*   CA 8.6 8.4* 8.7  8.3*   AGAP 9 9 10   BUCR 4* 5* 5*      CBC w/Diff Recent Labs     01/23/21  0213 01/22/21  1515 01/22/21  0350   WBC 12.8  --   --    RBC 2.64*  --   --    HGB 8.0* 7.8* 8.0*   HCT 25.3* 23.9* 24.4*     --   --    GRANS 69  --   --    LYMPH 11*  --   --    EOS 4  --   --       Coagulation Recent Labs     01/23/21  0213   PTP 16.2*   INR 1.3*       Iron/Ferritin Recent Labs     01/22/21  1515   IRON 16*      BNP No results for input(s): BNPP in the last 72 hours. Cardiac Enzymes No results for input(s): CPK, CKND1, RUSSEL in the last 72 hours. No lab exists for component: CKRMB, TROIP   Liver Enzymes No results for input(s): TP, ALB, TBIL, AP in the last 72 hours.     No lab exists for component: SGOT, GPT, DBIL   Thyroid Studies Lab Results   Component Value Date/Time    TSH 1.94 01/14/2021 11:56 AM         EKG: atrial fibrilation     Physical Assessment:     Visit Vitals  /70 (BP 1 Location: Left arm, BP Patient Position: At rest)   Pulse 92   Temp 97.8 °F (36.6 °C)   Resp 19   Ht 5' 10\" (1.778 m)   Wt 122.5 kg (270 lb)   SpO2 97%   BMI 38.74 kg/m²     Weight change:     Intake/Output Summary (Last 24 hours) at 1/24/2021 0855  Last data filed at 1/24/2021 0654  Gross per 24 hour   Intake 836 ml   Output 0 ml   Net 836 ml     Physical Exam:   General: comfortable, no acute distress   HEENT sclera anicteric, supple neck, no thyromegaly  CVS: S1S2 heard,  no rub  RS: + air entry b/l,   Abd: Soft, Non tender, Not distended,  Neuro: non focal, awake, alert , CN II-XII are grossly intact,  +tremor   Extrm: ++edema, no cyanosis, clubbing   Skin:  dry  Musculoskeletal: No gross joints or bone deformities     Procedures/imaging: see electronic medical records for all procedures, Xrays and details which were not copied into this note but were reviewed prior to creation of Jamir Mustafa MD  January 24, 2021  [facility]  Office [phone]

## 2021-01-24 NOTE — PROGRESS NOTES
Admit Date: 2021  Date of Service: 2021        Assessment:     1. 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 Acute hypoxic respiratory failure: multifactorial  3. Acute metabolic encephalopathy/delirium   4. A.fib:  Rate controlled; on Cardizem; Pradaxa on hold   5. Volume overload   6. Leukocytosis with mild lymphopenia  7. Chronic LE wounds with abscesses status post bilateral I&D  8. Hematuria   9. Pre-syncope  10. Surgically asplenic      Rapid Covid-19 negative 1/15/2021  Send out Covid19 negative 2021  Status post renal biopsy on . Pathology is pending  Discussed with nephrology and will transition patient to Eliquis 2.5 twice daily. I have also called and discussed with patient's wife regarding starting the Eliquis and she is agreeable. Continue hemodialysis  On Trileptal per neurology  Echo report reviewedEF 55-60%  On atenolol and Cardizem  On Symbicort  Fall precautions  Complete course of antibiotics per ID, wound care, podiatry is following. ID input noted  PT recommends rehab  Discussed with patient  I also called and discussed with patient's wife at phone #[phone]. Case discussed with:  [x]Patient  [x]Family  [x]Nursing  [x]Case Management  DVT Prophylaxis:  []Lovenox  []Hep SQ  []SCDs  []Coumadin   [x] Eliquis  CODE STATUS: Full  Contact: Candance Prey (wife)  [phone], daughter [phone]      Eduardo Goodson MD    2021       Subjective:      Patient is sitting in bed in no apparent distress, sleepy but awakens to verbal commands.   Still appears confused  Objective:        Visit Vitals  BP (!) 93/59 (BP 1 Location: Left arm, BP Patient Position: Sitting)   Pulse 99   Temp 98 °F (36.7 °C)   Resp 20   Ht 5' 10\" (1.778 m)   Wt 122.5 kg (270 lb)   SpO2 90%   BMI 38.74 kg/m²     Temp (24hrs), Av.1 °F (36.7 °C), Min:97.4 °F (36.3 °C), Max:98.4 °F (36.9 °C)      General: Sleepy, awakens to verbal commands, confused  Cardiovascular:  S1S2+, RRR  Pulmonary:  CTA b/l  GI:  Soft, BS+, NT, ND  Extremities: Bilateral legs with dressing          Labs: Results:   Chemistry Recent Labs     01/24/21  0348 01/23/21  0213 01/22/21  1515   GLU 93 86 85    137 136   K 3.7 4.0 4.3    99* 98*   CO2 28 29 28   BUN 22* 34* 29*   CREA 5.61* 6.89* 5.95*   CA 8.6 8.4* 8.7  8.3*   AGAP 9 9 10   BUCR 4* 5* 5*      CBC w/Diff Recent Labs     01/23/21  0213 01/22/21  1515 01/22/21  0350   WBC 12.8  --   --    RBC 2.64*  --   --    HGB 8.0* 7.8* 8.0*   HCT 25.3* 23.9* 24.4*     --   --    GRANS 69  --   --    LYMPH 11*  --   --    EOS 4  --   --         No results found for: SDES Lab Results   Component Value Date/Time    Culture result: NO GROWTH 4 DAYS 01/16/2021 08:46 AM    Culture result: NO GROWTH 4 DAYS 01/16/2021 08:46 AM    Culture result: NO GROWTH 4 DAYS 01/16/2021 08:46 AM    Culture result: NO GROWTH 4 DAYS 01/16/2021 08:46 AM    Culture result: (A) 01/14/2021 09:00 PM     LIGHT STAPHYLOCOCCUS EPIDERMIDIS (OXACILLIN RESISTANT)    Culture result: RARE STAPHYLOCOCCUS AUREUS (A) 01/14/2021 09:00 PM    Culture result: FEW CANDIDA PARAPSILOSIS (A) 01/14/2021 09:00 PM        Results     Procedure Component Value Units Date/Time    CULTURE, Diane Portillo STAIN [[id number]] Collected: 01/16/21 0846    Order Status: Completed Specimen: Foot Updated: 01/20/21 1046     Special Requests: RIGHT        GRAM STAIN NO ORGANISMS SEEN        Culture result: NO GROWTH 4 DAYS       CULTURE, ANAEROBIC [[id number]] Collected: 01/16/21 0846    Order Status: Completed Specimen: Foot Updated: 01/20/21 1046     Special Requests: RIGHT        Culture result: NO GROWTH 4 DAYS       CULTURE, Diane Portillo STAIN [[id number]] Collected: 01/16/21 0846    Order Status: Completed Specimen: Foot Updated: 01/20/21 1048     Special Requests: LEFT        GRAM STAIN NO ORGANISMS SEEN        Culture result: NO GROWTH 4 DAYS       CULTURE, ANAEROBIC [[id number]] Collected: 01/16/21 0846 Order Status: Completed Specimen: Foot Updated: 01/20/21 1047     Special Requests: LEFT        Culture result: NO GROWTH 4 DAYS       CULTURE, WOUND Elsi Steven STAIN [[id number]]  (Abnormal)  (Susceptibility) Collected: 01/14/21 2100    Order Status: Completed Specimen: Wound from Foot Updated: 01/18/21 1426     Special Requests: NO SPECIAL REQUESTS        GRAM STAIN FEW WBCS SEEN         NO ORGANISMS SEEN        Culture result:       LIGHT STAPHYLOCOCCUS EPIDERMIDIS (OXACILLIN RESISTANT)                  RARE STAPHYLOCOCCUS AUREUS            FEW CANDIDA PARAPSILOSIS       Susceptibility      Staphylococcus epidermidis     GEORGINA     Ciprofloxacin ($) Susceptible     Erythromycin ($$$$) Resistant     Gentamicin ($) Susceptible     Inducible Clindamycin Susceptible     Levofloxacin ($) Susceptible     Linezolid ($$$$$) Susceptible     Moxifloxacin ($$$$) Susceptible     Oxacillin Resistant     Tetracycline Intermediate     Vancomycin ($) Susceptible                Susceptibility      Staphylococcus aureus     GEORGINA     Ciprofloxacin ($) Susceptible     Clindamycin ($) Susceptible     Daptomycin ($$$$$) Susceptible     Doxycycline ($$) Susceptible     Erythromycin ($$$$) Susceptible     Gentamicin ($) Susceptible     Levofloxacin ($) Susceptible     Linezolid ($$$$$) Susceptible     Moxifloxacin ($$$$) Susceptible     Oxacillin Susceptible     Tetracycline Susceptible     Trimeth/Sulfa Susceptible     Vancomycin ($) Susceptible                          Imaging:     Ir Insert Harsh Nuñez Cvc W/o Port Over 5 Yr    Result Date: 1/21/2021  Technically successful placement of a right jugular tunneled dialysis catheter 14.5 Welsh 23 cm length palindrome dialysis catheter    Cta Neck    Addendum Date: 1/20/2021    Addendum: Addendum: The source images were reconstructed with virtual surface, MIP and intraluminal reconstruction Right subclavian: Normal Right carotid: normal right vertebral: Normal intact Left vertebral: Dominant intact unremarkable Left subclavian: Normal reconstruction arch: Type I arch identified    Result Date: 1/20/2021  The insertion site and course of the dialysis catheter intact normal Internal jugular above and below the dialysis catheter unremarkable no evidence for any injury to the jugular venous Cervical visceral space is unremarkable no adenopathy Mediastinal lipomatosis Some nonspecific postinflammatory changes in the lung apices. No soft tissue abnormalities identified. Ct Abd W Wo Cont    Result Date: 1/22/2021  : 1.  Status post left lower pole kidney biopsy for acute kidney insufficiency demonstrates some trace density in the upper collecting system of the biopsy left kidney suggesting a small amount of blood not felt to be significant clinically. The nephrogenic and excretory phase images demonstrate nothing to suggest a post biopsy complication. Ir Us Guided Vascular Access    Result Date: 1/21/2021  Successful placement of a single lumen midline catheter. Ct Bx Renal    Result Date: 1/22/2021  Impression: 1. Successful CT-guided lower pole left kidney biopsy.       Dominique Guo MD

## 2021-01-24 NOTE — PROGRESS NOTES
Pt completed inhaler. No respiratory distress noted, patient is comfortable. Pt instructed to call if help is needed, call bell within reach.   Patient confirms understanding

## 2021-01-24 NOTE — PROGRESS NOTES
Bedside shift change report given to Tosha Cedillo (oncoming nurse) by Brian Verma (offgoing nurse). Report included the following information SBAR, Intake/Output and Recent Results.

## 2021-01-24 NOTE — PROGRESS NOTES
Report received from dialysis nurse. Pt's therapy will be completed in 15 mis. Vitals stable. Will assess upon return to unit.

## 2021-01-24 NOTE — PROGRESS NOTES
Medications missed while patient was in dialysis this evening included augmentin. Single dose reordered in order for patient to complete a total of 4 doses.

## 2021-01-25 LAB
ANION GAP SERPL CALC-SCNC: 7 MMOL/L (ref 3–18)
BASOPHILS # BLD: 0.2 K/UL (ref 0–0.06)
BASOPHILS NFR BLD: 1 % (ref 0–3)
BUN SERPL-MCNC: 28 MG/DL (ref 7–18)
BUN/CREAT SERPL: 4 (ref 12–20)
CALCIUM SERPL-MCNC: 8.7 MG/DL (ref 8.5–10.1)
CHLORIDE SERPL-SCNC: 103 MMOL/L (ref 100–111)
CO2 SERPL-SCNC: 27 MMOL/L (ref 21–32)
CREAT SERPL-MCNC: 7.57 MG/DL (ref 0.6–1.3)
DIFFERENTIAL METHOD BLD: ABNORMAL
EOSINOPHIL # BLD: 0.5 K/UL (ref 0–0.4)
EOSINOPHIL NFR BLD: 3 % (ref 0–5)
ERYTHROCYTE [DISTWIDTH] IN BLOOD BY AUTOMATED COUNT: 15.6 % (ref 11.6–14.5)
GLUCOSE BLD STRIP.AUTO-MCNC: 105 MG/DL (ref 70–110)
GLUCOSE BLD STRIP.AUTO-MCNC: 114 MG/DL (ref 70–110)
GLUCOSE BLD STRIP.AUTO-MCNC: 121 MG/DL (ref 70–110)
GLUCOSE SERPL-MCNC: 89 MG/DL (ref 74–99)
HCT VFR BLD AUTO: 25.5 % (ref 36–48)
HGB BLD-MCNC: 8 G/DL (ref 13–16)
LYMPHOCYTES # BLD: 1.5 K/UL (ref 0.8–3.5)
LYMPHOCYTES NFR BLD: 9 % (ref 20–51)
MCH RBC QN AUTO: 30.8 PG (ref 24–34)
MCHC RBC AUTO-ENTMCNC: 31.4 G/DL (ref 31–37)
MCV RBC AUTO: 98.1 FL (ref 74–97)
METAMYELOCYTES NFR BLD MANUAL: 2 %
MONOCYTES # BLD: 1.4 K/UL (ref 0–1)
MONOCYTES NFR BLD: 8 % (ref 2–9)
MYELOCYTES NFR BLD MANUAL: 3 %
NEUTS BAND NFR BLD MANUAL: 1 % (ref 0–5)
NEUTS SEG # BLD: 12.7 K/UL (ref 1.8–8)
NEUTS SEG NFR BLD: 73 % (ref 42–75)
PLATELET # BLD AUTO: 472 K/UL (ref 135–420)
PLATELET COMMENTS,PCOM: ABNORMAL
PMV BLD AUTO: 10.4 FL (ref 9.2–11.8)
POTASSIUM SERPL-SCNC: 4 MMOL/L (ref 3.5–5.5)
RBC # BLD AUTO: 2.6 M/UL (ref 4.7–5.5)
RBC MORPH BLD: ABNORMAL
SODIUM SERPL-SCNC: 137 MMOL/L (ref 136–145)
WBC # BLD AUTO: 17.1 K/UL (ref 4.6–13.2)

## 2021-01-25 PROCEDURE — 85025 COMPLETE CBC W/AUTO DIFF WBC: CPT

## 2021-01-25 PROCEDURE — 82962 GLUCOSE BLOOD TEST: CPT

## 2021-01-25 PROCEDURE — 99232 SBSQ HOSP IP/OBS MODERATE 35: CPT | Performed by: EMERGENCY MEDICINE

## 2021-01-25 PROCEDURE — 74011250637 HC RX REV CODE- 250/637: Performed by: HOSPITALIST

## 2021-01-25 PROCEDURE — 74011250636 HC RX REV CODE- 250/636: Performed by: INTERNAL MEDICINE

## 2021-01-25 PROCEDURE — 74011250637 HC RX REV CODE- 250/637: Performed by: EMERGENCY MEDICINE

## 2021-01-25 PROCEDURE — 65270000029 HC RM PRIVATE

## 2021-01-25 PROCEDURE — 36592 COLLECT BLOOD FROM PICC: CPT

## 2021-01-25 PROCEDURE — 74011250637 HC RX REV CODE- 250/637: Performed by: PODIATRIST

## 2021-01-25 PROCEDURE — 2709999900 HC NON-CHARGEABLE SUPPLY

## 2021-01-25 PROCEDURE — 94640 AIRWAY INHALATION TREATMENT: CPT

## 2021-01-25 PROCEDURE — 80048 BASIC METABOLIC PNL TOTAL CA: CPT

## 2021-01-25 PROCEDURE — 74011250637 HC RX REV CODE- 250/637: Performed by: PSYCHIATRY & NEUROLOGY

## 2021-01-25 PROCEDURE — 74011250637 HC RX REV CODE- 250/637: Performed by: INTERNAL MEDICINE

## 2021-01-25 PROCEDURE — 90935 HEMODIALYSIS ONE EVALUATION: CPT

## 2021-01-25 RX ADMIN — Medication 10 ML: at 18:05

## 2021-01-25 RX ADMIN — OXCARBAZEPINE 600 MG: 300 TABLET, FILM COATED ORAL at 18:03

## 2021-01-25 RX ADMIN — AMITRIPTYLINE HYDROCHLORIDE 50 MG: 50 TABLET, FILM COATED ORAL at 21:52

## 2021-01-25 RX ADMIN — Medication 10 ML: at 21:52

## 2021-01-25 RX ADMIN — BUDESONIDE AND FORMOTEROL FUMARATE DIHYDRATE 2 PUFF: 160; 4.5 AEROSOL RESPIRATORY (INHALATION) at 20:18

## 2021-01-25 RX ADMIN — Medication 10 ML: at 05:00

## 2021-01-25 RX ADMIN — DILTIAZEM HYDROCHLORIDE 120 MG: 120 CAPSULE, COATED, EXTENDED RELEASE ORAL at 21:52

## 2021-01-25 RX ADMIN — OXCARBAZEPINE 600 MG: 300 TABLET, FILM COATED ORAL at 09:03

## 2021-01-25 RX ADMIN — BUDESONIDE AND FORMOTEROL FUMARATE DIHYDRATE 2 PUFF: 160; 4.5 AEROSOL RESPIRATORY (INHALATION) at 09:07

## 2021-01-25 RX ADMIN — APIXABAN 2.5 MG: 5 TABLET, FILM COATED ORAL at 09:03

## 2021-01-25 RX ADMIN — APIXABAN 2.5 MG: 5 TABLET, FILM COATED ORAL at 18:04

## 2021-01-25 RX ADMIN — Medication 5 MG: at 21:52

## 2021-01-25 RX ADMIN — SILVER SULFADIAZINE: 10 CREAM TOPICAL at 09:06

## 2021-01-25 RX ADMIN — DOXERCALCIFEROL 2 MCG: 4 INJECTION, SOLUTION INTRAVENOUS at 12:28

## 2021-01-25 NOTE — PROGRESS NOTES
Bedside shift change report given to [address] (oncoming nurse) by Wilfrido Shen (offgoing nurse). Report included the following information SBAR, Kardex and Recent Results.

## 2021-01-25 NOTE — PROGRESS NOTES
Progress  Note       57y M with PMH HTN, chronic pain, admitted for severe renal failure  Subjective      Overnight event noted  S/p kidney biopsy 9/21  Urine output remain low to nil  Feels ok   no specific chest pain,sob,nausea,vomiting,abdominal pain      IMPRESSION:   Acute kidney injury ATN vs RPGN, per history , has been having gross hematuria over last several weeks, brief use of nsaids, was on diuretics at home-- negative ANCA panel, negative anti GBM, negative hep B, C, HIV, negative JULIO  Proteinuria, nephrotic range with hematuria  S/p kidney biopsy on 1/21/2021  Anemia of chronic disease,iron deficiency  H/o nose bleeding episode   HTN  Lower extremities edema with cellulits, blister  Atrial fibrillation , on anti coagulation   H/o splenectomy   H/o heavy use of gabapentin ( about 2400mg daily for last 2 months)  Secondary hyperparathyroidism   PLAN:   At 10:30 AM on 1/25/2021, I saw and examined patient during hemodialysis treatment. The patient was receiving hemodialysis for treatment of  renal failure. I have also reviewed vital signs, intake and output, lab results and recent events, and agreed with today's dialysis order. No urine output  C/w  loading iron iv venofer   Start hectorol 2 mcg with each Rx  Epogen  Once iron repleted  Phos level is ok  Kidney biopsy report awaited- will call [facility] today   Addendum:  I called [facility] pathology department. They don't have the sample. I called IR and Pathology department at [facility] . Sample was sent to [facility] today as sample was obtained after cut off time on Thursday and Friday is a no send day as there is no one at [facility] to receive the sample on weekend. Hence sent today. Hopefully report is available by wednesday    Facility-Administered Medications: None       Current Facility-Administered Medications   Medication Dose Route Frequency    apixaban (ELIQUIS) tablet 2.5 mg  2.5 mg Oral BID    iron sucrose (VENOFER) 100 mg in 0.9% sodium chloride 100 mL IVPB  100 mg IntraVENous Q24H    doxercalciferoL (HECTOROL) 4 mcg/2 mL injection 2 mcg  2 mcg IntraVENous DIALYSIS MON, WED & FRI    0.9% sodium chloride infusion 250 mL  250 mL IntraVENous PRN    OXcarbazepine (TRILEPTAL) tablet 600 mg  600 mg Oral BID    silver sulfADIAZINE (SILVADENE) 1 % topical cream   Topical DAILY    nicotine (NICODERM CQ) 14 mg/24 hr patch 1 Patch  1 Patch TransDERmal DAILY    0.9% sodium chloride infusion 250 mL  250 mL IntraVENous DIALYSIS PRN    heparin (porcine) 1,000 unit/mL injection 5,000 Units  5,000 Units InterCATHeter DIALYSIS PRN    dilTIAZem ER (CARDIZEM CD) capsule 120 mg  120 mg Oral Q12H    albumin human 25% (BUMINATE) solution 25 g  25 g IntraVENous DIALYSIS PRN    budesonide-formoteroL (SYMBICORT) 160-4.5 mcg/actuation HFA inhaler 2 Puff  2 Puff Inhalation BID RT    oxyCODONE-acetaminophen (PERCOCET) 5-325 mg per tablet 1 Tab  1 Tab Oral Q6H PRN    insulin lispro (HUMALOG) injection   SubCUTAneous AC&HS    glucose chewable tablet 16 g  4 Tab Oral PRN    glucagon (GLUCAGEN) injection 1 mg  1 mg IntraMUSCular PRN    dextrose (D50W) injection syrg 12.5-25 g  25-50 mL IntraVENous PRN    sodium chloride (NS) flush 5-40 mL  5-40 mL IntraVENous Q8H    sodium chloride (NS) flush 5-40 mL  5-40 mL IntraVENous Q8H    sodium chloride (NS) flush 5-40 mL  5-40 mL IntraVENous PRN    acetaminophen (TYLENOL) tablet 650 mg  650 mg Oral Q6H PRN    Or    acetaminophen (TYLENOL) suppository 650 mg  650 mg Rectal Q6H PRN    polyethylene glycol (MIRALAX) packet 17 g  17 g Oral DAILY PRN    promethazine (PHENERGAN) tablet 12.5 mg  12.5 mg Oral Q6H PRN    Or    ondansetron (ZOFRAN) injection 4 mg  4 mg IntraVENous Q6H PRN    amitriptyline (ELAVIL) tablet 50 mg  50 mg Oral QHS    melatonin tablet 5 mg  5 mg Oral QHS       Review of Systems:     As above  Data Review:    Labs: Results:       Chemistry Recent Labs     01/25/21  0149 01/24/21  0348 01/23/21  0213   GLU 89 93 86    140 137   K 4.0 3.7 4.0    103 99*   CO2 27 28 29   BUN 28* 22* 34*   CREA 7.57* 5.61* 6.89*   CA 8.7 8.6 8.4*   AGAP 7 9 9   BUCR 4* 4* 5*      CBC w/Diff Recent Labs     01/25/21  0149 01/24/21  1826 01/23/21  0213   WBC 17.1*  --  12.8   RBC 2.60*  --  2.64*   HGB 8.0* 7.5* 8.0*   HCT 25.5* 23.8* 25.3*   *  --  406   GRANS 73  --  69   LYMPH 9*  --  11*   EOS 3  --  4      Coagulation Recent Labs     01/23/21  0213   PTP 16.2*   INR 1.3*       Iron/Ferritin Recent Labs     01/22/21  1515   IRON 16*      BNP No results for input(s): BNPP in the last 72 hours. Cardiac Enzymes No results for input(s): CPK, CKND1, RUSSEL in the last 72 hours. No lab exists for component: CKRMB, TROIP   Liver Enzymes No results for input(s): TP, ALB, TBIL, AP in the last 72 hours.     No lab exists for component: SGOT, GPT, DBIL   Thyroid Studies Lab Results   Component Value Date/Time    TSH 1.94 01/14/2021 11:56 AM         EKG: atrial fibrilation     Physical Assessment:     Visit Vitals  /66   Pulse 76   Temp 97.8 °F (36.6 °C) (Oral)   Resp 18   Ht 5' 10\" (1.778 m)   Wt 122.9 kg (271 lb)   SpO2 97%   BMI 38.88 kg/m²     Weight change: -8.845 kg (-19 lb 8 oz)    Intake/Output Summary (Last 24 hours) at 1/25/2021 1029  Last data filed at 1/24/2021 2221  Gross per 24 hour   Intake 358 ml   Output    Net 358 ml     Physical Exam:   General: comfortable, no acute distress   HEENT sclera anicteric, supple neck, no thyromegaly  CVS: S1S2 heard,  no rub  RS: + air entry b/l,   Abd: Soft, Non tender, Not distended,  Neuro: non focal, awake, alert , CN II-XII are grossly intact,  +tremor   Extrm: ++edema, no cyanosis, clubbing   Skin:  dry  Musculoskeletal: No gross joints or bone deformities     Procedures/imaging: see electronic medical records for all procedures, Xrays and details which were not copied into this note but were reviewed prior to creation of Jamir Mustafa MD  January 25, 2021  [facility]  Office [phone]

## 2021-01-25 NOTE — PROGRESS NOTES
LTSS/ UAI completed and submitted to [facility] for review in anticipation of pt going to SNF.  Gissell Ji MSALLYSSA, [place]- [phone]

## 2021-01-25 NOTE — PROGRESS NOTES
Problem: Pain  Goal: *Control of Pain  Outcome: Progressing Towards Goal     Problem: Patient Education: Go to Patient Education Activity  Goal: Patient/Family Education  Outcome: Progressing Towards Goal     Problem: Impaired Skin Integrity/Pressure Injury Treatment  Goal: *Improvement of Existing Pressure Injury  Outcome: Progressing Towards Goal     Problem: Patient Education: Go to Patient Education Activity  Goal: Patient/Family Education  Outcome: Progressing Towards Goal     Problem: Falls - Risk of  Goal: *Absence of Falls  Outcome: Progressing Towards Goal  Note: Fall Risk Interventions:       Mentation Interventions: Adequate sleep, hydration, pain control    Medication Interventions: Bed/chair exit alarm    Elimination Interventions: Bed/chair exit alarm              Problem: Pressure Injury - Risk of  Goal: *Prevention of pressure injury  Outcome: Progressing Towards Goal  Note: Pressure Injury Interventions:  Sensory Interventions: Assess changes in LOC    Moisture Interventions: Absorbent underpads    Activity Interventions: Pressure redistribution bed/mattress(bed type)    Mobility Interventions: HOB 30 degrees or less    Nutrition Interventions: Document food/fluid/supplement intake    Friction and Shear Interventions: HOB 30 degrees or less

## 2021-01-25 NOTE — DIALYSIS
JANAK        ACUTE HEMODIALYSIS FLOW SHEET      HEMODIALYSIS ORDERS: Physician: dany     Dialyzer: revaclear   Duration: 3 hr  BFR: 350   DFR: 600   Dialysate:  Temp 36-37*C  K+   2    Ca+  2.5 Na 138 Bicarb 35   Weight: 122.9 kg   Patient Chart [x]     Unable to Obtain []   Dry weight/UF Goal: 1500 Access CVL  Needle Gauge     Heparin []  Bolus      Units    [] Hourly       Units    [x]None      Catheter locking solution heparin   Pre BP:   109/60    Pulse:     73       Respirations: 18  Temperature:   9   Labs: Pre        Post:        [x] N/A   Additional Orders(medications, blood products, hypotension management) [x] N/A     [x] Janak Consent Verified     CATHETER ACCESS: []N/A   [x]Right   []Left   [x]IJ     []Fem   [x]chest wall   [] First use X-ray verified     [x]Tunnel                [] Non Tunneled   [x]No S/S infection  []Redness  []Drainage []Cultured []Swelling []Pain   [x]Medical Aseptic Prep Utilized   []Dressing Changed  [] Biopatch  Date:       []Clotted   [x]Patent   Flows: [x]Good  []Poor  []Reversed   If access problem,  notified: []Yes    [x]N/A  Date:                       GENERAL ASSESSMENT:      LUNGS:  Rate  SaO2% [] N/A    [x] Clear  [] Coarse  [] Crackles  [] Wheezing        [] Diminished     Location : []RLL   []LLL    []RUL  []SUKHJINDER     Cough: []Productive  []Dry  [x]N/A   Respirations:  [x]Easy  []Labored     Therapy:   []RA  [x]NC 2 l/min    Mask: []NRB []Venti       O2%                  []Ventilator  []Intubated  [] Trach  [] BiPaP     CARDIAC: [x]Regular      [] Irregular   [] Pericardial Rub  [] JVD        []  Monitored  [] Bedside  [] Remotely monitored [] N/A  Rhythm:      EDEMA: [] None  [x]Generalized  [] Pitting [] 1    [] 2    [] 3    [] 4                 [] Facial  [] Pedal  []  UE  [] LE     SKIN:   [x] Warm  [] Hot     [] Cold   [x] Dry     [] Pale   [] Diaphoretic                  [] Flushed  [] Jaundiced  [] Cyanotic  [] Rash  [] Weeping     LOC:    [x] Alert [x]Oriented:    [x] Person     [x] Place  [x]Time               [] Confused  [] Lethargic  [] Medicated  [] Non-responsive7     GI / ABDOMEN:  [] Flat    [] Distended    [x] Soft    [] Firm   []  Obese                             [] Diarrhea  [x] Bowel Sounds  [] Nausea  [] Vomiting       / URINE ASSESSMENT:[] Voiding   [x] Oliguria  [] Anuria   []  Garza     [] Incontinent    []  Incontinent Brief      []  Bathroom Privileges       PAIN: [x] 0 []1  []2   []3   []4   []5   []6   []7   []8   []9   []10              Scale 0-10  Action/Follow Up:      MOBILITY:  [] Amb    [] Amb/Assist    [x] Bed    [] Wheelchair  [] Stretcher      All Vitals and Treatment Details on Attached [address]: [facility]          Room # 132/11      [] 1st Time Acute  [] Stat  [x] Routine  [] Urgent     [x] Acute Room  []  Bedside  [] ICU/CCU  [] ER   Isolation Precautions:   There are currently no Active Isolations      Special Considerations:         [] Blood Consent Verified [x]N/A     ALLERGIES: No Known Allergies            Code Status:Full Code        Hepatitis Status:                        Lab Results   Component Value Date/Time    Hepatitis B surface Ag <0.10 01/14/2021 09:00 PM    Hepatitis B surface Ab 4.78 (L) 01/14/2021 09:00 PM    Hepatitis B core, IgM Negative 01/14/2021 09:00 PM    Hepatitis C virus Ab 0.03 01/14/2021 09:00 PM                     Current Labs:   Lab Results   Component Value Date/Time    Sodium 137 01/25/2021 01:49 AM    Potassium 4.0 01/25/2021 01:49 AM    Chloride 103 01/25/2021 01:49 AM    CO2 27 01/25/2021 01:49 AM    Anion gap 7 01/25/2021 01:49 AM    Glucose 89 01/25/2021 01:49 AM    BUN 28 (H) 01/25/2021 01:49 AM    Creatinine 7.57 (H) 01/25/2021 01:49 AM    BUN/Creatinine ratio 4 (L) 01/25/2021 01:49 AM    GFR est AA 9 (L) 01/25/2021 01:49 AM    GFR est non-AA 7 (L) 01/25/2021 01:49 AM    Calcium 8.7 01/25/2021 01:49 AM      Lab Results   Component Value Date/Time    WBC 17.1 (H) 01/25/2021 01:49 AM    HGB 8.0 (L) 01/25/2021 01:49 AM    HCT 25.5 (L) 01/25/2021 01:49 AM    PLATELET 932 (H) 93/29/0230 01:49 AM    MCV 98.1 (H) 01/25/2021 01:49 AM                                                                                     DIET: DIET RENAL  DIET NUTRITIONAL SUPPLEMENTS       PRIMARY NURSE REPORT: First initial/Last name/Title      Pre Dialysis: Carlos Servin RN      Time: 0900      EDUCATION:    [x] Patient [] Other         Knowledge Basis: []None [x]Minimal [] Substantial   Barriers to learning  [x]N/A   [] Access Care     [] S&S of infection     [] Fluid Management     []K+     [x]Procedural    []Albumin     [] Medications     [] Tx Options     [] Transplant     [] Diet     [] Other   Teaching Tools:  [x] Explain  [] Demo  [] Handouts [] Video  Patient response:   [x] Verbalized understanding  [] Teach back  [] Return demonstration [] Requires follow up   Inappropriate due to            [x] Time Out/Safety Check  [x]Extracorporeal Circuit Tested for integrity       RO/HEMODIALYSIS MACHINE SAFETY CHECKS  Before each treatment:     Machine Number:                   [facility]                                   [x] Unit Machine # 9 with centralized RO                                  [] Portable Machine #1/RO serial # [id number]                                  [] Portable Machine #2/RO serial # [id number]                                  [] Portable Machine #4/RO serial # [id number]                                                     [address] [place] [address]                                  [] Portable Machine #11/RO serial # [id number]                                   [] Portable Machine #12/RO serial # [id number]                                  [] Portable Machine #13/RO serial #  [id number]      Alarm Test:  Pass time 0900               [x] RO/Machine Log Complete      Temp    36*-37*             Dialysate: pH  7.4 Conductivity: Meter   14     HD Machine   14                  TCD: 14  Dialyzer Lot # [id number]          Blood Tubing Lot # [id number]          Saline Lot #  [id number]     CHLORINE TESTING-Before each treatment and every 4 hours    Total Chlorine: [x] less than 0.1 ppm  Time: 0900 4 Hr/2nd Check Time:    (if greater than 0.1 ppm from Primary then every 30 minutes from Secondary)     TREATMENT INITIATION  with Dialysis Precautions:   [x] All Connections Secured                 [x] Saline Line Double Clamped   [x] Venous Parameters Set                  [x] Arterial Parameters Set    [x] Prime Given 250ml                          [x]Air Foam Detector Engaged      Treatment Initiation Note:Pt in stable condition. CVL accessed and treatment initiated without complication. Dr Mariana Grimes at bedside. Post Assessment:   Dialyzer Cleared: [] Good [x] Fair  [] Poor  Blood processed:  61.8 L  UF Removed  1000 Ml  POst BP:   137/73       Pulse: 95        Respirations: 18  Temperature: 97.8 Lungs:     [x] Clear      [] Course         [] Crackles    [] Wheezing         [] Diminished   Post Tx Vascular Access:   AVF/AVG: Bleeding stopped   Art  min. Elkin. Min   N/A Cardiac:   [x] Regular   [] Irregular   [] Monitor  [] N/A      Rhythm:       Catheter:   Locking solution: Heparin 1:1000   Art. 1.9  Elkin. 1.9      Skin:   Pain:    [x] Warm  [x] Dry [] Diaphoretic    [] Flushed    [] Pale [] Cyanotic [x]0  []1  []2   []3  []4   []5   []6   []7   []8   []9   []10     Post Treatment Note:   HD well tolerated. 1L UF removed, orders received to decrease UFG to 1 L d/t asymptomatic hypotension.  NAD noted during or post treatment       POST TREATMENT PRIMARY NURSE HANDOFF REPORT:     First initial/Last name/Title         Post Dialysis: Bo Navas RN Time:  8479     Abbreviations: AVG-arterial venous graft, AVF-arterial venous fistula, IJ-Internal Jugular, Subcl-Subclavian, Fem-Femoral, Tx-treatment, AP/HR-apical heart rate, DFR-dialysate flow rate, BFR-blood flow rate, AP-arterial pressure, -venous pressure, UF-ultrafiltrate, TMP-transmembrane pressure, Elkin-Venous, Art-Arterial, RO-Reverse Osmosis

## 2021-01-25 NOTE — PROGRESS NOTES
Bedside shift change report given to Tosha Cedillo (oncoming nurse) by Lydia Ramírez (offgoing nurse). Report included the following information SBAR.

## 2021-01-25 NOTE — PROGRESS NOTES
Admit Date: 1/14/2021  Date of Service: 1/25/2021        Assessment:     1. 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 Acute hypoxic respiratory failure: multifactorial  3. Acute metabolic encephalopathy/delirium   4. A.fib:  Rate controlled; on Cardizem; Pradaxa on hold   5. Volume overload   6. Leukocytosis with mild lymphopenia  7. Chronic LE wounds with abscesses status post bilateral I&D  8. Hematuria   9. Pre-syncope  10. Surgically asplenic      Rapid Covid-19 negative 1/15/2021  Send out Covid19 negative 1/14/2021  Status post renal biopsy on January 21. Pathology is pending  Continue Eliquis  Hemodialysis as per nephrology  On Trileptal per neurology  Echo report reviewedEF 55-60%  On atenolol and Cardizem  On Symbicort  Leukocytosis noted, no clear source, afebrile, status post antibiotics per ID will monitor for now  Fall precautions  Continue wound care  PT OT  Discussed with patient, still is intermittently confused  I discussed with daughter and she is requesting a psychiatry evaluation. Daughter states that bipolar disorder runs in the patient's family and over the years he has periods when he is very happy and then he has periods when he is depressed. Daughter states that patient was going through depression phase before he came to the hospital.  Daughter states that she has not spoken to her father for 2 years before he came to the hospital.  Will order psychiatry evaluation  Disposition SNF, discussed with   I called patient's daughter at phone #[phone] and updated her regarding the patient's care. Daughter is upset that she and the family cannot visit the patient. I called and discussed with nursing administration and they have agreed to allow the daughter to visit tomorrow for 1 hours between 10 Am and 12 noon. I called and advised the same to the patient's daughter.     Case discussed with:  [x]Patient  [x]Family  [x]Nursing  [x]Case Management  DVT Prophylaxis:  []Lovenox []Hep SQ  []SCDs  []Coumadin   [x] Eliquis  CODE STATUS: Full  Contact: Judy Jose (wife)  [phone], daughter [phone]      Alycia Schmidt MD    2021       Subjective:      Patient is sitting in bed in no apparent distress, awake, follows simple commands. Knows that he is in the hospital.  Denies any pain at this time.   Objective:        Visit Vitals  /73   Pulse 95   Temp 97.8 °F (36.6 °C) (Axillary)   Resp 18   Ht 5' 10\" (1.778 m)   Wt 122.9 kg (271 lb)   SpO2 97%   BMI 38.88 kg/m²     Temp (24hrs), Av °F (36.7 °C), Min:97.6 °F (36.4 °C), Max:98.7 °F (37.1 °C)      General:  Awake, follows commands  Cardiovascular:  S1S2+, RRR  Pulmonary:  CTA b/l  GI:  Soft, BS+, NT, ND, obese  Extremities: Bilateral lower extremities with dressing            Labs: Results:   Chemistry Recent Labs     21  0149 21  0348 21  0213   GLU 89 93 86    140 137   K 4.0 3.7 4.0    103 99*   CO2 27 28 29   BUN 28* 22* 34*   CREA 7.57* 5.61* 6.89*   CA 8.7 8.6 8.4*   AGAP 7 9 9   BUCR 4* 4* 5*      CBC w/Diff Recent Labs     21  0149 21  1826 21  0213   WBC 17.1*  --  12.8   RBC 2.60*  --  2.64*   HGB 8.0* 7.5* 8.0*   HCT 25.5* 23.8* 25.3*   *  --  406   GRANS 73  --  69   LYMPH 9*  --  11*   EOS 3  --  4        No results found for: SDES Lab Results   Component Value Date/Time    Culture result: NO GROWTH 4 DAYS 2021 08:46 AM    Culture result: NO GROWTH 4 DAYS 2021 08:46 AM    Culture result: NO GROWTH 4 DAYS 2021 08:46 AM    Culture result: NO GROWTH 4 DAYS 2021 08:46 AM    Culture result: (A) 2021 09:00 PM     LIGHT STAPHYLOCOCCUS EPIDERMIDIS (OXACILLIN RESISTANT)    Culture result: RARE STAPHYLOCOCCUS AUREUS (A) 2021 09:00 PM    Culture result: FEW CANDIDA PARAPSILOSIS (A) 2021 09:00 PM        Results     Procedure Component Value Units Date/Time    CULTURE, [facility] [[id number]] Collected: 21 1410    Order Status: Completed Specimen: Foot Updated: 01/20/21 1046     Special Requests: RIGHT        GRAM STAIN NO ORGANISMS SEEN        Culture result: NO GROWTH 4 DAYS       CULTURE, ANAEROBIC [[id number]] Collected: 01/16/21 0846    Order Status: Completed Specimen: Foot Updated: 01/20/21 1046     Special Requests: RIGHT        Culture result: NO GROWTH 4 DAYS       CULTURE, Avonne Morro STAIN [[id number]] Collected: 01/16/21 0846    Order Status: Completed Specimen: Foot Updated: 01/20/21 1048     Special Requests: LEFT        GRAM STAIN NO ORGANISMS SEEN        Culture result: NO GROWTH 4 DAYS       CULTURE, ANAEROBIC [[id number]] Collected: 01/16/21 0846    Order Status: Completed Specimen: Foot Updated: 01/20/21 1047     Special Requests: LEFT        Culture result: NO GROWTH 4 DAYS       CULTURE, WOUND Logan Loupe STAIN [[id number]]  (Abnormal)  (Susceptibility) Collected: 01/14/21 2100    Order Status: Completed Specimen: Wound from Foot Updated: 01/18/21 1426     Special Requests: NO SPECIAL REQUESTS        GRAM STAIN FEW WBCS SEEN         NO ORGANISMS SEEN        Culture result:       LIGHT STAPHYLOCOCCUS EPIDERMIDIS (OXACILLIN RESISTANT)                  RARE STAPHYLOCOCCUS AUREUS            FEW CANDIDA PARAPSILOSIS       Susceptibility      Staphylococcus epidermidis     GEORGINA     Ciprofloxacin ($) Susceptible     Erythromycin ($$$$) Resistant     Gentamicin ($) Susceptible     Inducible Clindamycin Susceptible     Levofloxacin ($) Susceptible     Linezolid ($$$$$) Susceptible     Moxifloxacin ($$$$) Susceptible     Oxacillin Resistant     Tetracycline Intermediate     Vancomycin ($) Susceptible                Susceptibility      Staphylococcus aureus     GEORGINA     Ciprofloxacin ($) Susceptible     Clindamycin ($) Susceptible     Daptomycin ($$$$$) Susceptible     Doxycycline ($$) Susceptible     Erythromycin ($$$$) Susceptible     Gentamicin ($) Susceptible     Levofloxacin ($) Susceptible     Linezolid ($$$$$) Susceptible     Moxifloxacin ($$$$) Susceptible     Oxacillin Susceptible     Tetracycline Susceptible     Trimeth/Sulfa Susceptible     Vancomycin ($) Susceptible                          Imaging:     Ir Insert Nessa Perez Cvc W/o Port Over 5 Yr    Result Date: 1/21/2021  Technically successful placement of a right jugular tunneled dialysis catheter 14.5 Portuguese 23 cm length palindrome dialysis catheter    Cta Neck    Addendum Date: 1/20/2021    Addendum: Addendum: The source images were reconstructed with virtual surface, MIP and intraluminal reconstruction Right subclavian: Normal Right carotid: normal right vertebral: Normal intact Left vertebral: Dominant intact unremarkable Left subclavian: Normal reconstruction arch: Type I arch identified    Result Date: 1/20/2021  The insertion site and course of the dialysis catheter intact normal Internal jugular above and below the dialysis catheter unremarkable no evidence for any injury to the jugular venous Cervical visceral space is unremarkable no adenopathy Mediastinal lipomatosis Some nonspecific postinflammatory changes in the lung apices. No soft tissue abnormalities identified. Ct Abd W Wo Cont    Result Date: 1/22/2021  : 1.  Status post left lower pole kidney biopsy for acute kidney insufficiency demonstrates some trace density in the upper collecting system of the biopsy left kidney suggesting a small amount of blood not felt to be significant clinically. The nephrogenic and excretory phase images demonstrate nothing to suggest a post biopsy complication. Ir Us Guided Vascular Access    Result Date: 1/21/2021  Successful placement of a single lumen midline catheter. Ct Bx Renal    Result Date: 1/22/2021  Impression: 1. Successful CT-guided lower pole left kidney biopsy.       Claribel Tejada MD

## 2021-01-25 NOTE — PROGRESS NOTES
Problem: Falls - Risk of  Goal: *Absence of Falls  Description: Document Danne Lobe Fall Risk and appropriate interventions in the flowsheet. Outcome: Progressing Towards Goal  Note: Fall Risk Interventions:       Mentation Interventions: Adequate sleep, hydration, pain control    Medication Interventions: Bed/chair exit alarm    Elimination Interventions: Bed/chair exit alarm              Problem: Pressure Injury - Risk of  Goal: *Prevention of pressure injury  Description: Document Oswaldo Scale and appropriate interventions in the flowsheet.   Outcome: Progressing Towards Goal  Note: Pressure Injury Interventions:  Sensory Interventions: Assess changes in LOC    Moisture Interventions: Absorbent underpads    Activity Interventions: Pressure redistribution bed/mattress(bed type)    Mobility Interventions: HOB 30 degrees or less    Nutrition Interventions: Document food/fluid/supplement intake    Friction and Shear Interventions: HOB 30 degrees or less

## 2021-01-26 ENCOUNTER — APPOINTMENT (OUTPATIENT)
Dept: GENERAL RADIOLOGY | Age: 61
DRG: 673 | End: 2021-01-26
Attending: EMERGENCY MEDICINE
Payer: COMMERCIAL

## 2021-01-26 LAB
ANION GAP SERPL CALC-SCNC: 9 MMOL/L (ref 3–18)
BASOPHILS # BLD: 0 K/UL (ref 0–0.06)
BASOPHILS NFR BLD: 0 % (ref 0–3)
BUN SERPL-MCNC: 17 MG/DL (ref 7–18)
BUN/CREAT SERPL: 3 (ref 12–20)
CALCIUM SERPL-MCNC: 8.6 MG/DL (ref 8.5–10.1)
CHLORIDE SERPL-SCNC: 100 MMOL/L (ref 100–111)
CO2 SERPL-SCNC: 27 MMOL/L (ref 21–32)
CREAT SERPL-MCNC: 5.8 MG/DL (ref 0.6–1.3)
DIFFERENTIAL METHOD BLD: ABNORMAL
EOSINOPHIL # BLD: 0.2 K/UL (ref 0–0.4)
EOSINOPHIL NFR BLD: 1 % (ref 0–5)
ERYTHROCYTE [DISTWIDTH] IN BLOOD BY AUTOMATED COUNT: 15.7 % (ref 11.6–14.5)
GLUCOSE BLD STRIP.AUTO-MCNC: 100 MG/DL (ref 70–110)
GLUCOSE BLD STRIP.AUTO-MCNC: 117 MG/DL (ref 70–110)
GLUCOSE BLD STRIP.AUTO-MCNC: 96 MG/DL (ref 70–110)
GLUCOSE BLD STRIP.AUTO-MCNC: 99 MG/DL (ref 70–110)
GLUCOSE SERPL-MCNC: 79 MG/DL (ref 74–99)
HCT VFR BLD AUTO: 26.5 % (ref 36–48)
HGB BLD-MCNC: 8.1 G/DL (ref 13–16)
LYMPHOCYTES # BLD: 1.3 K/UL (ref 0.8–3.5)
LYMPHOCYTES NFR BLD: 7 % (ref 20–51)
MCH RBC QN AUTO: 29.7 PG (ref 24–34)
MCHC RBC AUTO-ENTMCNC: 30.6 G/DL (ref 31–37)
MCV RBC AUTO: 97.1 FL (ref 74–97)
METAMYELOCYTES NFR BLD MANUAL: 1 %
MONOCYTES # BLD: 1.1 K/UL (ref 0–1)
MONOCYTES NFR BLD: 6 % (ref 2–9)
MYELOCYTES NFR BLD MANUAL: 5 %
NEUTS SEG # BLD: 15 K/UL (ref 1.8–8)
NEUTS SEG NFR BLD: 80 % (ref 42–75)
NRBC BLD-RTO: 3 PER 100 WBC
PLATELET # BLD AUTO: 419 K/UL (ref 135–420)
PLATELET COMMENTS,PCOM: ABNORMAL
PMV BLD AUTO: 10.7 FL (ref 9.2–11.8)
POTASSIUM SERPL-SCNC: 3.4 MMOL/L (ref 3.5–5.5)
RBC # BLD AUTO: 2.73 M/UL (ref 4.7–5.5)
RBC MORPH BLD: ABNORMAL
RBC MORPH BLD: ABNORMAL
SODIUM SERPL-SCNC: 136 MMOL/L (ref 136–145)
WBC # BLD AUTO: 18.8 K/UL (ref 4.6–13.2)

## 2021-01-26 PROCEDURE — 82962 GLUCOSE BLOOD TEST: CPT

## 2021-01-26 PROCEDURE — 99222 1ST HOSP IP/OBS MODERATE 55: CPT | Performed by: PSYCHIATRY & NEUROLOGY

## 2021-01-26 PROCEDURE — 74011250636 HC RX REV CODE- 250/636: Performed by: INTERNAL MEDICINE

## 2021-01-26 PROCEDURE — 65270000029 HC RM PRIVATE

## 2021-01-26 PROCEDURE — 71045 X-RAY EXAM CHEST 1 VIEW: CPT

## 2021-01-26 PROCEDURE — 87040 BLOOD CULTURE FOR BACTERIA: CPT

## 2021-01-26 PROCEDURE — 74011250637 HC RX REV CODE- 250/637: Performed by: INTERNAL MEDICINE

## 2021-01-26 PROCEDURE — 77010033678 HC OXYGEN DAILY

## 2021-01-26 PROCEDURE — 2709999900 HC NON-CHARGEABLE SUPPLY

## 2021-01-26 PROCEDURE — 94760 N-INVAS EAR/PLS OXIMETRY 1: CPT

## 2021-01-26 PROCEDURE — 74011000258 HC RX REV CODE- 258: Performed by: INTERNAL MEDICINE

## 2021-01-26 PROCEDURE — 97110 THERAPEUTIC EXERCISES: CPT

## 2021-01-26 PROCEDURE — 74011250637 HC RX REV CODE- 250/637: Performed by: PSYCHIATRY & NEUROLOGY

## 2021-01-26 PROCEDURE — 74011250637 HC RX REV CODE- 250/637: Performed by: PODIATRIST

## 2021-01-26 PROCEDURE — 74011250637 HC RX REV CODE- 250/637: Performed by: EMERGENCY MEDICINE

## 2021-01-26 PROCEDURE — 94640 AIRWAY INHALATION TREATMENT: CPT

## 2021-01-26 PROCEDURE — 85025 COMPLETE CBC W/AUTO DIFF WBC: CPT

## 2021-01-26 PROCEDURE — 80048 BASIC METABOLIC PNL TOTAL CA: CPT

## 2021-01-26 PROCEDURE — 99232 SBSQ HOSP IP/OBS MODERATE 35: CPT | Performed by: EMERGENCY MEDICINE

## 2021-01-26 PROCEDURE — 92526 ORAL FUNCTION THERAPY: CPT

## 2021-01-26 PROCEDURE — 97535 SELF CARE MNGMENT TRAINING: CPT

## 2021-01-26 PROCEDURE — 74011250637 HC RX REV CODE- 250/637: Performed by: HOSPITALIST

## 2021-01-26 RX ORDER — FACIAL-BODY WIPES
10 EACH TOPICAL ONCE
Status: COMPLETED | OUTPATIENT
Start: 2021-01-26 | End: 2021-01-26

## 2021-01-26 RX ORDER — POLYETHYLENE GLYCOL 3350 17 G/17G
17 POWDER, FOR SOLUTION ORAL DAILY
Status: DISCONTINUED | OUTPATIENT
Start: 2021-01-26 | End: 2021-02-01 | Stop reason: HOSPADM

## 2021-01-26 RX ADMIN — BISACODYL 10 MG: 10 SUPPOSITORY RECTAL at 16:39

## 2021-01-26 RX ADMIN — Medication 10 ML: at 16:38

## 2021-01-26 RX ADMIN — OXCARBAZEPINE 600 MG: 300 TABLET, FILM COATED ORAL at 17:05

## 2021-01-26 RX ADMIN — Medication 5 MG: at 21:31

## 2021-01-26 RX ADMIN — Medication 10 ML: at 05:55

## 2021-01-26 RX ADMIN — APIXABAN 2.5 MG: 5 TABLET, FILM COATED ORAL at 10:13

## 2021-01-26 RX ADMIN — OXCARBAZEPINE 600 MG: 300 TABLET, FILM COATED ORAL at 10:11

## 2021-01-26 RX ADMIN — BUDESONIDE AND FORMOTEROL FUMARATE DIHYDRATE 2 PUFF: 160; 4.5 AEROSOL RESPIRATORY (INHALATION) at 21:21

## 2021-01-26 RX ADMIN — Medication 10 ML: at 21:31

## 2021-01-26 RX ADMIN — POLYETHYLENE GLYCOL 3350 17 G: 17 POWDER, FOR SOLUTION ORAL at 16:37

## 2021-01-26 RX ADMIN — DILTIAZEM HYDROCHLORIDE 120 MG: 120 CAPSULE, COATED, EXTENDED RELEASE ORAL at 21:31

## 2021-01-26 RX ADMIN — BUDESONIDE AND FORMOTEROL FUMARATE DIHYDRATE 2 PUFF: 160; 4.5 AEROSOL RESPIRATORY (INHALATION) at 08:00

## 2021-01-26 RX ADMIN — AMITRIPTYLINE HYDROCHLORIDE 50 MG: 50 TABLET, FILM COATED ORAL at 21:31

## 2021-01-26 RX ADMIN — IRON SUCROSE 100 MG: 20 INJECTION, SOLUTION INTRAVENOUS at 18:09

## 2021-01-26 RX ADMIN — APIXABAN 2.5 MG: 5 TABLET, FILM COATED ORAL at 17:05

## 2021-01-26 RX ADMIN — SILVER SULFADIAZINE: 10 CREAM TOPICAL at 16:30

## 2021-01-26 NOTE — ROUTINE PROCESS
Bedside shift change report given to [address] [facility] (oncoming nurse) by Tosha Cedillo (offgoing nurse). Report included the following information SBAR, Kardex and MAR.

## 2021-01-26 NOTE — ROUTINE PROCESS
Bedside shift change report given to Michelle RN (oncoming nurse) by Konrad Keller RN (offgoing nurse). Report included the following information SBAR, Procedure Summary, MAR and Recent Results.

## 2021-01-26 NOTE — PROGRESS NOTES
Problem: Pain  Goal: *Control of Pain  Outcome: Progressing Towards Goal     Problem: Patient Education: Go to Patient Education Activity  Goal: Patient/Family Education  Outcome: Progressing Towards Goal     Problem: Impaired Skin Integrity/Pressure Injury Treatment  Goal: *Improvement of Existing Pressure Injury  Outcome: Progressing Towards Goal  Goal: *Prevention of pressure injury  Outcome: Progressing Towards Goal  Note: Pressure Injury Interventions:  Sensory Interventions: Assess changes in LOC    Moisture Interventions: Absorbent underpads    Activity Interventions: Assess need for specialty bed    Mobility Interventions: HOB 30 degrees or less    Nutrition Interventions: Document food/fluid/supplement intake    Friction and Shear Interventions: HOB 30 degrees or less

## 2021-01-26 NOTE — PROGRESS NOTES
Called Patients insurance plan [phone] [address] [place] [address] Dr. Rosalind BURROWS II and spoke with [facility]. Patient does NOT have a trasportation benefit to dialysis from SNF or home. Called family to inform.       Nikolai Brooks, RN BSN  Care Manager  [phone]

## 2021-01-26 NOTE — PROGRESS NOTES
Problem: Dysphagia (Adult)  Goal: *Acute Goals and Plan of Care (Insert Text)  Description: Patient will:  1. Tolerate PO trials with 0 s/s overt distress in 4/5 trials-met   2. Utilize compensatory swallow strategies/maneuvers (decrease bite/sip, size/rate, alt. liq/sol) with min cues in 4/5 trials-met   3. Perform oral-motor/laryngeal exercises to increase oropharyngeal swallow function with min cues-n/a  4. Complete an objective swallow study (i.e., MBSS) to assess swallow integrity, r/o aspiration, and determine of safest LRD, min A-n/a    Rec:     Regular solid with thin liquids  Aspiration precautions  HOB >45 during po intake, remain >30 for 30-45 minutes after po   Small bites/sips; alternate liquid/solid with slow feeding rate   Oral care TID  Meds per pt preference  Outcome: Resolved/Met    [id number] [facility] TREATMENT & DISCHARGE    Patient: Uli January (32 y.o. male)  Date: 1/26/2021  Diagnosis: Acute kidney injury ([address] [N17.9]  ARF (acute renal failure) ([address] [N17.9] Acute kidney injury ([address]  Procedure(s) (LRB):  INCISION AND DRAINAGE BILATERAL FEET (Bilateral) 10 Days Post-Op  Precautions: Aspiration, Fall, WBAT  PLOF: Pt reports regular diet with thin liquids      ASSESSMENT:  Pt seen for follow up dysphagia treatment. Pt reporting hx of cervical surgery resulting in mild globus sensation; otherwise, denying dysphagia. Pt continuing to tolerate thin liquids + straw via consecutive swallow with timely swallow initiation, adequate laryngeal elevation to palpation and no overt s/sx aspiration. Demo positive rotary chew with thorough oral clearance with regular solids. Safe for continuation of regular diet with thin liquids with no further skilled SLP services indicated at this time. Educated with regard to s/sx aspiration and to alert MD/RN if symptoms arise. Available for MBS if silent aspiration a concern. Pt able to verbalize understanding.       Progression toward goals:  [x]         Goals met/approximated  []         Not making progress/Not appropriate - will d/c POC     PLAN:  Recommendations and Planned Interventions:  Maximum therapeutic gains met; safest, least restrictive diet achieved. D/C ST intervention at this time. Discharge Recommendations:  Do not anticipate further SLP needs upon discharge      SUBJECTIVE:   Patient stated I've had three neck surgeries    OBJECTIVE:   Cognitive and Communication Status:  Neurologic State: Alert, Eyes open spontaneously  Orientation Level: Oriented X4  Cognition: Follows commands  Perception: Appears intact  Perseveration: No perseveration noted  Safety/Judgement: Fall prevention, Awareness of environment  Dysphagia Treatment:  Oral Assessment:  Oral Assessment  Labial: No impairment  Dentition: Natural, Intact  Oral Hygiene: adequate  Lingual: No impairment  Velum: No impairment  Mandible: No impairment  P.O. Trials:   Patient Position: [facility] at [facility]   Vocal quality prior to P.O.: No impairment   Consistency Presented:  Thin liquid, Solid   How Presented: SLP-fed/presented, Cup/sip, Straw, Successive swallows   Bolus Acceptance: No impairment   Propulsion: No impairment   Oral Residue: None   Initiation of Swallow: No impairment   Laryngeal Elevation: Functional   Aspiration Signs/Symptoms: None    Pharyngeal Phase Characteristics: No impairment, issues, or problems    Oral Phase Severity: No impairment    Pharyngeal Phase Severity : No impairment      PAIN:  Start of Tx: 0  End of Tx: 0     After treatment:   []              Patient left in no apparent distress sitting up in chair  [x]              Patient left in no apparent distress in bed  [x]              Call bell left within reach  [x]              Nursing notified  []              Caregiver present  []              Bed alarm activated      COMMUNICATION/EDUCATION:   [x] Aspiration precautions; swallow safety; compensatory techniques  [x]        Patient/family able to participate in training and education   []  Patient unable to participate in education; education ongoing with staff  [] Patient understands goals/intent of therapy; neutral about participation     Thank you for this referral,  Genie Rivera M.S., [address] [facility]  Speech-Language Pathologist

## 2021-01-26 NOTE — PROGRESS NOTES
Spoke with daughter and spouse about HD transport from a facility. Spouse stated it is covered by the [employer]. [facility]. [facility]ched in [facility].     Kenya Hargrove RN BSN  Care Manager  [phone]

## 2021-01-26 NOTE — PROGRESS NOTES
Problem: Pain  Goal: *Control of Pain  Outcome: Progressing Towards Goal     Problem: Impaired Skin Integrity/Pressure Injury Treatment  Goal: *Improvement of Existing Pressure Injury  Outcome: Progressing Towards Goal  Goal: *Prevention of pressure injury  Description: Document Oswaldo Scale and appropriate interventions in the flowsheet. Outcome: Progressing Towards Goal  Note: Pressure Injury Interventions:  Sensory Interventions: Turn and reposition approx. every two hours (pillows and wedges if needed), Pressure redistribution bed/mattress (bed type)    Moisture Interventions: Minimize layers, Apply protective barrier, creams and emollients, Absorbent underpads    Activity Interventions: Increase time out of bed, Pressure redistribution bed/mattress(bed type)    Mobility Interventions: Pressure redistribution bed/mattress (bed type), Turn and reposition approx. every two hours(pillow and wedges)    Nutrition Interventions: Document food/fluid/supplement intake, Offer support with meals,snacks and hydration, Discuss nutritional consult with provider    Friction and Shear Interventions: Minimize layers, Apply protective barrier, creams and emollients                Problem: Falls - Risk of  Goal: *Absence of Falls  Description: Document David Fall Risk and appropriate interventions in the flowsheet.   Outcome: Progressing Towards Goal  Note: Fall Risk Interventions:  Mobility Interventions: Communicate number of staff needed for ambulation/transfer, Utilize walker, cane, or other assistive device, Strengthening exercises (ROM-active/passive), Patient to call before getting OOB    Mentation Interventions: Room close to nurse's station, Reorient patient, More frequent rounding    Medication Interventions: Teach patient to arise slowly, Patient to call before getting OOB, Evaluate medications/consider consulting pharmacy, Bed/chair exit alarm    Elimination Interventions: Bed/chair exit alarm, Call light in reach, Toileting schedule/hourly rounds, Toilet paper/wipes in reach, Patient to call for help with toileting needs              Problem: Pressure Injury - Risk of  Goal: *Prevention of pressure injury  Description: Document Oswaldo Scale and appropriate interventions in the flowsheet. Outcome: Progressing Towards Goal  Note: Pressure Injury Interventions:  Sensory Interventions: Turn and reposition approx. every two hours (pillows and wedges if needed), Pressure redistribution bed/mattress (bed type)    Moisture Interventions: Minimize layers, Apply protective barrier, creams and emollients, Absorbent underpads    Activity Interventions: Increase time out of bed, Pressure redistribution bed/mattress(bed type)    Mobility Interventions: Pressure redistribution bed/mattress (bed type), Turn and reposition approx.  every two hours(pillow and wedges)    Nutrition Interventions: Document food/fluid/supplement intake, Offer support with meals,snacks and hydration, Discuss nutritional consult with provider    Friction and Shear Interventions: Minimize layers, Apply protective barrier, creams and emollients                Problem: Nutrition Deficit  Goal: *Optimize nutritional status  Outcome: Progressing Towards Goal

## 2021-01-26 NOTE — PROGRESS NOTES
Spoke with patient Daughter. She was very upset as her father seems very confused. She wanted to come to hospital to see him. I gave her Little Velasquezs office number and Lawson's number. I believe the Dr. Matt Fee be calling her or her mother in regards to care and plan of patient. Daughter was very upset. I tried to comfort her.

## 2021-01-26 NOTE — PROGRESS NOTES
The patient was seen for the purpose of a psychiatric consult. The case was discussed with our hospitalist in charge, the chart was reviewed and the patient was examined. Please be referred to the dictated consultation report which is self-explanatory. Impression:  Axis I: Acute metabolic encephalopathy-delirium resolved  Axis II: Noncontributory. Axis III: Per Attending and consultants. The consultation is very much appreciated again please be referred to the dictated note. 1.  Reason for consultation was the patient's family's concerns, specifically his daughter who resides in this area, and the possibility of Mr. Sydney Gamboa suffering with a bipolar illness. 2.  Upon examination today, there is no current evidence that the patient suffers with a bipolar disorder. When I advised him about the reason for which his family had requested a psychiatric consultation, he indicated that around 18 years ago he did have some problems with his motor which he believes were related to he has alcohol dependence and cannabis dependence. 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 3.  The patient has made his second wife prior to his retiring, and described what appears to be a very positive relationship with her. Very specifically he denied having any problems with his mood since he has stopped drinking and smoking pot. 4.  Upon examination today the patient is showing no evidence of a mood disorder. What appears to be his initial cognitive impairment associated to an acute metabolic encephalopathy and delirium appears to have subsided. The concerns that were raised appear to have been made by his daughter. resides in this area, however she apparently has not seen the patient for a couple of years now.   So it is possible that some of her concerns may have been related to the acute mental status changes that the patient has had during this admission rather than he is actually suffering with a psychiatric illness at present. I hope this information is useful please again be referred to the dictated note which is again self-explanatory.     Falguni Fraser MD, Vivian Burgess

## 2021-01-26 NOTE — PROGRESS NOTES
Problem: Mobility Impaired (Adult and Pediatric)  Goal: *Acute Goals and Plan of Care (Insert Text)  Description: Physical Therapy Goals  Initiated 1/15/2021, re-evaluated 1/22/21 and goals adjusted and to be accomplished within 7 day(s)  1. Patient will move from supine to sit and sit to supine  and roll side to side in bed with CGA    2. Patient will transfer from bed to chair and chair to bed with moderate assistance using the least restrictive device. 3.  Patient will perform sit to stand with SBA. 4.  Patient will ambulate with moderate assist for 10 feet with the least restrictive device. PLOF: Patient reports he began using RW about 2 weeks ago, prior to that he was ambulating without AD. He lives with spouse in single story home. Outcome: Progressing Towards Goal   PHYSICAL THERAPY TREATMENT    Patient: Markell Crook (81 y.o. male)  Date: 1/26/2021  Diagnosis: Acute kidney injury ([address] [N17.9]  ARF (acute renal failure) ([address] [N17.9] Acute kidney injury ([address]  Procedure(s) (LRB):  INCISION AND DRAINAGE BILATERAL FEET (Bilateral) 10 Days Post-Op  Precautions: Fall, WBAT  PLOF: see above    ASSESSMENT:  Pt in bed with dressings off of LEs so no standing activity was done. Pt performed supine LE exercises as noted below with frequent rest breaks. Pt was left in bed with needs in reach and is agreeable to standing during next PT session. Progression toward goals:   []      Improving appropriately and progressing toward goals  [x]      Improving slowly and progressing toward goals  []      Not making progress toward goals and plan of care will be adjusted     PLAN:  Patient continues to benefit from skilled intervention to address the above impairments. Continue treatment per established plan of care. Discharge Recommendations:  Rehab  Further Equipment Recommendations for Discharge:  N/A     SUBJECTIVE:   Patient stated I didn't realize my legs were so weak.     OBJECTIVE DATA SUMMARY: Critical Behavior:  Neurologic State: Alert  Orientation Level: Oriented X4  Cognition: Follows commands  Safety/Judgement: Fall prevention, Awareness of environment  Functional Mobility Training:  Bed Mobility:  Supine to Sit: Additional time;Stand-by assistance(w/HOB raised and SR)  Sit to Supine: Additional time;Stand-by assistance  Scooting: Contact guard assistance  Transfers:  Sit to Stand: Moderate assistance  Balance:  Sitting: Intact  Standing: Impaired; With support   Therapeutic Exercises:         EXERCISE   Sets   Reps   Active Active Assist   Passive Self ROM   Comments   Ankle Pumps 1 15  [x] [] [] []    Quad Sets/Glut Sets 1 10  [x] [] [] [] Hold for 5 secs   Hamstring Sets   [] [] [] []    Short Arc Quads   [] [] [] []    Heel Slides 1 10 [x] [] [] []    Straight Leg Raises 1 5 [x] [] [] []    Hip Abd/Add 1 10 [x] [] [] []    Long Arc Quads   [] [] [] []    Seated Marching   [] [] [] []    Standing Marching   [] [] [] []       [] [] [] []        Pain:  Pain level pre-treatment: 2/10  Pain level post-treatment: 2/10   Pain Intervention(s): Rest, Repositioning   Response to intervention: Nurse notified, See doc flow    Activity Tolerance:   poor  Please refer to the flowsheet for vital signs taken during this treatment. After treatment:   [] Patient left in no apparent distress sitting up in chair  [x] Patient left in no apparent distress in bed  [x] Call bell left within reach  [x] Nursing notified  [] Caregiver present  [] Bed alarm activated  [] SCDs applied      COMMUNICATION/EDUCATION:   [x]         Role of Physical Therapy in the acute care setting. [x]         Fall prevention education was provided and the patient/caregiver indicated understanding. [x]         Patient/family have participated as able in working toward goals and plan of care. [x]         Patient/family agree to work toward stated goals and plan of care.   []         Patient understands intent and goals of therapy, but is neutral about his/her participation.   []         Patient is unable to participate in stated goals/plan of care: ongoing with therapy staff.  []         Other:        Cintia Pulse, PT   Time Calculation: 30 mins

## 2021-01-26 NOTE — PROGRESS NOTES
Patient with worsening leukocytosis. I have ordered repeat blood cultures and UA CNS and chest x-ray. I have reconsulted ID.   We will discussed with podiatry too

## 2021-01-26 NOTE — PROGRESS NOTES
Seen at bedside awake and alert. Inspected wounds both feet and lower legs. Very superficial wounds . No signs abscess or necrosis. Health  And non infected. Continue local wound care.

## 2021-01-26 NOTE — PROGRESS NOTES
Admit Date: 2021  Date of Service: 2021        Assessment:     1. 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 Acute hypoxic respiratory failure: multifactorial  3. Acute metabolic encephalopathy/delirium   4. A.fib:  Rate controlled; on Cardizem; Pradaxa on hold   5. Volume overload   6. Leukocytosis with mild lymphopenia  7. Chronic LE wounds with abscesses status post bilateral I&D  8. Hematuria   9. Pre-syncope  10. Surgically asplenic      Rapid Covid-19 negative 1/15/2021  Send out Covid19 negative 2021  Status post renal biopsy on . Await pathology  On Eliquis  Continue hemodialysis per nephrology  Continue Trileptal  Echo report reviewedEF 55-60%  Continue atenolol and Cardizem  On Symbicort  Worsening leukocytosis noted, no clear source, no diarrhea. I have ordered recultures. I have reconsulted ID  Fall precautions  Wound care  PT OT  Psychiatry input noted  Disposition SNF, discussed with   Discussed with patient and daughter at bedside    Case discussed with:  [x]Patient  [x]Family  [x]Nursing  [x]Case Management  DVT Prophylaxis:  []Lovenox  []Hep SQ  []SCDs  []Coumadin   [x] Eliquis  CODE STATUS: Full  Contact: Ken Boyer (wife)  [phone], daughter [id number]      Huy Brown MD    2021       Subjective:      Patient is sitting in bed in no apparent distress, awake follows commands. Daughter at bedside visiting patient.   Daughter states that patient is at his baseline mental state    Objective:        Visit Vitals  BP (!) 91/50 (BP 1 Location: Left arm, BP Patient Position: Supine)   Pulse 91   Temp 97.8 °F (36.6 °C)   Resp 19   Ht 5' 10\" (1.778 m)   Wt 122.9 kg (270 lb 15.1 oz)   SpO2 95%   BMI 38.88 kg/m²     Temp (24hrs), Av.4 °F (36.9 °C), Min:97.8 °F (36.6 °C), Max:99 °F (37.2 °C)      General:  Awake, alert  Cardiovascular:  S1S2+, RRR  Pulmonary:  CTA b/l  GI:  Soft, BS+, NT, ND, obese  Extremities: Bilateral lower extremity wounds appear clean, 1+ edema              Labs: Results:   Chemistry Recent Labs     01/26/21  0359 01/25/21  0149 01/24/21  0348   GLU 79 89 93    137 140   K 3.4* 4.0 3.7    103 103   CO2 27 27 28   BUN 17 28* 22*   CREA 5.80* 7.57* 5.61*   CA 8.6 8.7 8.6   AGAP 9 7 9   BUCR 3* 4* 4*      CBC w/Diff Recent Labs     01/26/21  0359 01/25/21  0149 01/24/21  1826   WBC 18.8* 17.1*  --    RBC 2.73* 2.60*  --    HGB 8.1* 8.0* 7.5*   HCT 26.5* 25.5* 23.8*    472*  --    GRANS 80* 73  --    LYMPH 7* 9*  --    EOS 1 3  --         No results found for: SDES Lab Results   Component Value Date/Time    Culture result: NO GROWTH 4 DAYS 01/16/2021 08:46 AM    Culture result: NO GROWTH 4 DAYS 01/16/2021 08:46 AM    Culture result: NO GROWTH 4 DAYS 01/16/2021 08:46 AM    Culture result: NO GROWTH 4 DAYS 01/16/2021 08:46 AM    Culture result: (A) 01/14/2021 09:00 PM     LIGHT STAPHYLOCOCCUS EPIDERMIDIS (OXACILLIN RESISTANT)    Culture result: RARE STAPHYLOCOCCUS AUREUS (A) 01/14/2021 09:00 PM    Culture result: FEW CANDIDA PARAPSILOSIS (A) 01/14/2021 09:00 PM        Results     Procedure Component Value Units Date/Time    CULTURE, BLOOD [[id number]] Collected: 01/26/21 1300    Order Status: Completed Specimen: Whole Blood Updated: 01/26/21 1358    CULTURE, BLOOD [[id number]] Collected: 01/26/21 1250    Order Status: Completed Specimen: Whole Blood Updated: 01/26/21 1358    CULTURE, URINE [[id number]]     Order Status: Sent Specimen: Clean catch     CULTURE, [place] STAIN [[id number]] Collected: 01/16/21 0846    Order Status: Completed Specimen: Foot Updated: 01/20/21 1046     Special Requests: RIGHT        GRAM STAIN NO ORGANISMS SEEN        Culture result: NO GROWTH 4 DAYS       CULTURE, ANAEROBIC [[id number]] Collected: 01/16/21 0846    Order Status: Completed Specimen: Foot Updated: 01/20/21 1046     Special Requests: RIGHT        Culture result: NO GROWTH 4 DAYS       CULTURE, WOUND Arch Pickles STAIN [[id number]] Collected: 01/16/21 0846    Order Status: Completed Specimen: Foot Updated: 01/20/21 1048     Special Requests: LEFT        GRAM STAIN NO ORGANISMS SEEN        Culture result: NO GROWTH 4 DAYS       CULTURE, ANAEROBIC [[id number]] Collected: 01/16/21 0846    Order Status: Completed Specimen: Foot Updated: 01/20/21 1047     Special Requests: LEFT        Culture result: NO GROWTH 4 DAYS       CULTURE, WOUND W GRAM STAIN [[id number]]  (Abnormal)  (Susceptibility) Collected: 01/14/21 2100    Order Status: Completed Specimen: Wound from Foot Updated: 01/18/21 1426     Special Requests: NO SPECIAL REQUESTS        GRAM STAIN FEW WBCS SEEN         NO ORGANISMS SEEN        Culture result:       LIGHT STAPHYLOCOCCUS EPIDERMIDIS (OXACILLIN RESISTANT)                  RARE STAPHYLOCOCCUS AUREUS            FEW CANDIDA PARAPSILOSIS       Susceptibility      Staphylococcus epidermidis     GEORGINA     Ciprofloxacin ($) Susceptible     Erythromycin ($$$$) Resistant     Gentamicin ($) Susceptible     Inducible Clindamycin Susceptible     Levofloxacin ($) Susceptible     Linezolid ($$$$$) Susceptible     Moxifloxacin ($$$$) Susceptible     Oxacillin Resistant     Tetracycline Intermediate     Vancomycin ($) Susceptible                Susceptibility      Staphylococcus aureus     GEORGINA     Ciprofloxacin ($) Susceptible     Clindamycin ($) Susceptible     Daptomycin ($$$$$) Susceptible     Doxycycline ($$) Susceptible     Erythromycin ($$$$) Susceptible     Gentamicin ($) Susceptible     Levofloxacin ($) Susceptible     Linezolid ($$$$$) Susceptible     Moxifloxacin ($$$$) Susceptible     Oxacillin Susceptible     Tetracycline Susceptible     Trimeth/Sulfa Susceptible     Vancomycin ($) Susceptible                          Imaging:     Ir Insert Tunl Cvc W/o Port Over 5 Yr    Result Date: 1/21/2021  Technically successful placement of a right jugular tunneled dialysis catheter 14.5 Pashto 23 cm length palindrome dialysis catheter    Cta  Neck    Addendum Date: 1/20/2021    Addendum: Addendum: The source images were reconstructed with virtual surface, MIP and intraluminal reconstruction Right subclavian: Normal Right carotid: normal right vertebral: Normal intact Left vertebral: Dominant intact unremarkable Left subclavian: Normal reconstruction arch: Type I arch identified    Result Date: 1/20/2021  The insertion site and course of the dialysis catheter intact normal Internal jugular above and below the dialysis catheter unremarkable no evidence for any injury to the jugular venous Cervical visceral space is unremarkable no adenopathy Mediastinal lipomatosis Some nonspecific postinflammatory changes in the lung apices. No soft tissue abnormalities identified. Ct Abd W Wo Cont    Result Date: 1/22/2021  : 1.  Status post left lower pole kidney biopsy for acute kidney insufficiency demonstrates some trace density in the upper collecting system of the biopsy left kidney suggesting a small amount of blood not felt to be significant clinically. The nephrogenic and excretory phase images demonstrate nothing to suggest a post biopsy complication. Ir Us Guided Vascular Access    Result Date: 1/21/2021  Successful placement of a single lumen midline catheter. Xr Chest Port    Result Date: 1/26/2021  Basilar opacities-suspect atelectasis    Ct Bx Renal    Result Date: 1/22/2021  Impression: 1. Successful CT-guided lower pole left kidney biopsy.       Dominique Guo MD

## 2021-01-26 NOTE — PROGRESS NOTES
Infectious Disease progress note    Reconsulted for worsening leukocytosis    Reason: Bilateral leg abscess, cellulitis    Current abx Prior abx   Cefepime since 1/14/21-1/20  Levofloxacin, Augmentin 1/20-1/23 Vancomycin 1/14-1/16     Lines:       Assessment :      56 y.o.  male  with history of alcoholism, atrial fibrillation presented to ED on 1/14/2021 with near syncope and weakness, dyspnea with exertion and swelling to extremities for the past 1 month. Clinical presentation consistent with bilateral leg abscess, cellulitis status post incision and drainage of bilateral leg abscesses on 1/16/2021. Intra-Op culture negative. Preop culture positive for methicillin susceptible Staph aureus, Staph epidermidis    Now with worsening leukocytosis. Patient presents with a highly complex clinical picture. Etiology of leukocytosis not entirely clear at this time. Possibilities include leukemoid reaction to constipation along with leukocyte demargination due to splenectomized state versus occult infection    No definitive clinical evidence of infection noted on today's exam.  Ulcers bilateral lower extremity appears superficial and clean. No signs of active infection noted on today's exam    Recommendations:    1. Monitor off antibiotics  2. Wound care bilateral lower extremity per podiatrist  3. Recommend Dulcolax suppository, MiraLAX daily for constipation  4. Follow-up blood culture, urine analysis, urine culture  5. Monitor CBC, clinically      HPI:    States that he feels fine. Has not had a bowel movement for about 4 days. Denies increasing chest pain, shortness       Home Medication List    Details   magnesium 250 mg tab Take 1 Tab by mouth daily. gabapentin (NEURONTIN) 800 mg tablet take 2 tablets by mouth three times a day  Qty: 180 Tab, Refills: 5    Associated Diagnoses: Neuropathy      atenolol (TENORMIN) 100 mg tablet Take 1 Tab by mouth daily.   Qty: 90 Tab, Refills: 0 Associated Diagnoses: AAA (abdominal aortic aneurysm) without rupture ([address] Chronic atrial fibrillation ([address] Essential hypertension      budesonide-formoterol (SYMBICORT) 160-4.5 mcg/actuation HFAA inhale 2 puffs by mouth twice a day  Qty: 1 Inhaler, Refills: 2      dabigatran etexilate (PRADAXA) 150 mg capsule Take 1 Cap by mouth every twelve (12) hours. Qty: 180 Cap, Refills: 3      dilTIAZem CD (CARTIA XT) 180 mg ER capsule Take 1 Cap by mouth two (2) times a day. Qty: 180 Cap, Refills: 2    Associated Diagnoses: AAA (abdominal aortic aneurysm) without rupture ([address] Chronic atrial fibrillation [address] [address] Essential hypertension      hydroCHLOROthiazide (HYDRODIURIL) 12.5 mg tablet Take 1 Tab by mouth daily. Qty: 30 Tab, Refills: 2    Associated Diagnoses: Essential hypertension      ergocalciferol (ERGOCALCIFEROL) 50,000 unit capsule Take 1 Cap by mouth every seven (7) days. Qty: 12 Cap, Refills: 0    Associated Diagnoses: Vitamin D deficiency      saw palmetto xtr/zinc picolin (SAW PALMETTO EXTRACT PO) Take  by mouth. amitriptyline (ELAVIL) 25 mg tablet take 1 tablet by mouth NIGHTLY  Qty: 30 Tab, Refills: 1    Associated Diagnoses: Chronic pain syndrome; Depression, unspecified depression type; Neuropathy      melatonin tab tablet Take  by mouth nightly. cholecalciferol, vitamin D3, (VITAMIN D3) 2,000 unit tab Take  by mouth two (2) times a day. MULTIVIT-MIN/FA/LYCOPEN/LUTEIN (CENTRUM SILVER MEN PO) Take 1 Tab by mouth daily. potassium 99 mg tablet Take 99 mg by mouth daily. fish oil-dha-epa 1,200-144-216 mg cap Take 1 Tab by mouth daily. albuterol (PROAIR HFA) 90 mcg/actuation inhaler Take 2 Puffs by inhalation every four (4) hours as needed for Wheezing. Qty: 1 Inhaler, Refills: 1      b complex vitamins tablet Take 1 Tab by mouth daily.              Current Facility-Administered Medications   Medication Dose Route Frequency    bisacodyL (DULCOLAX) suppository 10 mg  10 mg Rectal ONCE    polyethylene glycol (MIRALAX) packet 17 g  17 g Oral DAILY    apixaban (ELIQUIS) tablet 2.5 mg  2.5 mg Oral BID    iron sucrose (VENOFER) 100 mg in 0.9% sodium chloride 100 mL IVPB  100 mg IntraVENous Q24H    doxercalciferoL (HECTOROL) 4 mcg/2 mL injection 2 mcg  2 mcg IntraVENous DIALYSIS MON, WED & FRI    0.9% sodium chloride infusion 250 mL  250 mL IntraVENous PRN    OXcarbazepine (TRILEPTAL) tablet 600 mg  600 mg Oral BID    silver sulfADIAZINE (SILVADENE) 1 % topical cream   Topical DAILY    nicotine (NICODERM CQ) 14 mg/24 hr patch 1 Patch  1 Patch TransDERmal DAILY    0.9% sodium chloride infusion 250 mL  250 mL IntraVENous DIALYSIS PRN    heparin (porcine) 1,000 unit/mL injection 5,000 Units  5,000 Units InterCATHeter DIALYSIS PRN    dilTIAZem ER (CARDIZEM CD) capsule 120 mg  120 mg Oral Q12H    albumin human 25% (BUMINATE) solution 25 g  25 g IntraVENous DIALYSIS PRN    budesonide-formoteroL (SYMBICORT) 160-4.5 mcg/actuation HFA inhaler 2 Puff  2 Puff Inhalation BID RT    oxyCODONE-acetaminophen (PERCOCET) 5-325 mg per tablet 1 Tab  1 Tab Oral Q6H PRN    insulin lispro (HUMALOG) injection   SubCUTAneous AC&HS    glucose chewable tablet 16 g  4 Tab Oral PRN    glucagon (GLUCAGEN) injection 1 mg  1 mg IntraMUSCular PRN    dextrose (D50W) injection syrg 12.5-25 g  25-50 mL IntraVENous PRN    sodium chloride (NS) flush 5-40 mL  5-40 mL IntraVENous Q8H    sodium chloride (NS) flush 5-40 mL  5-40 mL IntraVENous Q8H    sodium chloride (NS) flush 5-40 mL  5-40 mL IntraVENous PRN    acetaminophen (TYLENOL) tablet 650 mg  650 mg Oral Q6H PRN    Or    acetaminophen (TYLENOL) suppository 650 mg  650 mg Rectal Q6H PRN    promethazine (PHENERGAN) tablet 12.5 mg  12.5 mg Oral Q6H PRN    Or    ondansetron (ZOFRAN) injection 4 mg  4 mg IntraVENous Q6H PRN    amitriptyline (ELAVIL) tablet 50 mg  50 mg Oral QHS    melatonin tablet 5 mg  5 mg Oral QHS       Allergies: Patient has no known allergies. Temp (24hrs), Av.4 °F (36.9 °C), Min:97.8 °F (36.6 °C), Max:99 °F (37.2 °C)    Visit Vitals  BP (!) 91/50 (BP 1 Location: Left arm, BP Patient Position: Supine)   Pulse 91   Temp 97.8 °F (36.6 °C)   Resp 19   Ht 5' 10\" (1.778 m)   Wt 122.9 kg (270 lb 15.1 oz)   SpO2 95%   BMI 38.88 kg/m²       ROS: 12 point review systems obtained details. Pertinent positives mentioned in history of present illness, otherwise negative    Physical Exam:    General: Well developed, well nourished male laying on the bed,in no acute distress. Appears more alert and communicative compared to prior exam  General:   awake alert and oriented   HEENT:  Normocephalic, atraumatic, no scleral icterus or pallor; no conjunctival hemmohage;  nasal and oral mucous are moist and without evidence of lesions. . Neck supple, no bruits. Lymph Nodes:   no cervical, axillary or inguinal adenopathy   Lungs:   non-labored, bilaterally clear to auscultation- no crackles wheezes rales or rhonchi   Heart:  RRR, s1 and s2; no  rubs or gallops, no edema, + pedal pulses   Abdomen:  soft, non-distended, active bowel sounds, no hepatomegaly, no splenomegaly. Non-tender   Genitourinary:  deferred   Extremities:   Multiple superficial ulcers noted bilateral legs, resolved surrounding erythema. No significant tenderness bilateral legs. No induration bilateral legs. Full ROM of all large joints to the upper and lower extremities; muscle mass appropriate for age   Neurologic:  No gross focal sensory abnormalities; 5/5 muscle strength to upper and lower extremities. Speech appropriate.  Cranial nerves intact                        Skin:   Multiple superficial ulcers bilateral legs as mentioned above   Back:  no spinal or paraspinal muscle tenderness or rigidity, no CVA tenderness     Psychiatric:   Appropriate mood and affect         Labs: Results:   Chemistry Recent Labs     21  0359 21  0149 21  0348   GLU 79 89 93   NA 136 137 140   K 3.4* 4.0 3.7    103 103   CO2 27 27 28   BUN 17 28* 22*   CREA 5.80* 7.57* 5.61*   CA 8.6 8.7 8.6   AGAP 9 7 9   BUCR 3* 4* 4*      CBC w/Diff Recent Labs     01/26/21  0359 01/25/21  0149 01/24/21  1826   WBC 18.8* 17.1*  --    RBC 2.73* 2.60*  --    HGB 8.1* 8.0* 7.5*   HCT 26.5* 25.5* 23.8*    472*  --    GRANS 80* 73  --    LYMPH 7* 9*  --    EOS 1 3  --       Microbiology No results for input(s): CULT in the last 72 hours. RADIOLOGY:    All available imaging studies/reports in [facility] for this admission were reviewed    Total time spent >35 minutes. High complexity decision making was performed during the evaluation of this patient at high risk for decompensation with multiple organ involvement     Above mentioned total time spent on reviewing the case/medical record/data/notes/EMR/patient examination/documentation/coordinating care with nurse/consultants, exclusive of procedures with complex decision making performed and > 50% time spent in face to face evaluation.     Dr. Loretta Patel, Infectious Disease Specialist  [phone]  January 26, 2021  3:01 PM

## 2021-01-26 NOTE — PROGRESS NOTES
Problem: Self Care Deficits Care Plan (Adult)  Goal: *Acute Goals and Plan of Care (Insert Text)  Description: Occupational Therapy Goals  Initiated 1/17/2021 within 7 day(s). Re-evaluated on 1/23/2021. Goal 2 Met, continue with all remaining goals. 1.  Patient will perform grooming with supervision/set-up. 2.  Patient will perform self-feeding with supervision/set-up. (Met 1/23/21 - DC goal)  3. Patient will perform upper body dressing with supervision/set-up. 4.  Patient will perform toilet transfers with contact guard assist.  5.  Patient will perform all aspects of toileting with contact guard assist.  6.  Patient will participate in upper extremity therapeutic exercise/activities with supervision/set-up for 5 minutes. 7.  Patient will utilize energy conservation techniques during functional activities with verbal and visual cues. Prior Level of Function: Pt reports he was independent with ADLs/IADLs prior to this hospital admission. Updated 1/23/21: pt reports his spouse has been assisting him with LB ADLs for several months. Outcome: Progressing Towards Goal   OCCUPATIONAL THERAPY TREATMENT    Patient: Haritha Aguilar (83 y.o. male)  Date: 1/26/2021  Diagnosis: Acute kidney injury ([address] [N17.9]  ARF (acute renal failure) ([address] [N17.9] Acute kidney injury ([address]  Procedure(s) (LRB):  INCISION AND DRAINAGE BILATERAL FEET (Bilateral) 10 Days Post-Op  Precautions: Fall, WBAT    Chart, occupational therapy assessment, plan of care, and goals were reviewed. ASSESSMENT:  Pt is pleasant and cooperative with supportive daughter at bedside. Pt continues w/edematous BLE w/open wounds requiring Total Assist w/LB dressing task, donning socks. Pt agreeable to EOB activity to increase activity tolerance for carryover w/ADLs. Pt requires increase time w/bed mobility to maneuver to EOB. Pt tolerates sitting EOB ~ 15 minutes performing ADL grooming tasks w/increase time and SBA.  Generalized weakness requires Mod Assist w/functional transfer to standing and lateral steps to [facility]. Pt returned to supine and left w/all needs within reach. Pt educated on importance of UE Therex and encouraged to perform throughout the day. Progression toward goals:  []          Improving appropriately and progressing toward goals  [x]          Improving slowly and progressing toward goals  []          Not making progress toward goals and plan of care will be adjusted     PLAN:  Patient continues to benefit from skilled intervention to address the above impairments. Continue treatment per established plan of care. Discharge Recommendations:  Rehab  Further Equipment Recommendations for Discharge:  TBD by next level of care     SUBJECTIVE:   Patient stated I don't think I'm ready to get up and run a race yet.     OBJECTIVE DATA SUMMARY:   Cognitive/Behavioral Status:  Neurologic State: Alert  Orientation Level: Oriented X4  Cognition: Follows commands  Safety/Judgement: Fall prevention, Awareness of environment    Functional Mobility and Transfers for ADLs:   Bed Mobility:  Supine to Sit: Additional time;Stand-by assistance(w/HOB raised and SR)  Sit to Supine: Additional time;Stand-by assistance  Scooting: Contact guard assistance   Transfers:  Sit to Stand: Moderate assistance  Balance:  Sitting: Intact  Standing: Impaired; With support    ADL Intervention:  Grooming  Position Performed: Seated edge of bed  Washing Face: Set-up  Washing Hands: Set-up  Brushing Teeth: Stand-by assistance    Lower Body Dressing Assistance  Socks: Total assistance (dependent)    Pain:  Pain level pre-treatment: 0/10   Pain level post-treatment: 0/10    Activity Tolerance:    Fair, pt fatigues easily    Please refer to the flowsheet for vital signs taken during this treatment.   After treatment:   []  Patient left in no apparent distress sitting up in chair  [x]  Patient left in no apparent distress in bed  [x]  Call bell left within reach  []  Nursing notified  [x]  Caregiver present  [x]  Bed alarm activated    COMMUNICATION/EDUCATION:   [] Role of Occupational Therapy in the acute care setting  [] Home safety education was provided and the patient/caregiver indicated understanding. [] Patient/family have participated as able in working towards goals and plan of care. [x] Patient/family agree to work toward stated goals and plan of care. [] Patient understands intent and goals of therapy, but is neutral about his/her participation. [] Patient is unable to participate in goal setting and plan of care.       Thank you for this referral.  RENATE Dubose  Time Calculation: 25 mins

## 2021-01-26 NOTE — CONSULTS
[address]    Name:  Rin Montgomery  MR#:   [id number]  :  1960  ACCOUNT #:  [de-identified]  DATE OF SERVICE:  2021    PSYCHIATRIC CONSULTATION    REFERRING PROVIDER:  Dyan Owens MD    IDENTIFYING INFORMATION:  The patient is a 27-year-old  male admitted to this facility on the above-mentioned date. BASIS FOR ADMISSION AND REASON FOR PSYCHIATRIC CONSULTATION:  Attention is invited to the patient's history and physical exam, this document being self-explanatory. There, the reasons for which the patient was brought to the attention of [facility], the findings upon his being examined there and the reasons for which he required medical admission are clearly stated. For the purpose of this current report, the reader is advised that, indeed, the patient was admitted to the facility after an initial diagnosis of acute renal failure of unclear etiology, and other medical concerns were raised when the patient was examined. Since then, communication with the patient's family included the concerns that the patient's daughter who resides in this area had mentioned to the attending hospitalist that she is concerned about her father having a possible history of bipolar illness. So for that reason, she requested a psychiatric consultation, this issue being mentioned by Dr. Silvia Sevilla who kindly requested me to evaluate the patient. For that reason, I was able to interview the patient, discuss the case with Dr. Silvia Sevilla, and also review his chart. PSYCHIATRIC HISTORY:  The patient denied any prior specific reason of a mood disorder with exception of the mood changes that he was having while residing in [place] and used to be drinking alcohol in excess, the same as his smoking cannabis. The changes were very much related thus of his being somewhat depressed at times.   However, very vehemently, the patient denied any changes in his mood that had created hypomanic or manic symptoms, the same as depressive symptoms. The patient described that his life had changed since he met his second wife, he  her, and he then stopped drinking and smoking pot. 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 Since then, the patient did describe being concerned and perhaps somewhat at times depressed because of the multiple medical problems that he has suffered with including the reasons for which he was hospitalized this time. Not only the patient is having problems with acute renal failure but also, in addition, he has had a history of difficulties with chronic lower extremity wounds with abscesses with status post bilateral incisions and drainage, the same as metabolic encephalopathic changes that he had when initially admitted. By the way, when the patient was examined today, the acute metabolic encephalopathic/delirium changes had subsided. MEDICAL HISTORY:  Please be referred to his attending and consultant notes that are self-explanatory. The patient has a history of an acute hypoxic respiratory failure, history of severe acute kidney injury, status post kidney biopsy, history of leukocytosis, chronic lower extremity wounds which required surgical incisions and drainage, a history of AFib and a history of hematuria. The patient has undergone dialysis since he was admitted and is being followed by different consultants including Nephrology and Neurology. Infectious Disease has also been involved in the care of the patient. Due to the presence of increased white blood cell count, Infectious Disease has been recalled to evaluate the patient. The prior history of seizures, diagnosed on or about 2017 to suffer with partial seizures in the form of simple and complex, manifested by eye fluttering, eye deviation and apparently an associated alteration of mentation.   When the patient was examined today, however, many of the difficulties that he had with his cognition, when admitted to the facility and shortly thereafter, had resolved. ALCOHOL AND DRUG HISTORY:  The patient has been clean for 18 years. He denies the use of alcohol, illicit substances, abusing over-the-counter medications or prescription medications. PERSONAL HISTORY AND FAMILY HISTORY:  The patient has been  to his second wife for 18 years, he was  to his first wife in excess of 20 years. If I understood him correctly, he has four children from his first wife, all of them not in the area with exception of a daughter who lives in this area. The chart indicated that the patient's daughter had not seen her father during the last 2 years, not clear reason as to which this happened. However, it appears that during the last 2-3 years, the patient has had different medical problems to have to deal with, and a possibility is that, as a result, his interaction with his daughter has been less than optimal.  However, he describes an excellent relationship with his second wife. It appears, by his own description, that she used to \"drink with him at times then;\" however, she also has stopped drinking since the patient did so. The relationship appears to be very positive, and he feels very supported by her. MENTAL STATUS EXAM:  This is a male who looks his stated age. During this evaluation, the patient was found to be coherent, showing quality of continuity of associations without evidence of flight of ideas and/or pressure of speech. He denied any ideas of reference or influence or any hallucinatory process. He denies being depressed other than \"concerned\" about the medical difficulties that he is having. Specifically, he denied any thoughts of self-harm and/or harming others. Cognition is currently intact. He was oriented x4.   He was able to provide a fairly good history regarding the medical problems that he has had in the past.  He had some difficulties pronouncing some of the medical words, which is understandable, but in general, he was able to provide a clear history of the difficulties that he has had during the last 2 or 3 years. When asked about his having history of seizures, he described them as \"one episode of stuttering when and/or around the timing which in which he had a ruptured spleen. \"  This appears to be more or less correct as described by the neurology consultant. Otherwise, the patient very specifically denied being overwhelmed by his current problems. He knows that his overall health is not the best.  However, he is still very hopeful, he says, as to his being able to improve in the near future. CLINICAL IMPRESSION:  AXIS I:  Acute metabolic encephalopathy/delirium, resolved. AXIS II:  Noncontributory. AXIS III:  Per attending and consultants. RECOMMENDATIONS:  First of all, let me thank Dr. Graeme Saint for this consultation. 1.  Reason for consultation was above described; however, it appears that was mostly related to concerns raised by the patient's daughter regarding the possibility of the patient suffering with a bipolar illness. 2.  Upon examination today, there is no current evidence that the patient suffers with a bipolar illness. There is a history of a mood disorder, apparently associated to his alcohol use disorder and cannabis use disorder. However, the patient stopped using alcohol and drugs 18 years or so ago. 3.  Upon examination today, there is no evidence of a cognitive impairment. There is no evidence of an actual mood disorder. Some depressive symptoms are completely normal based upon the patient's current medical difficulties and their extent, as to the number of days that he has been in the hospital and the fact that he is still being hospitalized. No evidence of an acute metabolic encephalopathy and delirium being noted at present.   Very thankfully, he has overcome the cognitive changes that he was having when initially admitted. As of now, there is no evidence of a bipolar illness by current examination or by prior history. I hope this information is useful; however, let me know if I can be of any further help.       Gery Leventhal, MD, LFAPA      [id number]  D:  01/26/2021 11:17  T:  01/26/2021 13:10  JOB #:  [id number]

## 2021-01-26 NOTE — PROGRESS NOTES
Progress  Note       57y M with PMH HTN, chronic pain, admitted for severe renal failure  Subjective      Overnight event noted  S/p kidney biopsy 9/21  Urine output remain low to nil  Feels ok   no specific chest pain,sob,nausea,vomiting,abdominal pain      IMPRESSION:   Acute kidney injury ATN vs RPGN, per history , has been having gross hematuria over last several weeks, brief use of nsaids, was on diuretics at home-- negative ANCA panel, negative anti GBM, negative hep B, C, HIV, negative JULIO  Proteinuria, nephrotic range with hematuria  S/p kidney biopsy on 1/21/2021  Anemia of chronic disease,iron deficiency  H/o nose bleeding episode   HTN  Lower extremities edema with cellulits, blister  Atrial fibrillation , on anti coagulation   H/o splenectomy   H/o heavy use of gabapentin ( about 2400mg daily for last 2 months)  Secondary hyperparathyroidism   PLAN:   HD tomorrow. He will be set up for Air[facility] dialysis unit on discharge. Working on chair time  No urine output  C/w  loading iron iv venofer   C/w  hectorol 2 mcg with each Rx  Epogen  Once iron repleted  Phos level is ok  Kidney biopsy report awaited    I called [facility] pathology department yesterday. They don't have the sample. I called IR and Pathology department at [facility] . Sample was sent to [facility] yesterday as sample was obtained after cut off time on Thursday and Friday is a no send day as there is no one at [facility] to receive the sample on weekend. Hence sent on monday. Hopefully report is available by wednesday       Facility-Administered Medications: None       Current Facility-Administered Medications   Medication Dose Route Frequency    apixaban (ELIQUIS) tablet 2.5 mg  2.5 mg Oral BID    iron sucrose (VENOFER) 100 mg in 0.9% sodium chloride 100 mL IVPB  100 mg IntraVENous Q24H    doxercalciferoL (HECTOROL) 4 mcg/2 mL injection 2 mcg  2 mcg IntraVENous DIALYSIS MON, WED & FRI    0.9% sodium chloride infusion 250 mL  250 mL IntraVENous PRN    OXcarbazepine (TRILEPTAL) tablet 600 mg  600 mg Oral BID    silver sulfADIAZINE (SILVADENE) 1 % topical cream   Topical DAILY    nicotine (NICODERM CQ) 14 mg/24 hr patch 1 Patch  1 Patch TransDERmal DAILY    0.9% sodium chloride infusion 250 mL  250 mL IntraVENous DIALYSIS PRN    heparin (porcine) 1,000 unit/mL injection 5,000 Units  5,000 Units InterCATHeter DIALYSIS PRN    dilTIAZem ER (CARDIZEM CD) capsule 120 mg  120 mg Oral Q12H    albumin human 25% (BUMINATE) solution 25 g  25 g IntraVENous DIALYSIS PRN    budesonide-formoteroL (SYMBICORT) 160-4.5 mcg/actuation HFA inhaler 2 Puff  2 Puff Inhalation BID RT    oxyCODONE-acetaminophen (PERCOCET) 5-325 mg per tablet 1 Tab  1 Tab Oral Q6H PRN    insulin lispro (HUMALOG) injection   SubCUTAneous AC&HS    glucose chewable tablet 16 g  4 Tab Oral PRN    glucagon (GLUCAGEN) injection 1 mg  1 mg IntraMUSCular PRN    dextrose (D50W) injection syrg 12.5-25 g  25-50 mL IntraVENous PRN    sodium chloride (NS) flush 5-40 mL  5-40 mL IntraVENous Q8H    sodium chloride (NS) flush 5-40 mL  5-40 mL IntraVENous Q8H    sodium chloride (NS) flush 5-40 mL  5-40 mL IntraVENous PRN    acetaminophen (TYLENOL) tablet 650 mg  650 mg Oral Q6H PRN    Or    acetaminophen (TYLENOL) suppository 650 mg  650 mg Rectal Q6H PRN    polyethylene glycol (MIRALAX) packet 17 g  17 g Oral DAILY PRN    promethazine (PHENERGAN) tablet 12.5 mg  12.5 mg Oral Q6H PRN    Or    ondansetron (ZOFRAN) injection 4 mg  4 mg IntraVENous Q6H PRN    amitriptyline (ELAVIL) tablet 50 mg  50 mg Oral QHS    melatonin tablet 5 mg  5 mg Oral QHS       Review of Systems:     As above  Data Review:    Labs: Results:       Chemistry Recent Labs     01/26/21  0359 01/25/21  0149 01/24/21  0348   GLU 79 89 93    137 140   K 3.4* 4.0 3.7    103 103   CO2 27 27 28   BUN 17 28* 22*   CREA 5.80* 7.57* 5.61*   CA 8.6 8.7 8.6   AGAP 9 7 9   BUCR 3* 4* 4*      CBC w/Diff Recent Labs     01/26/21  0359 01/25/21  0149 01/24/21  1826   WBC 18.8* 17.1*  --    RBC 2.73* 2.60*  --    HGB 8.1* 8.0* 7.5*   HCT 26.5* 25.5* 23.8*    472*  --    GRANS 80* 73  --    LYMPH 7* 9*  --    EOS 1 3  --       Coagulation No results for input(s): PTP, INR, APTT, INREXT, INREXT in the last 72 hours. Iron/Ferritin No results for input(s): IRON in the last 72 hours. No lab exists for component: TIBCCALC   BNP No results for input(s): BNPP in the last 72 hours. Cardiac Enzymes No results for input(s): CPK, CKND1, RUSSEL in the last 72 hours. No lab exists for component: CKRMB, TROIP   Liver Enzymes No results for input(s): TP, ALB, TBIL, AP in the last 72 hours.     No lab exists for component: SGOT, GPT, DBIL   Thyroid Studies Lab Results   Component Value Date/Time    TSH 1.94 01/14/2021 11:56 AM         EKG: atrial fibrilation     Physical Assessment:     Visit Vitals  BP (!) 107/45 (BP 1 Location: Left arm, BP Patient Position: Supine)   Pulse 86   Temp 97.9 °F (36.6 °C)   Resp 19   Ht 5' 10\" (1.778 m)   Wt 122.9 kg (270 lb 15.1 oz)   SpO2 96%   BMI 38.88 kg/m²     Weight change: -0.025 kg (-0.9 oz)    Intake/Output Summary (Last 24 hours) at 1/26/2021 1221  Last data filed at 1/25/2021 1247  Gross per 24 hour   Intake    Output 1000 ml   Net -1000 ml     Physical Exam:   General: comfortable, no acute distress   HEENT sclera anicteric, supple neck, no thyromegaly  CVS: S1S2 heard,  no rub  RS: + air entry b/l,   Abd: Soft, Non tender, Not distended,  Neuro: non focal, awake, alert , CN II-XII are grossly intact,    Extrm: ++edema, no cyanosis, clubbing   Skin:  dry  Musculoskeletal: No gross joints or bone deformities     Procedures/imaging: see electronic medical records for all procedures, Xrays and details which were not copied into this note but were reviewed prior to creation of Jose M Rios MD  January 26, 2021  [facility]  Office [phone]

## 2021-01-27 LAB
ANION GAP SERPL CALC-SCNC: 6 MMOL/L (ref 3–18)
ATRIAL RATE: 105 BPM
BASOPHILS # BLD: 0 K/UL (ref 0–0.06)
BASOPHILS NFR BLD: 0 % (ref 0–3)
BUN SERPL-MCNC: 25 MG/DL (ref 7–18)
BUN/CREAT SERPL: 3 (ref 12–20)
CALCIUM SERPL-MCNC: 8.8 MG/DL (ref 8.5–10.1)
CALCULATED R AXIS, ECG10: 24 DEGREES
CALCULATED T AXIS, ECG11: 37 DEGREES
CHLORIDE SERPL-SCNC: 99 MMOL/L (ref 100–111)
CO2 SERPL-SCNC: 29 MMOL/L (ref 21–32)
CREAT SERPL-MCNC: 7.53 MG/DL (ref 0.6–1.3)
DIAGNOSIS, 93000: NORMAL
DIFFERENTIAL METHOD BLD: ABNORMAL
EOSINOPHIL # BLD: 0.6 K/UL (ref 0–0.4)
EOSINOPHIL NFR BLD: 4 % (ref 0–5)
ERYTHROCYTE [DISTWIDTH] IN BLOOD BY AUTOMATED COUNT: 15.7 % (ref 11.6–14.5)
GLUCOSE BLD STRIP.AUTO-MCNC: 102 MG/DL (ref 70–110)
GLUCOSE BLD STRIP.AUTO-MCNC: 118 MG/DL (ref 70–110)
GLUCOSE BLD STRIP.AUTO-MCNC: 118 MG/DL (ref 70–110)
GLUCOSE BLD STRIP.AUTO-MCNC: 280 MG/DL (ref 70–110)
GLUCOSE BLD STRIP.AUTO-MCNC: 87 MG/DL (ref 70–110)
GLUCOSE BLD STRIP.AUTO-MCNC: 89 MG/DL (ref 70–110)
GLUCOSE BLD STRIP.AUTO-MCNC: 92 MG/DL (ref 70–110)
GLUCOSE BLD STRIP.AUTO-MCNC: 94 MG/DL (ref 70–110)
GLUCOSE SERPL-MCNC: 87 MG/DL (ref 74–99)
HCT VFR BLD AUTO: 26.1 % (ref 36–48)
HGB BLD-MCNC: 8.3 G/DL (ref 13–16)
LYMPHOCYTES # BLD: 1.8 K/UL (ref 0.8–3.5)
LYMPHOCYTES NFR BLD: 12 % (ref 20–51)
MCH RBC QN AUTO: 30.4 PG (ref 24–34)
MCHC RBC AUTO-ENTMCNC: 31.8 G/DL (ref 31–37)
MCV RBC AUTO: 95.6 FL (ref 74–97)
METAMYELOCYTES NFR BLD MANUAL: 1 %
MONOCYTES # BLD: 1.8 K/UL (ref 0–1)
MONOCYTES NFR BLD: 12 % (ref 2–9)
NEUTS SEG # BLD: 10.4 K/UL (ref 1.8–8)
NEUTS SEG NFR BLD: 71 % (ref 42–75)
PLATELET # BLD AUTO: 517 K/UL (ref 135–420)
PLATELET COMMENTS,PCOM: ABNORMAL
PMV BLD AUTO: 10.3 FL (ref 9.2–11.8)
POTASSIUM SERPL-SCNC: 3.3 MMOL/L (ref 3.5–5.5)
Q-T INTERVAL, ECG07: 378 MS
QRS DURATION, ECG06: 100 MS
QTC CALCULATION (BEZET), ECG08: 454 MS
RBC # BLD AUTO: 2.73 M/UL (ref 4.7–5.5)
RBC MORPH BLD: ABNORMAL
RBC MORPH BLD: ABNORMAL
SODIUM SERPL-SCNC: 134 MMOL/L (ref 136–145)
VENTRICULAR RATE, ECG03: 87 BPM
WBC # BLD AUTO: 14.6 K/UL (ref 4.6–13.2)

## 2021-01-27 PROCEDURE — 99232 SBSQ HOSP IP/OBS MODERATE 35: CPT | Performed by: EMERGENCY MEDICINE

## 2021-01-27 PROCEDURE — 94640 AIRWAY INHALATION TREATMENT: CPT

## 2021-01-27 PROCEDURE — 2709999900 HC NON-CHARGEABLE SUPPLY

## 2021-01-27 PROCEDURE — 74011250637 HC RX REV CODE- 250/637: Performed by: EMERGENCY MEDICINE

## 2021-01-27 PROCEDURE — 90935 HEMODIALYSIS ONE EVALUATION: CPT

## 2021-01-27 PROCEDURE — 65270000029 HC RM PRIVATE

## 2021-01-27 PROCEDURE — 74011250637 HC RX REV CODE- 250/637: Performed by: INTERNAL MEDICINE

## 2021-01-27 PROCEDURE — 85025 COMPLETE CBC W/AUTO DIFF WBC: CPT

## 2021-01-27 PROCEDURE — 80048 BASIC METABOLIC PNL TOTAL CA: CPT

## 2021-01-27 PROCEDURE — 74011250637 HC RX REV CODE- 250/637: Performed by: HOSPITALIST

## 2021-01-27 PROCEDURE — 82962 GLUCOSE BLOOD TEST: CPT

## 2021-01-27 PROCEDURE — 77010033678 HC OXYGEN DAILY

## 2021-01-27 PROCEDURE — 93005 ELECTROCARDIOGRAM TRACING: CPT

## 2021-01-27 PROCEDURE — 74011250637 HC RX REV CODE- 250/637: Performed by: PSYCHIATRY & NEUROLOGY

## 2021-01-27 PROCEDURE — 74011250636 HC RX REV CODE- 250/636: Performed by: INTERNAL MEDICINE

## 2021-01-27 PROCEDURE — 74011250637 HC RX REV CODE- 250/637: Performed by: PODIATRIST

## 2021-01-27 PROCEDURE — 77030027138 HC INCENT SPIROMETER -A

## 2021-01-27 PROCEDURE — 74011000258 HC RX REV CODE- 258: Performed by: INTERNAL MEDICINE

## 2021-01-27 RX ORDER — BISACODYL 5 MG
10 TABLET, DELAYED RELEASE (ENTERIC COATED) ORAL DAILY PRN
Status: DISCONTINUED | OUTPATIENT
Start: 2021-01-27 | End: 2021-02-01 | Stop reason: HOSPADM

## 2021-01-27 RX ADMIN — Medication 10 ML: at 18:47

## 2021-01-27 RX ADMIN — DOXERCALCIFEROL 2 MCG: 4 INJECTION, SOLUTION INTRAVENOUS at 17:30

## 2021-01-27 RX ADMIN — DILTIAZEM HYDROCHLORIDE 120 MG: 120 CAPSULE, COATED, EXTENDED RELEASE ORAL at 09:12

## 2021-01-27 RX ADMIN — BUDESONIDE AND FORMOTEROL FUMARATE DIHYDRATE 2 PUFF: 160; 4.5 AEROSOL RESPIRATORY (INHALATION) at 08:00

## 2021-01-27 RX ADMIN — Medication 10 ML: at 06:48

## 2021-01-27 RX ADMIN — SILVER SULFADIAZINE: 10 CREAM TOPICAL at 18:48

## 2021-01-27 RX ADMIN — APIXABAN 2.5 MG: 5 TABLET, FILM COATED ORAL at 18:46

## 2021-01-27 RX ADMIN — IRON SUCROSE 100 MG: 20 INJECTION, SOLUTION INTRAVENOUS at 18:46

## 2021-01-27 RX ADMIN — DILTIAZEM HYDROCHLORIDE 120 MG: 120 CAPSULE, COATED, EXTENDED RELEASE ORAL at 21:47

## 2021-01-27 RX ADMIN — BUDESONIDE AND FORMOTEROL FUMARATE DIHYDRATE 2 PUFF: 160; 4.5 AEROSOL RESPIRATORY (INHALATION) at 21:16

## 2021-01-27 RX ADMIN — Medication 10 ML: at 21:48

## 2021-01-27 RX ADMIN — APIXABAN 2.5 MG: 5 TABLET, FILM COATED ORAL at 09:12

## 2021-01-27 RX ADMIN — OXCARBAZEPINE 600 MG: 300 TABLET, FILM COATED ORAL at 09:12

## 2021-01-27 RX ADMIN — Medication 5 MG: at 21:47

## 2021-01-27 NOTE — PROGRESS NOTES
Progress  Note       57y M with PMH HTN, chronic pain, admitted for severe renal failure  Subjective      Overnight event noted  S/p kidney biopsy 9/21  Asterixis  Impaired concentration and cognition  Doesn't feel right      IMPRESSION:   Acute kidney injury ATN vs RPGN, per history , has been having gross hematuria over last several weeks, brief use of nsaids, was on diuretics at home-- negative ANCA panel, negative anti GBM, negative hep B, C, HIV, negative JULIO  Proteinuria, nephrotic range with hematuria  Impaired concentration,cognition,asterexis vs tardive dyskinesia due to ?trileptal, amitriptyline. S/p kidney biopsy on 1/21/2021  Anemia of chronic disease,iron deficiency  H/o nose bleeding episode   HTN  Lower extremities edema with cellulits, blister  Atrial fibrillation , on anti coagulation   H/o splenectomy   H/o heavy use of gabapentin ( about 2400mg daily for last 2 months)  Secondary hyperparathyroidism   PLAN:   He has asterexis today. Also Impaired concentration,cognition today. Suspect trileptal induced. Amitriptyline can cause some some of these too. HD should clear this. Would hold both the above meds for now. If tripletal is needed then dose with 600 mg post dialysis Monday ,wed,friday  No urine output  C/w  loading iron iv venofer   C/w  hectorol 2 mcg with each Rx  Epogen  Once iron repleted  Phos level is ok  Kidney biopsy report awaited. I will call [facility] again today  Spoke with HD nurse.  HD today       Facility-Administered Medications: None       Current Facility-Administered Medications   Medication Dose Route Frequency    polyethylene glycol (MIRALAX) packet 17 g  17 g Oral DAILY    apixaban (ELIQUIS) tablet 2.5 mg  2.5 mg Oral BID    iron sucrose (VENOFER) 100 mg in 0.9% sodium chloride 100 mL IVPB  100 mg IntraVENous Q24H    doxercalciferoL (HECTOROL) 4 mcg/2 mL injection 2 mcg  2 mcg IntraVENous DIALYSIS MON, WED & FRI    0.9% sodium chloride infusion 250 mL  250 mL IntraVENous PRN    OXcarbazepine (TRILEPTAL) tablet 600 mg  600 mg Oral BID    silver sulfADIAZINE (SILVADENE) 1 % topical cream   Topical DAILY    nicotine (NICODERM CQ) 14 mg/24 hr patch 1 Patch  1 Patch TransDERmal DAILY    0.9% sodium chloride infusion 250 mL  250 mL IntraVENous DIALYSIS PRN    heparin (porcine) 1,000 unit/mL injection 5,000 Units  5,000 Units InterCATHeter DIALYSIS PRN    dilTIAZem ER (CARDIZEM CD) capsule 120 mg  120 mg Oral Q12H    albumin human 25% (BUMINATE) solution 25 g  25 g IntraVENous DIALYSIS PRN    budesonide-formoteroL (SYMBICORT) 160-4.5 mcg/actuation HFA inhaler 2 Puff  2 Puff Inhalation BID RT    oxyCODONE-acetaminophen (PERCOCET) 5-325 mg per tablet 1 Tab  1 Tab Oral Q6H PRN    insulin lispro (HUMALOG) injection   SubCUTAneous AC&HS    glucose chewable tablet 16 g  4 Tab Oral PRN    glucagon (GLUCAGEN) injection 1 mg  1 mg IntraMUSCular PRN    dextrose (D50W) injection syrg 12.5-25 g  25-50 mL IntraVENous PRN    sodium chloride (NS) flush 5-40 mL  5-40 mL IntraVENous Q8H    sodium chloride (NS) flush 5-40 mL  5-40 mL IntraVENous Q8H    sodium chloride (NS) flush 5-40 mL  5-40 mL IntraVENous PRN    acetaminophen (TYLENOL) tablet 650 mg  650 mg Oral Q6H PRN    Or    acetaminophen (TYLENOL) suppository 650 mg  650 mg Rectal Q6H PRN    promethazine (PHENERGAN) tablet 12.5 mg  12.5 mg Oral Q6H PRN    Or    ondansetron (ZOFRAN) injection 4 mg  4 mg IntraVENous Q6H PRN    amitriptyline (ELAVIL) tablet 50 mg  50 mg Oral QHS    melatonin tablet 5 mg  5 mg Oral QHS       Review of Systems:     As above  Data Review:    Labs: Results:       Chemistry Recent Labs     01/27/21  0358 01/26/21  0359 01/25/21  0149   GLU 87 79 89   * 136 137   K 3.3* 3.4* 4.0   CL 99* 100 103   CO2 29 27 27   BUN 25* 17 28*   CREA 7.53* 5.80* 7.57*   CA 8.8 8.6 8.7   AGAP 6 9 7   BUCR 3* 3* 4*      CBC w/Diff Recent Labs     01/27/21  0358 01/26/21  0359 01/25/21  0149   WBC 14.6* 18.8* 17.1*   RBC 2.73* 2.73* 2.60*   HGB 8.3* 8.1* 8.0*   HCT 26.1* 26.5* 25.5*   * 419 472*   GRANS 71 80* 73   LYMPH 12* 7* 9*   EOS 4 1 3      Coagulation No results for input(s): PTP, INR, APTT, INREXT, INREXT in the last 72 hours. Iron/Ferritin No results for input(s): IRON in the last 72 hours. No lab exists for component: TIBCCALC   BNP No results for input(s): BNPP in the last 72 hours. Cardiac Enzymes No results for input(s): CPK, CKND1, RUSSEL in the last 72 hours. No lab exists for component: CKRMB, TROIP   Liver Enzymes No results for input(s): TP, ALB, TBIL, AP in the last 72 hours.     No lab exists for component: SGOT, GPT, DBIL   Thyroid Studies Lab Results   Component Value Date/Time    TSH 1.94 01/14/2021 11:56 AM         EKG: atrial fibrilation     Physical Assessment:     Visit Vitals  BP (!) 100/59 (BP 1 Location: Left arm)   Pulse 83   Temp 97.6 °F (36.4 °C)   Resp 20   Ht 5' 10\" (1.778 m)   Wt 122.9 kg (270 lb 15.1 oz)   SpO2 99%   BMI 38.88 kg/m²     Weight change: 0 kg (0 lb)  No intake or output data in the 24 hours ending 01/27/21 1225  Physical Exam:     HEENT sclera anicteric, supple neck, no thyromegaly  CVS: S1S2 heard,  no rub  RS: + air entry b/l,   Abd: Soft, Non tender, Not distended,  Neuro: mild confusion,asterixis,impaired concentration    Extrm: ++edema, no cyanosis, clubbing   Skin:  dry  Musculoskeletal: No gross joints or bone deformities     Procedures/imaging: see electronic medical records for all procedures, Xrays and details which were not copied into this note but were reviewed prior to creation of Lala Durbin MD  January 27, 2021  [facility]  Office [phone]

## 2021-01-27 NOTE — PROGRESS NOTES
Infectious Disease progress note    Reconsulted for worsening leukocytosis    Reason: Bilateral leg abscess, cellulitis    Current abx Prior abx   Cefepime since 1/14/21-1/20  Levofloxacin, Augmentin 1/20-1/23 Vancomycin 1/14-1/16     Lines:       Assessment :      56 y.o.  male  with history of alcoholism, atrial fibrillation presented to ED on 1/14/2021 with near syncope and weakness, dyspnea with exertion and swelling to extremities for the past 1 month. Clinical presentation consistent with bilateral leg abscess, cellulitis status post incision and drainage of bilateral leg abscesses on 1/16/2021. Intra-Op culture negative. Preop culture positive for methicillin susceptible Staph aureus, Staph epidermidis    Now with worsening leukocytosis on 1/26. Leukocytosis likely leukemoid reaction to constipation along with leukocyte demargination due to splenectomized state       No definitive clinical evidence of infection noted on today's exam.  Ulcers bilateral lower extremity appears superficial and clean. No signs of active infection noted on today's exam  Had 2 bowel movements yesterday. Improved leukocytosis. Recommendations:    1. Monitor off antibiotics  2. Wound care bilateral lower extremity per podiatrist  3. Continue MiraLAX daily for constipation  4. Follow-up blood culture  5. Monitor CBC, clinically      HPI:    States that he feels fine. Had 2 bowel movements yesterday. Denies increasing chest pain, shortness of breath, abdominal pain. Home Medication List    Details   magnesium 250 mg tab Take 1 Tab by mouth daily. gabapentin (NEURONTIN) 800 mg tablet take 2 tablets by mouth three times a day  Qty: 180 Tab, Refills: 5    Associated Diagnoses: Neuropathy      atenolol (TENORMIN) 100 mg tablet Take 1 Tab by mouth daily. Qty: 90 Tab, Refills: 0    Associated Diagnoses: AAA (abdominal aortic aneurysm) without rupture ([address] Chronic atrial fibrillation ([address]  Essential hypertension      budesonide-formoterol (SYMBICORT) 160-4.5 mcg/actuation HFAA inhale 2 puffs by mouth twice a day  Qty: 1 Inhaler, Refills: 2      dabigatran etexilate (PRADAXA) 150 mg capsule Take 1 Cap by mouth every twelve (12) hours. Qty: 180 Cap, Refills: 3      dilTIAZem CD (CARTIA XT) 180 mg ER capsule Take 1 Cap by mouth two (2) times a day. Qty: 180 Cap, Refills: 2    Associated Diagnoses: AAA (abdominal aortic aneurysm) without rupture ([address] Chronic atrial fibrillation ([address] Essential hypertension      hydroCHLOROthiazide (HYDRODIURIL) 12.5 mg tablet Take 1 Tab by mouth daily. Qty: 30 Tab, Refills: 2    Associated Diagnoses: Essential hypertension      ergocalciferol (ERGOCALCIFEROL) 50,000 unit capsule Take 1 Cap by mouth every seven (7) days. Qty: 12 Cap, Refills: 0    Associated Diagnoses: Vitamin D deficiency      saw palmetto xtr/zinc picolin (SAW PALMETTO EXTRACT PO) Take  by mouth. amitriptyline (ELAVIL) 25 mg tablet take 1 tablet by mouth NIGHTLY  Qty: 30 Tab, Refills: 1    Associated Diagnoses: Chronic pain syndrome; Depression, unspecified depression type; Neuropathy      melatonin tab tablet Take  by mouth nightly. cholecalciferol, vitamin D3, (VITAMIN D3) 2,000 unit tab Take  by mouth two (2) times a day. MULTIVIT-MIN/FA/LYCOPEN/LUTEIN (CENTRUM SILVER MEN PO) Take 1 Tab by mouth daily. potassium 99 mg tablet Take 99 mg by mouth daily. fish oil-dha-epa 1,200-144-216 mg cap Take 1 Tab by mouth daily. albuterol (PROAIR HFA) 90 mcg/actuation inhaler Take 2 Puffs by inhalation every four (4) hours as needed for Wheezing. Qty: 1 Inhaler, Refills: 1      b complex vitamins tablet Take 1 Tab by mouth daily.              Current Facility-Administered Medications   Medication Dose Route Frequency    polyethylene glycol (MIRALAX) packet 17 g  17 g Oral DAILY    apixaban (ELIQUIS) tablet 2.5 mg  2.5 mg Oral BID    iron sucrose (VENOFER) 100 mg in 0.9% sodium chloride 100 mL IVPB  100 mg IntraVENous Q24H    doxercalciferoL (HECTOROL) 4 mcg/2 mL injection 2 mcg  2 mcg IntraVENous DIALYSIS MON, WED & FRI    0.9% sodium chloride infusion 250 mL  250 mL IntraVENous PRN    OXcarbazepine (TRILEPTAL) tablet 600 mg  600 mg Oral BID    silver sulfADIAZINE (SILVADENE) 1 % topical cream   Topical DAILY    nicotine (NICODERM CQ) 14 mg/24 hr patch 1 Patch  1 Patch TransDERmal DAILY    0.9% sodium chloride infusion 250 mL  250 mL IntraVENous DIALYSIS PRN    heparin (porcine) 1,000 unit/mL injection 5,000 Units  5,000 Units InterCATHeter DIALYSIS PRN    dilTIAZem ER (CARDIZEM CD) capsule 120 mg  120 mg Oral Q12H    albumin human 25% (BUMINATE) solution 25 g  25 g IntraVENous DIALYSIS PRN    budesonide-formoteroL (SYMBICORT) 160-4.5 mcg/actuation HFA inhaler 2 Puff  2 Puff Inhalation BID RT    oxyCODONE-acetaminophen (PERCOCET) 5-325 mg per tablet 1 Tab  1 Tab Oral Q6H PRN    insulin lispro (HUMALOG) injection   SubCUTAneous AC&HS    glucose chewable tablet 16 g  4 Tab Oral PRN    glucagon (GLUCAGEN) injection 1 mg  1 mg IntraMUSCular PRN    dextrose (D50W) injection syrg 12.5-25 g  25-50 mL IntraVENous PRN    sodium chloride (NS) flush 5-40 mL  5-40 mL IntraVENous Q8H    sodium chloride (NS) flush 5-40 mL  5-40 mL IntraVENous Q8H    sodium chloride (NS) flush 5-40 mL  5-40 mL IntraVENous PRN    acetaminophen (TYLENOL) tablet 650 mg  650 mg Oral Q6H PRN    Or    acetaminophen (TYLENOL) suppository 650 mg  650 mg Rectal Q6H PRN    promethazine (PHENERGAN) tablet 12.5 mg  12.5 mg Oral Q6H PRN    Or    ondansetron (ZOFRAN) injection 4 mg  4 mg IntraVENous Q6H PRN    amitriptyline (ELAVIL) tablet 50 mg  50 mg Oral QHS    melatonin tablet 5 mg  5 mg Oral QHS       Allergies: Patient has no known allergies.     Temp (24hrs), Av.7 °F (36.5 °C), Min:97.5 °F (36.4 °C), Max:97.8 °F (36.6 °C)    Visit Vitals  BP (!) 100/59 (BP 1 Location: Left arm)   Pulse 83   Temp 97.6 °F (36.4 °C)   Resp 20   Ht 5' 10\" (1.778 m)   Wt 122.9 kg (270 lb 15.1 oz)   SpO2 99%   BMI 38.88 kg/m²       ROS: 12 point review systems obtained details. Pertinent positives mentioned in history of present illness, otherwise negative    Physical Exam:    General: Well developed, well nourished male laying on the bed,in no acute distress. Appears more alert and communicative compared to prior exam  General:   awake alert and oriented   HEENT:  Normocephalic, atraumatic, no scleral icterus or pallor; no conjunctival hemmohage;  nasal and oral mucous are moist and without evidence of lesions. . Neck supple, no bruits. Lymph Nodes:   no cervical, axillary or inguinal adenopathy   Lungs:   non-labored, bilaterally clear to auscultation- no crackles wheezes rales or rhonchi   Heart:  RRR, s1 and s2; no  rubs or gallops, no edema, + pedal pulses   Abdomen:  soft, non-distended, active bowel sounds, no hepatomegaly, no splenomegaly. Non-tender   Genitourinary:  deferred   Extremities:   Multiple superficial ulcers noted bilateral legs, resolved surrounding erythema. No significant tenderness bilateral legs. No induration bilateral legs. Full ROM of all large joints to the upper and lower extremities; muscle mass appropriate for age   Neurologic:  No gross focal sensory abnormalities; 5/5 muscle strength to upper and lower extremities. Speech appropriate.  Cranial nerves intact                        Skin:   Multiple superficial ulcers bilateral legs as mentioned above   Back:  no spinal or paraspinal muscle tenderness or rigidity, no CVA tenderness     Psychiatric:   Appropriate mood and affect         Labs: Results:   Chemistry Recent Labs     01/27/21 0358 01/26/21 0359 01/25/21  0149   GLU 87 79 89   * 136 137   K 3.3* 3.4* 4.0   CL 99* 100 103   CO2 29 27 27   BUN 25* 17 28*   CREA 7.53* 5.80* 7.57*   CA 8.8 8.6 8.7   AGAP 6 9 7   BUCR 3* 3* 4*      CBC w/Diff Recent Labs     01/27/21 0358 01/26/21 0359 01/25/21  0149   WBC 14.6* 18.8* 17.1*   RBC 2.73* 2.73* 2.60*   HGB 8.3* 8.1* 8.0*   HCT 26.1* 26.5* 25.5*   * 419 472*   GRANS 71 80* 73   LYMPH 12* 7* 9*   EOS 4 1 3      Microbiology Recent Labs     01/26/21  1300 01/26/21  1250   CULT NO GROWTH AFTER 20 HOURS NO GROWTH AFTER 20 HOURS          RADIOLOGY:    All available imaging studies/reports in [facility] for this admission were reviewed        Dr. Zuri Henson, Infectious Disease Specialist  [phone]  January 27, 2021  3:01 PM

## 2021-01-27 NOTE — PROGRESS NOTES
Problem: Pain  Goal: *Control of Pain  Outcome: Progressing Towards Goal     Problem: Impaired Skin Integrity/Pressure Injury Treatment  Goal: *Improvement of Existing Pressure Injury  Outcome: Progressing Towards Goal  Goal: *Prevention of pressure injury  Description: Document Oswaldo Scale and appropriate interventions in the flowsheet. Outcome: Progressing Towards Goal  Note: Pressure Injury Interventions:  Sensory Interventions: Turn and reposition approx. every two hours (pillows and wedges if needed), Pressure redistribution bed/mattress (bed type)    Moisture Interventions: Maintain skin hydration (lotion/cream)    Activity Interventions: Increase time out of bed, Pressure redistribution bed/mattress(bed type)    Mobility Interventions: Pressure redistribution bed/mattress (bed type)    Nutrition Interventions: Document food/fluid/supplement intake, Offer support with meals,snacks and hydration    Friction and Shear Interventions: Apply protective barrier, creams and emollients, Minimize layers                Problem: Falls - Risk of  Goal: *Absence of Falls  Description: Document David Fall Risk and appropriate interventions in the flowsheet. Outcome: Progressing Towards Goal  Note: Fall Risk Interventions:  Mobility Interventions: Communicate number of staff needed for ambulation/transfer, Bed/chair exit alarm    Mentation Interventions: Room close to nurse's station, Reorient patient, More frequent rounding    Medication Interventions: Teach patient to arise slowly, Patient to call before getting OOB, Evaluate medications/consider consulting pharmacy    Elimination Interventions:  Toileting schedule/hourly rounds, Toilet paper/wipes in reach, Patient to call for help with toileting needs, Elevated toilet seat, Call light in reach              Problem: Pressure Injury - Risk of  Goal: *Prevention of pressure injury  Description: Document Oswaldo Scale and appropriate interventions in the flowsheet. Outcome: Progressing Towards Goal  Note: Pressure Injury Interventions:  Sensory Interventions: Turn and reposition approx.  every two hours (pillows and wedges if needed), Pressure redistribution bed/mattress (bed type)    Moisture Interventions: Maintain skin hydration (lotion/cream)    Activity Interventions: Increase time out of bed, Pressure redistribution bed/mattress(bed type)    Mobility Interventions: Pressure redistribution bed/mattress (bed type)    Nutrition Interventions: Document food/fluid/supplement intake, Offer support with meals,snacks and hydration    Friction and Shear Interventions: Apply protective barrier, creams and emollients, Minimize layers                Problem: Nutrition Deficit  Goal: *Optimize nutritional status  Outcome: Progressing Towards Goal

## 2021-01-27 NOTE — PROGRESS NOTES
ACUTE HEMODIALYSIS FLOW SHEET    HEMODIALYSIS ORDERS: Physician: Dr. Marybel Lopez: Kassy   Duration: 3 hr  BFR: 350   DFR: 600   Dialysate:  Temp 36.0   K+   3    Ca+  2.5   Na 138   Bicarb 35   Wt Readings from Last 1 Encounters:   01/26/21 122.9 kg (270 lb 15.1 oz)    Patient Chart [x]   Unable to Obtain []  Dry weight/UF Goal: 1500 ml  Access RIJ     Heparin []  Bolus    Units    [] Hourly    Units    [x]None      Catheter locking solution Heparin 1:1000   Pre BP:   116/63    Pulse:  75   Respirations: 20    Temperature:  98.0 Axillary    Tx: NSS   ml/Bolus   [x] N/A   Labs: []  Pre  []  Post:   [x] N/A   Additional Orders(medications, blood products, hypotension management): [] Yes   [x] No     [x]  Janak Consent Verified     CATHETER ACCESS:  []N/A   [x]Right   []Left   [x]IJ     []Fem   []Chest wall   [] First use X-ray verified     [x]Tunnel    [] Non Tunneled   [x]No S/S infection  []Redness  []Drainage []Cultured []Swelling []Pain   [x]Medical Aseptic Prep Utilized   [x]Dressing Changed  [x] Biopatch  Date: 1/27/21   []Clotted   [x]Patent   Flows: [x]Good  []Poor  []Reversed   If access problem,  notified: []Yes    [x]N/A            GENERAL ASSESSMENT:    LUNGS:   SaO2%      [x] Clear  [] Coarse  [] Crackles  [] Wheezing                                                [] Diminished     Location : []RLL   []LLL    []RUL  []SUKHJINDER   Cough: []Productive  []Dry  [x]N/A   Respirations:  [x]Easy  []Labored   Therapy:  []RA  [x]NC 2/min    Mask: []NRB  [] Venti    O2%                  []Ventilator  []Intubated  [] Trach  [] BiPaP   CARDIAC: []Regular      [x] Irregular   [] Pericardial Rub  [] JVD          []  Monitored  [] Bedside  [] Remotely monitored     EDEMA: [x] None  []Generalized  [x] Pitting [] 1    [x] 2    [] 3    [] 4                 [] Facial  [] Pedal  []  UE  [x] LE   SKIN:   [x] Warm  [] Hot     [] Cold   [x] Dry     [] Pale   [] Diaphoretic                  [] Flushed [] Jaundiced  [] Cyanotic  [] Rash  [] Weeping   LOC:    [x] Alert      [x]Oriented:    [x] Person     [x] Place  []Time               [] Confused  [] Lethargic  [] Medicated  [] Non-responsive  [] Non-Verbal   GI / ABDOMEN:                     [] Flat    [] Distended    [x] Soft    [] Firm   []  Obese                   [] Diarrhea  [x] Bowel Sounds  [] Nausea  [] Vomiting     / URINE ASSESSMENT:                   [x] Voiding   [] Oliguria  [] Anuria   []  Garza                  [] Incontinent  []  Incontinent Brief []  Fecal Management System     PAIN:  [x] 0 []1  []2   []3   []4   []5   []6   []7   []8   []9   []10            Scale 0-10  Action/Follow Up:    MOBILITY:  [x] Bed    [] Stretcher      All Vitals and Treatment Details on Attached [address]: [facility]          Room # 823/91   [] 1st Time Acute      [] Stat       [x] Routine      [] Urgent     [x] Acute Room  []  Bedside  [] ICU/CCU  [] ER   Isolation Precautions:  [x] Dialysis    There are currently no Active Isolations       ALLERGIES:     No Known Allergies   Code Status:  Full Code     Hepatitis Status     Lab Results   Component Value Date/Time    Hepatitis B surface Ag <0.10 01/14/2021 09:00 PM    Hepatitis B surface Ab 4.78 (L) 01/14/2021 09:00 PM    Hepatitis B core, IgM Negative 01/14/2021 09:00 PM    Hepatitis C virus Ab 0.03 01/14/2021 09:00 PM        Current Labs:      Lab Results   Component Value Date/Time    WBC 14.6 (H) 01/27/2021 03:58 AM    HGB 8.3 (L) 01/27/2021 03:58 AM    HCT 26.1 (L) 01/27/2021 03:58 AM    PLATELET 857 (H) 50/18/9256 03:58 AM    MCV 95.6 01/27/2021 03:58 AM     Lab Results   Component Value Date/Time    Sodium 134 (L) 01/27/2021 03:58 AM    Potassium 3.3 (L) 01/27/2021 03:58 AM    Chloride 99 (L) 01/27/2021 03:58 AM    CO2 29 01/27/2021 03:58 AM    Anion gap 6 01/27/2021 03:58 AM    Glucose 87 01/27/2021 03:58 AM    BUN 25 (H) 01/27/2021 03:58 AM    Creatinine 7.53 (H) 01/27/2021 03:58 AM    BUN/Creatinine ratio 3 (L) 01/27/2021 03:58 AM    GFR est AA 9 (L) 01/27/2021 03:58 AM    GFR est non-AA 7 (L) 01/27/2021 03:58 AM    Calcium 8.8 01/27/2021 03:58 AM          DIET:  DIET RENAL  DIET NUTRITIONAL SUPPLEMENTS      PRIMARY NURSE REPORT:   Pre Dialysis: CONSUELO hCu RN     Time: 1510        EDUCATION:    [x] Patient [] Other           Knowledge Basis: []None [x]Minimal [] Substantial   Barriers to learning  [x]N/A   [] Access Care     [] S&S of infection  [] Fluid Management  [] K+   [x] Procedural    []Albumin   [] Medications   [] Tx Options   [] Transplant   [] Diet   [] Other   Teaching Tools:  [x] Explain  [] Demo  [] Handouts [] Video  Patient response: [x] Verbalized understanding  [] Teach back  [] Return demonstration   [] Requires follow up      [x]Time Out/Safety Check  [x] Extracorporeal Circuit Tested for integrity       RO/HEMODIALYSIS MACHINE SAFETY CHECKS  Before each treatment:     Machine Number:                   [facility]                                    [x] Unit Machine # 6 with centralized RO                                                                                  Alarm Test:  Pass time 3179            [x] RO/Machine Log Complete    Machine Temp    36.0             Dialysate: pH  7.4    Conductivity: Meter 13.8     HD Machine  13.8      TCD: 13.7  Dialyzer Lot # [id number]     Blood Tubing Lot # [id number]    Saline Lot # [id number]     CHLORINE TESTING-Before each treatment and every 4 hours    Total Chlorine: [x] less than 0.1 ppm  Initial Time Check: 1300       4 Hr/2nd Check Time: 1700   (if greater than 0.1 ppm from Primary then every 30 minutes from Secondary)     TREATMENT INITIATION  with Dialysis Precautions:   [x] All Connections Secured              [x] Saline Line Double Clamped   [x] Venous Parameters Set               [x] Arterial Parameters Set    [x] Prime Given 250ml NSS              [x]Air Foam Detector Engaged      Treatment Initiation Note:  1400 Pt arrived to HD without any complaints. Pt A &O X 3, follows commands, no distress noted. Pt states he is very sleepy. During Treatment Notes:  [address] assessed no abnormalities noted, line patent with good flow. CVC accessed without any difficulty, pt tolerated well. Vascular access visible with arterial and venous line connections intact. 1430  Pt making snoring sounds, Dr. Camille Nava at bedside unable to stimulate patient. Pt given D50 and 200cc of NS.   1500  Vascular access visible with arterial and venous line connections intact. 1600 Pt resting with eyes closed. Vascular access visible with arterial and venous line connections intact. 1700  Vascular access visible with arterial and venous line connections intact. Medication Dose Volume Route Time Janak Nurse, Title   D50 NS 25g 50ml  Jasmine Babb RN   hectoral 2mcg 1ml HD 50 [facility] RAFA Sy     Post Assessment  Dialyzer Cleared:[] Good [x] Fair  [] Poor  Blood processed:  72.3 L  UF Removed:  100 Ml  Post /71  Pulse  80 Resp  18   Temp 98.1           CVC Catheter: [] N/A  Locking solution: Heparin 1:1000 U  Arterial port 1.9 ml   Venous port 1.9ml         Skin:[x] Warm  [x] Dry [] Diaphoretic               [] Flushed  [] Pale [] Cyanotic   Pain:  [x]0  []1 []2  []3 []4  []5  []6 []7 []8  []9  []10       Post Treatment Note:   5516  HD completed at this time, pt tolerated well. Dressing clean, dry and intact. POST TREATMENT PRIMARY NURSE HANDOFF REPORT:   Post Dialysis: CONSUELO Alegre RN                Time:  1365       Abbreviations: AVG-arterial venous graft, AVF-arterial venous fistula, IJ-Internal Jugular, Subcl-Subclavian, Fem-Femoral, Tx-treatment, AP/HR-apical heart rate, DFR-dialysate flow rate, BFR-blood flow rate, AP-arterial pressure, -venous pressure, UF-ultrafiltrate, TMP-transmembrane pressure, Elkin-Venous, Art-Arterial, RO-Reverse Osmosis

## 2021-01-27 NOTE — PROGRESS NOTES
Patient c/o feeling bad explained to patient he needs to eat poor appetite noted encouraged to drink nepro assisted with feeding tolerated well will continue to monitor vitals and labs    1035 Dr Corrinne Leatherwood made aware will come to assess patient patient resting in bed safety precaution taken

## 2021-01-27 NOTE — PROGRESS NOTES
Seen and examined during HD  Patient drowsy  Given D50 amp  Not much improvement  Gave 200 cc NS bolus as BP was dropping in 60s  Now waking up buts still weak  BS is in 200s on check  BP on recheck is 101/70  Will let primary team know about this event so that they can work him up for encephalopathic which in my suspicion is drug induced with trileptal.  Total time spent for patient care today is 1 hr and 20 minutes(4 pm to 5pm/11:40 t0 12:00 noon)

## 2021-01-27 NOTE — PROGRESS NOTES
Nutrition Assessment     Type and Reason for Visit: Reassess, RD nutrition re-screen/LOS    Nutrition Recommendations/Plan:   - Modify supplements: Add Ensure Pudding TID.   - Encourage po intake of meals and supplements       Nutrition Assessment:  Pt reported poor/fair appetite and meal intake. Likes nepro drinks, only drinking one of two. Encouraged increasing po intake. Angela Murray agreeable to trying Ensure Pudding too; stated Magic Cup likely to cause his dentures to separate from his gums (has this problem with ice cream like products). meal and nutirtion supplement intake encouraged. K low today and yesterday; discussed with Nephrology; per MD, pt to have HD today and K to be replaced with the dialysis solution. Malnutrition Assessment:  Malnutrition Status: No malnutrition     Estimated Daily Nutrient Needs:  Energy (kcal):  0225-9075  Protein (g):  127-152       Fluid (ml/day):  500 mL + total output    Nutrition Related Findings:  BM 1/26 loose. +edema. meds: hectorol, venofer. SLP following. Current Nutrition Therapies:  DIET RENAL Regular  DIET NUTRITIONAL SUPPLEMENTS Breakfast, Dinner; Nepro    Anthropometric Measures:  · Height:  5' 10\" (177.8 cm)  · Current Body Wt:  127 kg (279 lb 15.8 oz)  · BMI: 40.2    Nutrition Diagnosis:   · Inadequate oral intake related to (food preferences) as evidenced by intake 51-75%    · Increased nutrient needs(energy and protein) related to renal dysfunction as evidenced by dialysis      Nutrition Intervention:  Food and/or Nutrient Delivery: Continue current diet, Mineral supplement, Vitamin supplement, Modify oral nutrition supplement  Nutrition Education and Counseling: Education not indicated  Coordination of Nutrition Care: Continue to monitor while inpatient    Goals:  PO nutrition intake will meet >75% of patient estimated nutritional needs within the next 7 days.        Nutrition Monitoring and Evaluation:   Behavioral-Environmental Outcomes: None identified  Food/Nutrient Intake Outcomes: Food and nutrient intake, Supplement intake, Vitamin/mineral intake  Physical Signs/Symptoms Outcomes: Biochemical data, Fluid status or edema, Meal time behavior, Nutrition focused physical findings    Discharge Planning:     Too soon to determine     Electronically signed by Anatoliy Marie RD on 1/27/2021 at 3:20 PM    Contact Number: [phone]

## 2021-01-27 NOTE — PROGRESS NOTES
Bedside and Verbal shift change report given to Jordyn Foster (oncoming nurse) by Everett Ernandez RN   (offgoing nurse). Report given with SBAR, Kardex, MAR and Recent Results.

## 2021-01-27 NOTE — PROGRESS NOTES
Called Patients insurance plan [phone] [employer] and spoke with  . Patient does NOT have a trasportation benefit to dialysis from SNF or home. Called the rep for the [facility] [phone]. No answer, left message to call this writer back.       Michelle Sepulveda, RN BSN  Care Manager  [phone]

## 2021-01-27 NOTE — PROGRESS NOTES
Admit Date: 2021  Date of Service: 2021        Assessment:     1. 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 Acute hypoxic respiratory failure: multifactorial  3. Acute metabolic encephalopathy/delirium   4. A.fib:  Rate controlled; on Cardizem; Pradaxa on hold   5. Volume overload   6. Leukocytosis with mild lymphopenia  7. Chronic LE wounds with abscesses status post bilateral I&D  8. Hematuria   9. Pre-syncope  10. Surgically asplenic      Rapid Covid-19 negative 1/15/2021  Send out Covid19 negative 2021  Status post renal biopsy on . Await pathology  Continue Eliquis  Continue hemodialysis per nephrology  Discussed with nephrologist and we will hold the Trileptal as this may be contributing to patient symptoms  Echo report reviewedEF 55-60%  Continue Cardizem and atenolol  On Symbicort  Monitor off antibiotics per ID  Fall precautions  Wound care  PT OT  Bowel regimen  Psychiatry input noted  Disposition SNF, discussed with   Discussed with patient, discussed with nephrologist  I called patient's daughter at phone #[phone] and updated her regarding patient's medical care    Case discussed with:  [x]Patient  [x]Family  [x]Nursing  [x]Case Management  DVT Prophylaxis:  []Lovenox  []Hep SQ  []SCDs  []Coumadin   [x] Eliquis  CODE STATUS: Full  Contact: Lupis Reyes (wife)  [phone], daughter [phone]      Papito Villalba MD    2021       Subjective:      RN stated that patient not feeling good. I went and assessed patient. Patient is sitting in bed. Patient denies any chest pain or shortness of breath or cough or fevers. Patient denies any abdominal pain or nausea or vomiting. Patient complains of tiredness and fatigue.     Objective:        Visit Vitals  /69   Pulse 68   Temp 97.8 °F (36.6 °C)   Resp 20   Ht 5' 10\" (1.778 m)   Wt 122.9 kg (270 lb 15.1 oz)   SpO2 96%   BMI 38.88 kg/m²     Temp (24hrs), Av.7 °F (36.5 °C), Min:97.5 °F (36.4 °C), Max:97.8 °F (36.6 °C)      General:  Awake, follows commands  Cardiovascular:  S1S2+, RRR  Pulmonary:  CTA b/l  GI:  Soft, BS+, NT, ND, obese  Extremities: Bilateral lower extremity with dressing          Labs: Results:   Chemistry Recent Labs     01/27/21 0358 01/26/21 0359 01/25/21 0149   GLU 87 79 89   * 136 137   K 3.3* 3.4* 4.0   CL 99* 100 103   CO2 29 27 27   BUN 25* 17 28*   CREA 7.53* 5.80* 7.57*   CA 8.8 8.6 8.7   AGAP 6 9 7   BUCR 3* 3* 4*      CBC w/Diff Recent Labs     01/27/21 0358 01/26/21 0359 01/25/21 0149   WBC 14.6* 18.8* 17.1*   RBC 2.73* 2.73* 2.60*   HGB 8.3* 8.1* 8.0*   HCT 26.1* 26.5* 25.5*   * 419 472*   GRANS 71 80* 73   LYMPH 12* 7* 9*   EOS 4 1 3        No results found for: SDES Lab Results   Component Value Date/Time    Culture result: NO GROWTH AFTER 20 HOURS 01/26/2021 01:00 PM    Culture result: NO GROWTH AFTER 20 HOURS 01/26/2021 12:50 PM    Culture result: NO GROWTH 4 DAYS 01/16/2021 08:46 AM    Culture result: NO GROWTH 4 DAYS 01/16/2021 08:46 AM    Culture result: NO GROWTH 4 DAYS 01/16/2021 08:46 AM    Culture result: NO GROWTH 4 DAYS 01/16/2021 08:46 AM        Results     Procedure Component Value Units Date/Time    CULTURE, BLOOD [[id number]] Collected: 01/26/21 1300    Order Status: Completed Specimen: Whole Blood Updated: 01/27/21 1013     Special Requests: NO SPECIAL REQUESTS        Culture result: NO GROWTH AFTER 20 HOURS       CULTURE, BLOOD [[id number]] Collected: 01/26/21 1250    Order Status: Completed Specimen: Whole Blood Updated: 01/27/21 1013     Special Requests: NO SPECIAL REQUESTS        Culture result: NO GROWTH AFTER 20 HOURS       CULTURE, URINE [[id number]]     Order Status: Sent Specimen: Clean catch     CULTURE, Aleshia Nipper STAIN [[id number]] Collected: 01/16/21 0846    Order Status: Completed Specimen: Foot Updated: 01/20/21 1046     Special Requests: RIGHT        GRAM STAIN NO ORGANISMS SEEN        Culture result: NO GROWTH 4 DAYS CULTURE, ANAEROBIC [[id number]] Collected: 01/16/21 0846    Order Status: Completed Specimen: Foot Updated: 01/20/21 1046     Special Requests: RIGHT        Culture result: NO GROWTH 4 DAYS       CULTURE, Clemon Muldoon STAIN [[id number]] Collected: 01/16/21 0846    Order Status: Completed Specimen: Foot Updated: 01/20/21 1048     Special Requests: LEFT        GRAM STAIN NO ORGANISMS SEEN        Culture result: NO GROWTH 4 DAYS       CULTURE, ANAEROBIC [[id number]] Collected: 01/16/21 0846    Order Status: Completed Specimen: Foot Updated: 01/20/21 1047     Special Requests: LEFT        Culture result: NO GROWTH 4 DAYS       CULTURE, WOUND Margurite Bill STAIN [[id number]]  (Abnormal)  (Susceptibility) Collected: 01/14/21 2100    Order Status: Completed Specimen: Wound from Foot Updated: 01/18/21 1426     Special Requests: NO SPECIAL REQUESTS        GRAM STAIN FEW WBCS SEEN         NO ORGANISMS SEEN        Culture result:       LIGHT STAPHYLOCOCCUS EPIDERMIDIS (OXACILLIN RESISTANT)                  RARE STAPHYLOCOCCUS AUREUS            FEW CANDIDA PARAPSILOSIS       Susceptibility      Staphylococcus epidermidis     GEORGINA     Ciprofloxacin ($) Susceptible     Erythromycin ($$$$) Resistant     Gentamicin ($) Susceptible     Inducible Clindamycin Susceptible     Levofloxacin ($) Susceptible     Linezolid ($$$$$) Susceptible     Moxifloxacin ($$$$) Susceptible     Oxacillin Resistant     Tetracycline Intermediate     Vancomycin ($) Susceptible                Susceptibility      Staphylococcus aureus     GEORGINA     Ciprofloxacin ($) Susceptible     Clindamycin ($) Susceptible     Daptomycin ($$$$$) Susceptible     Doxycycline ($$) Susceptible     Erythromycin ($$$$) Susceptible     Gentamicin ($) Susceptible     Levofloxacin ($) Susceptible     Linezolid ($$$$$) Susceptible     Moxifloxacin ($$$$) Susceptible     Oxacillin Susceptible     Tetracycline Susceptible     Trimeth/Sulfa Susceptible     Vancomycin ($) Susceptible Imaging:     Ir Insert Rudy Veliz Cvc W/o Port Over 5 Yr    Result Date: 1/21/2021  Technically successful placement of a right jugular tunneled dialysis catheter 14.5 German 23 cm length palindrome dialysis catheter    Cta Neck    Addendum Date: 1/20/2021    Addendum: Addendum: The source images were reconstructed with virtual surface, MIP and intraluminal reconstruction Right subclavian: Normal Right carotid: normal right vertebral: Normal intact Left vertebral: Dominant intact unremarkable Left subclavian: Normal reconstruction arch: Type I arch identified    Result Date: 1/20/2021  The insertion site and course of the dialysis catheter intact normal Internal jugular above and below the dialysis catheter unremarkable no evidence for any injury to the jugular venous Cervical visceral space is unremarkable no adenopathy Mediastinal lipomatosis Some nonspecific postinflammatory changes in the lung apices. No soft tissue abnormalities identified. Ct Abd W Wo Cont    Result Date: 1/22/2021  : 1.  Status post left lower pole kidney biopsy for acute kidney insufficiency demonstrates some trace density in the upper collecting system of the biopsy left kidney suggesting a small amount of blood not felt to be significant clinically. The nephrogenic and excretory phase images demonstrate nothing to suggest a post biopsy complication. Ir Us Guided Vascular Access    Result Date: 1/21/2021  Successful placement of a single lumen midline catheter. Xr Chest Port    Result Date: 1/26/2021  Basilar opacities-suspect atelectasis    Ct Bx Renal    Result Date: 1/22/2021  Impression: 1. Successful CT-guided lower pole left kidney biopsy.       Edwin York MD

## 2021-01-27 NOTE — ROUTINE PROCESS
Bedside shift change report given to Michelle RN (oncoming nurse) by Neeraj Crowder RN (offgoing nurse). Report included the following information SBAR, Procedure Summary, MAR and Recent Results.

## 2021-01-28 LAB
GLUCOSE BLD STRIP.AUTO-MCNC: 104 MG/DL (ref 70–110)
GLUCOSE BLD STRIP.AUTO-MCNC: 115 MG/DL (ref 70–110)
GLUCOSE BLD STRIP.AUTO-MCNC: 117 MG/DL (ref 70–110)

## 2021-01-28 PROCEDURE — 74011250637 HC RX REV CODE- 250/637: Performed by: PODIATRIST

## 2021-01-28 PROCEDURE — 2709999900 HC NON-CHARGEABLE SUPPLY

## 2021-01-28 PROCEDURE — U0003 INFECTIOUS AGENT DETECTION BY NUCLEIC ACID (DNA OR RNA); SEVERE ACUTE RESPIRATORY SYNDROME CORONAVIRUS 2 (SARS-COV-2) (CORONAVIRUS DISEASE [COVID-19]), AMPLIFIED PROBE TECHNIQUE, MAKING USE OF HIGH THROUGHPUT TECHNOLOGIES AS DESCRIBED BY CMS-2020-01-R: HCPCS

## 2021-01-28 PROCEDURE — 97110 THERAPEUTIC EXERCISES: CPT

## 2021-01-28 PROCEDURE — 99232 SBSQ HOSP IP/OBS MODERATE 35: CPT | Performed by: EMERGENCY MEDICINE

## 2021-01-28 PROCEDURE — 74011250637 HC RX REV CODE- 250/637: Performed by: HOSPITALIST

## 2021-01-28 PROCEDURE — 74011000258 HC RX REV CODE- 258: Performed by: INTERNAL MEDICINE

## 2021-01-28 PROCEDURE — 94640 AIRWAY INHALATION TREATMENT: CPT

## 2021-01-28 PROCEDURE — 74011250637 HC RX REV CODE- 250/637: Performed by: INTERNAL MEDICINE

## 2021-01-28 PROCEDURE — 77010033678 HC OXYGEN DAILY

## 2021-01-28 PROCEDURE — 74011250637 HC RX REV CODE- 250/637: Performed by: EMERGENCY MEDICINE

## 2021-01-28 PROCEDURE — 65270000029 HC RM PRIVATE

## 2021-01-28 PROCEDURE — 82962 GLUCOSE BLOOD TEST: CPT

## 2021-01-28 PROCEDURE — 74011250636 HC RX REV CODE- 250/636: Performed by: INTERNAL MEDICINE

## 2021-01-28 PROCEDURE — 97530 THERAPEUTIC ACTIVITIES: CPT

## 2021-01-28 RX ADMIN — DILTIAZEM HYDROCHLORIDE 120 MG: 120 CAPSULE, COATED, EXTENDED RELEASE ORAL at 08:51

## 2021-01-28 RX ADMIN — DILTIAZEM HYDROCHLORIDE 120 MG: 120 CAPSULE, COATED, EXTENDED RELEASE ORAL at 21:34

## 2021-01-28 RX ADMIN — BUDESONIDE AND FORMOTEROL FUMARATE DIHYDRATE 2 PUFF: 160; 4.5 AEROSOL RESPIRATORY (INHALATION) at 21:00

## 2021-01-28 RX ADMIN — Medication 10 ML: at 18:09

## 2021-01-28 RX ADMIN — BUDESONIDE AND FORMOTEROL FUMARATE DIHYDRATE 2 PUFF: 160; 4.5 AEROSOL RESPIRATORY (INHALATION) at 09:35

## 2021-01-28 RX ADMIN — APIXABAN 2.5 MG: 5 TABLET, FILM COATED ORAL at 08:51

## 2021-01-28 RX ADMIN — Medication 10 ML: at 06:13

## 2021-01-28 RX ADMIN — IRON SUCROSE 100 MG: 20 INJECTION, SOLUTION INTRAVENOUS at 18:08

## 2021-01-28 RX ADMIN — SILVER SULFADIAZINE: 10 CREAM TOPICAL at 09:00

## 2021-01-28 RX ADMIN — APIXABAN 2.5 MG: 5 TABLET, FILM COATED ORAL at 18:08

## 2021-01-28 RX ADMIN — Medication 10 ML: at 21:34

## 2021-01-28 RX ADMIN — Medication 5 MG: at 21:34

## 2021-01-28 NOTE — PROGRESS NOTES
Problem: Patient Education: Go to Patient Education Activity  Goal: Patient/Family Education  Outcome: Progressing Towards Goal     Problem: Pain  Goal: *Control of Pain  Outcome: Progressing Towards Goal     Problem: Patient Education: Go to Patient Education Activity  Goal: Patient/Family Education  Outcome: Progressing Towards Goal     Problem: Impaired Skin Integrity/Pressure Injury Treatment  Goal: *Improvement of Existing Pressure Injury  Outcome: Progressing Towards Goal  Goal: *Prevention of pressure injury  Description: Document Oswaldo Scale and appropriate interventions in the flowsheet.   Outcome: Progressing Towards Goal  Note: Pressure Injury Interventions:  Sensory Interventions: Discuss PT/OT consult with provider, Avoid rigorous massage over bony prominences, Assess need for specialty bed    Moisture Interventions: Check for incontinence Q2 hours and as needed, Apply protective barrier, creams and emollients    Activity Interventions: Assess need for specialty bed, PT/OT evaluation    Mobility Interventions: Assess need for specialty bed, HOB 30 degrees or less, Pressure redistribution bed/mattress (bed type)    Nutrition Interventions: Document food/fluid/supplement intake    Friction and Shear Interventions: HOB 30 degrees or less, Apply protective barrier, creams and emollients

## 2021-01-28 NOTE — PROGRESS NOTES
Problem: Mobility Impaired (Adult and Pediatric)  Goal: *Acute Goals and Plan of Care (Insert Text)  Description: Physical Therapy Goals  Initiated 1/15/2021, re-evaluated 1/22/21 and goals adjusted and to be accomplished within 7 day(s)  1.  Patient will move from supine to sit and sit to supine  and roll side to side in bed with CGA    2.  Patient will transfer from bed to chair and chair to bed with moderate assistance using the least restrictive device.  3.  Patient will perform sit to stand with SBA.  4.  Patient will ambulate with moderate assist for 10 feet with the least restrictive device.       PLOF: Patient reports he began using RW about 2 weeks ago, prior to that he was ambulating without AD. He lives with spouse in single story home.       Outcome: Progressing Towards Goal  PHYSICAL THERAPY TREATMENT    Patient: Ariel Nicole (60 y.o. male)  Date: 1/28/2021  Diagnosis: Acute kidney injury (HCC) [N17.9]  ARF (acute renal failure) (HCC) [N17.9] Acute kidney injury (HCC)  Procedure(s) (LRB):  INCISION AND DRAINAGE BILATERAL FEET (Bilateral) 12 Days Post-Op  Precautions: Fall, WBAT  PLOF: see above    ASSESSMENT:  Pt was extremely motivated during today's PT session.  Pt transferred supine to sit with supervision and the head of the bed elevated 30 degrees.  Pt demonstrated good static sitting balance at the edge of the bed.  Pt performed LAQs x20.  Pt stood 3 times from the bed with cues for proper hand placement and progressed from min A to SBA/CGA.  Pt then ambulated 5 feet with RW twice with a seated break between trials.  Pt transferred to the recliner and after a seated rest break performed 5 sit to stand transfers with SBA/CGA.  Pt then ambulated 2 more trials of 5 feet with RW, CGA and decreased step clearance.  Pt was left sitting up in the recliner with needs in reach.    Based on patient's ability to perform sit to stand transfers and take a few steps patient should be able to perform w/c  to car transfers with his family for transport to and from dialysis. Progression toward goals:   [x]      Improving appropriately and progressing toward goals  []      Improving slowly and progressing toward goals  []      Not making progress toward goals and plan of care will be adjusted     PLAN:  Patient continues to benefit from skilled intervention to address the above impairments. Continue treatment per established plan of care. Discharge Recommendations:  Rehab  Further Equipment Recommendations for Discharge:  N/A     SUBJECTIVE:   Patient stated I feel so much stronger today.     OBJECTIVE DATA SUMMARY:   Critical Behavior:  Neurologic State: Alert  Orientation Level: Oriented to person, Oriented to place, Oriented to time  Cognition: Follows commands  Safety/Judgement: Fall prevention, Awareness of environment  Functional Mobility Training:  Bed Mobility:  Supine to Sit: Supervision      Transfers:  Sit to Stand: Contact guard assistance;Stand-by assistance  Stand to Sit: Stand-by assistance  Bed to Chair: Contact guard assistance     Balance:  Sitting - Static: Good (unsupported)  Standing: With support  Standing - Static: (fair+)  Standing - Dynamic : (fair/fair-)   Ambulation/Gait Training:  Distance (ft): 5 Feet (ft)(4 times)  Assistive Device: Walker, rolling  Ambulation - Level of Assistance: Contact guard assistance  Gait Abnormalities: Decreased step clearance     Therapeutic Exercises:         EXERCISE   Sets   Reps   Active Active Assist   Passive Self ROM   Comments   Ankle Pumps 1 10  [x] [] [] []    Quad Sets/Glut Sets    [] [] [] [] Hold for 5 secs   Hamstring Sets   [] [] [] []    Short Arc Quads   [] [] [] []    Heel Slides   [] [] [] []    Straight Leg Raises   [] [] [] []    Hip Add   [] [] [] [] Hold for 5 secs, w/ pillow squeeze   Long Arc Quads 1 20 [x] [] [] []    Seated Marching   [] [] [] []    Standing Marching   [] [] [] []    Sit to stand 1 5 [x] [] [] [] Pain:  Pain level pre-treatment: 2/10  Pain level post-treatment: 3/10   Pain Intervention(s): n/a    Activity Tolerance:   Fair-  Please refer to the flowsheet for vital signs taken during this treatment. After treatment:   [x] Patient left in no apparent distress sitting up in chair  [] Patient left in no apparent distress in bed  [x] Call bell left within reach  [x] Nursing notified  [] Caregiver present  [] Bed alarm activated  [] SCDs applied      COMMUNICATION/EDUCATION:   [x]         Role of Physical Therapy in the acute care setting. [x]         Fall prevention education was provided and the patient/caregiver indicated understanding. [x]         Patient/family have participated as able in working toward goals and plan of care. [x]         Patient/family agree to work toward stated goals and plan of care. []         Patient understands intent and goals of therapy, but is neutral about his/her participation.   []         Patient is unable to participate in stated goals/plan of care: ongoing with therapy staff.  []         Other:        Yessi Noel, PT   Time Calculation: 41 mins

## 2021-01-28 NOTE — PROGRESS NOTES
Called Alanna Mann from [facility] [phone]. No answer, left message to call this writer back. Robin Mendez from [facility] [phone] to discuss Dialysis transportation who spouse said was covered by insurance. Left message to call this writer back.     Mackenzie Duggan RN BSN  Care Manager  [phone]

## 2021-01-28 NOTE — PROGRESS NOTES
Problem: Patient Education: Go to Patient Education Activity  Goal: Patient/Family Education  Outcome: Progressing Towards Goal     Problem: Pain  Goal: *Control of Pain  Outcome: Progressing Towards Goal     Problem: Patient Education: Go to Patient Education Activity  Goal: Patient/Family Education  Outcome: Progressing Towards Goal     Problem: Impaired Skin Integrity/Pressure Injury Treatment  Goal: *Improvement of Existing Pressure Injury  Outcome: Progressing Towards Goal  Goal: *Prevention of pressure injury  Description: Document Oswaldo Scale and appropriate interventions in the flowsheet. Outcome: Progressing Towards Goal  Note: Pressure Injury Interventions:  Sensory Interventions: Assess changes in LOC, Float heels, Keep linens dry and wrinkle-free, Maintain/enhance activity level, Minimize linen layers, Pressure redistribution bed/mattress (bed type)    Moisture Interventions: Absorbent underpads, Limit adult briefs, Maintain skin hydration (lotion/cream), Minimize layers    Activity Interventions: Pressure redistribution bed/mattress(bed type), PT/OT evaluation    Mobility Interventions: Float heels, HOB 30 degrees or less, Pressure redistribution bed/mattress (bed type), PT/OT evaluation    Nutrition Interventions: Document food/fluid/supplement intake, Offer support with meals,snacks and hydration    Friction and Shear Interventions: Foam dressings/transparent film/skin sealants, HOB 30 degrees or less, Minimize layers                Problem: Patient Education: Go to Patient Education Activity  Goal: Patient/Family Education  Outcome: Progressing Towards Goal     Problem: Patient Education: Go to Patient Education Activity  Goal: Patient/Family Education  Outcome: Progressing Towards Goal     Problem: Falls - Risk of  Goal: *Absence of Falls  Description: Document David Fall Risk and appropriate interventions in the flowsheet.   Outcome: Progressing Towards Goal  Note: Fall Risk Interventions:  Mobility Interventions: Bed/chair exit alarm, Patient to call before getting OOB, PT Consult for mobility concerns    Mentation Interventions: Bed/chair exit alarm, Adequate sleep, hydration, pain control, Increase mobility, More frequent rounding, Reorient patient, Room close to nurse's station    Medication Interventions: Bed/chair exit alarm, Patient to call before getting OOB, Teach patient to arise slowly    Elimination Interventions: Call light in reach, Bed/chair exit alarm, Patient to call for help with toileting needs              Problem: Patient Education: Go to Patient Education Activity  Goal: Patient/Family Education  Outcome: Progressing Towards Goal     Problem: Pressure Injury - Risk of  Goal: *Prevention of pressure injury  Description: Document Oswaldo Scale and appropriate interventions in the flowsheet.  Outcome: Progressing Towards Goal  Note: Pressure Injury Interventions:  Sensory Interventions: Assess changes in LOC, Float heels, Keep linens dry and wrinkle-free, Maintain/enhance activity level, Minimize linen layers, Pressure redistribution bed/mattress (bed type)    Moisture Interventions: Absorbent underpads, Limit adult briefs, Maintain skin hydration (lotion/cream), Minimize layers    Activity Interventions: Pressure redistribution bed/mattress(bed type), PT/OT evaluation    Mobility Interventions: Float heels, HOB 30 degrees or less, Pressure redistribution bed/mattress (bed type), PT/OT evaluation    Nutrition Interventions: Document food/fluid/supplement intake, Offer support with meals,snacks and hydration    Friction and Shear Interventions: Foam dressings/transparent film/skin sealants, HOB 30 degrees or less, Minimize layers                Problem: Patient Education: Go to Patient Education Activity  Goal: Patient/Family Education  Outcome: Progressing Towards Goal     Problem: Patient Education: Go to Patient Education Activity  Goal: Patient/Family  Education  Outcome: Progressing Towards Goal     Problem: Nutrition Deficit  Goal: *Optimize nutritional status  Outcome: Progressing Towards Goal

## 2021-01-28 NOTE — PROGRESS NOTES
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 Final report pending  Proteinuria, nephrotic range with hematuria  Impaired concentration,cognition,asterexis vs tardive dyskinesia due to ?trileptal, amitriptyline. , now resolved  Anemia of chronic disease,iron deficiency  H/o nose bleeding episode   HTN  Lower extremities edema with cellulits, blister  Atrial fibrillation , on anti coagulation   H/o splenectomy   H/o heavy use of gabapentin ( about 2400mg daily for last 2 months)  Secondary hyperparathyroidism   PLAN:   Will hold off on any Rx for IGA nephropathy as ? benefit Also his bipolar disorder and underlying psychological disease make its little bit riskier to start him on steroids. Considering risks and benefits, decided to observe him for now. Will follow up with full report. [facility] for discharge tomorrow post dialysis.  Has a chair for Monday at [address] [facility]  No urine output  IV venofer to continue  C/w  hectorol 2 mcg with each Rx  Epogen  Once iron repleted  Phos level is ok  HD tomorrow again  Hold off on trileptal for now       Facility-Administered Medications: None       Current Facility-Administered Medications   Medication Dose Route Frequency    bisacodyL (DULCOLAX) tablet 10 mg  10 mg Oral DAILY PRN    polyethylene glycol (MIRALAX) packet 17 g  17 g Oral DAILY    apixaban (ELIQUIS) tablet 2.5 mg  2.5 mg Oral BID    iron sucrose (VENOFER) 100 mg in 0.9% sodium chloride 100 mL IVPB  100 mg IntraVENous Q24H    doxercalciferoL (HECTOROL) 4 mcg/2 mL injection 2 mcg  2 mcg IntraVENous DIALYSIS MON, WED & FRI    0.9% sodium chloride infusion 250 mL  250 mL IntraVENous PRN    silver sulfADIAZINE (SILVADENE) 1 % topical cream   Topical DAILY    nicotine (NICODERM CQ) 14 mg/24 hr patch 1 Patch  1 Patch TransDERmal DAILY    0.9% sodium chloride infusion 250 mL  250 mL IntraVENous DIALYSIS PRN    heparin (porcine) 1,000 unit/mL injection 5,000 Units  5,000 Units InterCATHeter DIALYSIS PRN    dilTIAZem ER (CARDIZEM CD) capsule 120 mg  120 mg Oral Q12H    albumin human 25% (BUMINATE) solution 25 g  25 g IntraVENous DIALYSIS PRN    budesonide-formoteroL (SYMBICORT) 160-4.5 mcg/actuation HFA inhaler 2 Puff  2 Puff Inhalation BID RT    oxyCODONE-acetaminophen (PERCOCET) 5-325 mg per tablet 1 Tab  1 Tab Oral Q6H PRN    insulin lispro (HUMALOG) injection   SubCUTAneous AC&HS    glucose chewable tablet 16 g  4 Tab Oral PRN    glucagon (GLUCAGEN) injection 1 mg  1 mg IntraMUSCular PRN    dextrose (D50W) injection syrg 12.5-25 g  25-50 mL IntraVENous PRN    sodium chloride (NS) flush 5-40 mL  5-40 mL IntraVENous Q8H    sodium chloride (NS) flush 5-40 mL  5-40 mL IntraVENous Q8H    sodium chloride (NS) flush 5-40 mL  5-40 mL IntraVENous PRN    acetaminophen (TYLENOL) tablet 650 mg  650 mg Oral Q6H PRN    Or    acetaminophen (TYLENOL) suppository 650 mg  650 mg Rectal Q6H PRN    promethazine (PHENERGAN) tablet 12.5 mg  12.5 mg Oral Q6H PRN    Or    ondansetron (ZOFRAN) injection 4 mg  4 mg IntraVENous Q6H PRN    [Held by provider] amitriptyline (ELAVIL) tablet 50 mg  50 mg Oral QHS    melatonin tablet 5 mg  5 mg Oral QHS       Review of Systems:     As above  Data Review:    Labs: Results:       Chemistry Recent Labs     01/27/21  0358 01/26/21 0359   GLU 87 79   * 136   K 3.3* 3.4*   CL 99* 100   CO2 29 27   BUN 25* 17   CREA 7.53* 5.80*   CA 8.8 8.6   AGAP 6 9   BUCR 3* 3*      CBC w/Diff Recent Labs     01/27/21  0358 01/26/21 0359   WBC 14.6* 18.8*   RBC 2.73* 2.73*   HGB 8.3* 8.1*   HCT 26.1* 26.5*   * 419   GRANS 71 80*   LYMPH 12* 7*   EOS 4 1      Coagulation No results for input(s): PTP, INR, APTT, INREXT, INREXT in the last 72 hours. Iron/Ferritin No results for input(s): IRON in the last 72 hours. No lab exists for component: TIBCCALC   BNP No results for input(s): BNPP in the last 72 hours. Cardiac Enzymes No results for input(s): CPK, CKND1, RUSSEL in the last 72 hours. No lab exists for component: CKRMB, TROIP   Liver Enzymes No results for input(s): TP, ALB, TBIL, AP in the last 72 hours.     No lab exists for component: SGOT, GPT, DBIL   Thyroid Studies Lab Results   Component Value Date/Time    TSH 1.94 01/14/2021 11:56 AM            Physical Assessment:     Visit Vitals  /71 (BP 1 Location: Left arm, BP Patient Position: At rest)   Pulse 91   Temp 98.2 °F (36.8 °C)   Resp 19   Ht 5' 10\" (1.778 m)   Wt 121.6 kg (268 lb)   SpO2 98%   BMI 38.45 kg/m²     Weight change: -1.336 kg (-2 lb 15.1 oz)    Intake/Output Summary (Last 24 hours) at 1/28/2021 1156  Last data filed at 1/27/2021 2150  Gross per 24 hour   Intake 100 ml   Output 500 ml   Net -400 ml     Physical Exam:     HEENT sclera anicteric, supple neck, no thyromegaly  CVS: S1S2 heard,  no rub  RS: + air entry b/l,   Abd: Soft, Non tender, Not distended,  Neuro: mild confusion,asterixis,impaired concentration    Extrm: ++edema, no cyanosis, clubbing   Skin:  dry  Musculoskeletal: No gross joints or bone deformities     Procedures/imaging: see electronic medical records for all procedures, Xrays and details which were not copied into this note but were reviewed prior to creation of Anselm Schlatter, MD  January 28, 2021  [facility]  Office [phone]

## 2021-01-28 NOTE — PROGRESS NOTES
No respiratory distress noted, patient is comfortable. Pt instructed to call if help is needed, call bell within reach.   Patient confirms understanding     01/27/21 2116   Oxygen Therapy   O2 Sat (%) 97 %   Pulse via Oximetry 90 beats per minute   O2 Device Nasal cannula   O2 Flow Rate (L/min) 2 l/min   Pre-Treatment   Breathing Pattern Regular   Breath Sounds Bilateral Diminished   Post-Treatment   Breathing Pattern Regular   Breath Sounds Bilateral Diminished   Pulse 90   SpO2 97 %   Respirations 14   Treatment Tolerance Patient tolerated   Procedures   $$ Subsequent Procedure MDI   Delivery Source MDI   Aerosolized Medications Symbicort

## 2021-01-29 LAB
ANION GAP SERPL CALC-SCNC: 9 MMOL/L (ref 3–18)
BASOPHILS # BLD: 0 K/UL (ref 0–0.06)
BASOPHILS NFR BLD: 0 % (ref 0–3)
BUN SERPL-MCNC: 19 MG/DL (ref 7–18)
BUN/CREAT SERPL: 3 (ref 12–20)
CALCIUM SERPL-MCNC: 8.8 MG/DL (ref 8.5–10.1)
CHLORIDE SERPL-SCNC: 102 MMOL/L (ref 100–111)
CO2 SERPL-SCNC: 28 MMOL/L (ref 21–32)
CREAT SERPL-MCNC: 6.83 MG/DL (ref 0.6–1.3)
DIFFERENTIAL METHOD BLD: ABNORMAL
EOSINOPHIL # BLD: 0.3 K/UL (ref 0–0.4)
EOSINOPHIL NFR BLD: 2 % (ref 0–5)
ERYTHROCYTE [DISTWIDTH] IN BLOOD BY AUTOMATED COUNT: 16.4 % (ref 11.6–14.5)
GLUCOSE BLD STRIP.AUTO-MCNC: 120 MG/DL (ref 70–110)
GLUCOSE BLD STRIP.AUTO-MCNC: 93 MG/DL (ref 70–110)
GLUCOSE BLD STRIP.AUTO-MCNC: 94 MG/DL (ref 70–110)
GLUCOSE SERPL-MCNC: 83 MG/DL (ref 74–99)
HCT VFR BLD AUTO: 26.9 % (ref 36–48)
HGB BLD-MCNC: 8.3 G/DL (ref 13–16)
LYMPHOCYTES # BLD: 0.9 K/UL (ref 0.8–3.5)
LYMPHOCYTES NFR BLD: 7 % (ref 20–51)
MAGNESIUM SERPL-MCNC: 2.4 MG/DL (ref 1.6–2.6)
MCH RBC QN AUTO: 30.1 PG (ref 24–34)
MCHC RBC AUTO-ENTMCNC: 30.9 G/DL (ref 31–37)
MCV RBC AUTO: 97.5 FL (ref 74–97)
METAMYELOCYTES NFR BLD MANUAL: 1 %
MONOCYTES # BLD: 1.9 K/UL (ref 0–1)
MONOCYTES NFR BLD: 14 % (ref 2–9)
MYELOCYTES NFR BLD MANUAL: 2 %
NEUTS BAND NFR BLD MANUAL: 1 % (ref 0–5)
NEUTS SEG # BLD: 9.9 K/UL (ref 1.8–8)
NEUTS SEG NFR BLD: 73 % (ref 42–75)
PLATELET # BLD AUTO: 540 K/UL (ref 135–420)
PLATELET COMMENTS,PCOM: ABNORMAL
PMV BLD AUTO: 10.6 FL (ref 9.2–11.8)
POTASSIUM SERPL-SCNC: 3.6 MMOL/L (ref 3.5–5.5)
RBC # BLD AUTO: 2.76 M/UL (ref 4.7–5.5)
RBC MORPH BLD: ABNORMAL
SARS-COV-2, COV2NT: NOT DETECTED
SODIUM SERPL-SCNC: 139 MMOL/L (ref 136–145)
WBC # BLD AUTO: 13.4 K/UL (ref 4.6–13.2)

## 2021-01-29 PROCEDURE — 97535 SELF CARE MNGMENT TRAINING: CPT

## 2021-01-29 PROCEDURE — 74011250637 HC RX REV CODE- 250/637: Performed by: PODIATRIST

## 2021-01-29 PROCEDURE — 99232 SBSQ HOSP IP/OBS MODERATE 35: CPT | Performed by: EMERGENCY MEDICINE

## 2021-01-29 PROCEDURE — 74011250637 HC RX REV CODE- 250/637: Performed by: INTERNAL MEDICINE

## 2021-01-29 PROCEDURE — 74011250636 HC RX REV CODE- 250/636: Performed by: INTERNAL MEDICINE

## 2021-01-29 PROCEDURE — 82962 GLUCOSE BLOOD TEST: CPT

## 2021-01-29 PROCEDURE — 80048 BASIC METABOLIC PNL TOTAL CA: CPT

## 2021-01-29 PROCEDURE — 65270000029 HC RM PRIVATE

## 2021-01-29 PROCEDURE — 94640 AIRWAY INHALATION TREATMENT: CPT

## 2021-01-29 PROCEDURE — 90935 HEMODIALYSIS ONE EVALUATION: CPT

## 2021-01-29 PROCEDURE — 74011250637 HC RX REV CODE- 250/637: Performed by: HOSPITALIST

## 2021-01-29 PROCEDURE — 74011250637 HC RX REV CODE- 250/637: Performed by: EMERGENCY MEDICINE

## 2021-01-29 PROCEDURE — 85025 COMPLETE CBC W/AUTO DIFF WBC: CPT

## 2021-01-29 PROCEDURE — 74011000258 HC RX REV CODE- 258: Performed by: INTERNAL MEDICINE

## 2021-01-29 PROCEDURE — 83735 ASSAY OF MAGNESIUM: CPT

## 2021-01-29 RX ADMIN — DOXERCALCIFEROL 2 MCG: 4 INJECTION, SOLUTION INTRAVENOUS at 12:54

## 2021-01-29 RX ADMIN — BISACODYL 10 MG: 5 TABLET, COATED ORAL at 21:59

## 2021-01-29 RX ADMIN — Medication 10 ML: at 15:59

## 2021-01-29 RX ADMIN — DILTIAZEM HYDROCHLORIDE 120 MG: 120 CAPSULE, COATED, EXTENDED RELEASE ORAL at 21:59

## 2021-01-29 RX ADMIN — BUDESONIDE AND FORMOTEROL FUMARATE DIHYDRATE 2 PUFF: 160; 4.5 AEROSOL RESPIRATORY (INHALATION) at 08:00

## 2021-01-29 RX ADMIN — Medication 10 ML: at 05:24

## 2021-01-29 RX ADMIN — Medication 10 ML: at 22:01

## 2021-01-29 RX ADMIN — Medication 5 MG: at 21:59

## 2021-01-29 RX ADMIN — Medication 10 ML: at 16:00

## 2021-01-29 RX ADMIN — IRON SUCROSE 100 MG: 20 INJECTION, SOLUTION INTRAVENOUS at 17:46

## 2021-01-29 RX ADMIN — SILVER SULFADIAZINE: 10 CREAM TOPICAL at 09:23

## 2021-01-29 RX ADMIN — APIXABAN 2.5 MG: 5 TABLET, FILM COATED ORAL at 09:22

## 2021-01-29 RX ADMIN — APIXABAN 2.5 MG: 5 TABLET, FILM COATED ORAL at 17:44

## 2021-01-29 RX ADMIN — BUDESONIDE AND FORMOTEROL FUMARATE DIHYDRATE 2 PUFF: 160; 4.5 AEROSOL RESPIRATORY (INHALATION) at 20:46

## 2021-01-29 RX ADMIN — Medication 10 ML: at 22:02

## 2021-01-29 NOTE — PROGRESS NOTES
Chart reviewed. Patient is afebrile. Hemodynamically stable off antibiotics. Improving leukocytosis. Nothing new to add from infectious disease standpoint. Will sign off. Please call if any new questions or concerns.   Thanks

## 2021-01-29 NOTE — PROGRESS NOTES
Spoke with patient at bedside and spouse over the phone. Both are in agreement to come home instead of SNF. Patient will need chair time secured at [facility]. Spouse would like Prescriptions filled at [address] [place] [address] before discharge. Spouse would also like a new PCP in [place].   Feels that current [facility] cosme handle patients complex medical     Chau Field RN BSN  Care Manager  [phone]

## 2021-01-29 NOTE — PROGRESS NOTES
Problem: Self Care Deficits Care Plan (Adult)  Goal: *Acute Goals and Plan of Care (Insert Text)  Description: Occupational Therapy Goals  Initiated 1/17/2021 within 7 day(s). Re-evaluated on 1/23/2021. Goal 2 Met, continue with all remaining goals. 1.  Patient will perform grooming with supervision/set-up. 2.  Patient will perform self-feeding with supervision/set-up. (Met 1/23/21 - DC goal)  3. Patient will perform upper body dressing with supervision/set-up. 4.  Patient will perform toilet transfers with contact guard assist.  5.  Patient will perform all aspects of toileting with contact guard assist.  6.  Patient will participate in upper extremity therapeutic exercise/activities with supervision/set-up for 5 minutes. 7.  Patient will utilize energy conservation techniques during functional activities with verbal and visual cues. Prior Level of Function: Pt reports he was independent with ADLs/IADLs prior to this hospital admission. Updated 1/23/21: pt reports his spouse has been assisting him with LB ADLs for several months. Outcome: Progressing Towards Goal   OCCUPATIONAL THERAPY TREATMENT    Patient: Ele Hussein (16 y.o. male)  Date: 1/29/2021  Diagnosis: Acute kidney injury ([address] [N17.9]  ARF (acute renal failure) ([address] [N17.9] Acute kidney injury ([address]  Procedure(s) (LRB):  INCISION AND DRAINAGE BILATERAL FEET (Bilateral) 13 Days Post-Op  Precautions: Fall, WBAT    Chart, occupational therapy assessment, plan of care, and goals were reviewed. ASSESSMENT:  Pt returning from dialysis. Agreeable to therapy. Pt demonstrating progress w/activity tolerance and independence w/ADLs as evidenced by by meeting goals # 1,3,6,7. Pt tolerates sitting EOB ~ 25 minutes performing ADL. (see functional levels below) Pt requires increase time and demonstrates good pacing technique for increase independence. Pt tolerates standing performing james-care w/SBA and demonstrates no LOB.  Pt declines OOB to chair and request return to supine. No c/o pain. Pt left w/all items within reach. Progression toward goals:  [x]          Improving appropriately and progressing toward goals  []          Improving slowly and progressing toward goals  []          Not making progress toward goals and plan of care will be adjusted     PLAN:  Patient continues to benefit from skilled intervention to address the above impairments. Continue treatment per established plan of care. Discharge Recommendations:  Rehab  Further Equipment Recommendations for Discharge:  rolling walker     SUBJECTIVE:   Patient stated We lived off the grid up in [place].     OBJECTIVE DATA SUMMARY:   Cognitive/Behavioral Status:  Neurologic State: Alert  Orientation Level: Oriented X4  Cognition: Follows commands  Safety/Judgement: Fall prevention, Awareness of environment    Functional Mobility and Transfers for ADLs:   Bed Mobility:  Supine to Sit: Additional time;Modified independent(w/HOB raised and SR)  Sit to Supine: Additional time;Modified independent   Transfers:  Sit to Stand: Stand-by assistance  Balance:  Sitting: Intact  Standing: Impaired; Without support  Standing - Static: Fair(fair/fair plus)  Standing - Dynamic : Fair(fair/fair plus)    ADL Intervention:  Grooming  Position Performed: Seated edge of bed  Washing Face: Set-up  Washing Hands: Set-up  Brushing/Combing Hair: Stand-by assistance    Upper Body Bathing  Bathing Assistance: Set-up  Position Performed: Seated edge of bed    Lower Body Bathing  Perineal  : Stand-by assistance  Position Performed: Standing    Upper [address]: Set-up    Pain:  Pain level pre-treatment: 0/10   Pain level post-treatment: 0/10    Activity Tolerance:    Good    Please refer to the flowsheet for vital signs taken during this treatment.   After treatment:   []  Patient left in no apparent distress sitting up in chair  [x]  Patient left in no apparent distress in bed  [x]  Call shin left within reach  [x]  Nursing notified  []  Caregiver present  []  Bed alarm activated    COMMUNICATION/EDUCATION:   [] Role of Occupational Therapy in the acute care setting  [] Home safety education was provided and the patient/caregiver indicated understanding. [] Patient/family have participated as able in working towards goals and plan of care. [x] Patient/family agree to work toward stated goals and plan of care. [] Patient understands intent and goals of therapy, but is neutral about his/her participation. [] Patient is unable to participate in goal setting and plan of care.       Thank you for this referral.  RENATE Pinto  Time Calculation: 39 mins

## 2021-01-29 NOTE — PROGRESS NOTES
Admit Date: 1/14/2021  Date of Service: 1/28/2021        Assessment:     1. 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 Acute hypoxic respiratory failure: multifactorial  3. Acute metabolic encephalopathy/delirium   4. A.fib:  Rate controlled; on Cardizem; Pradaxa on hold   5. Volume overload   6. Leukocytosis with mild lymphopenia  7. Chronic LE wounds with abscesses status post bilateral I&D  8. Hematuria   9. Pre-syncope  10. Surgically asplenic      Rapid Covid-19 negative 1/15/2021  Send out Covid19 negative 1/14/2021  Status post renal biopsy on January 21. Preliminary pathology report shows glomerulonephritis  On Eliquis  Continue hemodialysis per nephrology  Discussed with nephrologist and we will hold the Trileptal as this may be contributing to patient symptoms  Echo report reviewedEF 55-60%  Continue Cardizem and atenolol  On Symbicort  Monitor off antibiotics per ID  Fall precautions  Wound care  PT OT  Bowel regimen  Psychiatry input noted  Disposition await SNF, discussed with   Discussed with patient, discussed with nephrologist  Repeat Covid test is being ordered to help transition to facility. Clinically I am not suspecting COVID-19 infection at this time. I called patient's daughter at phone #[phone] and updated her regarding patient's care and the discharge plans. I also discussed the preliminary pathology report with the daughter. Daughter stated that she has discussed with patient's wife and at this time they wish for the patient to come home with home health care and do not want the patient to go to rehab. I advised the daughter that the therapist care are recommending that patient will benefit from being in a rehab facility. Daughter verbalized understanding but stated that family understands the therapist recommendations but still want the patient to come home with home health care. I advised the daughter that I would discuss this with the patient and his wife.   We will also discussed with . Case discussed with:  [x]Patient  [x]Family  [x]Nursing  [x]Case Management  DVT Prophylaxis:  []Lovenox  []Hep SQ  []SCDs  []Coumadin   [x] Eliquis  CODE STATUS: Full  Contact: Judy Jose (wife)  [phone], daughter [phone]      Alycia Schmidt MD    2021       Subjective:      Patient is sitting in a chair in no apparent distress, awake, follows commands. Responds appropriately. Patient is in good spirits and feels much better. Patient denies any chest pain or shortness of breath.     Objective:        Visit Vitals  /84 (BP 1 Location: Left upper arm)   Pulse 81   Temp 97.6 °F (36.4 °C)   Resp 20   Ht 5' 10\" (1.778 m)   Wt 121.6 kg (268 lb)   SpO2 99%   BMI 38.45 kg/m²     Temp (24hrs), Av.3 °F (36.8 °C), Min:97.6 °F (36.4 °C), Max:98.7 °F (37.1 °C)      General:  Awake, follows commands  Cardiovascular:  S1S2+, RRR  Pulmonary:  CTA b/l  GI:  Soft, BS+, NT, ND, obese  Extremities: Both feet and legs with dressing            Labs: Results:   Chemistry Recent Labs     21   GLU 87 79   * 136   K 3.3* 3.4*   CL 99* 100   CO2 29 27   BUN 25* 17   CREA 7.53* 5.80*   CA 8.8 8.6   AGAP 6 9   BUCR 3* 3*      CBC w/Diff Recent Labs     21   WBC 14.6* 18.8*   RBC 2.73* 2.73*   HGB 8.3* 8.1*   HCT 26.1* 26.5*   * 419   GRANS 71 80*   LYMPH 12* 7*   EOS 4 1        No results found for: SDES Lab Results   Component Value Date/Time    Culture result: NO GROWTH 2 DAYS 2021 01:00 PM    Culture result: NO GROWTH 2 DAYS 2021 12:50 PM    Culture result: NO GROWTH 4 DAYS 2021 08:46 AM    Culture result: NO GROWTH 4 DAYS 2021 08:46 AM    Culture result: NO GROWTH 4 DAYS 2021 08:46 AM    Culture result: NO GROWTH 4 DAYS 2021 08:46 AM        Results     Procedure Component Value Units Date/Time    CULTURE, BLOOD [[id number]] Collected: 21 1300    Order Status: Completed Specimen: Whole Blood Updated: 01/28/21 0712     Special Requests: NO SPECIAL REQUESTS        Culture result: NO GROWTH 2 DAYS       CULTURE, BLOOD [[id number]] Collected: 01/26/21 1250    Order Status: Completed Specimen: Whole Blood Updated: 01/28/21 0712     Special Requests: NO SPECIAL REQUESTS        Culture result: NO GROWTH 2 DAYS       CULTURE, URINE [[id number]]     Order Status: Sent Specimen: Clean catch     CULTURE, Aleshia Nipper STAIN [[id number]] Collected: 01/16/21 0846    Order Status: Completed Specimen: Foot Updated: 01/20/21 1046     Special Requests: RIGHT        GRAM STAIN NO ORGANISMS SEEN        Culture result: NO GROWTH 4 DAYS       CULTURE, ANAEROBIC [[id number]] Collected: 01/16/21 0846    Order Status: Completed Specimen: Foot Updated: 01/20/21 1046     Special Requests: RIGHT        Culture result: NO GROWTH 4 DAYS       CULTURE, Aleshia Nipper STAIN [[id number]] Collected: 01/16/21 0846    Order Status: Completed Specimen: Foot Updated: 01/20/21 1048     Special Requests: LEFT        GRAM STAIN NO ORGANISMS SEEN        Culture result: NO GROWTH 4 DAYS       CULTURE, ANAEROBIC [[id number]] Collected: 01/16/21 0846    Order Status: Completed Specimen: Foot Updated: 01/20/21 1047     Special Requests: LEFT        Culture result: NO GROWTH 4 DAYS       CULTURE, WOUND Mliss Pamela STAIN [[id number]]  (Abnormal)  (Susceptibility) Collected: 01/14/21 2100    Order Status: Completed Specimen: Wound from Foot Updated: 01/18/21 1426     Special Requests: NO SPECIAL REQUESTS        GRAM STAIN FEW WBCS SEEN         NO ORGANISMS SEEN        Culture result:       LIGHT STAPHYLOCOCCUS EPIDERMIDIS (OXACILLIN RESISTANT)                  RARE STAPHYLOCOCCUS AUREUS            FEW CANDIDA PARAPSILOSIS       Susceptibility      Staphylococcus epidermidis     GEORGINA     Ciprofloxacin ($) Susceptible     Erythromycin ($$$$) Resistant     Gentamicin ($) Susceptible     Inducible Clindamycin Susceptible     Levofloxacin ($) Susceptible     Linezolid ($$$$$) Susceptible     Moxifloxacin ($$$$) Susceptible     Oxacillin Resistant     Tetracycline Intermediate     Vancomycin ($) Susceptible                Susceptibility      Staphylococcus aureus     GEORGINA     Ciprofloxacin ($) Susceptible     Clindamycin ($) Susceptible     Daptomycin ($$$$$) Susceptible     Doxycycline ($$) Susceptible     Erythromycin ($$$$) Susceptible     Gentamicin ($) Susceptible     Levofloxacin ($) Susceptible     Linezolid ($$$$$) Susceptible     Moxifloxacin ($$$$) Susceptible     Oxacillin Susceptible     Tetracycline Susceptible     Trimeth/Sulfa Susceptible     Vancomycin ($) Susceptible                          Imaging:     Ir Insert Camilla Schirmer Cvc W/o Port Over 5 Yr    Result Date: 1/21/2021  Technically successful placement of a right jugular tunneled dialysis catheter 14.5 [place] 23 cm length palindrome dialysis catheter    Cta Neck    Addendum Date: 1/20/2021    Addendum: Addendum: The source images were reconstructed with virtual surface, MIP and intraluminal reconstruction Right subclavian: Normal Right carotid: normal right vertebral: Normal intact Left vertebral: Dominant intact unremarkable Left subclavian: Normal reconstruction arch: Type I arch identified    Result Date: 1/20/2021  The insertion site and course of the dialysis catheter intact normal Internal jugular above and below the dialysis catheter unremarkable no evidence for any injury to the jugular venous Cervical visceral space is unremarkable no adenopathy Mediastinal lipomatosis Some nonspecific postinflammatory changes in the lung apices. No soft tissue abnormalities identified. Ct Abd W Wo Cont    Result Date: 1/22/2021  : 1.  Status post left lower pole kidney biopsy for acute kidney insufficiency demonstrates some trace density in the upper collecting system of the biopsy left kidney suggesting a small amount of blood not felt to be significant clinically.  The nephrogenic and excretory phase images demonstrate nothing to suggest a post biopsy complication. Ir Us Guided Vascular Access    Result Date: 1/21/2021  Successful placement of a single lumen midline catheter. Xr Chest Port    Result Date: 1/26/2021  Basilar opacities-suspect atelectasis    Ct Bx Renal    Result Date: 1/22/2021  Impression: 1. Successful CT-guided lower pole left kidney biopsy.       Nicolás Duran MD

## 2021-01-29 NOTE — PROGRESS NOTES
Dr Dinh Grullon stated that patient wants to now go to SNF. Met with patient at bedside. Patient now agreeable to SNF. Awaiting Covid testing results. Plan was to go to [facility] started Authorization on Thursday 1/28/21 with Vera. Estephania Hager at [phone] to put patient back on authorization. [employer] would like HD transportation guaranteed.       Shiv Arrington RN BSN  Care Manager  [phone]

## 2021-01-29 NOTE — PROGRESS NOTES
Admit Date: 1/14/2021  Date of Service: 1/29/2021        Assessment:     1. 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 Acute hypoxic respiratory failure: multifactorial  3. Acute metabolic encephalopathy/delirium   4. A.fib:  Rate controlled; on Cardizem; Pradaxa on hold   5. Volume overload   6. Leukocytosis with mild lymphopenia  7. Chronic LE wounds with abscesses status post bilateral I&D  8. Hematuria   9. Pre-syncope  10. Surgically asplenic      Rapid Covid-19 negative 1/15/2021  Send out Covid19 negative 1/14/2021  Status post renal biopsy on January 21. Preliminary pathology report shows glomerulonephritis  On Eliquis  Hemodialysis per nephrology  Discussed with nephrologist and we will hold the Trileptal as this may be contributing to patient symptoms  Echo report reviewedEF 55-60%  On atenolol and Cardizem  On Symbicort  Monitor off antibiotics per ID  Fall precautions  Wound care  PT OT  Bowel regimen  Psychiatry input noted  Patient is on oxygen 2 L via nasal cannula at this time. I have discussed with nursing staff regarding weaning that off. Disposition await SNF, discussed with   Discussed with patient, discussed with nephrologist  Repeat Covid test is being ordered to help transition to facility. Clinically I am not suspecting COVID-19 infection at this time. I had a lengthy discussion with the patient regarding his discharge plans. Patient stated that his wife misses him and that is why he was considering going home. Patient states that he realizes that he needs some rehabilitation. Subsequently patient stated that he is agreeable to going to a rehab and will let his family know. This conversation was witnessed by the  Clary. I did call patient's wife Ashia Bradshaw at phone #[phone] and spoke to Ashia Bradshaw and Nirmal Arreaga (patient's daughter) in detail. I advised them regarding the patient's decision. I updated them regarding patient's care.   Discussed with RN, discussed with , discussed with nephrologist    Case discussed with:  [x]Patient  [x]Family  [x]Nursing  [x]Case Management  DVT Prophylaxis:  []Lovenox  []Hep SQ  []SCDs  []Coumadin   [x] Eliquis  CODE STATUS: Full  Contact: [place] (wife)  [phone], daughter [phone]      Deborah Patricia MD    2021       Subjective:      Patient is sitting in bed in no apparent distress, awake and alert. Patient follows commands and responds appropriately.     Objective:        Visit Vitals  /76   Pulse 82   Temp 98.4 °F (36.9 °C) (Oral)   Resp 18   Ht 5' 10\" (1.778 m)   Wt 130.6 kg (288 lb)   SpO2 99%   BMI 41.32 kg/m²     Temp (24hrs), Av.9 °F (36.6 °C), Min:97.4 °F (36.3 °C), Max:98.5 °F (36.9 °C)      General: Awake and alert  Cardiovascular:  S1S2+, RRR  Pulmonary:  CTA b/l  GI:  Soft, BS+, NT, ND, obese  Extremities: Both lower extremities with dressing , has some edema        Labs: Results:   Chemistry Recent Labs     21  0330 21  0358   GLU 83 87    134*   K 3.6 3.3*    99*   CO2 28 29   BUN 19* 25*   CREA 6.83* 7.53*   CA 8.8 8.8   AGAP 9 6   BUCR 3* 3*      CBC w/Diff Recent Labs     21  0330 21  0358   WBC 13.4* 14.6*   RBC 2.76* 2.73*   HGB 8.3* 8.3*   HCT 26.9* 26.1*   * 517*   GRANS 73 71   LYMPH 7* 12*   EOS 2 4        No results found for: SDES Lab Results   Component Value Date/Time    Culture result: NO GROWTH 3 DAYS 2021 01:00 PM    Culture result: NO GROWTH 3 DAYS 2021 12:50 PM    Culture result: NO GROWTH 4 DAYS 2021 08:46 AM    Culture result: NO GROWTH 4 DAYS 2021 08:46 AM    Culture result: NO GROWTH 4 DAYS 2021 08:46 AM    Culture result: NO GROWTH 4 DAYS 2021 08:46 AM        Results     Procedure Component Value Units Date/Time    CULTURE, BLOOD [[id number]] Collected: 21 1300    Order Status: Completed Specimen: Whole Blood Updated: 21 0721     Special Requests: NO SPECIAL REQUESTS        Culture result: NO GROWTH 3 DAYS       CULTURE, BLOOD [[id number]] Collected: 01/26/21 1250    Order Status: Completed Specimen: Whole Blood Updated: 01/29/21 0721     Special Requests: NO SPECIAL REQUESTS        Culture result: NO GROWTH 3 DAYS       CULTURE, URINE [[id number]] Collected: 01/26/21 1015    Order Status: Canceled Specimen: Clean catch     CULTURE, Jeanette Page STAIN [[id number]] Collected: 01/16/21 0846    Order Status: Completed Specimen: Foot Updated: 01/20/21 1046     Special Requests: RIGHT        GRAM STAIN NO ORGANISMS SEEN        Culture result: NO GROWTH 4 DAYS       CULTURE, ANAEROBIC [[id number]] Collected: 01/16/21 0846    Order Status: Completed Specimen: Foot Updated: 01/20/21 1046     Special Requests: RIGHT        Culture result: NO GROWTH 4 DAYS       CULTURE, Jeanette Page STAIN [[id number]] Collected: 01/16/21 0846    Order Status: Completed Specimen: Foot Updated: 01/20/21 1048     Special Requests: LEFT        GRAM STAIN NO ORGANISMS SEEN        Culture result: NO GROWTH 4 DAYS       CULTURE, ANAEROBIC [[id number]] Collected: 01/16/21 0846    Order Status: Completed Specimen: Foot Updated: 01/20/21 1047     Special Requests: LEFT        Culture result: NO GROWTH 4 DAYS             Imaging:     Ir Insert Nan Cutter Cvc W/o Port Over 5 Yr    Result Date: 1/21/2021  Technically successful placement of a right jugular tunneled dialysis catheter 14.5 Serbian 23 cm length palindrome dialysis catheter    Cta Neck    Addendum Date: 1/20/2021    Addendum: Addendum: The source images were reconstructed with virtual surface, MIP and intraluminal reconstruction Right subclavian: Normal Right carotid: normal right vertebral: Normal intact Left vertebral: Dominant intact unremarkable Left subclavian: Normal reconstruction arch: Type I arch identified    Result Date: 1/20/2021  The insertion site and course of the dialysis catheter intact normal Internal jugular above and below the dialysis catheter unremarkable no evidence for any injury to the jugular venous Cervical visceral space is unremarkable no adenopathy Mediastinal lipomatosis Some nonspecific postinflammatory changes in the lung apices. No soft tissue abnormalities identified. Ct Abd W Wo Cont    Result Date: 1/22/2021  : 1.  Status post left lower pole kidney biopsy for acute kidney insufficiency demonstrates some trace density in the upper collecting system of the biopsy left kidney suggesting a small amount of blood not felt to be significant clinically. The nephrogenic and excretory phase images demonstrate nothing to suggest a post biopsy complication. Ir Us Guided Vascular Access    Result Date: 1/21/2021  Successful placement of a single lumen midline catheter. Xr Chest Port    Result Date: 1/26/2021  Basilar opacities-suspect atelectasis    Ct Bx Renal    Result Date: 1/22/2021  Impression: 1. Successful CT-guided lower pole left kidney biopsy.       Michael Jones MD

## 2021-01-29 NOTE — PROGRESS NOTES
[facility] REFERRAL CONTACT NOTE  [address] [place] [address] for Physical Rehabilitation    RE: Precious Avalos     Thank you for the opportunity to review this patient's case for admission to [address] [place] [address] for Physical Rehabilitation. Based on our pre-admission screening:     [x ] Our Team/Medical Director is following this case. Comments: Patient remains medically unstable and per Dr. Gutiérrez Fus progress note  the daughter would like the patient to be dc'ed home. Dr Deann Chaudhry stated  that he would speak to the patient and his wife re:this. Will continue to follow for medical stability, therapy progress and families wishes for DC plans. Again, Thank you for this referral. Should you have any questions please do not hesitate to call. Sincerely,  Evelin Lopez. Rasheeda Fried, [address] [address]   Rasheeda Fried, RN  Admissions [facility] for Physical Rehabilitation  [phone]

## 2021-01-29 NOTE — PROGRESS NOTES
Problem: Patient Education: Go to Patient Education Activity  Goal: Patient/Family Education  Outcome: Progressing Towards Goal     Problem: Pain  Goal: *Control of Pain  Outcome: Progressing Towards Goal     Problem: Patient Education: Go to Patient Education Activity  Goal: Patient/Family Education  Outcome: Progressing Towards Goal     Problem: Impaired Skin Integrity/Pressure Injury Treatment  Goal: *Improvement of Existing Pressure Injury  Outcome: Progressing Towards Goal  Goal: *Prevention of pressure injury  Description: Document Oswaldo Scale and appropriate interventions in the flowsheet. Outcome: Progressing Towards Goal  Note: Pressure Injury Interventions:  Sensory Interventions: Assess changes in LOC, Discuss PT/OT consult with provider, Keep linens dry and wrinkle-free, Maintain/enhance activity level, Minimize linen layers, Pressure redistribution bed/mattress (bed type), Turn and reposition approx.  every two hours (pillows and wedges if needed)    Moisture Interventions: Absorbent underpads, Check for incontinence Q2 hours and as needed, Maintain skin hydration (lotion/cream), Minimize layers    Activity Interventions: Increase time out of bed, Pressure redistribution bed/mattress(bed type), PT/OT evaluation    Mobility Interventions: Float heels, HOB 30 degrees or less, Pressure redistribution bed/mattress (bed type), PT/OT evaluation    Nutrition Interventions: Document food/fluid/supplement intake, Offer support with meals,snacks and hydration    Friction and Shear Interventions: HOB 30 degrees or less, Minimize layers                Problem: Patient Education: Go to Patient Education Activity  Goal: Patient/Family Education  Outcome: Progressing Towards Goal     Problem: Patient Education: Go to Patient Education Activity  Goal: Patient/Family Education  Outcome: Progressing Towards Goal     Problem: Falls - Risk of  Goal: *Absence of Falls  Description: Document David Fall Risk and appropriate interventions in the flowsheet. Outcome: Progressing Towards Goal  Note: Fall Risk Interventions:  Mobility Interventions: Bed/chair exit alarm, Patient to call before getting OOB, Utilize walker, cane, or other assistive device, Strengthening exercises (ROM-active/passive)    Mentation Interventions: Adequate sleep, hydration, pain control, Bed/chair exit alarm, Increase mobility, More frequent rounding, Update white board, Room close to nurse's station    Medication Interventions: Bed/chair exit alarm, Patient to call before getting OOB, Teach patient to arise slowly    Elimination Interventions: Call light in reach, Bed/chair exit alarm, Stay With Me (per policy), Patient to call for help with toileting needs              Problem: Patient Education: Go to Patient Education Activity  Goal: Patient/Family Education  Outcome: Progressing Towards Goal     Problem: Pressure Injury - Risk of  Goal: *Prevention of pressure injury  Description: Document Oswaldo Scale and appropriate interventions in the flowsheet. Outcome: Progressing Towards Goal  Note: Pressure Injury Interventions:  Sensory Interventions: Assess changes in LOC, Discuss PT/OT consult with provider, Keep linens dry and wrinkle-free, Maintain/enhance activity level, Minimize linen layers, Pressure redistribution bed/mattress (bed type), Turn and reposition approx.  every two hours (pillows and wedges if needed)    Moisture Interventions: Absorbent underpads, Check for incontinence Q2 hours and as needed, Maintain skin hydration (lotion/cream), Minimize layers    Activity Interventions: Increase time out of bed, Pressure redistribution bed/mattress(bed type), PT/OT evaluation    Mobility Interventions: Float heels, HOB 30 degrees or less, Pressure redistribution bed/mattress (bed type), PT/OT evaluation    Nutrition Interventions: Document food/fluid/supplement intake, Offer support with meals,snacks and hydration    Friction and Shear Interventions: HOB 30 degrees or less, Minimize layers                Problem: Patient Education: Go to Patient Education Activity  Goal: Patient/Family Education  Outcome: Progressing Towards Goal     Problem: Patient Education: Go to Patient Education Activity  Goal: Patient/Family Education  Outcome: Progressing Towards Goal     Problem: Nutrition Deficit  Goal: *Optimize nutritional status  Outcome: Progressing Towards Goal     Problem: Risk for Spread of Infection  Goal: Prevent transmission of infectious organism to others  Description: Prevent the transmission of infectious organisms to other patients, staff members, and visitors.   Outcome: Progressing Towards Goal     Problem: Patient Education:  Go to Education Activity  Goal: Patient/Family Education  Outcome: Progressing Towards Goal

## 2021-01-29 NOTE — DIALYSIS
JAANK        ACUTE HEMODIALYSIS FLOW SHEET      HEMODIALYSIS ORDERS: Physician: Abdi Rubin     Dialyzer: revaclear   Duration: 3 hr  BFR: 350   DFR: 600   Dialysate:  Temp 36-37*C  K+   2    Ca+  2.5 Na 138 Bicarb 35   Weight: 130.6 kg   Patient Chart [x]     Unable to Obtain []   Dry weight/UF Goal: 1500 Access CVl  Needle Gauge     Heparin []  Bolus      Units    [] Hourly       Units    [x]None      Catheter locking solution heparin   Pre BP:   131/79    Pulse:     78       Respirations: 18  Temperature:   97.4   Labs: Pre        Post:        [x] N/A   Additional Orders(medications, blood products, hypotension management) [x] N/A     [x] Janak Consent Verified     CATHETER ACCESS: []N/A   [x]Right   []Left   [x]IJ     []Fem   []chest wall   [] First use X-ray verified     [x]Tunnel                [] Non Tunneled   [x]No S/S infection  []Redness  []Drainage []Cultured []Swelling []Pain   [x]Medical Aseptic Prep Utilized   []Dressing Changed  [] Biopatch  Date:       []Clotted   [x]Patent   Flows: [x]Good  []Poor  []Reversed   If access problem,  notified: []Yes    [x]N/A  Date:           \            GENERAL ASSESSMENT:      LUNGS:  Rate  SaO2% [] N/A    [x] Clear  [] Coarse  [] Crackles  [] Wheezing        [] Diminished     Location : []RLL   []LLL    []RUL  []SUKHJINDER     Cough: []Productive  []Dry  [x]N/A   Respirations:  [x]Easy  []Labored     Therapy:   []RA  [x]NC 2 l/min    Mask: []NRB []Venti       O2%                  []Ventilator  []Intubated  [] Trach  [] BiPaP     CARDIAC: [x]Regular      [] Irregular   [] Pericardial Rub  [] JVD        []  Monitored  [] Bedside  [] Remotely monitored [] N/A  Rhythm:      EDEMA: [] None  [x]Generalized  [] Pitting [] 1    [] 2    [] 3    [] 4                 [] Facial  [] Pedal  []  UE  [] LE     SKIN:   [x] Warm  [] Hot     [] Cold   [x] Dry     [] Pale   [] Diaphoretic                  [] Flushed  [] Jaundiced  [] Cyanotic  [] Rash  [] Weeping     LOC:    [x] Alert [x]Oriented:    [x] Person     [x] Place  [x]Time               [] Confused  [] Lethargic  [] Medicated  [] Non-responsive     GI / ABDOMEN:  [] Flat    [] Distended    [x] Soft    [] Firm   []  Obese                             [] Diarrhea  [x] Bowel Sounds  [] Nausea  [] Vomiting       / URINE ASSESSMENT:[] Voiding   [x] Oliguria  [] Anuria   []  Garza     [] Incontinent    []  Incontinent Brief      []  Bathroom Privileges       PAIN: [x] 0 []1  []2   []3   []4   []5   []6   []7   []8   []9   []10              Scale 0-10  Action/Follow Up:      MOBILITY:  [] Amb    [] Amb/Assist    [x] Bed    [] Wheelchair  [] Stretcher      All Vitals and Treatment Details on Attached [address]: [facility]          Room # 866/95      [] 1st Time Acute  [] Stat  [x] Routine  [] Urgent     [x] Acute Room  []  Bedside  [] ICU/CCU  [] ER   Isolation Precautions:  Droplet, Droplet Plus      Special Considerations:         [] Blood Consent Verified [x]N/A     ALLERGIES: No Known Allergies            Code Status:Full Code        Hepatitis Status:                        Lab Results   Component Value Date/Time    Hepatitis B surface Ag <0.10 01/14/2021 09:00 PM    Hepatitis B surface Ab 4.78 (L) 01/14/2021 09:00 PM    Hepatitis B core, IgM Negative 01/14/2021 09:00 PM    Hepatitis C virus Ab 0.03 01/14/2021 09:00 PM                     Current Labs:   Lab Results   Component Value Date/Time    Sodium 139 01/29/2021 03:30 AM    Potassium 3.6 01/29/2021 03:30 AM    Chloride 102 01/29/2021 03:30 AM    CO2 28 01/29/2021 03:30 AM    Anion gap 9 01/29/2021 03:30 AM    Glucose 83 01/29/2021 03:30 AM    BUN 19 (H) 01/29/2021 03:30 AM    Creatinine 6.83 (H) 01/29/2021 03:30 AM    BUN/Creatinine ratio 3 (L) 01/29/2021 03:30 AM    GFR est AA 10 (L) 01/29/2021 03:30 AM    GFR est non-AA 8 (L) 01/29/2021 03:30 AM    Calcium 8.8 01/29/2021 03:30 AM      Lab Results   Component Value Date/Time    WBC 13.4 (H) 01/29/2021 03:30 AM    HGB 8.3 (L) 01/29/2021 03:30 AM    HCT 26.9 (L) 01/29/2021 03:30 AM    PLATELET 017 (H) 36/65/8967 03:30 AM    MCV 97.5 (H) 01/29/2021 03:30 AM                                                                                     DIET: DIET RENAL  DIET NUTRITIONAL SUPPLEMENTS  DIET NUTRITIONAL SUPPLEMENTS       PRIMARY NURSE REPORT: First initial/Last name/Title      Pre Dialysis: DENTON Beverly Rn     Time: 0900      EDUCATION:    [x] Patient [] Other         Knowledge Basis: []None [x]Minimal [] Substantial   Barriers to learning  [x]N/A   [] Access Care     [] S&S of infection     [] Fluid Management     []K+     [x]Procedural    []Albumin     [] Medications     [] Tx Options     [] Transplant     [] Diet     [] Other   Teaching Tools:  [x] Explain  [] Demo  [] Handouts [] Video  Patient response:   [x] Verbalized understanding  [] Teach back  [] Return demonstration [] Requires follow up   Inappropriate due to            [x] Time Out/Safety Check  [x]Extracorporeal Circuit Tested for integrity       RO/HEMODIALYSIS MACHINE SAFETY CHECKS  Before each treatment:     Machine Number:                   [facility]                                  [x] Unit Machine # 7 with centralized RO                                  [] Portable Machine #1/RO serial # [id number]                                  [] Portable Machine #2/RO serial # [id number]                                  [] Portable Machine #4/RO serial # [id number]                                                     [facility]                                  [] Portable Machine #11/RO serial # [id number]                                   [] Portable Machine #12/RO serial # [id number]                                  [] Portable Machine #13/RO serial #  [id number]      Alarm Test:  Pass time 0900               [x] RO/Machine Log Complete      Temp    36*-37*             Dialysate: pH  7.4 Conductivity: Meter   14     HD Machine   14                  TCD: 14  Dialyzer Lot # [id number]          Blood Tubing Lot # [id number]          Saline Lot #  [id number]     CHLORINE TESTING-Before each treatment and every 4 hours    Total Chlorine: [x] less than 0.1 ppm  Time: 0900 4 Hr/2nd Check Time: 1300   (if greater than 0.1 ppm from Primary then every 30 minutes from Secondary)     TREATMENT INITIATION  with Dialysis Precautions:   [x] All Connections Secured                 [x] Saline Line Double Clamped   [x] Venous Parameters Set                  [x] Arterial Parameters Set    [x] Prime Given 250ml                          [x]Air Foam Detector Engaged      Treatment Initiation Note:Pt in stable condition. CVL accessed and treatment initiated without complication. Dr Steele at bedside. Post Assessment:   Dialyzer Cleared: [] Good [x] Fair  [] Poor  Blood processed:  61.4 L  UF Removed  1500 Ml  POst BP:   126/76       Pulse: 82        Respirations: 18  Temperature: 98.4 Lungs:     [x] Clear      [] Course         [] Crackles    [] Wheezing         [] Diminished   Post Tx Vascular Access:   AVF/AVG: Bleeding stopped   Art  min. Elkin. Min   N/A Cardiac:   [x] Regular   [] Irregular   [] Monitor  [] N/A      Rhythm:       Catheter:   Locking solution: Heparin 1:1000   Art. 1.9  Elkin. 1.9     Skin:   Pain:    [x] Warm  [x] Dry [] Diaphoretic    [] Flushed    [] Pale [] Cyanotic [x]0  []1  []2   []3  []4   []5   []6   []7   []8   []9   []10     Post Treatment Note:   HD well tolerated. 1.5L UF removed.  NAD noted during or post treatment       POST TREATMENT PRIMARY NURSE HANDOFF REPORT:     First initial/Last name/Title         Post Dialysis: DENTON Espinoza RN Time:  3926     Abbreviations: AVG-arterial venous graft, AVF-arterial venous fistula, IJ-Internal Jugular, Subcl-Subclavian, Fem-Femoral, Tx-treatment, AP/HR-apical heart rate, DFR-dialysate flow rate, BFR-blood flow rate, AP-arterial pressure, -venous pressure, UF-ultrafiltrate, TMP-transmembrane pressure, Elkin-Venous, Art-Arterial, RO-Reverse Osmosis

## 2021-01-29 NOTE — PROGRESS NOTES
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 Final report pending  Proteinuria, nephrotic range with hematuria  Impaired concentration,cognition,asterexis vs tardive dyskinesia due to ?trileptal, amitriptyline. , now resolved  Anemia of chronic disease,iron deficiency  HTN  Lower extremities edema with cellulits, blister  Atrial fibrillation , on anti coagulation   H/o splenectomy   H/o heavy use of gabapentin ( about 2400mg daily for last 2 months)  Secondary hyperparathyroidism   PLAN:   Will hold off on any Rx for IGA nephropathy as ? benefit . With underlying FSGS benefit is limited. There was a question of mood disorder in past but this is not entirely clear and psychiatry ruled this out at this point. Considering pros and cons, at this point will hold steroids. Will follow up with full report. Ok for discharge today post dialysis. Has a chair for Monday at [address] [facility] 12 noon. No urine output  IV venofer given  C/w  hectorol 2 mcg with each Rx  Epogen  Once iron repleted  Phos level is ok  Seen and examined on HD.  Tolerating well  Hold off on trileptal for now       Facility-Administered Medications: None       Current Facility-Administered Medications   Medication Dose Route Frequency    bisacodyL (DULCOLAX) tablet 10 mg  10 mg Oral DAILY PRN    polyethylene glycol (MIRALAX) packet 17 g  17 g Oral DAILY    apixaban (ELIQUIS) tablet 2.5 mg  2.5 mg Oral BID    iron sucrose (VENOFER) 100 mg in 0.9% sodium chloride 100 mL IVPB  100 mg IntraVENous Q24H    doxercalciferoL (HECTOROL) 4 mcg/2 mL injection 2 mcg  2 mcg IntraVENous DIALYSIS MON, WED & FRI    0.9% sodium chloride infusion 250 mL  250 mL IntraVENous PRN    silver sulfADIAZINE (SILVADENE) 1 % topical cream   Topical DAILY    nicotine (NICODERM CQ) 14 mg/24 hr patch 1 Patch  1 Patch TransDERmal DAILY    0.9% sodium chloride infusion 250 mL  250 mL IntraVENous DIALYSIS PRN    heparin (porcine) 1,000 unit/mL injection 5,000 Units  5,000 Units InterCATHeter DIALYSIS PRN    dilTIAZem ER (CARDIZEM CD) capsule 120 mg  120 mg Oral Q12H    albumin human 25% (BUMINATE) solution 25 g  25 g IntraVENous DIALYSIS PRN    budesonide-formoteroL (SYMBICORT) 160-4.5 mcg/actuation HFA inhaler 2 Puff  2 Puff Inhalation BID RT    oxyCODONE-acetaminophen (PERCOCET) 5-325 mg per tablet 1 Tab  1 Tab Oral Q6H PRN    insulin lispro (HUMALOG) injection   SubCUTAneous AC&HS    glucose chewable tablet 16 g  4 Tab Oral PRN    glucagon (GLUCAGEN) injection 1 mg  1 mg IntraMUSCular PRN    dextrose (D50W) injection syrg 12.5-25 g  25-50 mL IntraVENous PRN    sodium chloride (NS) flush 5-40 mL  5-40 mL IntraVENous Q8H    sodium chloride (NS) flush 5-40 mL  5-40 mL IntraVENous Q8H    sodium chloride (NS) flush 5-40 mL  5-40 mL IntraVENous PRN    acetaminophen (TYLENOL) tablet 650 mg  650 mg Oral Q6H PRN    Or    acetaminophen (TYLENOL) suppository 650 mg  650 mg Rectal Q6H PRN    promethazine (PHENERGAN) tablet 12.5 mg  12.5 mg Oral Q6H PRN    Or    ondansetron (ZOFRAN) injection 4 mg  4 mg IntraVENous Q6H PRN    [Held by provider] amitriptyline (ELAVIL) tablet 50 mg  50 mg Oral QHS    melatonin tablet 5 mg  5 mg Oral QHS       Review of Systems:     As above  Data Review:    Labs: Results:       Chemistry Recent Labs     01/29/21 0330 01/27/21 0358   GLU 83 87    134*   K 3.6 3.3*    99*   CO2 28 29   BUN 19* 25*   CREA 6.83* 7.53*   CA 8.8 8.8   AGAP 9 6   BUCR 3* 3*      CBC w/Diff Recent Labs     01/29/21  0330 01/27/21  0358   WBC 13.4* 14.6*   RBC 2.76* 2.73*   HGB 8.3* 8.3*   HCT 26.9* 26.1*   * 517*   GRANS 73 71   LYMPH 7* 12*   EOS 2 4      Coagulation No results for input(s): PTP, INR, APTT, INREXT, INREXT in the last 72 hours. Iron/Ferritin No results for input(s): IRON in the last 72 hours. No lab exists for component: TIBCCALC   BNP No results for input(s): BNPP in the last 72 hours. Cardiac Enzymes No results for input(s): CPK, CKND1, RUSSEL in the last 72 hours. No lab exists for component: CKRMB, TROIP   Liver Enzymes No results for input(s): TP, ALB, TBIL, AP in the last 72 hours.     No lab exists for component: SGOT, GPT, DBIL   Thyroid Studies Lab Results   Component Value Date/Time    TSH 1.94 01/14/2021 11:56 AM            Physical Assessment:     Visit Vitals  /81 (BP 1 Location: Left upper arm, BP Patient Position: Supine)   Pulse 75   Temp 97.4 °F (36.3 °C)   Resp 18   Ht 5' 10\" (1.778 m)   Wt 130.6 kg (288 lb)   SpO2 99%   BMI 41.32 kg/m²     Weight change: 9.072 kg (20 lb)    Intake/Output Summary (Last 24 hours) at 1/29/2021 1007  Last data filed at 1/28/2021 2000  Gross per 24 hour   Intake 120 ml   Output    Net 120 ml     Physical Exam:     HEENT sclera anicteric, supple neck, no thyromegaly  CVS: S1S2 heard,  no rub  RS: + air entry b/l,   Abd: Soft, Non tender, Not distended,  Neuro: non focal    Extrm: ++edema, no cyanosis, clubbing   Skin:  dry  Musculoskeletal: No gross joints or bone deformities     Procedures/imaging: see electronic medical records for all procedures, Xrays and details which were not copied into this note but were reviewed prior to creation of Cristina Perez MD  January 29, 2021  [facility]  Office [phone]

## 2021-01-29 NOTE — PROGRESS NOTES
Problem: Patient Education: Go to Patient Education Activity  Goal: Patient/Family Education  Outcome: Progressing Towards Goal     Problem: Pain  Goal: *Control of Pain  Outcome: Progressing Towards Goal     Problem: Patient Education: Go to Patient Education Activity  Goal: Patient/Family Education  Outcome: Progressing Towards Goal     Problem: Impaired Skin Integrity/Pressure Injury Treatment  Goal: *Improvement of Existing Pressure Injury  Outcome: Progressing Towards Goal  Goal: *Prevention of pressure injury  Description: Document Oswaldo Scale and appropriate interventions in the flowsheet.   Outcome: Progressing Towards Goal  Note: Pressure Injury Interventions:  Sensory Interventions: Assess changes in LOC, Assess need for specialty bed, Avoid rigorous massage over bony prominences, Check visual cues for pain    Moisture Interventions: Absorbent underpads, Apply protective barrier, creams and emollients, Assess need for specialty bed    Activity Interventions: PT/OT evaluation, Pressure redistribution bed/mattress(bed type)    Mobility Interventions: PT/OT evaluation, Pressure redistribution bed/mattress (bed type)    Nutrition Interventions: Document food/fluid/supplement intake    Friction and Shear Interventions: HOB 30 degrees or less, Foam dressings/transparent film/skin sealants

## 2021-01-29 NOTE — PROGRESS NOTES
Bedside shift change report given to [facility]. (oncoming nurse) by Tyson Clements (offgoing nurse). Report included the following information SBAR.

## 2021-01-30 LAB
GLUCOSE BLD STRIP.AUTO-MCNC: 100 MG/DL (ref 70–110)
GLUCOSE BLD STRIP.AUTO-MCNC: 78 MG/DL (ref 70–110)
GLUCOSE BLD STRIP.AUTO-MCNC: 97 MG/DL (ref 70–110)
GLUCOSE BLD STRIP.AUTO-MCNC: 98 MG/DL (ref 70–110)

## 2021-01-30 PROCEDURE — 94640 AIRWAY INHALATION TREATMENT: CPT

## 2021-01-30 PROCEDURE — 74011250637 HC RX REV CODE- 250/637: Performed by: HOSPITALIST

## 2021-01-30 PROCEDURE — 74011000258 HC RX REV CODE- 258: Performed by: INTERNAL MEDICINE

## 2021-01-30 PROCEDURE — 77010033678 HC OXYGEN DAILY

## 2021-01-30 PROCEDURE — 99232 SBSQ HOSP IP/OBS MODERATE 35: CPT | Performed by: FAMILY MEDICINE

## 2021-01-30 PROCEDURE — 74011250637 HC RX REV CODE- 250/637: Performed by: FAMILY MEDICINE

## 2021-01-30 PROCEDURE — 74011250637 HC RX REV CODE- 250/637: Performed by: PODIATRIST

## 2021-01-30 PROCEDURE — 74011250636 HC RX REV CODE- 250/636: Performed by: INTERNAL MEDICINE

## 2021-01-30 PROCEDURE — 74011250637 HC RX REV CODE- 250/637: Performed by: EMERGENCY MEDICINE

## 2021-01-30 PROCEDURE — 65270000029 HC RM PRIVATE

## 2021-01-30 PROCEDURE — 74011250637 HC RX REV CODE- 250/637: Performed by: INTERNAL MEDICINE

## 2021-01-30 PROCEDURE — 82962 GLUCOSE BLOOD TEST: CPT

## 2021-01-30 PROCEDURE — 2709999900 HC NON-CHARGEABLE SUPPLY

## 2021-01-30 RX ADMIN — BUDESONIDE AND FORMOTEROL FUMARATE DIHYDRATE 2 PUFF: 160; 4.5 AEROSOL RESPIRATORY (INHALATION) at 08:24

## 2021-01-30 RX ADMIN — Medication 10 ML: at 13:03

## 2021-01-30 RX ADMIN — Medication 10 ML: at 22:18

## 2021-01-30 RX ADMIN — APIXABAN 2.5 MG: 5 TABLET, FILM COATED ORAL at 17:26

## 2021-01-30 RX ADMIN — BISACODYL 10 MG: 5 TABLET, COATED ORAL at 16:39

## 2021-01-30 RX ADMIN — Medication 10 ML: at 06:46

## 2021-01-30 RX ADMIN — Medication 5 MG: at 22:18

## 2021-01-30 RX ADMIN — DILTIAZEM HYDROCHLORIDE 120 MG: 120 CAPSULE, COATED, EXTENDED RELEASE ORAL at 08:58

## 2021-01-30 RX ADMIN — POLYETHYLENE GLYCOL 3350 17 G: 17 POWDER, FOR SOLUTION ORAL at 08:57

## 2021-01-30 RX ADMIN — OXYCODONE HYDROCHLORIDE AND ACETAMINOPHEN 1 TABLET: 5; 325 TABLET ORAL at 05:48

## 2021-01-30 RX ADMIN — APIXABAN 2.5 MG: 5 TABLET, FILM COATED ORAL at 08:57

## 2021-01-30 RX ADMIN — IRON SUCROSE 100 MG: 20 INJECTION, SOLUTION INTRAVENOUS at 18:17

## 2021-01-30 RX ADMIN — SILVER SULFADIAZINE: 10 CREAM TOPICAL at 09:00

## 2021-01-30 RX ADMIN — BUDESONIDE AND FORMOTEROL FUMARATE DIHYDRATE 2 PUFF: 160; 4.5 AEROSOL RESPIRATORY (INHALATION) at 22:20

## 2021-01-30 RX ADMIN — DILTIAZEM HYDROCHLORIDE 120 MG: 120 CAPSULE, COATED, EXTENDED RELEASE ORAL at 22:18

## 2021-01-30 NOTE — PROGRESS NOTES
Bedside shift change report given to Ephraim Feng (oncoming nurse) by Luis Tracey (offgoing nurse). Report included the following information SBAR.

## 2021-01-30 NOTE — PROGRESS NOTES
Per attending, patient is medically stable for d/c. Per chart, waiting on authorization for SNF from patient's [facility]) insurance, will also need to submit for authorization for dialysis transport, likely will not take place on weekend.   [facility] [employer] 12pm.     FELIPE Carlson  Case Management  [phone]

## 2021-01-30 NOTE — PROGRESS NOTES
Problem: Patient Education: Go to Patient Education Activity  Goal: Patient/Family Education  Outcome: Progressing Towards Goal     Problem: Pain  Goal: *Control of Pain  Outcome: Progressing Towards Goal     Problem: Patient Education: Go to Patient Education Activity  Goal: Patient/Family Education  Outcome: Progressing Towards Goal     Problem: Impaired Skin Integrity/Pressure Injury Treatment  Goal: *Improvement of Existing Pressure Injury  Outcome: Progressing Towards Goal  Goal: *Prevention of pressure injury  Description: Document Oswaldo Scale and appropriate interventions in the flowsheet. Outcome: Progressing Towards Goal  Note: Pressure Injury Interventions:  Sensory Interventions: Avoid rigorous massage over bony prominences, Discuss PT/OT consult with provider    Moisture Interventions: Absorbent underpads, Apply protective barrier, creams and emollients, Assess need for specialty bed    Activity Interventions: PT/OT evaluation, Pressure redistribution bed/mattress(bed type)    Mobility Interventions: PT/OT evaluation, Pressure redistribution bed/mattress (bed type)    Nutrition Interventions: Document food/fluid/supplement intake    Friction and Shear Interventions: Apply protective barrier, creams and emollients, HOB 30 degrees or less, Foam dressings/transparent film/skin sealants                Problem: Impaired Skin Integrity/Pressure Injury Treatment  Goal: *Prevention of pressure injury  Description: Document Oswaldo Scale and appropriate interventions in the flowsheet.   Outcome: Progressing Towards Goal  Note: Pressure Injury Interventions:  Sensory Interventions: Avoid rigorous massage over bony prominences, Discuss PT/OT consult with provider    Moisture Interventions: Absorbent underpads, Apply protective barrier, creams and emollients, Assess need for specialty bed    Activity Interventions: PT/OT evaluation, Pressure redistribution bed/mattress(bed type)    Mobility Interventions: PT/OT evaluation, Pressure redistribution bed/mattress (bed type)    Nutrition Interventions: Document food/fluid/supplement intake    Friction and Shear Interventions: Apply protective barrier, creams and emollients, HOB 30 degrees or less, Foam dressings/transparent film/skin sealants

## 2021-01-30 NOTE — PROGRESS NOTES
Bedside shift change report given to [address] (oncoming nurse) by Ginger Freitas (offgoing nurse). Report included the following information SBAR.

## 2021-01-31 LAB
ANION GAP SERPL CALC-SCNC: 7 MMOL/L (ref 3–18)
BUN SERPL-MCNC: 21 MG/DL (ref 7–18)
BUN/CREAT SERPL: 3 (ref 12–20)
CALCIUM SERPL-MCNC: 8.7 MG/DL (ref 8.5–10.1)
CHLORIDE SERPL-SCNC: 101 MMOL/L (ref 100–111)
CO2 SERPL-SCNC: 28 MMOL/L (ref 21–32)
CREAT SERPL-MCNC: 7.77 MG/DL (ref 0.6–1.3)
GLUCOSE BLD STRIP.AUTO-MCNC: 101 MG/DL (ref 70–110)
GLUCOSE BLD STRIP.AUTO-MCNC: 87 MG/DL (ref 70–110)
GLUCOSE BLD STRIP.AUTO-MCNC: 88 MG/DL (ref 70–110)
GLUCOSE BLD STRIP.AUTO-MCNC: 92 MG/DL (ref 70–110)
GLUCOSE SERPL-MCNC: 90 MG/DL (ref 74–99)
POTASSIUM SERPL-SCNC: 3.9 MMOL/L (ref 3.5–5.5)
SODIUM SERPL-SCNC: 136 MMOL/L (ref 136–145)

## 2021-01-31 PROCEDURE — 74011000258 HC RX REV CODE- 258: Performed by: INTERNAL MEDICINE

## 2021-01-31 PROCEDURE — 74011250637 HC RX REV CODE- 250/637: Performed by: INTERNAL MEDICINE

## 2021-01-31 PROCEDURE — 65660000000 HC RM CCU STEPDOWN

## 2021-01-31 PROCEDURE — 82962 GLUCOSE BLOOD TEST: CPT

## 2021-01-31 PROCEDURE — 99232 SBSQ HOSP IP/OBS MODERATE 35: CPT | Performed by: FAMILY MEDICINE

## 2021-01-31 PROCEDURE — 74011250637 HC RX REV CODE- 250/637: Performed by: PODIATRIST

## 2021-01-31 PROCEDURE — 80048 BASIC METABOLIC PNL TOTAL CA: CPT

## 2021-01-31 PROCEDURE — 74011250637 HC RX REV CODE- 250/637: Performed by: EMERGENCY MEDICINE

## 2021-01-31 PROCEDURE — 74011250636 HC RX REV CODE- 250/636: Performed by: INTERNAL MEDICINE

## 2021-01-31 PROCEDURE — 36415 COLL VENOUS BLD VENIPUNCTURE: CPT

## 2021-01-31 RX ORDER — IPRATROPIUM BROMIDE AND ALBUTEROL SULFATE 2.5; .5 MG/3ML; MG/3ML
3 SOLUTION RESPIRATORY (INHALATION)
Status: DISCONTINUED | OUTPATIENT
Start: 2021-01-31 | End: 2021-02-01 | Stop reason: HOSPADM

## 2021-01-31 RX ORDER — IPRATROPIUM BROMIDE AND ALBUTEROL SULFATE 2.5; .5 MG/3ML; MG/3ML
3 SOLUTION RESPIRATORY (INHALATION)
Status: DISCONTINUED | OUTPATIENT
Start: 2021-01-31 | End: 2021-01-31

## 2021-01-31 RX ADMIN — Medication 10 ML: at 15:37

## 2021-01-31 RX ADMIN — DILTIAZEM HYDROCHLORIDE 120 MG: 120 CAPSULE, COATED, EXTENDED RELEASE ORAL at 09:10

## 2021-01-31 RX ADMIN — ACETAMINOPHEN 650 MG: 325 TABLET ORAL at 17:22

## 2021-01-31 RX ADMIN — POLYETHYLENE GLYCOL 3350 17 G: 17 POWDER, FOR SOLUTION ORAL at 09:10

## 2021-01-31 RX ADMIN — Medication 10 ML: at 23:03

## 2021-01-31 RX ADMIN — APIXABAN 2.5 MG: 5 TABLET, FILM COATED ORAL at 17:22

## 2021-01-31 RX ADMIN — Medication 10 ML: at 06:59

## 2021-01-31 RX ADMIN — IRON SUCROSE 100 MG: 20 INJECTION, SOLUTION INTRAVENOUS at 17:00

## 2021-01-31 RX ADMIN — APIXABAN 2.5 MG: 5 TABLET, FILM COATED ORAL at 09:10

## 2021-01-31 RX ADMIN — DILTIAZEM HYDROCHLORIDE 120 MG: 120 CAPSULE, COATED, EXTENDED RELEASE ORAL at 23:00

## 2021-01-31 RX ADMIN — Medication 5 MG: at 23:00

## 2021-01-31 RX ADMIN — SILVER SULFADIAZINE: 10 CREAM TOPICAL at 09:00

## 2021-01-31 NOTE — PROGRESS NOTES
Bedside shift change report given to [address] (oncoming nurse) by Lucas Rosales (offgoing nurse). Report included the following information SBAR.

## 2021-01-31 NOTE — PROGRESS NOTES
Bedside shift change report given to Radhika (oncoming nurse) by Calvin Humphrey (offgoing nurse). Report included the following information SBAR, Intake/Output and MAR.

## 2021-01-31 NOTE — PROGRESS NOTES
[facility]  Progress Note    Patient:  Kaden Age: 56 y.o. : 1960 MR#: [id number] SSN: [id number]  Date: 2021     Subjective/24-hour events:     Resting comfortably, no new complaints. Denies SOB and chest pain. Afebrile. Assessment:   JOE  Acute hypoxic respiratory failure  Acute metabolic encephalopathy  Atrial fibrillation  Anemia of chronic disease  Chronic LE wound with abscess s/p I&D  Hematuria  Obesity, BMI 38.5  Hx splenectomy    Plan:   Continue medical management as ordered - stable off ABX. HD per usual schedule tomorrow. Anticipate discharge tomorrow following dialysis if disposition arranged. Patient states that transportation to outpatient HD facility will be pain for by his insurance. Will verify with CM in AM.    Case discussed with:  [x]Patient  []Family  [x]Nursing  [x]Case Management  DVT Prophylaxis:  []Lovenox  []Hep SQ  [x]SCDs  []Coumadin   []On Heparin gtt    Objective:   VS:   Visit Vitals  /66   Pulse 92   Temp 98.3 °F (36.8 °C)   Resp 19   Ht 5' 10\" (1.778 m)   Wt 121.6 kg (268 lb)   SpO2 97%   BMI 38.45 kg/m²      Tmax/24hrs: Temp (24hrs), Av.4 °F (36.9 °C), Min:98.1 °F (36.7 °C), Max:98.5 °F (36.9 °C)      Intake/Output Summary (Last 24 hours) at 2021 1404  Last data filed at 2021 2222  Gross per 24 hour   Intake 470 ml   Output    Net 470 ml       General:  In NAD. Nontoxic-appearing. Cardiovascular:  RRR. Pulmonary:  Lungs clear bilaterally, no wheezes. GI:  Abdomen soft, NTTP. Extremities:  Warm, no edema or ischemia. Neuro:  Awake and alert. Moves extremities spontaneously.     Labs:    Recent Results (from the past 24 hour(s))   GLUCOSE, POC    Collection Time: 21  4:06 PM   Result Value Ref Range    Glucose (POC) 98 70 - 110 mg/dL   GLUCOSE, POC    Collection Time: 21  9:24 PM   Result Value Ref Range    Glucose (POC) 100 70 - 110 mg/dL   GLUCOSE, POC    Collection Time: 01/31/21  6:46 AM   Result Value Ref Range    Glucose (POC) 88 70 - 868 mg/dL   METABOLIC PANEL, BASIC    Collection Time: 01/31/21 11:12 AM   Result Value Ref Range    Sodium 136 136 - 145 mmol/L    Potassium 3.9 3.5 - 5.5 mmol/L    Chloride 101 100 - 111 mmol/L    CO2 28 21 - 32 mmol/L    Anion gap 7 3.0 - 18 mmol/L    Glucose 90 74 - 99 mg/dL    BUN 21 (H) 7.0 - 18 MG/DL    Creatinine 7.77 (H) 0.6 - 1.3 MG/DL    BUN/Creatinine ratio 3 (L) 12 - 20      GFR est AA 9 (L) >60 ml/min/1.73m2    GFR est non-AA 7 (L) >60 ml/min/1.73m2    Calcium 8.7 8.5 - 10.1 MG/DL   GLUCOSE, POC    Collection Time: 01/31/21 12:01 PM   Result Value Ref Range    Glucose (POC) 92 70 - 110 mg/dL       Signed By: Sarwat Seth MD     January 31, 2021

## 2021-01-31 NOTE — PROGRESS NOTES
[facility]    Patient: Georgina Dash Age: 56 y.o. : 1960 MR#: [id number] SSN: [id number]  Date: 2021     Subjective/24-hour events:     No new complaints, nothing acute clinically overnight. Assessment:   JOE  Acute hypoxic respiratory failure  Acute metabolic encephalopathy  Atrial fibrillation  Anemia of chronic disease  Chronic LE wound with abscess s/p I&D  Hematuria  Obesity, BMI 38.5  Hx splenectomy    Plan:   Stable off ABX - no new recommendations from ID standpoint. Continue to monitor. Continue HD per nephrology - outpatient dialysis arranged. PT/OT as tolerated. Being evaluated for possible ARU admission on discharge. Supportive care o/w. Follow. Case discussed with:  [x]Patient  []Family  [x]Nursing  []Case Management  DVT Prophylaxis:  []Lovenox  []Hep SQ  [x]SCDs  []Coumadin   []On Heparin gtt    Objective:   VS:   Visit Vitals  /71 (BP 1 Location: Left upper arm, BP Patient Position: Supine)   Pulse 88   Temp 98.3 °F (36.8 °C)   Resp 22   Ht 5' 10\" (1.778 m)   Wt 121.6 kg (268 lb)   SpO2 91%   BMI 38.45 kg/m²       General:  In NAD. Nontoxic-appearing. Cardiovascular:  S1-S2, rate WNL. Pulmonary:  Lungs clear bilaterally, no wheezes. GI:  Abdomen soft, NTTP. Extremities:  Warm, no edema or ischemia. Neuro:  Awake and alert. Moves extremities spontaneously. Labs:    No new labs.     Signed By: Sailaja Peralta MD     2021

## 2021-01-31 NOTE — PROGRESS NOTES
Problem: Patient Education: Go to Patient Education Activity  Goal: Patient/Family Education  Outcome: Progressing Towards Goal     Problem: Pain  Goal: *Control of Pain  Outcome: Progressing Towards Goal     Problem: Patient Education: Go to Patient Education Activity  Goal: Patient/Family Education  Outcome: Progressing Towards Goal     Problem: Impaired Skin Integrity/Pressure Injury Treatment  Goal: *Improvement of Existing Pressure Injury  Outcome: Progressing Towards Goal  Goal: *Prevention of pressure injury  Description: Document Oswaldo Scale and appropriate interventions in the flowsheet.   Outcome: Progressing Towards Goal  Note: Pressure Injury Interventions:  Sensory Interventions: Assess changes in LOC, Assess need for specialty bed, Avoid rigorous massage over bony prominences    Moisture Interventions: Absorbent underpads, Check for incontinence Q2 hours and as needed    Activity Interventions: PT/OT evaluation, Pressure redistribution bed/mattress(bed type)    Mobility Interventions: Assess need for specialty bed, PT/OT evaluation, Pressure redistribution bed/mattress (bed type)    Nutrition Interventions: Document food/fluid/supplement intake    Friction and Shear Interventions: HOB 30 degrees or less

## 2021-02-01 ENCOUNTER — HOME HEALTH ADMISSION (OUTPATIENT)
Dept: HOME HEALTH SERVICES | Facility: HOME HEALTH | Age: 61
End: 2021-02-01

## 2021-02-01 VITALS
SYSTOLIC BLOOD PRESSURE: 137 MMHG | HEART RATE: 80 BPM | BODY MASS INDEX: 38.37 KG/M2 | OXYGEN SATURATION: 97 % | TEMPERATURE: 97.9 F | WEIGHT: 268 LBS | HEIGHT: 70 IN | DIASTOLIC BLOOD PRESSURE: 71 MMHG | RESPIRATION RATE: 18 BRPM

## 2021-02-01 LAB
BACTERIA SPEC CULT: NORMAL
BACTERIA SPEC CULT: NORMAL
GLUCOSE BLD STRIP.AUTO-MCNC: 88 MG/DL (ref 70–110)
GLUCOSE BLD STRIP.AUTO-MCNC: 91 MG/DL (ref 70–110)
SERVICE CMNT-IMP: NORMAL
SERVICE CMNT-IMP: NORMAL

## 2021-02-01 PROCEDURE — 77010033678 HC OXYGEN DAILY

## 2021-02-01 PROCEDURE — 97168 OT RE-EVAL EST PLAN CARE: CPT

## 2021-02-01 PROCEDURE — 97530 THERAPEUTIC ACTIVITIES: CPT

## 2021-02-01 PROCEDURE — 82962 GLUCOSE BLOOD TEST: CPT

## 2021-02-01 PROCEDURE — 2709999900 HC NON-CHARGEABLE SUPPLY

## 2021-02-01 PROCEDURE — 74011250637 HC RX REV CODE- 250/637: Performed by: EMERGENCY MEDICINE

## 2021-02-01 PROCEDURE — 99239 HOSP IP/OBS DSCHRG MGMT >30: CPT | Performed by: FAMILY MEDICINE

## 2021-02-01 PROCEDURE — 90935 HEMODIALYSIS ONE EVALUATION: CPT

## 2021-02-01 RX ORDER — AMITRIPTYLINE HYDROCHLORIDE 25 MG/1
50 TABLET, FILM COATED ORAL
Qty: 30 TAB | Refills: 0 | Status: SHIPPED
Start: 2021-02-01 | End: 2021-02-02 | Stop reason: SDUPTHER

## 2021-02-01 RX ORDER — DILTIAZEM HYDROCHLORIDE 120 MG/1
120 CAPSULE, COATED, EXTENDED RELEASE ORAL EVERY 12 HOURS
Qty: 60 CAP | Refills: 0 | Status: SHIPPED | OUTPATIENT
Start: 2021-02-01 | End: 2021-02-02

## 2021-02-01 RX ORDER — POLYETHYLENE GLYCOL 3350 17 G/17G
17 POWDER, FOR SOLUTION ORAL
Qty: 30 PACKET | Refills: 0 | Status: SHIPPED | OUTPATIENT
Start: 2021-02-01 | End: 2021-02-02

## 2021-02-01 RX ADMIN — SILVER SULFADIAZINE: 10 CREAM TOPICAL at 09:00

## 2021-02-01 RX ADMIN — Medication 10 ML: at 06:52

## 2021-02-01 RX ADMIN — APIXABAN 2.5 MG: 5 TABLET, FILM COATED ORAL at 09:26

## 2021-02-01 NOTE — HOME CARE
Received  referral for SN,PT,OT; Discharge order  noted for today; spoke to patients wife Tim Knowles), Verified demographics and explained [facility] services and answered all questions, wife states patient already has RW,Rollator,shower chair, lift chair and a BSC; [facility] referral processed to [facility] central Intake. REGGIE BRISCOE.

## 2021-02-01 NOTE — PROGRESS NOTES
Discharge order noted for today. Pt has been accepted to [facility] agency. Met with patient and spouse over the phone and are agreeable to the transition plan today. Transport has been arranged through spouse after dialysis. Patient's discharge summary and home health  orders have been forwarded to [facility] home health  agency via [facility] Updated bedside RN, Lucinda Zayas,  to the transition plan. Discharge information has been documented on the AVS.     Address and Phone number in AVS for M/W/F dialysis at [facility]. Spouse will transport to dialysis.        Iraj Vega RN BSN  Care Manager  [phone]

## 2021-02-01 NOTE — PROGRESS NOTES
Spoke with patient at bedside and spouse over the phone. Both are in agreement to go home with [facility] home care. Patient currently in HD. Plan home with home health after dialysis.      Wu Christopher RN BSN  Care Manager  [phone]

## 2021-02-01 NOTE — PROGRESS NOTES
Bedside shift change report given to [address] (oncoming nurse) by Bharat Shipman (offgoing nurse). Report included the following information SBAR.

## 2021-02-01 NOTE — DISCHARGE INSTRUCTIONS
Patient Education        Deciding Between Electrical Cardioversion and Rate Control Medicines for Atrial Fibrillation  How can you decide between electrical cardioversion and rate control medicines for atrial fibrillation? What is atrial fibrillation? Atrial fibrillation (say \"AY-tree-annika rhq-feiw-IDJ-shun\") is a kind of uneven heartbeat. It can make you feel lightheaded and dizzy. You may feel weak. It also can make you more likely to have a stroke. Electrical cardioversion can return your heart to a normal rhythm. First you'll get medicines to make you sleepy and control pain. Then your doctor will use patches to send an electric current to your heart. This resets the rhythm of your heart. Not everyone with atrial fibrillation needs this treatment. For some people, taking medicines may be better. Most people can live with an uneven heartbeat. It just has to be kept under control so the heart does not beat too fast.  Use this information to help you and your doctor decide which treatment to choose for atrial fibrillation. What are mckeon points about this decision? · Electrical cardioversion can return your heart to a normal rhythm. But the problem can come back. The longer you have had atrial fibrillation, the more likely it is to come back after this treatment. · Cardioversion may not work as well when an uneven heartbeat is caused by another heart disease, such as heart failure. · If your symptoms bother you a lot, you may want to try cardioversion. But even if it works, you may still need to take blood thinners to prevent a stroke. · If you don't have symptoms, or if they don't bother you much, you can try medicines to slow your heart rate. And you can take blood thinners to prevent a stroke. · Cardioversion does have risks, such as stroke. Discuss the risks with your doctor. Make sure you understand them. · You may have more than one heart problem.  Cardioversion doesn't work as well if you have more than one heart problem. Why might you choose electrical cardioversion? · It restores the normal heart rhythm for most people. · The idea of having an electric shock does not bother you. · Your symptoms bother you a lot. · You have had atrial fibrillation just one time. · You do not have other heart problems. · You may not have to take as many medicines. Or you may not need to take them as long. Why might you choose rate-control medicines? · These medicines keep many people from having symptoms. · You prefer to take medicines rather than have an electric shock. · Your symptoms don't bother you much. · If these medicines don't work, you can still try electrical cardioversion. Your decision  Thinking about the facts and your feelings can help you make a decision that is right for you. Be sure you understand the benefits and risks of your options. And think about what else you need to do before you make the decision. Where can you learn more? Go to http://www.gray.com/  Enter M647 in the search box to learn more about \"Deciding Between Electrical Cardioversion and Rate Control Medicines for Atrial Fibrillation. \"  Current as of: December 16, 2019               Content Version: 12.6  © 7642-2228 [employer]. Care instructions adapted under license by [employer] (which disclaims liability or warranty for this information). If you have questions about a medical condition or this instruction, always ask your healthcare professional. [place] [address] any warranty or liability for your use of this information. Patient armband removed and shredded  MyChart Activation    Thank you for requesting access to [facility]. Please follow the instructions below to securely access and download your online medical record.  [facility] allows you to send messages to your doctor, view your test results, renew your prescriptions, schedule appointments, and more. How Do I Sign Up? 1. In your internet browser, go to www."[employer]  2. Click on the First Time User? Click Here link in the Sign In box. You will be redirect to the New Member Sign Up page. 3. Enter your ahoyDoc Access Code exactly as it appears below. You will not need to use this code after youve completed the sign-up process. If you do not sign up before the expiration date, you must request a new code. [employer] Access Code: Activation code not generated  Current ahoyDoc Status: Active (This is the date your [employer] access code will )    4. Enter the last four digits of your Social Security Number (xxxx) and Date of Birth (mm/dd/yyyy) as indicated and click Submit. You will be taken to the next sign-up page. 5. Create a ahoyDoc ID. This will be your ahoyDoc login ID and cannot be changed, so think of one that is secure and easy to remember. 6. Create a ahoyDoc password. You can change your password at any time. 7. Enter your Password Reset Question and Answer. This can be used at a later time if you forget your password. 8. Enter your e-mail address. You will receive e-mail notification when new information is available in [address] 9. Click Sign Up. You can now view and download portions of your medical record. 10. Click the Download Summary menu link to download a portable copy of your medical information. Additional Information    If you have questions, please visit the Frequently Asked Questions section of the ahoyDoc website at [email or web address] 5 CUPS and some sugar/[employer]/. Remember, ahoyDoc is NOT to be used for urgent needs. For medical emergencies, dial 911. I have reviewed discharge instructions with the patient. The patient verbalized understanding.     DISCHARGE SUMMARY from Nurse    PATIENT INSTRUCTIONS:    After general anesthesia or intravenous sedation, for 24 hours or while taking prescription Narcotics:  · Limit your activities  · Do not drive and operate hazardous machinery  · Do not make important personal or business decisions  · Do  not drink alcoholic beverages  · If you have not urinated within 8 hours after discharge, please contact your surgeon on call. Report the following to your surgeon:  · Excessive pain, swelling, redness or odor of or around the surgical area  · Temperature over 100.5  · Nausea and vomiting lasting longer than 4 hours or if unable to take medications  · Any signs of decreased circulation or nerve impairment to extremity: change in color, persistent  numbness, tingling, coldness or increase pain  · Any questions    What to do at Home:  Recommended activity: Activity as tolerated,     If you experience any of the following symptoms weakness, worsening shortness of breath with exertion, worsening of celulitis, and change of mental status please follow up with primary physcian. *  Please give a list of your current medications to your Primary Care Provider. *  Please update this list whenever your medications are discontinued, doses are      changed, or new medications (including over-the-counter products) are added. *  Please carry medication information at all times in case of emergency situations. These are general instructions for a healthy lifestyle:    No smoking/ No tobacco products/ Avoid exposure to second hand smoke  Surgeon General's Warning:  Quitting smoking now greatly reduces serious risk to your health. Obesity, smoking, and sedentary lifestyle greatly increases your risk for illness    A healthy diet, regular physical exercise & weight monitoring are important for maintaining a healthy lifestyle    You may be retaining fluid if you have a history of heart failure or if you experience any of the following symptoms:  Weight gain of 3 pounds or more overnight or 5 pounds in a week, increased swelling in our hands or feet or shortness of breath while lying flat in bed.   Please call your doctor as soon as you notice any of these symptoms; do not wait until your next office visit. The discharge information has been reviewed with the patient. The patient verbalized understanding. Discharge medications reviewed with the patient and appropriate educational materials and side effects teaching were provided.   ___________________________________________________________________________________________________________________________________

## 2021-02-01 NOTE — DIALYSIS
JANAK        ACUTE HEMODIALYSIS FLOW SHEET      HEMODIALYSIS ORDERS: Physician: marizol     Dialyzer: revaclear   Duration: 3 hr  BFR: 350   DFR: 600   Dialysate:  Temp 36-37*C  K+   2    Ca+  2.5 Na 138 Bicarb 35   Weight: 121.6 kg   Patient Chart [x]     Unable to Obtain []   Dry weight/UF Goal: 1500 Access CVL  Needle Gauge     Heparin []  Bolus      Units    [] Hourly       Units    [x]None      Catheter locking solution heparin   Pre BP:   115/98    Pulse:     59       Respirations: 18  Temperature:   98.1   Labs: Pre        Post:        [x] N/A   Additional Orders(medications, blood products, hypotension management) [x] N/A     [x] Janak Consent Verified     CATHETER ACCESS: []N/A   [x]Right   []Left   [x]IJ     []Fem   []chest wall   [] First use X-ray verified     [x]Tunnel                [] Non Tunneled   [x]No S/S infection  []Redness  []Drainage []Cultured []Swelling []Pain   [x]Medical Aseptic Prep Utilized   []Dressing Changed  [] Biopatch  Date:       []Clotted   [x]Patent   Flows: [x]Good  []Poor  []Reversed   If access problem,  notified: []Yes    [x]N/A  Date:                         GENERAL ASSESSMENT:      LUNGS:  Rate  SaO2% [] N/A    [x] Clear  [] Coarse  [] Crackles  [] Wheezing        [] Diminished     Location : []RLL   []LLL    []RUL  []SUKHJINDER     Cough: []Productive  []Dry  [x]N/A   Respirations:  [x]Easy  []Labored     Therapy:   [x]RA  []NC  l/min    Mask: []NRB []Venti       O2%                  []Ventilator  []Intubated  [] Trach  [] BiPaP     CARDIAC: [x]Regular      [] Irregular   [] Pericardial Rub  [] JVD        []  Monitored  [] Bedside  [] Remotely monitored [] N/A  Rhythm:      EDEMA: [] None  [x]Generalized  [] Pitting [] 1    [] 2    [] 3    [] 4                 [] Facial  [] Pedal  []  UE  [] LE     SKIN:   [x] Warm  [] Hot     [] Cold   [x] Dry     [] Pale   [] Diaphoretic                  [] Flushed  [] Jaundiced  [] Cyanotic  [] Rash  [] Weeping     LOC:    [x] Alert [x]Oriented:    [x] Person     [x] Place  [x]Time               [] Confused  [] Lethargic  [] Medicated  [] Non-responsive     GI / ABDOMEN:  [] Flat    [] Distended    [x] Soft    [] Firm   []  Obese                             [] Diarrhea  [x] Bowel Sounds  [] Nausea  [] Vomiting       / URINE ASSESSMENT:[] Voiding   [x] Oliguria  [] Anuria   []  Garza     [] Incontinent    []  Incontinent Brief      []  Bathroom Privileges       PAIN: [x] 0 []1  []2   []3   []4   []5   []6   []7   []8   []9   []10              Scale 0-10  Action/Follow Up:      MOBILITY:  [] Amb    [] Amb/Assist    [x] Bed    [] Wheelchair  [] Stretcher      All Vitals and Treatment Details on Attached [address]: [facility]          Room # 262/67      [] 1st Time Acute  [] Stat  [x] Routine  [] Urgent     [x] Acute Room  []  Bedside  [] ICU/CCU  [] ER   Isolation Precautions:   There are currently no Active Isolations      Special Considerations:         [] Blood Consent Verified [x]N/A     ALLERGIES: No Known Allergies            Code Status:Full Code        Hepatitis Status:                        Lab Results   Component Value Date/Time    Hepatitis B surface Ag <0.10 01/14/2021 09:00 PM    Hepatitis B surface Ab 4.78 (L) 01/14/2021 09:00 PM    Hepatitis B core, IgM Negative 01/14/2021 09:00 PM    Hepatitis C virus Ab 0.03 01/14/2021 09:00 PM                     Current Labs:   Lab Results   Component Value Date/Time    Sodium 136 01/31/2021 11:12 AM    Potassium 3.9 01/31/2021 11:12 AM    Chloride 101 01/31/2021 11:12 AM    CO2 28 01/31/2021 11:12 AM    Anion gap 7 01/31/2021 11:12 AM    Glucose 90 01/31/2021 11:12 AM    BUN 21 (H) 01/31/2021 11:12 AM    Creatinine 7.77 (H) 01/31/2021 11:12 AM    BUN/Creatinine ratio 3 (L) 01/31/2021 11:12 AM    GFR est AA 9 (L) 01/31/2021 11:12 AM    GFR est non-AA 7 (L) 01/31/2021 11:12 AM    Calcium 8.7 01/31/2021 11:12 AM      Lab Results   Component Value Date/Time    WBC 13.4 (H) 01/29/2021 03:30 AM    HGB 8.3 (L) 01/29/2021 03:30 AM    HCT 26.9 (L) 01/29/2021 03:30 AM    PLATELET 992 (H) 31/67/3997 03:30 AM    MCV 97.5 (H) 01/29/2021 03:30 AM                                                                                     DIET: DIET RENAL  DIET NUTRITIONAL SUPPLEMENTS  DIET NUTRITIONAL SUPPLEMENTS       PRIMARY NURSE REPORT: First initial/Last name/Title      Pre Dialysis: Graeme Robles RN     Time: 0930      EDUCATION:    [x] Patient [] Other         Knowledge Basis: []None [x]Minimal [] Substantial   Barriers to learning  [x]N/A   [] Access Care     [] S&S of infection     [] Fluid Management     []K+     [x]Procedural    []Albumin     [] Medications     [] Tx Options     [] Transplant     [] Diet     [] Other   Teaching Tools:  [x] Explain  [] Demo  [] Handouts [] Video  Patient response:   [x] Verbalized understanding  [] Teach back  [] Return demonstration [] Requires follow up   Inappropriate due to            [x] Time Out/Safety Check  [x]Extracorporeal Circuit Tested for integrity       RO/HEMODIALYSIS MACHINE SAFETY CHECKS  Before each treatment:     Machine Number:                   [facility]                                   [x] Unit Machine # 3 with centralized RO                                  [] Portable Machine #1/RO serial # [id number]                                  [] Portable Machine #2/RO serial # [id number]                                  [] Portable Machine #4/RO serial # [id number]                                                     [facility]                                  [] Portable Machine #11/RO serial # [id number]                                   [] Portable Machine #12/RO serial # [id number]                                  [] Portable Machine #13/RO serial #  [id number]      Alarm Test:  Pass time 0930               [x] RO/Machine Log Complete      Temp    36*-37*             Dialysate: pH  7.4 Conductivity: Meter   14     HD Machine   14 TCD: 14  Dialyzer Lot # [id number]          Blood Tubing Lot # [id number]          Saline Lot #  [id number]     CHLORINE TESTING-Before each treatment and every 4 hours    Total Chlorine: [x] less than 0.1 ppm  Time: 0900 4 Hr/2nd Check Time: 1300   (if greater than 0.1 ppm from Primary then every 30 minutes from Secondary)     TREATMENT INITIATION  with Dialysis Precautions:   [x] All Connections Secured                 [x] Saline Line Double Clamped   [x] Venous Parameters Set                  [x] Arterial Parameters Set    [x] Prime Given 250ml                          [x]Air Foam Detector Engaged      Treatment Initiation Note:Pt in stable condition. CVL accessed and treatment initiated without complication. Dr Roman Dasilva at bedside. Post Assessment:   Dialyzer Cleared: [] Good [x] Fair  [] Poor  Blood processed:  61.6 L  UF Removed  1500 Ml  POst BP:   118/94       Pulse: 78        Respirations: 18  Temperature: 98.1 Lungs:     [x] Clear      [] Course         [] Crackles    [] Wheezing         [] Diminished   Post Tx Vascular Access:   AVF/AVG: Bleeding stopped   Art  min. Elkin. Min   N/A Cardiac:   [x] Regular   [] Irregular   [] Monitor  [] N/A      Rhythm:       Catheter:   Locking solution: Heparin 1:1000   Art. 1.9  Elkin. 1.9      Skin:   Pain:    [x] Warm  [x] Dry [] Diaphoretic    [] Flushed    [] Pale [] Cyanotic [x]0  []1  []2   []3  []4   []5   []6   []7   []8   []9   []10     Post Treatment Note:   HD well tolerated. 1.5L UF removed.  NAD noted during or post treatment       POST TREATMENT PRIMARY NURSE HANDOFF REPORT:     First initial/Last name/Title         Post Dialysis: NEREIDA Chen RN Time:  6030     Abbreviations: AVG-arterial venous graft, AVF-arterial venous fistula, IJ-Internal Jugular, Subcl-Subclavian, Fem-Femoral, Tx-treatment, AP/HR-apical heart rate, DFR-dialysate flow rate, BFR-blood flow rate, AP-arterial pressure, -venous pressure, UF-ultrafiltrate, TMP-transmembrane pressure, Elkin-Venous, Art-Arterial, RO-Reverse Osmosis

## 2021-02-01 NOTE — PROGRESS NOTES
PT orders received, chart reviewed. Pt unable to participate with PT due to:  []  Nausea/vomiting  []  Eating  []  Pain  []  Pt lethargic  [x]  Off Unit at  and 1309  []  Pt refused  []  Other   Will f/u later as patient's schedule allows.  Thank you for this referral.  Morgan Mccallum, PT, DPT

## 2021-02-01 NOTE — ROUTINE PROCESS
TRANSFER - IN REPORT: 
 
Verbal report received from Jimena Kendall(name) on [facility]ia [facility]  being received from Dialysis(unit) for routine progression of care Report consisted of patients Situation, Background, Assessment and  
Recommendations(SBAR). Information from the following report(s) SBAR, Kardex and Procedure Summary was reviewed with the receiving nurse. Opportunity for questions and clarification was provided. Assessment completed upon patients arrival to unit and care assumed.

## 2021-02-01 NOTE — PROGRESS NOTES
Bedside shift change report given to Nirav Anne (oncoming nurse) by Navi Way (offgoing nurse). Report included the following information SBAR and Intake/Output.

## 2021-02-01 NOTE — DISCHARGE SUMMARY
Discharge Summary    Patient: Junie Carrero MRN: [id number]  CSN: [id number]    YOB: 1960  Age: 56 y.o. Sex: male    DOA: 1/14/2021 LOS:  LOS: 18 days   Discharge Date: 2/1/2021     Admission Diagnoses: JOE     Discharge Diagnoses: JOE  Acute hypoxic respiratory failure  Deliriium, resolved  Atrial fibrillation  Anemia of chronic disease  Chronic LE wound with abscess s/p I&D  Hematuria  Obesity, BMI 38.5  Hx splenectomy    Discharge Condition: Stable    PHYSICAL EXAM  Visit Vitals  BP (!) 118/94   Pulse 78   Temp 98.1 °F (36.7 °C) (Oral)   Resp 18   Ht 5' 10\" (1.778 m)   Wt 121.6 kg (268 lb)   SpO2 93%   BMI 38.45 kg/m²       General: In NAD. Nontoxic-appearing. HEENT: NCAT. Sclerae anicteric, EOMI. Lungs:  Clear, no wheezes. Effort nonlabored. Heart:  S1, S2, rate WNL. Abdomen: Obese but soft - NT/ND. Extremities: Warm, no edema or ischemia. Psych:   Mood normal.  Neurologic:  Awake and alert, moves extremities spontaneously. Hospital Course:   See admission H&P for full details of HPI. Patient was referred for admission to the hospital after presenting to the emergency department for evaluation of near syncope and shortness of breath. Patient was found to be in acute renal failure of uncertain etiology on initial work-up. Nephrology evaluation was obtained acutely and full work-up was done including renal ultrasound and PVL studies as well as serologic testing. 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 There  was evidence for a severe acute tubular injury as well as focal and segmental  glomerulosclerosis with collapsing features. Hemodialysis has been initiated and patient has been tolerating this well. A psychiatry evaluation was obtained on 1/26 due to development of altered mental status.   This had been an issue intermittently and patient had previously been seen by neurology.  Symptomatology is felt to be a combination of delirium and acute metabolic encephalopathy due to underlying medical issues..  Symptoms have since resolved and patient is back to baseline mental status.  Patient was noted to have bilateral foot abscesses on admission did undergo incision and drainage of these on 1/16/2020 by podiatry.  Antibiotic therapy has been managed by ID in the interim and wound care has been managed by podiatry post procedure.  Physical/Occupational Therapy evaluations have been completed.  There was discussion for possible acute rehab placement versus skilled nursing facility but patient and wife has ultimately decided that they want him to return home with home health care services.  Home health has been ordered prior to discharge and patient is medically stable for discharge home today with outpatient follow-up and [facility] as ordered.      Consults:   Nephrology  Podiatry  Neurology  Vascular surgery  Psychiatry  Interventional radiology  Infectious Diseases      Significant Diagnostic Studies/Procedures:   2D echo:  Comparison Study Information    Prior Study    There is a prior study available for comparison. Prior study date: 10/9/2017. Previous echocardiogram completed at [facility]. Images are not available for direct comparison.   Echo Findings    Left Ventricle Normal cavity size, wall thickness and systolic function (ejection fraction normal). Wall motion: normal. The estimated EF is 55 - 60%. Visually measured ejection fraction.   Wall Scoring The left ventricular wall motion is normal.            Right Ventricle Not well visualized.   Aortic Valve No regurgitation.   Tricuspid Valve Tricuspid regurgitation is inadequate for estimation of right ventricular systolic pressure.   Pulmonic Valve No regurgitation.   Pulmonary Artery Pulmonary hypertension not suggested by Doppler findings.   IVC/Hepatic Veins Normal structure.  Normal central venous pressure (0-5 mmHg); IVC diameter is less than 21 mm and collapses more than 50% with respiration. Pericardium No evidence of pericardial effusion. CT abdomen with/without contrast  IMPRESSION  :     1.  Status post left lower pole kidney biopsy for acute kidney insufficiency  demonstrates some trace density in the upper collecting system of the biopsy  left kidney suggesting a small amount of blood not felt to be significant  clinically.     The nephrogenic and excretory phase images demonstrate nothing to suggest a post  biopsy complication. CT-guided renal biopsy:  IMPRESSION  Impression:     1. Successful CT-guided lower pole left kidney biopsy. CTA neck:  IMPRESSION     The insertion site and course of the dialysis catheter intact normal     Internal jugular above and below the dialysis catheter unremarkable no evidence  for any injury to the jugular venous     Cervical visceral space is unremarkable no adenopathy     Mediastinal lipomatosis     Some nonspecific postinflammatory changes in the lung apices.     No soft tissue abnormalities identified. CT abdomen/pelvis:  IMPRESSION:      No acute abnormality is identified in the abdomen or pelvis. There are small pleural effusions and contiguous atelectasis evident in the lung  bases. Etiology indeterminate with broad differential. Elevated BNP favors  congestive heart failure or fluid overload      Renal artery/vein PVL:  Interpretation Summary    Extremely difficult exam due to body habitus, bowel gas and confused, uncooperative patient. The aorta and bilateral proximal renal arteries could not be visualized due to body habitus. Visualized portions of the right renal artery without evidence of significant stenosis. Right renal vein shows normal flow.   Kidneys are normal in size bilateral.   Abdominal Findings    Renal    Limited visualization due to bowel gas, patient unable to hold breath and body habitus. Kidneys are of normal size bilaterally. Right renal findings: Patent right mid and distal renal artery without evidence of a significant stenosis. The right renal vein flow is normal.           Renal ultrasound:  IMPRESSION:  1. Normal examination of the kidneys.     2.  Bladder decompressed by Garza. Discharge Medications:     Current Discharge Medication List      START taking these medications    Details   polyethylene glycol (MIRALAX) 17 gram packet Take 1 Packet by mouth daily as needed for Constipation. Qty: 30 Packet, Refills: 0      apixaban (ELIQUIS) 2.5 mg tablet Take 1 Tab by mouth two (2) times a day. Qty: 60 Tab, Refills: 0         CONTINUE these medications which have CHANGED    Details   dilTIAZem ER (CARDIZEM CD) 120 mg capsule Take 1 Cap by mouth every twelve (12) hours. Qty: 60 Cap, Refills: 0      amitriptyline (ELAVIL) 25 mg tablet Take 2 Tabs by mouth nightly as needed for Sleep. Qty: 30 Tab, Refills: 0    Associated Diagnoses: Chronic pain syndrome; Depression, unspecified depression type; Neuropathy         CONTINUE these medications which have NOT CHANGED    Details   gabapentin (NEURONTIN) 800 mg tablet take 2 tablets by mouth three times a day  Qty: 180 Tab, Refills: 5    Associated Diagnoses: Neuropathy      atenolol (TENORMIN) 100 mg tablet Take 1 Tab by mouth daily. Qty: 90 Tab, Refills: 0    Associated Diagnoses: AAA (abdominal aortic aneurysm) without rupture ([address] Chronic atrial fibrillation ([address] Essential hypertension      budesonide-formoterol (SYMBICORT) 160-4.5 mcg/actuation HFAA inhale 2 puffs by mouth twice a day  Qty: 1 Inhaler, Refills: 2      melatonin tab tablet Take  by mouth nightly. albuterol (PROAIR HFA) 90 mcg/actuation inhaler Take 2 Puffs by inhalation every four (4) hours as needed for Wheezing.   Qty: 1 Inhaler, Refills: 1         STOP taking these medications       magnesium 250 mg tab Comments:   Reason for Stopping: dabigatran etexilate (PRADAXA) 150 mg capsule Comments:   Reason for Stopping:         hydroCHLOROthiazide (HYDRODIURIL) 12.5 mg tablet Comments:   Reason for Stopping:         ergocalciferol (ERGOCALCIFEROL) 50,000 unit capsule Comments:   Reason for Stopping:         saw palmetto xtr/zinc picolin (SAW PALMETTO EXTRACT PO) Comments:   Reason for Stopping:         cholecalciferol, vitamin D3, (VITAMIN D3) 2,000 unit tab Comments:   Reason for Stopping:         MULTIVIT-MIN/FA/LYCOPEN/LUTEIN (CENTRUM SILVER MEN PO) Comments:   Reason for Stopping:         potassium 99 mg tablet Comments:   Reason for Stopping:         fish oil-dha-epa 1,200-144-216 mg cap Comments:   Reason for Stopping:         b complex vitamins tablet Comments:   Reason for Stopping:               Activity: As tolerated. Diet: Cardiac Diet and Renal Diet    Disposition: Home with home health care services    Follow-up: with PCP in 1 week, nephrology as directed. Minutes spent on discharge: >30 minutes spent coordinating this discharge. Dahlia Gale MD  [address] [place] [address]    Disclaimer: Sections of this note are dictated using utilizing voice recognition software. Minor typographical errors may be present. If questions arise, please do not hesitate to contact me or call our department.

## 2021-02-02 LAB
Lab: NORMAL
REFERENCE LAB,REFLB: NORMAL
TEST DESCRIPTION:,ATST: NORMAL

## 2021-02-02 RX ORDER — AMITRIPTYLINE HYDROCHLORIDE 50 MG/1
50 TABLET, FILM COATED ORAL
Qty: 15 TAB | Refills: 0 | Status: SHIPPED | OUTPATIENT
Start: 2021-02-02 | End: 2021-02-21

## 2021-02-02 RX ORDER — DILTIAZEM HYDROCHLORIDE 120 MG/1
120 CAPSULE, COATED, EXTENDED RELEASE ORAL EVERY 12 HOURS
Qty: 60 CAP | Refills: 0 | Status: SHIPPED | OUTPATIENT
Start: 2021-02-02 | End: 2021-02-21

## 2021-02-02 RX ORDER — SILVER SULFADIAZINE 10 G/1000G
CREAM TOPICAL DAILY
Qty: 100 G | Refills: 0 | Status: SHIPPED | OUTPATIENT
Start: 2021-02-02 | End: 2021-02-21

## 2021-02-02 RX ORDER — POLYETHYLENE GLYCOL 3350 17 G/17G
17 POWDER, FOR SOLUTION ORAL
Qty: 30 PACKET | Refills: 0 | Status: SHIPPED | OUTPATIENT
Start: 2021-02-02

## 2021-02-02 NOTE — PROGRESS NOTES
OCCUPATIONAL THERAPY RE-EVALUATION    Patient: Fernando Otero (79 y.o. male)  Date: 2/1/2021  Primary Diagnosis: Acute kidney injury ([facility] [address] [N17.9]  ARF (acute renal failure) ([facility] [address] [N17.9]  Procedure(s) (LRB):  INCISION AND DRAINAGE BILATERAL FEET (Bilateral) 16 Days Post-Op   Precautions:   Fall    ASSESSMENT :  Based on the objective data described below, the patient presents with min decrease in LB ADLs, decreased functional mobility and muscle weakness. Min assist needed to reach feet while seated on EOB. He was able to sit on EOB from supine with modified independence and ambulate to the bathroom with supervision using a RW. No LOB noted. Patient able to stand at sink to simulate self care tasks. He returned to supine in bed without assistance at end of session. Recommend home health therapy to evaluate safety in the home environment and support from wife as needed. Patient will benefit from skilled intervention to address the above impairments.   Patient's rehabilitation potential is considered to be Good  Factors which may influence rehabilitation potential include:   []             None noted  []             Mental ability/status  [x]             Medical condition  []             Home/family situation and support systems  []             Safety awareness  []             Pain tolerance/management  []             Other:      PLAN :  Recommendations and Planned Interventions:   [x]               Self Care Training                  [x]      Therapeutic Activities  [x]               Functional Mobility Training   []      Cognitive Retraining  [x]               Therapeutic Exercises           [x]      Endurance Activities  [x]               Balance Training                    []      Neuromuscular Re-Education  []               Visual/Perceptual Training     [x]      Home Safety Training  [x]               Patient Education                   [x]      Family Training/Education  []               Other (comment):    Frequency/Duration: Patient will be followed by occupational therapy 1-2 times per day/4-7 days per week to address goals. Discharge Recommendations: Home Health  Further Equipment Recommendations for Discharge: NA     SUBJECTIVE:   Patient stated Chau Crocker you call my wife and let her know how I'm doing?     OBJECTIVE DATA SUMMARY:   Hospital course since last seen and reason for reevaluation: Patient making steady progress toward goals but continues to benefit from strength/endurance training to increase his function. Past Medical History:   Diagnosis Date    AAA (abdominal aortic aneurysm) (HCC)     Alcoholism ([address]     Atrial fibrillation (HCC)     Bradycardia     Chronic pain     Degenerative disc disease, cervical     Degenerative disc disease, lumbar     Hypertension     Hypokalemia     Pneumonia 2016    caused permanent L lung problems    Thyroid disease      Past Surgical History:   Procedure Laterality Date    HX ORTHOPAEDIC      neck fracture    HX ORTHOPAEDIC      lumbar fusion    IR INSERT TUNL CVC W/O PORT OVER 5 YR  1/19/2021    IL ABDOMEN SURGERY PROC UNLISTED  10/08/2017    spleenectomy     Barriers to Learning/Limitations: None  Compensate with: visual, verbal, tactile, kinesthetic cues/model    Home Situation:   Home Situation  Home Environment: Apartment  # Steps to Enter: 3  One/Two Story Residence: One story  Living Alone: No  Support Systems: Spouse/Significant Other/Partner  Patient Expects to be Discharged to[de-identified] Apartment  Current DME Used/Available at Home: None  Tub or Shower Type: Shower(min height step to enter)  [x]  Right hand dominant   []  Left hand dominant    Cognitive/Behavioral Status:  Neurologic State: Alert  Orientation Level: Oriented X4  Cognition: Appropriate decision making; Follows commands  Safety/Judgement: Awareness of environment; Fall prevention    Skin: Intact on UEs  Edema: None noted in UEs    Vision/Perceptual:    Acuity: Within Defined Limits Corrective Lenses: Glasses    Coordination: BUE  Fine Motor Skills-Upper: Left Intact; Right Intact    Gross Motor Skills-Upper: Left Intact; Right Intact    Balance:  Sitting: Intact  Standing: With support    Strength: BUE  Strength: Generally decreased, functional    Tone & Sensation: BUE  Tone: Normal  Sensation: Intact    Range of Motion: BUE  AROM: Within functional limits    Functional Mobility and Transfers for ADLs:  Bed Mobility:  Supine to Sit: Modified independent  Sit to Supine: Modified independent     Transfers:  Sit to Stand: Supervision  Stand to Sit: Modified independent   Toilet Transfer : Supervision    ADL Assessment:   Feeding: Independent    Oral Facial Hygiene/Grooming: Modified Independent    Bathing: Minimum assistance(for feet)    Upper Body Dressing: Modified independent    Lower Body Dressing: Minimum assistance(to thread clothing over toes)    Toileting: Supervision    Pain:  Pain level pre-treatment: 0/10   Pain level post-treatment: 0/10  Pain Intervention(s): NA  Response to intervention: NA    Activity Tolerance:   Good  Please refer to the flowsheet for vital signs taken during this treatment. After treatment:   [] Patient left in no apparent distress sitting up in chair  [x] Patient left in no apparent distress in bed  [x] Call bell left within reach  [x] Nursing notified  [] Caregiver present  [] Bed alarm activated    COMMUNICATION/EDUCATION:   [x] Role of Occupational Therapy in the acute care setting  [x] Home safety education was provided and the patient/caregiver indicated understanding. [x] Patient/family have participated as able in goal setting and plan of care. [x] Patient/family agree to work toward stated goals and plan of care. [] Patient understands intent and goals of therapy, but is neutral about his/her participation. [] Patient is unable to participate in goal setting and plan of care.     Thank you for this referral.  Nohemy Brown, MS OTR/L   Time Calculation: 28 mins

## 2021-02-20 ENCOUNTER — HOSPITAL ENCOUNTER (INPATIENT)
Age: 61
LOS: 1 days | Discharge: HOME | DRG: 149 | End: 2021-02-21
Attending: EMERGENCY MEDICINE | Admitting: INTERNAL MEDICINE
Payer: COMMERCIAL

## 2021-02-20 ENCOUNTER — APPOINTMENT (OUTPATIENT)
Dept: GENERAL RADIOLOGY | Age: 61
DRG: 149 | End: 2021-02-20
Attending: EMERGENCY MEDICINE
Payer: COMMERCIAL

## 2021-02-20 ENCOUNTER — APPOINTMENT (OUTPATIENT)
Dept: CT IMAGING | Age: 61
DRG: 149 | End: 2021-02-20
Attending: INTERNAL MEDICINE
Payer: COMMERCIAL

## 2021-02-20 DIAGNOSIS — I48.20 CHRONIC ATRIAL FIBRILLATION (HCC): ICD-10-CM

## 2021-02-20 DIAGNOSIS — R42 LIGHTHEADEDNESS: Primary | ICD-10-CM

## 2021-02-20 DIAGNOSIS — I71.40 AAA (ABDOMINAL AORTIC ANEURYSM) WITHOUT RUPTURE: ICD-10-CM

## 2021-02-20 DIAGNOSIS — Z99.2 ENCOUNTER FOR HEMODIALYSIS (HCC): ICD-10-CM

## 2021-02-20 DIAGNOSIS — I10 ESSENTIAL HYPERTENSION: ICD-10-CM

## 2021-02-20 DIAGNOSIS — D72.829 LEUKOCYTOSIS, UNSPECIFIED TYPE: ICD-10-CM

## 2021-02-20 LAB
ANION GAP SERPL CALC-SCNC: 9 MMOL/L (ref 3–18)
ATRIAL RATE: 72 BPM
BASOPHILS # BLD: 0.4 K/UL (ref 0–0.06)
BASOPHILS NFR BLD: 2 % (ref 0–3)
BUN SERPL-MCNC: 29 MG/DL (ref 7–18)
BUN/CREAT SERPL: 3 (ref 12–20)
CALCIUM SERPL-MCNC: 9.7 MG/DL (ref 8.5–10.1)
CALCULATED R AXIS, ECG10: 47 DEGREES
CALCULATED T AXIS, ECG11: 66 DEGREES
CHLORIDE SERPL-SCNC: 98 MMOL/L (ref 100–111)
CK MB CFR SERPL CALC: 1.8 % (ref 0–4)
CK MB SERPL-MCNC: 1.4 NG/ML (ref 5–25)
CK SERPL-CCNC: 80 U/L (ref 39–308)
CO2 SERPL-SCNC: 28 MMOL/L (ref 21–32)
CREAT SERPL-MCNC: 8.9 MG/DL (ref 0.6–1.3)
DIAGNOSIS, 93000: NORMAL
DIFFERENTIAL METHOD BLD: ABNORMAL
EOSINOPHIL # BLD: 0.6 K/UL (ref 0–0.4)
EOSINOPHIL NFR BLD: 3 % (ref 0–5)
ERYTHROCYTE [DISTWIDTH] IN BLOOD BY AUTOMATED COUNT: 15 % (ref 11.6–14.5)
GLUCOSE BLD STRIP.AUTO-MCNC: 97 MG/DL (ref 70–110)
GLUCOSE SERPL-MCNC: 110 MG/DL (ref 74–99)
HBV SURFACE AG SER QL: 0.1 INDEX
HBV SURFACE AG SER QL: NEGATIVE
HCT VFR BLD AUTO: 41.9 % (ref 36–48)
HGB BLD-MCNC: 13.4 G/DL (ref 13–16)
LYMPHOCYTES # BLD: 2.6 K/UL (ref 0.8–3.5)
LYMPHOCYTES NFR BLD: 12 % (ref 20–51)
MAGNESIUM SERPL-MCNC: 2.1 MG/DL (ref 1.6–2.6)
MCH RBC QN AUTO: 30.9 PG (ref 24–34)
MCHC RBC AUTO-ENTMCNC: 32 G/DL (ref 31–37)
MCV RBC AUTO: 96.8 FL (ref 74–97)
MONOCYTES # BLD: 0.4 K/UL (ref 0–1)
MONOCYTES NFR BLD: 2 % (ref 2–9)
NEUTS BAND NFR BLD MANUAL: 5 % (ref 0–5)
NEUTS SEG # BLD: 17.4 K/UL (ref 1.8–8)
NEUTS SEG NFR BLD: 76 % (ref 42–75)
PLATELET # BLD AUTO: 355 K/UL (ref 135–420)
PLATELET COMMENTS,PCOM: ABNORMAL
PMV BLD AUTO: 11.3 FL (ref 9.2–11.8)
POTASSIUM SERPL-SCNC: 3.2 MMOL/L (ref 3.5–5.5)
Q-T INTERVAL, ECG07: 458 MS
QRS DURATION, ECG06: 82 MS
QTC CALCULATION (BEZET), ECG08: 515 MS
RBC # BLD AUTO: 4.33 M/UL (ref 4.7–5.5)
RBC MORPH BLD: ABNORMAL
SODIUM SERPL-SCNC: 135 MMOL/L (ref 136–145)
TROPONIN I SERPL-MCNC: <0.02 NG/ML (ref 0–0.04)
VENTRICULAR RATE, ECG03: 76 BPM
WBC # BLD AUTO: 21.4 K/UL (ref 4.6–13.2)

## 2021-02-20 PROCEDURE — 99223 1ST HOSP IP/OBS HIGH 75: CPT | Performed by: INTERNAL MEDICINE

## 2021-02-20 PROCEDURE — 74011250637 HC RX REV CODE- 250/637: Performed by: EMERGENCY MEDICINE

## 2021-02-20 PROCEDURE — 74011250636 HC RX REV CODE- 250/636: Performed by: EMERGENCY MEDICINE

## 2021-02-20 PROCEDURE — 85025 COMPLETE CBC W/AUTO DIFF WBC: CPT

## 2021-02-20 PROCEDURE — 80048 BASIC METABOLIC PNL TOTAL CA: CPT

## 2021-02-20 PROCEDURE — 99285 EMERGENCY DEPT VISIT HI MDM: CPT

## 2021-02-20 PROCEDURE — 74011000250 HC RX REV CODE- 250: Performed by: INTERNAL MEDICINE

## 2021-02-20 PROCEDURE — 65270000029 HC RM PRIVATE

## 2021-02-20 PROCEDURE — 74011250636 HC RX REV CODE- 250/636: Performed by: INTERNAL MEDICINE

## 2021-02-20 PROCEDURE — 74011000258 HC RX REV CODE- 258: Performed by: EMERGENCY MEDICINE

## 2021-02-20 PROCEDURE — 70450 CT HEAD/BRAIN W/O DYE: CPT

## 2021-02-20 PROCEDURE — 71045 X-RAY EXAM CHEST 1 VIEW: CPT

## 2021-02-20 PROCEDURE — 82553 CREATINE MB FRACTION: CPT

## 2021-02-20 PROCEDURE — 5A1D70Z PERFORMANCE OF URINARY FILTRATION, INTERMITTENT, LESS THAN 6 HOURS PER DAY: ICD-10-PCS | Performed by: INTERNAL MEDICINE

## 2021-02-20 PROCEDURE — 82962 GLUCOSE BLOOD TEST: CPT

## 2021-02-20 PROCEDURE — 90935 HEMODIALYSIS ONE EVALUATION: CPT

## 2021-02-20 PROCEDURE — 87340 HEPATITIS B SURFACE AG IA: CPT

## 2021-02-20 PROCEDURE — 87040 BLOOD CULTURE FOR BACTERIA: CPT

## 2021-02-20 PROCEDURE — 86706 HEP B SURFACE ANTIBODY: CPT

## 2021-02-20 PROCEDURE — 74011250637 HC RX REV CODE- 250/637: Performed by: INTERNAL MEDICINE

## 2021-02-20 PROCEDURE — 83735 ASSAY OF MAGNESIUM: CPT

## 2021-02-20 PROCEDURE — 93005 ELECTROCARDIOGRAM TRACING: CPT

## 2021-02-20 RX ORDER — ATENOLOL 50 MG/1
50 TABLET ORAL DAILY
Status: DISCONTINUED | OUTPATIENT
Start: 2021-02-21 | End: 2021-02-21

## 2021-02-20 RX ORDER — DILTIAZEM HYDROCHLORIDE 120 MG/1
120 CAPSULE, COATED, EXTENDED RELEASE ORAL EVERY 12 HOURS
Status: DISCONTINUED | OUTPATIENT
Start: 2021-02-20 | End: 2021-02-21

## 2021-02-20 RX ORDER — IPRATROPIUM BROMIDE AND ALBUTEROL SULFATE 2.5; .5 MG/3ML; MG/3ML
3 SOLUTION RESPIRATORY (INHALATION)
Status: DISCONTINUED | OUTPATIENT
Start: 2021-02-20 | End: 2021-02-21 | Stop reason: HOSPADM

## 2021-02-20 RX ORDER — DIPHENHYDRAMINE HCL 25 MG
25 CAPSULE ORAL ONCE
Status: COMPLETED | OUTPATIENT
Start: 2021-02-21 | End: 2021-02-20

## 2021-02-20 RX ORDER — POLYETHYLENE GLYCOL 3350 17 G/17G
17 POWDER, FOR SOLUTION ORAL
Status: DISCONTINUED | OUTPATIENT
Start: 2021-02-20 | End: 2021-02-21 | Stop reason: HOSPADM

## 2021-02-20 RX ORDER — CHOLECALCIFEROL (VITAMIN D3) 125 MCG
5 CAPSULE ORAL
Status: DISCONTINUED | OUTPATIENT
Start: 2021-02-20 | End: 2021-02-21 | Stop reason: HOSPADM

## 2021-02-20 RX ADMIN — DIPHENHYDRAMINE HYDROCHLORIDE 25 MG: 25 CAPSULE ORAL at 23:42

## 2021-02-20 RX ADMIN — ALTEPLASE 2 MG: 2.2 INJECTION, POWDER, LYOPHILIZED, FOR SOLUTION INTRAVENOUS at 18:35

## 2021-02-20 RX ADMIN — PIPERACILLIN AND TAZOBACTAM 2.25 G: 2; .25 INJECTION, POWDER, LYOPHILIZED, FOR SOLUTION INTRAVENOUS at 14:30

## 2021-02-20 RX ADMIN — DILTIAZEM HYDROCHLORIDE 120 MG: 120 CAPSULE, COATED, EXTENDED RELEASE ORAL at 21:42

## 2021-02-20 NOTE — H&P
History and Physical    Chief complaint: Patient had an episode of dizziness, lightheadedness, nausea, excessive perspiration earlier today when he was in dialysis room    HPI:     Nixon Kincaid is a 56 y.o.  male who presented to the emergency department with a complaint of dizziness/lightheadedness while he was at dialysis unit. Patient states that he has had sleep disturbances since he had an episode of pulling out dialysis catheter few weeks ago. Due to excessive sleepiness yesterday, he missed his outpatient dialysis. He went to get dialysis done in outpatient setting this morning, he started feeling lightheadedness/dizziness, nausea, excessive perspiration and tremors when dialysis nurse was manipulating his catheter for 30 minutes per patient. Patient was sent to [facility] emergency room. Currently patient does not have any other symptoms other than having some light headaches. Patient was found out to have leukocytosis and hypokalemia on his left wrist initially in the emergency room. Our service was contacted to admit him for evaluation and management of leukocytosis as well as dizziness. Patient has been feeling fine currently. Has light headaches. No visual disturbances. No runny nose or watery eyes. No neck pain. No soreness of throat. Denies any chest pain. No shortness of breath or cough. No PND or orthopnea. No abdominal pain. No nausea or vomiting currently. No bowel or bladder disturbances. No leg pain currently. Chronic back pain present. No tingling or numbness. No weight gain or weight loss lately. No blood loss. No easy bruising. Denies having any fever or chills. Denies smoking cigarettes or drinking any alcohol currently. However he is a former smoker and used to drink alcohol in the past.  Lives with his wife. He has limited ambulation and walks from bed to bathroom. He has a walker but he does not use it usually. He does not drive.   He says he takes Eliquis and other 2 medications for his blood pressure control/for atrial fibrillation. His past medical history includes atrial fibrillation, hypertension, hypokalemia, ESRD. Past Medical History:   Diagnosis Date    AAA (abdominal aortic aneurysm) (HCC)     Alcoholism ([address]     Atrial fibrillation (HCC)     Bradycardia     Chronic pain     Degenerative disc disease, cervical     Degenerative disc disease, lumbar     Hypertension     Hypokalemia     Pneumonia 2016    caused permanent L lung problems    Thyroid disease       Past Surgical History:   Procedure Laterality Date    HX ORTHOPAEDIC      neck fracture    HX ORTHOPAEDIC      lumbar fusion    IR INSERT TUNL CVC W/O PORT OVER 5 YR  2021    NM ABDOMEN SURGERY PROC UNLISTED  10/08/2017    spleenectomy     Family History   Problem Relation Age of Onset    Heart Disease Father     Heart Disease Brother         AAA-  in his 42's    Heart Disease Paternal Aunt     Heart Disease Paternal Uncle     Heart Disease Paternal Grandmother     Lupus Mother       Social History     Tobacco Use    Smoking status: Former Smoker     Quit date: 2017     Years since quitting: 3.5    Smokeless tobacco: Former User   Substance Use Topics    Alcohol use: No     Comment: sober x 5 years       Prior to Admission medications    Medication Sig Start Date End Date Taking? Authorizing Provider   amitriptyline (ELAVIL) 50 mg tablet Take 1 Tab by mouth nightly as needed for Sleep. 21   Jesus Lyles MD   dilTIAZem ER (CARDIZEM CD) 120 mg capsule Take 1 Cap by mouth every twelve (12) hours. 21   Jesus Lyles MD   apixaban (ELIQUIS) 2.5 mg tablet Take 1 Tab by mouth two (2) times a day. 21   Jesus Lyles MD   polyethylene glycol (MIRALAX) 17 gram packet Take 1 Packet by mouth daily as needed for Constipation.  21   Jesus Lyles MD   silver sulfADIAZINE (Silvadene) 1 % topical cream Apply  to affected area daily. 2/2/21   Frantz Ho MD   gabapentin (NEURONTIN) 800 mg tablet take 2 tablets by mouth three times a day  Patient taking differently: Take 1,200 mg by mouth two (2) times a day. 3/17/20   Akash Crook MD   atenolol (TENORMIN) 100 mg tablet Take 1 Tab by mouth daily. 12/26/19   Eron Teran PA-C   budesonide-formoterol [facility]) 160-4.5 mcg/actuation HFAA inhale 2 puffs by mouth twice a day 11/20/19   Eron Teran PA-C   melatonin tab tablet Take  by mouth nightly. Provider, Historical   albuterol (PROAIR HFA) 90 mcg/actuation inhaler Take 2 Puffs by inhalation every four (4) hours as needed for Wheezing. 7/19/17   Eron Teran PA-C     No Known Allergies     Review of Systems:  A comprehensive review of systems was negative except for that written in the History of Present Illness. Review of Systems-  GENERAL: No fever, chills, malaise, weight changes  HEENT: No change in vision,  sore throat or sinus congestion. NECK: No pain or stiffness. PULMONARY: No shortness of breath, cough or wheeze. Cardiovascular: no pnd or orthopnea, no CP  GASTROINTESTINAL: No abdominal pain, nausea, vomiting or diarrhea, melena or bright red blood per rectum. GENITOURINARY: No urinary frequency dysuria. MUSCULOSKELETAL: No joint pain other than chronic intermittent leg pain, no back pain, no recent trauma. DERMATOLOGIC: No rash, no itching, no lesions. ENDOCRINE: No polyuria, polydipsia,No recent change in weight. HEMATOLOGICAL: No anemia or easy bruising or bleeding. NEUROLOGIC: Headache present, no seizures or numbness or tingling or weakness. Objective: Intake and Output:    No intake/output data recorded. No intake/output data recorded.     Physical Exam:     /86   Pulse 82   Temp 98.1 °F (36.7 °C)   Resp 18   Wt 108.4 kg (239 lb)   SpO2 98%   BMI 34.29 kg/m²     General appearance - alert, well appearing, and in no distress  Head -atraumatic, normocephalic  Eyes - sclera anicteric, no pallor  Nose - no obvious nasal discharge. Neck - supple, no JVD, trachea is midline  Chest -clear air entry noted in bases, no wheezes. Right-sided hemodialysis catheter in situ  Heart - S1 and S2 normal, irregularly irregular  Abdomen - soft, nontender, nondistended, Bowel sounds present  Neurological - alert, oriented, normal speech, no focal findings noted  Musculoskeletal - no joint tenderness or erythema of knees bilaterally  Skin -old discoloration patches present on lower extremities  Extremities - no pedal edema noted      Data Review:   Recent Results (from the past 24 hour(s))   CBC WITH AUTOMATED DIFF    Collection Time: 02/20/21 11:00 AM   Result Value Ref Range    WBC 21.4 (H) 4.6 - 13.2 K/uL    RBC 4.33 (L) 4.70 - 5.50 M/uL    HGB 13.4 13.0 - 16.0 g/dL    HCT 41.9 36.0 - 48.0 %    MCV 96.8 74.0 - 97.0 FL    MCH 30.9 24.0 - 34.0 PG    MCHC 32.0 31.0 - 37.0 g/dL    RDW 15.0 (H) 11.6 - 14.5 %    PLATELET 565 900 - 618 K/uL    MPV 11.3 9.2 - 11.8 FL    NEUTROPHILS 76 (H) 42 - 75 %    BAND NEUTROPHILS 5 0 - 5 %    LYMPHOCYTES 12 (L) 20 - 51 %    MONOCYTES 2 2 - 9 %    EOSINOPHILS 3 0 - 5 %    BASOPHILS 2 0 - 3 %    ABS. NEUTROPHILS 17.4 (H) 1.8 - 8.0 K/UL    ABS. LYMPHOCYTES 2.6 0.8 - 3.5 K/UL    ABS. MONOCYTES 0.4 0 - 1.0 K/UL    ABS. EOSINOPHILS 0.6 (H) 0.0 - 0.4 K/UL    ABS.  BASOPHILS 0.4 (H) 0.0 - 0.06 K/UL    DF MANUAL      PLATELET COMMENTS ADEQUATE PLATELETS      RBC COMMENTS ANISOCYTOSIS  1+       METABOLIC PANEL, BASIC    Collection Time: 02/20/21 11:00 AM   Result Value Ref Range    Sodium 135 (L) 136 - 145 mmol/L    Potassium 3.2 (L) 3.5 - 5.5 mmol/L    Chloride 98 (L) 100 - 111 mmol/L    CO2 28 21 - 32 mmol/L    Anion gap 9 3.0 - 18 mmol/L    Glucose 110 (H) 74 - 99 mg/dL    BUN 29 (H) 7.0 - 18 MG/DL    Creatinine 8.90 (H) 0.6 - 1.3 MG/DL    BUN/Creatinine ratio 3 (L) 12 - 20      GFR est AA 7 (L) >60 ml/min/1.73m2    GFR est non-AA 6 (L) >60 ml/min/1.73m2    Calcium 9.7 8.5 - 10.1 MG/DL   MAGNESIUM    Collection Time: 02/20/21 11:00 AM   Result Value Ref Range    Magnesium 2.1 1.6 - 2.6 mg/dL   CARDIAC PANEL,(CK, CKMB & TROPONIN)    Collection Time: 02/20/21 11:00 AM   Result Value Ref Range    CK - MB 1.4 <3.6 ng/ml    CK-MB Index 1.8 0.0 - 4.0 %    CK 80 39 - 308 U/L    Troponin-I, QT <0.02 0.0 - 0.045 NG/ML   HEP B SURFACE AG    Collection Time: 02/20/21 12:00 PM   Result Value Ref Range    Hepatitis B surface Ag 0.1 <1.00 Index    Hep B surface Ag Interp. Negative NEG     EKG, 12 LEAD, INITIAL    Collection Time: 02/20/21 12:25 PM   Result Value Ref Range    Ventricular Rate 76 BPM    Atrial Rate 72 BPM    QRS Duration 82 ms    Q-T Interval 458 ms    QTC Calculation (Bezet) 515 ms    Calculated R Axis 47 degrees    Calculated T Axis 66 degrees    Diagnosis       Atrial fibrillation  Nonspecific ST abnormality  Abnormal ECG  When compared with ECG of 27-JAN-2021 11:00,  No significant change was found  Confirmed by Heriberto Mattson MD, [facility] ([id number]) on 2/20/2021 1:50:13 PM     GLUCOSE, POC    Collection Time: 02/20/21  4:08 PM   Result Value Ref Range    Glucose (POC) 97 70 - 110 mg/dL       CXR Results  (Last 48 hours)               02/20/21 1251  XR CHEST PORT Final result    Impression:  :       1.  No acute cardiopulmonary disease. Narrative:  EXAM: AP portable chest.       Indications: chest pain, sob, and/or arrhythmia       Time stamp: 12:19 hours   Comparison: January 26, 2021       Findings:       Lines/Tubes/Devices: Tunneled dialysis catheter remains in good position   LUNGS: Clear. MEDIASTINUM: Unremarkable   BONES/SOFT TISSUES: Prior remote cervical spine fusion surgery                   Assessment:     Active Problems:    Leukocytosis (2/20/2021)      Dizziness (2/20/2021)      Dialysis patient ([address] (2/20/2021)      1. Dizziness, currently resolved. Sounds like due to vasovagal  2. Leukocytosis without fever  3.   Missed dialysis yesterday  4. ESRD on hemodialysis  5. Chronic atrial fibrillation, rate controlled currently  6. Hypokalemia  7. Sleep deprivation  8. History of lymphedema    Plan:     The patient will be admitted to telemetry floor. Blood cultures have been done. Continue empiric antibiotic. Patient is getting hemodialysis. We will continue Eliquis for stroke prophylaxis. We will continue atenolol and Cardizem with holding parameters as patient does not know what medication he takes other than saying he has been taking 2 different medications to control his heart rate. PT and OT to follow this patient. Echocardiogram has been ordered. Patient wishes to be full code. Total time to take care of this patient was 70 minutes and more than 50% of time was spent counseling and coordinating care. Disclaimer: Sections of this note are dictated using utilizing voice recognition software, which may have resulted in some phonetic based errors in grammar and contents. Even though attempts were made to correct all the mistakes, some may have been missed, and remained in the body of the document. If questions arise, please contact our department.

## 2021-02-20 NOTE — ED NOTES
TRANSFER - OUT REPORT:    Verbal report given to RAFA Chen on Lesli Larson  being transferred to [facility] for routine progression of care. Report consisted of patients Situation, Background, Assessment and   Recommendations(SBAR). Lines:   Peripheral IV 02/20/21 Right Antecubital (Active)   Site Assessment Clean, dry, & intact 02/20/21 1206   Phlebitis Assessment 0 02/20/21 1206   Infiltration Assessment 0 02/20/21 1206   Dressing Status Clean, dry, & intact 02/20/21 1206   Dressing Type Tape;Transparent 02/20/21 1206   Hub Color/Line Status Pink;Patent; Flushed 02/20/21 1206   Action Taken Blood drawn 02/20/21 1206        Opportunity for questions and clarification was provided.

## 2021-02-20 NOTE — PROGRESS NOTES
ACUTE HEMODIALYSIS FLOW SHEET    HEMODIALYSIS ORDERS: Physician: Dr. Nelson Gan: [place]   Duration: 3.5 hr  BFR: 400  DFR: 600   Dialysate:  Temp 36.0   K+   3    Ca+  2.0   Na 138   Bicarb 35   Wt Readings from Last 1 Encounters:   02/20/21 108.4 kg (239 lb)    Patient Chart [x]   Unable to Obtain []  Dry weight/UF Goal: 2500 ml  Access RIJ     Heparin []  Bolus    Units    [] Hourly    Units    [x]None      Catheter locking solution Heparin 1:1000   Pre BP:   168/84    Pulse:  84   Respirations: 18    Temperature:  98.1  [x] Oral [] Axillary  Tx: NSS   ml/Bolus   [] N/A   Labs: [x]  Pre  []  Post:   [] N/A   Additional Orders(medications, blood products, hypotension management): [] Yes   [x] No     [x]  [facility] Consent Verified     CATHETER ACCESS:  []N/A   [x]Right   []Left   [x]IJ     []Fem   []Chest wall   [] First use X-ray verified     [x]Tunnel    [] Non Tunneled   [x]No S/S infection  []Redness  []Drainage []Cultured []Swelling []Pain   [x]Medical Aseptic Prep Utilized   [x]Dressing Changed  [] Biopatch  Date: 2/20/21   []Clotted   [x]Patent   Flows: [x]Good  []Poor  []Reversed   If access problem,  notified: []Yes    [x]N/A            GENERAL ASSESSMENT:    LUNGS:   SaO2%      [x] Clear  [] Coarse  [] Crackles  [] Wheezing                                                [] Diminished     Location : []RLL   []LLL    []RUL  []SUKHJINDER   Cough: []Productive  []Dry  [x]N/A   Respirations:  [x]Easy  []Labored   Therapy:  [x]RA  []NC l/min    Mask: []NRB  [] Venti    O2%                  []Ventilator  []Intubated  [] [place]  [] BiPaP   CARDIAC: [x]Regular      [] Irregular   [] Pericardial Rub  [] JVD          []  Monitored  [] Bedside  [] Remotely monitored     EDEMA: [x] None  []Generalized  [] Pitting [] 1    [] 2    [] 3    [] 4                 [] Facial  [] Pedal  []  UE  [] LE   SKIN:   [x] Warm  [] Hot     [] Cold   [x] Dry     [] Pale   [] Diaphoretic                  [] Flushed  [] Jaundiced  [] Cyanotic  [] Rash  [] Weeping   LOC:    [x] Alert      [x]Oriented:    [x] Person     [x] Place  [x]Time               [] Confused  [] Lethargic  [] Medicated  [] Non-responsive  [] Non-Verbal   GI / ABDOMEN:                     [] Flat    [] Distended    [x] Soft    [] Firm   []  Obese                   [] Diarrhea  [x] Bowel Sounds  [] Nausea  [] Vomiting     / URINE ASSESSMENT:                   [] Voiding   [] Oliguria  [x] Anuria   []  Garza                  [] Incontinent  []  Incontinent Brief []  Fecal Management System     PAIN:  [x] 0 []1  []2   []3   []4   []5   []6   []7   []8   []9   []10            Scale 0-10  Action/Follow Up:    MOBILITY:  [x] Bed    [] Stretcher      All Vitals and Treatment Details on Attached [address]: [facility]          Room # YZ36/48   [] 1st Time Acute      [] Stat       [x] Routine      [] Urgent     [x] Acute Room  []  Bedside  [] ICU/CCU  [] ER   Isolation Precautions:  [x] Dialysis    There are currently no Active Isolations       ALLERGIES:     No Known Allergies   Code Status:  Prior     Hepatitis Status     Lab Results   Component Value Date/Time    Hepatitis B surface Ag 0.1 02/20/2021 12:00 PM    Hepatitis B surface Ab 4.78 (L) 01/14/2021 09:00 PM    Hepatitis B core, IgM Negative 01/14/2021 09:00 PM    Hepatitis C virus Ab 0.03 01/14/2021 09:00 PM        Current Labs:      Lab Results   Component Value Date/Time    WBC 21.4 (H) 02/20/2021 11:00 AM    HGB 13.4 02/20/2021 11:00 AM    HCT 41.9 02/20/2021 11:00 AM    PLATELET 531 99/44/8922 11:00 AM    MCV 96.8 02/20/2021 11:00 AM     Lab Results   Component Value Date/Time    Sodium 135 (L) 02/20/2021 11:00 AM    Potassium 3.2 (L) 02/20/2021 11:00 AM    Chloride 98 (L) 02/20/2021 11:00 AM    CO2 28 02/20/2021 11:00 AM    Anion gap 9 02/20/2021 11:00 AM    Glucose 110 (H) 02/20/2021 11:00 AM    BUN 29 (H) 02/20/2021 11:00 AM    Creatinine 8.90 (H) 02/20/2021 11:00 AM    BUN/Creatinine ratio 3 (L) 02/20/2021 11:00 AM    GFR est AA 7 (L) 02/20/2021 11:00 AM    GFR est non-AA 6 (L) 02/20/2021 11:00 AM    Calcium 9.7 02/20/2021 11:00 AM          DIET:  None      PRIMARY NURSE REPORT:   Pre Dialysis: Rodríguez Hugo RN     Time: 7456        EDUCATION:    [x] Patient [] Other           Knowledge Basis: []None [x]Minimal [] Substantial   Barriers to learning  [x]N/A   [] Access Care     [] S&S of infection  [] Fluid Management  [] K+   [x] Procedural    []Albumin   [] Medications   [] Tx Options   [] Transplant   [] Diet   [] Other   Teaching Tools:  [x] Explain  [] Demo  [] Handouts [] Video  Patient response: [x] Verbalized understanding  [] Teach back  [] Return demonstration   [] Requires follow up      [x]Time Out/Safety Check  [x] Extracorporeal Circuit Tested for integrity       RO/HEMODIALYSIS MACHINE SAFETY CHECKS  Before each treatment:     Machine Number:                   [facility]                                     [x] Unit Machine # 5 with centralized RO                                                                                                               Alarm Test:  Pass time 1431            [x] RO/Machine Log Complete    Machine Temp    36.0             Dialysate: pH  7.4    Conductivity: Meter 13.8     HD Machine  13.9      TCD: 13.7  Dialyzer Lot # [id number]     Blood Tubing Lot # [id number]    Saline Lot # [id number]     CHLORINE TESTING-Before each treatment and every 4 hours    Total Chlorine: [x] less than 0.1 ppm  Initial Time Check: 1300       4 Hr/2nd Check Time: 1700   (if greater than 0.1 ppm from Primary then every 30 minutes from Secondary)     TREATMENT INITIATION  with Dialysis Precautions:   [x] All Connections Secured              [x] Saline Line Double Clamped   [x] Venous Parameters Set               [x] Arterial Parameters Set    [x] Prime Given 250ml NSS              [x]Air Foam Detector Engaged      Treatment Initiation Note:  8505  Pt arrived to HD without any complaints. Pt A &O X 3, very talkative, follows commands, no distress noted. Verbal order given to increase fluid removal from 1500 to 2500, and to increase duration from 3 to 3.5. During Treatment Notes:  6879  NOT TYG assessed no abnormalities noted, line patent with good flow. CVC accessed without any difficulty, pt tolerated well. Vascular access visible with arterial and venous line connections intact. 1600  Vascular access visible with arterial and venous line connections intact. 1700  Pt sitting up talking without any distress. Vascular access visible with arterial and venous line connections intact. 1800  Vascular access visible with arterial and venous line connections intact. 1820  Pt clotted off, Dr. Nisha Lara notified, orders given to cathflo patients catheter. [address] administered to dwell   1843  Pt transferred to CT. Report called to 4N. Medication Dose Volume Route Time Janak Nurse, Title   cathflo 2mg 2ml HD Mariana 67, RN   cathflo 2mg 2ml HD Mariana 67, RN     Post Assessment  Dialyzer Cleared:[] Good [x] Fair  [] Poor  Blood processed:  57.1 L  UF Removed:  1800 Ml  Post /93  Pulse  85 Resp  18   Temp 98.1           CVC Catheter: [] N/A  Locking solution: Heparin 1:1000 U  Arterial port 1.9 ml   Venous port 1.9ml         Skin:[x] Warm  [x] Dry [] Diaphoretic               [] Flushed  [] Pale [] Cyanotic   Pain:  [x]0  []1 []2  []3 []4  []5  []6 []7 []8  []9  []10       Post Treatment Note:   1838  HD completed at this time, pt tolerated well. Dressing clean, dry and intact.      POST TREATMENT PRIMARY NURSE HANDOFF REPORT:   Post Dialysis: Terry Abel RN                Time:  0713       Abbreviations: AVG-arterial venous graft, AVF-arterial venous fistula, IJ-Internal Jugular, Subcl-Subclavian, Fem-Femoral, Tx-treatment, AP/HR-apical heart rate, DFR-dialysate flow rate, BFR-blood flow rate, AP-arterial pressure, -venous pressure, UF-ultrafiltrate, TMP-transmembrane pressure, Elkin-Venous, Art-Arterial, RO-Reverse Osmosis

## 2021-02-20 NOTE — ED PROVIDER NOTES
[facility]  [facility] EMERGENCY DEPT      12:29 PM    Date: 2/20/2021  Patient Name: Pam Keen    History of Presenting Illness     Chief Complaint   Patient presents with    Dizziness       56 y.o. male with noted past medical history who presents to the emergency department with lightheadedness. Patient states he was in his usual state of health although over the last few days has been having increased anxiety about pulling out his right chest hemodialysis catheter. He states that he pulled accident bleeding in the past and he is afraid that he will pull it out when he is sleeping. Because that he is only gotten a few hours sleep in the last 2-3 nights. Because he was so tired and is on a Monday Wednesday Friday hemodialysis schedule, when he awoke Friday morning he was too tired to go to dialysis then told to come to the dialysis center today, Saturday. When he got to dialysis center they are having a hard time starting his dialysis connection and while waiting he had an episode of lightheadedness. There is no vertigo. He felt like it was a presyncopal event. No other associated symptoms. Fire rescue was called and patient was transferred to the ER for evaluation and treatment. Upon initial MD exam, the patient is currently asymptomatic and has no lightheadedness. Patient denies any other associated signs or symptoms. Patient denies any other complaints. Nursing notes regarding the HPI and triage nursing notes were reviewed. Prior medical records were reviewed.      Current Facility-Administered Medications   Medication Dose Route Frequency Provider Last Rate Last Admin    piperacillin-tazobactam (ZOSYN) 2.25 g in 0.9% sodium chloride (MBP/ADV) 50 mL MBP  2.25 g IntraVENous NOW Cally Holloway MD        vancomycin (VANCOCIN) 2,500 mg in 0.9% sodium chloride 500 mL IVPB  2,500 mg IntraVENous NOW Cally Holloway MD         Current Outpatient Medications   Medication Sig Dispense Refill    amitriptyline (ELAVIL) 50 mg tablet Take 1 Tab by mouth nightly as needed for Sleep. 15 Tab 0    dilTIAZem ER (CARDIZEM CD) 120 mg capsule Take 1 Cap by mouth every twelve (12) hours. 60 Cap 0    apixaban (ELIQUIS) 2.5 mg tablet Take 1 Tab by mouth two (2) times a day. 60 Tab 0    polyethylene glycol (MIRALAX) 17 gram packet Take 1 Packet by mouth daily as needed for Constipation. 30 Packet 0    silver sulfADIAZINE (Silvadene) 1 % topical cream Apply  to affected area daily. 100 g 0    gabapentin (NEURONTIN) 800 mg tablet take 2 tablets by mouth three times a day (Patient taking differently: Take 1,200 mg by mouth two (2) times a day.) 180 Tab 5    atenolol (TENORMIN) 100 mg tablet Take 1 Tab by mouth daily. 90 Tab 0    budesonide-formoterol (SYMBICORT) 160-4.5 mcg/actuation HFAA inhale 2 puffs by mouth twice a day 1 Inhaler 2    melatonin tab tablet Take  by mouth nightly.  albuterol (PROAIR HFA) 90 mcg/actuation inhaler Take 2 Puffs by inhalation every four (4) hours as needed for Wheezing.  1 Inhaler 1       Past History     Past Medical History:  Past Medical History:   Diagnosis Date    AAA [facility]abdominal [facility]) [facility] [address]     Alcoholism ([facility] [address]     Atrial fibrillation (HCC)     Bradycardia     Chronic pain     Degenerative disc disease, cervical     Degenerative disc disease, lumbar     Hypertension     Hypokalemia     Pneumonia 2016    caused permanent L lung problems    Thyroid disease        Past Surgical History:  Past Surgical History:   Procedure Laterality Date    HX ORTHOPAEDIC      neck fracture    HX ORTHOPAEDIC      lumbar fusion    IR INSERT TUNL CVC W/O PORT OVER 5 YR  2021    RI ABDOMEN SURGERY PROC UNLISTED  10/08/2017    spleenectomy       Family History:  Family History   Problem Relation Age of Onset    Heart Disease Father     Heart Disease Brother         AAA-  in his 42's    Heart Disease Paternal Aunt     Heart Disease Paternal Elsi Lomas Heart Disease Paternal Grandmother     Lupus Mother        Social History:   Social History     Tobacco Use    Smoking status: Former Smoker     Quit date: 8/8/2017     Years since quitting: 3.5    Smokeless tobacco: Former User   Substance Use Topics    Alcohol use: No     Comment: sober x 5 years    Drug use: No     Comment: former drug use       Allergies:  No Known Allergies    Patient's primary care provider (as noted in EPIC):  Sue Dahl PA-C    Review of Systems   Constitutional: Negative for diaphoresis. HENT: Negative for congestion. Eyes: Negative for discharge. Respiratory: Negative for stridor. Cardiovascular: Negative for palpitations. Gastrointestinal: Negative for diarrhea. Endocrine: Negative for heat intolerance. Genitourinary: Negative for flank pain. Musculoskeletal: Negative for back pain. Neurological: Negative for weakness. Psychiatric/Behavioral: Negative for hallucinations. All other systems reviewed and are negative. Visit Vitals  BP (!) 153/67   Pulse 69   Temp 97.8 °F (36.6 °C)   Resp 15   Wt 108.4 kg (239 lb)   SpO2 98%   BMI 34.29 kg/m²       PHYSICAL EXAM:    CONSTITUTIONAL:  Alert, in no apparent distress;  well developed;  well nourished. HEAD:  Normocephalic, atraumatic. EYES:  EOMI. Non-icteric sclera. Normal conjunctiva. ENTM:  Nose:  no rhinorrhea. Throat:  no erythema or exudate, mucous membranes moist.  NECK:  No JVD. Supple  RESPIRATORY:  Chest clear, equal breath sounds, good air movement. CHEST: Right chest hemodialysis catheter in place. Site is clean dry and intact with no signs of cellulitis and no purulent discharge. CARDIOVASCULAR:  Regular rate and rhythm. No murmurs, rubs, or gallops. GI:  Normal bowel sounds, abdomen soft and non-tender. No rebound or guarding. BACK:  Non-tender. UPPER EXT:  Normal inspection. LOWER EXT:  No edema, no calf tenderness. Distal pulses intact. NEURO:  Moves all four extremities. Normal motor exam and sensation in all four extremities. Normal CN II-XII exam.  Normal bilateral finger-to-nose exam.      SKIN:  No rashes;  Normal for age. PSYCH:  Alert and normal affect. DIFFERENTIAL DIAGNOSES/ MEDICAL DECISION MAKING:   Dehydration, hyperglycemia-induced weakness, electrolyte and/or endocrine imbalance, CVA, intracranial hemorrhage, sepsis, cardiac arrhythmia, central versus peripheral vertigo, illicit drug intoxication, alcohol intoxication, prescribed drug toxicity, pregnancy in females patients, anxiety disorder, versus other etiologies or a combination of the above. ED COURSE:      Diagnostic Study Results     Abnormal lab results from this emergency department encounter:  Labs Reviewed   CBC WITH AUTOMATED DIFF - Abnormal; Notable for the following components:       Result Value    WBC 21.4 (*)     RBC 4.33 (*)     RDW 15.0 (*)     NEUTROPHILS 76 (*)     LYMPHOCYTES 12 (*)     ABS. NEUTROPHILS 17.4 (*)     ABS. EOSINOPHILS 0.6 (*)     ABS.  BASOPHILS 0.4 (*)     All other components within normal limits   METABOLIC PANEL, BASIC - Abnormal; Notable for the following components:    Sodium 135 (*)     Potassium 3.2 (*)     Chloride 98 (*)     Glucose 110 (*)     BUN 29 (*)     Creatinine 8.90 (*)     BUN/Creatinine ratio 3 (*)     GFR est AA 7 (*)     GFR est non-AA 6 (*)     All other components within normal limits   CULTURE, BLOOD   CULTURE, BLOOD   MAGNESIUM   CARDIAC PANEL,(CK, CKMB & TROPONIN)       Lab values for this patient within approximately the last 12 hours:  Recent Results (from the past 12 hour(s))   CBC WITH AUTOMATED DIFF    Collection Time: 02/20/21 11:00 AM   Result Value Ref Range    WBC 21.4 (H) 4.6 - 13.2 K/uL    RBC 4.33 (L) 4.70 - 5.50 M/uL    HGB 13.4 13.0 - 16.0 g/dL    HCT 41.9 36.0 - 48.0 %    MCV 96.8 74.0 - 97.0 FL    MCH 30.9 24.0 - 34.0 PG    MCHC 32.0 31.0 - 37.0 g/dL    RDW 15.0 (H) 11.6 - 14.5 %    PLATELET 813 266 - 076 K/uL    MPV 11.3 9.2 - 11.8 FL    NEUTROPHILS 76 (H) 42 - 75 %    BAND NEUTROPHILS 5 0 - 5 %    LYMPHOCYTES 12 (L) 20 - 51 %    MONOCYTES 2 2 - 9 %    EOSINOPHILS 3 0 - 5 %    BASOPHILS 2 0 - 3 %    ABS. NEUTROPHILS 17.4 (H) 1.8 - 8.0 K/UL    ABS. LYMPHOCYTES 2.6 0.8 - 3.5 K/UL    ABS. MONOCYTES 0.4 0 - 1.0 K/UL    ABS. EOSINOPHILS 0.6 (H) 0.0 - 0.4 K/UL    ABS.  BASOPHILS 0.4 (H) 0.0 - 0.06 K/UL    DF MANUAL      PLATELET COMMENTS ADEQUATE PLATELETS      RBC COMMENTS ANISOCYTOSIS  1+       METABOLIC PANEL, BASIC    Collection Time: 02/20/21 11:00 AM   Result Value Ref Range    Sodium 135 (L) 136 - 145 mmol/L    Potassium 3.2 (L) 3.5 - 5.5 mmol/L    Chloride 98 (L) 100 - 111 mmol/L    CO2 28 21 - 32 mmol/L    Anion gap 9 3.0 - 18 mmol/L    Glucose 110 (H) 74 - 99 mg/dL    BUN 29 (H) 7.0 - 18 MG/DL    Creatinine 8.90 (H) 0.6 - 1.3 MG/DL    BUN/Creatinine ratio 3 (L) 12 - 20      GFR est AA 7 (L) >60 ml/min/1.73m2    GFR est non-AA 6 (L) >60 ml/min/1.73m2    Calcium 9.7 8.5 - 10.1 MG/DL   MAGNESIUM    Collection Time: 02/20/21 11:00 AM   Result Value Ref Range    Magnesium 2.1 1.6 - 2.6 mg/dL   CARDIAC PANEL,(CK, CKMB & TROPONIN)    Collection Time: 02/20/21 11:00 AM   Result Value Ref Range    CK - MB 1.4 <3.6 ng/ml    CK-MB Index 1.8 0.0 - 4.0 %    CK 80 39 - 308 U/L    Troponin-I, QT <0.02 0.0 - 0.045 NG/ML   EKG, 12 LEAD, INITIAL    Collection Time: 02/20/21 12:25 PM   Result Value Ref Range    Ventricular Rate 76 BPM    Atrial Rate 72 BPM    QRS Duration 82 ms    Q-T Interval 458 ms    QTC Calculation (Bezet) 515 ms    Calculated R Axis 47 degrees    Calculated T Axis 66 degrees    Diagnosis       Atrial fibrillation  Nonspecific ST abnormality  Abnormal ECG  When compared with ECG of 27-JAN-2021 11:00,  No significant change was found  Confirmed by Seamus Montenegro MD, Bethel Alberts ([id number]) on 2/20/2021 1:50:13 PM         Radiologist and cardiologist interpretations if available at time of this note:  Xr Chest Port    Result Date: 2/20/2021  EXAM: AP portable chest. Indications: chest pain, sob, and/or arrhythmia Time stamp: 12:19 hours Comparison: January 26, 2021 Findings: Lines/Tubes/Devices: Tunneled dialysis catheter remains in good position LUNGS: Clear. MEDIASTINUM: Unremarkable BONES/SOFT TISSUES: Prior remote cervical spine fusion surgery     : 1. No acute cardiopulmonary disease. Medication(s) ordered for patient during this emergency visit encounter:  Medications   piperacillin-tazobactam (ZOSYN) 2.25 g in 0.9% sodium chloride (MBP/ADV) 50 mL MBP (has no administration in time range)   vancomycin (VANCOCIN) 2,500 mg in 0.9% sodium chloride 500 mL IVPB (has no administration in time range)       Medical Decision Making     I am the first provider for this patient. I reviewed the vital signs, available nursing notes, past medical history, past surgical history, family history and social history. Vital Signs:  Reviewed the patient's vital signs. Initial EKG interpretation by attending emergency physician: Atrial fibrillation about 75 bpm.    2:00 PM  Dr. Shiloh Beltrán, the patient's nephrologist, has seen the patient and given the leukocytosis to like the patient needed for 24 hours of broad-spectrum antibiotics. Will be to the hospitalist and consult Dr. Shiloh Beltrán for nephrology. Admit to Hospitalist    The patient was presented to the accepting hospitalist, Dr. Jose Avalos. The patient's primary doctor is Kayode Ponce PA-C, and admissions for this physician are with the hospitalist.  If the patient has no primary doctor, then admission is to the hospitalist as well. As the emergency physician, I wrote courtesy admission orders for the hospitalist physician. The courtesy orders included explicit instructions for the floor nursing staff to call the admitting attending physician upon patient arrival on the floor. Consult Nephrology    The on call nephrologist was called and the patient was presented for nephrology consult.  I personally spoke with Dr. Nava Sun, in the nephrology group, about the patient's presentation and management. I subsequently placed the noted nephrologist on the treatment team.      Dictation disclaimer:  Please note that this dictation was completed with Intro[employer]ive, the computer voice recognition software. Quite often unanticipated grammatical, syntax, homophones, and other interpretive errors are inadvertently transcribed by the computer software. Please disregard these errors. Please excuse any errors that have escaped final proofreading. Coding Diagnoses     Clinical Impression:   1. Lightheadedness    2. Encounter for hemodialysis ([facility] [address]    3. Leukocytosis, unspecified type        Disposition     Disposition:  Admit. MISSY Espinosa Board Certified Emergency Physician    Provider Attestation:  If a scribe was utilized in generation of this patient record, I personally performed the services described in the documentation, reviewed the documentation, as recorded by the scribe in my presence, and it accurately records the patient's history of presenting illness, review of systems, patient physical examination, and procedures performed by me as the attending physician. MISSY Espinosa Board Certified Emergency Physician  2/20/2021.  12:29 PM

## 2021-02-20 NOTE — Clinical Note
Status[de-identified] INPATIENT [101]  Type of Bed: Medical [8]  Inpatient Hospitalization Certified Necessary for the Following Reasons: 3.  Patient receiving treatment that can only be provided in an inpatient setting (further clarification in H&P documentation)   Admitting Diagnosis: Leukocytosis [200030]  Admitting Physician: Chelsea Antonio  Attending Physician: Chelsea Antonio  Estimated Length of Stay: 2 Midnights  Discharge Plan[de-identified] Home with Office Follow-up

## 2021-02-20 NOTE — ED TRIAGE NOTES
Patient arrives via EMS d/t dizziness that occurred while pt was at dialysis. According to medics, dialysis center had difficulty accessing his port and that is when pt reported dizziness and cold sweats.  Per medics, pt has hx of afib

## 2021-02-21 ENCOUNTER — APPOINTMENT (OUTPATIENT)
Dept: NON INVASIVE DIAGNOSTICS | Age: 61
DRG: 149 | End: 2021-02-21
Attending: INTERNAL MEDICINE
Payer: COMMERCIAL

## 2021-02-21 VITALS
DIASTOLIC BLOOD PRESSURE: 77 MMHG | HEIGHT: 70 IN | SYSTOLIC BLOOD PRESSURE: 112 MMHG | RESPIRATION RATE: 18 BRPM | TEMPERATURE: 99.1 F | OXYGEN SATURATION: 98 % | WEIGHT: 239 LBS | BODY MASS INDEX: 34.22 KG/M2 | HEART RATE: 61 BPM

## 2021-02-21 LAB
ANION GAP SERPL CALC-SCNC: 8 MMOL/L (ref 3–18)
BASOPHILS # BLD: 0.1 K/UL (ref 0–0.06)
BASOPHILS NFR BLD: 1 % (ref 0–3)
BUN SERPL-MCNC: 16 MG/DL (ref 7–18)
BUN/CREAT SERPL: 3 (ref 12–20)
CALCIUM SERPL-MCNC: 8.5 MG/DL (ref 8.5–10.1)
CHLORIDE SERPL-SCNC: 101 MMOL/L (ref 100–111)
CO2 SERPL-SCNC: 27 MMOL/L (ref 21–32)
CREAT SERPL-MCNC: 5.9 MG/DL (ref 0.6–1.3)
DIFFERENTIAL METHOD BLD: ABNORMAL
EOSINOPHIL # BLD: 0.5 K/UL (ref 0–0.4)
EOSINOPHIL NFR BLD: 4 % (ref 0–5)
ERYTHROCYTE [DISTWIDTH] IN BLOOD BY AUTOMATED COUNT: 14.9 % (ref 11.6–14.5)
GLUCOSE SERPL-MCNC: 94 MG/DL (ref 74–99)
HCT VFR BLD AUTO: 39 % (ref 36–48)
HGB BLD-MCNC: 12.6 G/DL (ref 13–16)
LYMPHOCYTES # BLD: 3 K/UL (ref 0.8–3.5)
LYMPHOCYTES NFR BLD: 24 % (ref 20–51)
MCH RBC QN AUTO: 31.2 PG (ref 24–34)
MCHC RBC AUTO-ENTMCNC: 32.3 G/DL (ref 31–37)
MCV RBC AUTO: 96.5 FL (ref 74–97)
MONOCYTES # BLD: 1.4 K/UL (ref 0–1)
MONOCYTES NFR BLD: 11 % (ref 2–9)
NEUTS SEG # BLD: 7.5 K/UL (ref 1.8–8)
NEUTS SEG NFR BLD: 60 % (ref 42–75)
PLATELET # BLD AUTO: 350 K/UL (ref 135–420)
PLATELET COMMENTS,PCOM: ABNORMAL
PMV BLD AUTO: 11.7 FL (ref 9.2–11.8)
POTASSIUM SERPL-SCNC: 3.3 MMOL/L (ref 3.5–5.5)
RBC # BLD AUTO: 4.04 M/UL (ref 4.7–5.5)
RBC MORPH BLD: ABNORMAL
SODIUM SERPL-SCNC: 136 MMOL/L (ref 136–145)
WBC # BLD AUTO: 12.5 K/UL (ref 4.6–13.2)

## 2021-02-21 PROCEDURE — 97161 PT EVAL LOW COMPLEX 20 MIN: CPT

## 2021-02-21 PROCEDURE — 97165 OT EVAL LOW COMPLEX 30 MIN: CPT

## 2021-02-21 PROCEDURE — 80048 BASIC METABOLIC PNL TOTAL CA: CPT

## 2021-02-21 PROCEDURE — 97530 THERAPEUTIC ACTIVITIES: CPT

## 2021-02-21 PROCEDURE — 97116 GAIT TRAINING THERAPY: CPT

## 2021-02-21 PROCEDURE — 36415 COLL VENOUS BLD VENIPUNCTURE: CPT

## 2021-02-21 PROCEDURE — 94640 AIRWAY INHALATION TREATMENT: CPT

## 2021-02-21 PROCEDURE — 74011250637 HC RX REV CODE- 250/637: Performed by: INTERNAL MEDICINE

## 2021-02-21 PROCEDURE — 93321 DOPPLER ECHO F-UP/LMTD STD: CPT

## 2021-02-21 PROCEDURE — 85025 COMPLETE CBC W/AUTO DIFF WBC: CPT

## 2021-02-21 PROCEDURE — 74011000250 HC RX REV CODE- 250: Performed by: INTERNAL MEDICINE

## 2021-02-21 PROCEDURE — 2709999900 HC NON-CHARGEABLE SUPPLY

## 2021-02-21 PROCEDURE — 99239 HOSP IP/OBS DSCHRG MGMT >30: CPT | Performed by: INTERNAL MEDICINE

## 2021-02-21 PROCEDURE — 74011250636 HC RX REV CODE- 250/636: Performed by: INTERNAL MEDICINE

## 2021-02-21 RX ORDER — POTASSIUM CHLORIDE 20 MEQ/1
40 TABLET, EXTENDED RELEASE ORAL
Status: COMPLETED | OUTPATIENT
Start: 2021-02-21 | End: 2021-02-21

## 2021-02-21 RX ORDER — ATENOLOL 50 MG/1
100 TABLET ORAL DAILY
Status: DISCONTINUED | OUTPATIENT
Start: 2021-02-22 | End: 2021-02-21 | Stop reason: HOSPADM

## 2021-02-21 RX ORDER — ATENOLOL 100 MG/1
50 TABLET ORAL DAILY
Qty: 90 TAB | Refills: 0 | Status: SHIPPED
Start: 2021-02-21 | End: 2021-02-21 | Stop reason: SDUPTHER

## 2021-02-21 RX ORDER — ATENOLOL 100 MG/1
100 TABLET ORAL DAILY
Qty: 90 TAB | Refills: 0 | Status: SHIPPED
Start: 2021-02-21

## 2021-02-21 RX ADMIN — POTASSIUM CHLORIDE 40 MEQ: 1500 TABLET, EXTENDED RELEASE ORAL at 12:18

## 2021-02-21 RX ADMIN — DILTIAZEM HYDROCHLORIDE 120 MG: 120 CAPSULE, COATED, EXTENDED RELEASE ORAL at 08:25

## 2021-02-21 RX ADMIN — VANCOMYCIN HYDROCHLORIDE 2500 MG: 500 INJECTION, POWDER, LYOPHILIZED, FOR SOLUTION INTRAVENOUS at 12:18

## 2021-02-21 RX ADMIN — ATENOLOL 50 MG: 50 TABLET ORAL at 08:25

## 2021-02-21 RX ADMIN — ARFORMOTEROL TARTRATE: 15 SOLUTION RESPIRATORY (INHALATION) at 08:14

## 2021-02-21 RX ADMIN — CEFEPIME HYDROCHLORIDE 1 G: 1 INJECTION, POWDER, FOR SOLUTION INTRAMUSCULAR; INTRAVENOUS at 14:45

## 2021-02-21 RX ADMIN — APIXABAN 2.5 MG: 2.5 TABLET, FILM COATED ORAL at 08:25

## 2021-02-21 NOTE — PROGRESS NOTES
Problem: Self Care Deficits Care Plan (Adult)  Goal: *Acute Goals and Plan of Care (Insert Text)  Outcome: Resolved/Met     OCCUPATIONAL THERAPY EVALUATION/DISCHARGE    Patient: Ken Jain (67 y.o. male)  Date: 2/21/2021  Primary Diagnosis: Leukocytosis [D72.829]  Dizziness [R42]  Dialysis patient [facility]) [Z99.2]        Precautions:     PLOF: Pt lives in a 1-story home with his wife and has a daughter that lives nearby that comes to visit frequently and assist him as needed. He has a RW at home but does not always use it and he also has a motorized scooter. He uses a wheelchair at the dialysis clinic due to the long walk to the treatment room. He has a raised toilet seat and has been having to bathe at the sink because of his dialysis port. Patient is mostly independent with ADLs with the exception of donning/doffing his socks, of which his wife helps him with. ASSESSMENT AND RECOMMENDATIONS:    Patient cleared by RN to participate in OT eval today. Patient seen in conjunction with PT to maximize patient's safety and willingness to participate. Upon entering patient's room, patient received semi-reclined in bed in NAD on room air and agreeable to participate. Patient reported he has been getting up to use the [facility] on his own, however this is infrequent as he does not often urinate due to dialysis treatment. Patient managed to sit at the EOB with supervision. Non-slip shoes were placed on the patient's feet which appear to still be healing from his last hospital admission. Patient performed sit<>stand with supervision in preparation for ADL activity but reported slightly increased pain in his feet so minimal functional mobility was performed. Vitals after activity were 99% O2 and ~130 HR. Patient reported no SOB, dizziness or light headedness. Patient was returned to bed and educated over energy conservation techniques as well as increased safety at home during occupational performance.  Patient verbally acknowledged his understanding and had no further questions/concerns. Call bell and phone left within reach and nursing notified of patient's performance. Based on the objective data described above, the patient presents near or at baseline level of function with no remarkable safety concerns observed during evaluation. Skilled occupational therapy is not indicated at this time. Discharge Recommendations: Home  Further Equipment Recommendations for Discharge: N/A     SUBJECTIVE:   Patient stated i've lost a lot of weight and that has helped.     OBJECTIVE DATA SUMMARY:     Past Medical History:   Diagnosis Date    AAA (abdominal aortic aneurysm) ([facility] [address]     Alcoholism ([facility] [address]     Atrial fibrillation (HCC)     Bradycardia     Chronic pain     Degenerative disc disease, cervical     Degenerative disc disease, lumbar     Hypertension     Hypokalemia     Pneumonia 2016    caused permanent L lung problems    Thyroid disease      Past Surgical History:   Procedure Laterality Date    HX ORTHOPAEDIC      neck fracture    HX ORTHOPAEDIC      lumbar fusion    IR INSERT TUNL CVC W/O PORT OVER 5 YR  1/19/2021    [place] ABDOMEN SURGERY PROC UNLISTED  10/08/2017    spleenectomy     Barriers to Learning/Limitations: None  Compensate with: visual, verbal, tactile, kinesthetic cues/model    Home Situation:   Home Situation  Home Environment: Private residence  # Steps to Enter: 0  One/Two Story Residence: One story  Living Alone: No  Support Systems: Family member(s)  Patient Expects to be Discharged to[de-identified] Private residence  Current DME Used/Available at Home: Dejan Messenger or Shower Type: Tub/Shower combination  [x]     Right hand dominant   []     Left hand dominant    Cognitive/Behavioral Status:  Neurologic State: Alert  Orientation Level: Oriented X4  Cognition: Follows commands  Safety/Judgement: Fall prevention;Good awareness of safety precautions    Skin: red blisters present over bilateral LEs and feet from previous treatments  Edema: none noted    Vision/Perceptual:    Acuity: Able to read clock/calendar on wall without difficulty    Corrective Lenses: Glasses    Coordination: BUE  Coordination: Within functional limits  Fine Motor Skills-Upper: Left Intact; Right Intact(missing thumb DIP on L hand)    Gross Motor Skills-Upper: Left Intact; Right Intact    Balance:  Sitting: Intact  Standing: Intact    Strength: BUE  Strength: Within functional limits     Tone & Sensation: BUE  Tone: Normal  Sensation: Intact(decreased in thumb/index/middle finger of right hand)    Range of Motion: BUE  AROM: Within functional limits    Functional Mobility and Transfers for ADLs:  Bed Mobility:  Supine to Sit: Supervision  Sit to Supine: Supervision  Scooting: Supervision    Transfers:  Sit to Stand: Supervision  Stand to Sit: Supervision    ADL Assessment:  Feeding: Modified independent  Oral Facial Hygiene/Grooming: Modified Independent  Bathing: Modified independent  Upper Body Dressing: Modified independent  Lower Body Dressing: Modified independent(wife dons/doffs socks)  Toileting: Modified independent    ADL Intervention:  Lower Body Dressing Assistance  Shoes with Velcro: Maximum assistance(donned by PT)    Cognitive Retraining  Safety/Judgement: Fall prevention;Good awareness of safety precautions    Pain:  Pain level pre-treatment: /10   Pain level post-treatment: /10   Pain Intervention(s): Medication (see MAR); Rest, Ice, Repositioning  Response to intervention: Nurse notified, See doc flow    Activity Tolerance:   Fair    Please refer to the flowsheet for vital signs taken during this treatment.   After treatment:   []  Patient left in no apparent distress sitting up in chair  [x]  Patient left in no apparent distress in bed  [x]  Call bell left within reach  [x]  Nursing notified  []  Caregiver present  []  Bed alarm activated    COMMUNICATION/EDUCATION:   [x]      Role of Occupational Therapy in the acute care setting  [x] Home safety education was provided and the patient/caregiver indicated understanding. [x]      Patient/family have participated as able and agree with findings and recommendations. []      Patient is unable to participate in plan of care at this time. Thank you for this referral.  Jenell Aschoff  Time Calculation: 23 mins      Eval Complexity: History: LOW Complexity : Brief history review ; Examination: LOW Complexity : 1-3 performance deficits relating to physical, cognitive , or psychosocial skils that result in activity limitations and / or participation restrictions ; Decision Making:MEDIUM Complexity : Patient may present with comorbidities that affect occupational performnce.  Miniml to moderate modification of tasks or assistance (eg, physical or verbal ) with assesment(s) is necessary to enable patient to complete evaluation

## 2021-02-21 NOTE — DISCHARGE INSTRUCTIONS
DISCHARGE SUMMARY from Nurse    PATIENT INSTRUCTIONS:    After general anesthesia or intravenous sedation, for 24 hours or while taking prescription Narcotics:  · Limit your activities  · Do not drive and operate hazardous machinery  · Do not make important personal or business decisions  · Do  not drink alcoholic beverages  · If you have not urinated within 8 hours after discharge, please contact your surgeon on call.    Report the following to your surgeon:  · Excessive pain, swelling, redness or odor of or around the surgical area  · Temperature over 100.5  · Nausea and vomiting lasting longer than 4 hours or if unable to take medications  · Any signs of decreased circulation or nerve impairment to extremity: change in color, persistent  numbness, tingling, coldness or increase pain  · Any questions    What to do at Home:  Recommended activity: Activity as tolerated    If you experience any of the following symptoms dizziness, loss of consciousness, excessive fatigue, pr fever greater than 100.F, please follow up with your primary care physician or the nearest emergency room.    *  Please give a list of your current medications to your Primary Care Provider.    *  Please update this list whenever your medications are discontinued, doses are      changed, or new medications (including over-the-counter products) are added.    *  Please carry medication information at all times in case of emergency situations.    These are general instructions for a healthy lifestyle:    No smoking/ No tobacco products/ Avoid exposure to second hand smoke  Surgeon General's Warning:  Quitting smoking now greatly reduces serious risk to your health.    Obesity, smoking, and sedentary lifestyle greatly increases your risk for illness    A healthy diet, regular physical exercise & weight monitoring are important for maintaining a healthy lifestyle    You may be retaining fluid if you have a history of heart failure or if you  experience any of the following symptoms:  Weight gain of 3 pounds or more overnight or 5 pounds in a week, increased swelling in our hands or feet or shortness of breath while lying flat in bed. Please call your doctor as soon as you notice any of these symptoms; do not wait until your next office visit. The discharge information has been reviewed with the patient. The patient verbalized understanding. Discharge medications reviewed with the patient and appropriate educational materials and side effects teaching were provided.   ___________________________________________________________________________________________________________________________________

## 2021-02-21 NOTE — CONSULTS
Consult Note  Consult requested by: Dr. Marcus Leavitt is a 56 y.o. male WHITE who is being seen on consult for Renal failure   Chief Complaint   Patient presents with    Dizziness     Admission diagnosis: leucocytosis     57y M with HTN, now on dialysis, missed HD , admitted for leucocytosis   Impression & Plan:   IMPRESSION:   Severe JOE, now likely ESRD, on MWF  Access; tunnel HD catheter  Leucocytosis, no sob or dysuria, no infiltrate on xray, improving today   Hypokalemia   Atrial fibrilation, on eliquis   Agitiation, ? Anxiety spell yesterday during HD   PLAN:    Mr. Terence Shearer appears comfortable, his two set of blood culture remain negative,  He received HD yesterday. Plan for discharge noted, agree with plan, will follow blood culture. Ordered K supplement today    Discussed with Dr. Marleny Cardozo. HPI:  57y M with PMH HTN, severe JOE , now on dialysis, was sent to ER from HD unit for agitation and anxiety. He receives HD on MWF schedule, his HD was on satureday as he missed on Friday. In HD unit his catheter was on working well, required tpa placement. He was very anxious , nervous. He was sent to ER yesterday. In ER he denies for any fever, chest pain, nausea , vomiting , dysuria. His lab shows leucocytosis, received broad spectrum abx. HD was arranged yesterday, he tolerated well. Today , he denies for any complain, admit he feels much better, wants to go home.      Past Medical History:   Diagnosis Date    [facility])     Alcoholism ([facility] [address]     Atrial fibrillation (HCC)     Bradycardia     Chronic pain     Degenerative disc disease, cervical     Degenerative disc disease, lumbar     Hypertension     Hypokalemia     Pneumonia 2016    caused permanent L lung problems    Thyroid disease       Past Surgical History:   Procedure Laterality Date    HX ORTHOPAEDIC      neck fracture    HX ORTHOPAEDIC      lumbar fusion    IR INSERT TUNL CVC W/O PORT OVER 5 YR  2021    NC ABDOMEN SURGERY PROC UNLISTED  10/08/2017    spleenectomy       Social History     Socioeconomic History    Marital status:      Spouse name: Not on file    Number of children: Not on file    Years of education: Not on file    Highest education level: Not on file   Occupational History    Not on file   Social Needs    Financial resource strain: Not on file    Food insecurity     Worry: Not on file     Inability: Not on file    Transportation needs     Medical: Not on file     Non-medical: Not on file   Tobacco Use    Smoking status: Former Smoker     Quit date: 2017     Years since quitting: 3.5    Smokeless tobacco: Former User   Substance and Sexual Activity    Alcohol use: No     Comment: sober x 5 years    Drug use: No     Comment: former drug use    Sexual activity: Not on file   Lifestyle    Physical activity     Days per week: Not on file     Minutes per session: Not on file    Stress: Not on file   Relationships    Social connections     Talks on phone: Not on file     Gets together: Not on file     Attends Bahai service: Not on file     Active member of club or organization: Not on file     Attends meetings of clubs or organizations: Not on file     Relationship status: Not on file    Intimate partner violence     Fear of current or ex partner: Not on file     Emotionally abused: Not on file     Physically abused: Not on file     Forced sexual activity: Not on file   Other Topics Concern    Not on file   Social History Narrative    Not on file       Family History   Problem Relation Age of Onset    Heart Disease Father     Heart Disease Brother         AAA-  in his 42's    Heart Disease Paternal Aunt     Heart Disease Paternal Uncle     Heart Disease Paternal Grandmother     Lupus Mother      No Known Allergies     Home Medications:     Prior to Admission Medications   Prescriptions Last Dose Informant Patient Reported? Taking? albuterol (PROAIR HFA) 90 mcg/actuation inhaler   No No   Sig: Take 2 Puffs by inhalation every four (4) hours as needed for Wheezing. amitriptyline (ELAVIL) 50 mg tablet   No No   Sig: Take 1 Tab by mouth nightly as needed for Sleep.   apixaban (ELIQUIS) 2.5 mg tablet   No No   Sig: Take 1 Tab by mouth two (2) times a day. atenolol (TENORMIN) 100 mg tablet   No No   Sig: Take 1 Tab by mouth daily. budesonide-formoterol (SYMBICORT) 160-4.5 mcg/actuation HFAA   No No   Sig: inhale 2 puffs by mouth twice a day   dilTIAZem ER (CARDIZEM CD) 120 mg capsule   No No   Sig: Take 1 Cap by mouth every twelve (12) hours. gabapentin (NEURONTIN) 800 mg tablet   No No   Sig: take 2 tablets by mouth three times a day   Patient taking differently: Take 1,200 mg by mouth two (2) times a day. melatonin tab tablet   Yes No   Sig: Take  by mouth nightly. polyethylene glycol (MIRALAX) 17 gram packet   No No   Sig: Take 1 Packet by mouth daily as needed for Constipation. silver sulfADIAZINE (Silvadene) 1 % topical cream   No No   Sig: Apply  to affected area daily.       Facility-Administered Medications: None       Current Facility-Administered Medications   Medication Dose Route Frequency    arformoterol 15 mcg/budesonide 0.5 mg neb solution   Nebulization BID RT    vancomycin (VANCOCIN) 2,500 mg in 0.9% sodium chloride 500 mL IVPB  2,500 mg IntraVENous NOW    cefepime (MAXIPIME) 1 g in sterile water (preservative free) 10 mL IV syringe  1 g IntraVENous Q24H    albuterol-ipratropium (DUO-NEB) 2.5 MG-0.5 MG/3 ML  3 mL Nebulization Q4H PRN    polyethylene glycol (MIRALAX) packet 17 g  17 g Oral DAILY PRN    melatonin tablet 5 mg  5 mg Oral QHS PRN    VANCOMYCIN INFORMATION NOTE   Other Rx Dosing/Monitoring    [START ON 2/22/2021] Vancomycin random level due 2/22/2021 at 0400  1 Each Other ONCE    apixaban (ELIQUIS) tablet 2.5 mg  2.5 mg Oral BID    dilTIAZem ER (CARDIZEM CD) capsule 120 mg  120 mg Oral Q12H    atenoloL (TENORMIN) tablet 50 mg  50 mg Oral DAILY       Review of Systems:      Complete 10-point review of systems were obtained and discussed in length  with the patient. Complete review of systems was negative/unremarkable  except as mentioned in HPI section. Data Review:    Labs: Results:       Chemistry Recent Labs     02/21/21 0220 02/20/21  1100   GLU 94 110*    135*   K 3.3* 3.2*    98*   CO2 27 28   BUN 16 29*   CREA 5.90* 8.90*   CA 8.5 9.7   AGAP 8 9   BUCR 3* 3*      CBC w/Diff Recent Labs     02/21/21 0220 02/20/21  1100   WBC 12.5 21.4*   RBC 4.04* 4.33*   HGB 12.6* 13.4   HCT 39.0 41.9    355   GRANS 60 76*   LYMPH 24 12*   EOS 4 3      Coagulation No results for input(s): PTP, INR, APTT, INREXT in the last 72 hours. Iron/Ferritin No results for input(s): IRON in the last 72 hours. No lab exists for component: TIBCCALC   BNP No results for input(s): BNPP in the last 72 hours. Cardiac Enzymes Recent Labs     02/20/21  1100   CPK 80   CKND1 1.8      Liver Enzymes No results for input(s): TP, ALB, TBIL, AP in the last 72 hours.     No lab exists for component: SGOT, GPT, DBIL   Thyroid Studies Lab Results   Component Value Date/Time    TSH 1.94 01/14/2021 11:56 AM         EKG: atrial fibrilation     Physical Assessment:     Visit Vitals  /77 (BP 1 Location: Left upper arm, BP Patient Position: At rest)   Pulse 61   Temp 99.1 °F (37.3 °C)   Resp 18   Wt 108.4 kg (239 lb)   SpO2 98%   BMI 34.29 kg/m²     Weight change:     Intake/Output Summary (Last 24 hours) at 2/21/2021 1157  Last data filed at 2/21/2021 0506  Gross per 24 hour   Intake 120 ml   Output 2000 ml   Net -1880 ml     Physical Exam:   General: comfortable, no acute distress   HEENT sclera anicteric, supple neck, no thyromegaly  CVS: S1S2 heard,  no rub  RS: + air entry b/l,   Abd: Soft, Non tender, Not distended, Positive bowel sounds, no organomegaly, no CVA / supra pubic tenderness  Neuro: non focal, awake, alert , CN II-XII are grossly intact  Extrm: no edema, no cyanosis, clubbing   Skin: no visible  Rash  Musculoskeletal: No gross joints or bone deformities     Procedures/imaging: see electronic medical records for all procedures, Xrays and details which were not copied into this note but were reviewed prior to creation of Bailey Lockwood MD  February 21, 2021  [facility]  Office [phone]

## 2021-02-21 NOTE — PROGRESS NOTES
Hospitalist Progress Note    Subjective:   Daily Progress Note: 2021 12:10 PM    The patient is feeling much better. He says he slept very well last night as he did not have to worry about her dialysis catheter coming out while being here. He denies any headaches or dizziness now. No nausea vomiting. No visual disturbances denies having any chest pain or shortness of breath or cough. No nausea or vomiting. No abdominal pain. He says he would like to go home later today. Patient states he only takes blood thinner medication called Eliquis and atenolol at home. No Cardizem at home. Current Facility-Administered Medications   Medication Dose Route Frequency    arformoterol 15 mcg/budesonide 0.5 mg neb solution   Nebulization BID RT    vancomycin (VANCOCIN) 2,500 mg in 0.9% sodium chloride 500 mL IVPB  2,500 mg IntraVENous NOW    cefepime (MAXIPIME) 1 g in sterile water (preservative free) 10 mL IV syringe  1 g IntraVENous Q24H    albuterol-ipratropium (DUO-NEB) 2.5 MG-0.5 MG/3 ML  3 mL Nebulization Q4H PRN    polyethylene glycol (MIRALAX) packet 17 g  17 g Oral DAILY PRN    melatonin tablet 5 mg  5 mg Oral QHS PRN    VANCOMYCIN INFORMATION NOTE   Other Rx Dosing/Monitoring    [START ON 2021] Vancomycin random level due 2021 at 0400  1 Each Other ONCE    apixaban (ELIQUIS) tablet 2.5 mg  2.5 mg Oral BID    dilTIAZem ER (CARDIZEM CD) capsule 120 mg  120 mg Oral Q12H    atenoloL (TENORMIN) tablet 50 mg  50 mg Oral DAILY        Review of Systems  A comprehensive review of systems was negative except for that written in the HPI. Objective:     Visit Vitals  /77   Pulse 61   Temp 99.1 °F (37.3 °C)   Resp 18   Ht 5' 10\" (1.778 m)   Wt 108.4 kg (239 lb)   SpO2 98%   BMI 34.29 kg/m²      O2 Device: Room air    Temp (24hrs), Av.4 °F (36.9 °C), Min:97.7 °F (36.5 °C), Max:99.1 °F (37.3 °C)      No intake/output data recorded.    1901 -  0700  In: 120 [P.O.:120]  Out: 2000 [Urine:200]    General appearance - alert, well appearing, and in no distress  Chest -clear air entry noted in bases, no wheezes. Right-sided hemodialysis catheter in situ  Heart - S1 and S2 normal, irregularly irregular  Abdomen - soft, nontender, nondistended, Bowel sounds present  Neurological - alert, oriented, normal speech, no focal findings noted  Skin -old discoloration patches present on lower extremities  Extremities - no pedal edema noted    Data Review    Recent Results (from the past 24 hour(s))   CULTURE, BLOOD    Collection Time: 02/20/21  2:00 PM    Specimen: Blood   Result Value Ref Range    Special Requests: NO SPECIAL REQUESTS      Culture result: NO GROWTH AFTER 15 HOURS     CULTURE, BLOOD    Collection Time: 02/20/21  2:15 PM    Specimen: Blood   Result Value Ref Range    Special Requests: NO SPECIAL REQUESTS      Culture result: NO GROWTH AFTER 15 HOURS     GLUCOSE, POC    Collection Time: 02/20/21  4:08 PM   Result Value Ref Range    Glucose (POC) 97 70 - 110 mg/dL   CBC WITH AUTOMATED DIFF    Collection Time: 02/21/21  2:20 AM   Result Value Ref Range    WBC 12.5 4.6 - 13.2 K/uL    RBC 4.04 (L) 4.70 - 5.50 M/uL    HGB 12.6 (L) 13.0 - 16.0 g/dL    HCT 39.0 36.0 - 48.0 %    MCV 96.5 74.0 - 97.0 FL    MCH 31.2 24.0 - 34.0 PG    MCHC 32.3 31.0 - 37.0 g/dL    RDW 14.9 (H) 11.6 - 14.5 %    PLATELET 450 494 - 677 K/uL    MPV 11.7 9.2 - 11.8 FL    NEUTROPHILS 60 42 - 75 %    LYMPHOCYTES 24 20 - 51 %    MONOCYTES 11 (H) 2 - 9 %    EOSINOPHILS 4 0 - 5 %    BASOPHILS 1 0 - 3 %    ABS. NEUTROPHILS 7.5 1.8 - 8.0 K/UL    ABS. LYMPHOCYTES 3.0 0.8 - 3.5 K/UL    ABS. MONOCYTES 1.4 (H) 0 - 1.0 K/UL    ABS. EOSINOPHILS 0.5 (H) 0.0 - 0.4 K/UL    ABS.  BASOPHILS 0.1 (H) 0.0 - 0.06 K/UL    DF MANUAL      PLATELET COMMENTS ADEQUATE PLATELETS      RBC COMMENTS ANISOCYTOSIS  1+       METABOLIC PANEL, BASIC    Collection Time: 02/21/21  2:20 AM   Result Value Ref Range    Sodium 136 136 - 145 mmol/L    Potassium 3.3 (L) 3.5 - 5.5 mmol/L    Chloride 101 100 - 111 mmol/L    CO2 27 21 - 32 mmol/L    Anion gap 8 3.0 - 18 mmol/L    Glucose 94 74 - 99 mg/dL    BUN 16 7.0 - 18 MG/DL    Creatinine 5.90 (H) 0.6 - 1.3 MG/DL    BUN/Creatinine ratio 3 (L) 12 - 20      GFR est AA 12 (L) >60 ml/min/1.73m2    GFR est non-AA 10 (L) >60 ml/min/1.73m2    Calcium 8.5 8.5 - 10.1 MG/DL   ECHO ADULT FOLLOW-UP OR LIMITED    Collection Time: 02/21/21 12:09 PM   Result Value Ref Range    IVSd 1.02 (A) 0.6 - 1.0 cm    LVIDd 4.85 4.2 - 5.9 cm    LVIDs 3.78 cm    LVPWd 0.98 0.6 - 1.0 cm    Tapse 1.67 1.5 - 2.0 cm    LV Mass .1 88 - 224 g    LV Mass AL Index 76.9 49 - 115 g/m2         Assessment/Plan:     Active Problems:    Leukocytosis (2/20/2021)      Dizziness (2/20/2021)      Dialysis patient ([facility] [address] (2/20/2021)      1. Dizziness, currently resolved. Sounds like due to vasovagal, no recurrence  2. Leukocytosis without fever, resolved currently  3. Missed dialysis yesterday s/p dialysis yesterday  4. ESRD on hemodialysis  5. Chronic atrial fibrillation, rate controlled currently  6. Hypokalemia  7. Sleep deprivation  8. History of lymphedema    PLAN:    Will increase the dose of atenolol and discontinue Cardizem  Continue Eliquis for stroke prophylaxis  Replace potassium per nephrologist  Patient was advised to continue taking melatonin at home for #7  Discussed with nephrologist: Patient can go home after 3 PM today if his blood culture remains negative after 24 hours and they will also start working on peritoneal dialysis as an outpatient. We will call microbiology lab around 2:30 PM to make sure patient's blood cultures are negative after 24 hours. Echocardiogram report is pending    Care Plan discussed with: Patient/Family, Nurse,  and Consultant Nephrologist    Total time to take care of this patient was 35 minutes and more than 50% of time was spent counseling and coordinating care.      Disclaimer: Sections of this note are dictated using utilizing voice recognition software, which may have resulted in some phonetic based errors in grammar and contents. Even though attempts were made to correct all the mistakes, some may have been missed, and remained in the body of the document. If questions arise, please contact our department.

## 2021-02-21 NOTE — ROUTINE PROCESS
Bedside and Verbal shift change report given to [address] [place] [address] (oncoming nurse) by Owen Abarca RN (offgoing nurse). Report included the following information SBAR, Kardex and MAR.

## 2021-02-21 NOTE — DISCHARGE SUMMARY
Physician Discharge Summary       Patient: Firoella Toure MRN: [id number]  SSN: [id number]    YOB: 1960  Age: 56 y.o. Sex: male    PCP: Natalie Hull PA-C    Allergies: Patient has no known allergies. Admit date: 2/20/2021  Admitting Provider: Jenny zIquierdo MD    Discharge date: 2/21/2021  Discharging Provider: Sheela Luo MD    * Admission Diagnoses: Leukocytosis [D72.829]  Dizziness [R42]  Dialysis patient [facility]) [Z99.2]    * Discharge Diagnoses:      1.  Dizziness, currently resolved.  Sounds like due to vasovagal, no recurrence  2.  Leukocytosis without fever, resolved currently. Blood cultures are negative at 24 hours. I just talked to microbiology lab technician around 3 PM.  3.  Missed dialysis yesterday s/p dialysis yesterday  4.  ESRD on hemodialysis  5.  Chronic atrial fibrillation, rate controlled currently  6.  Hypokalemia  7.  Sleep deprivation  8.  History of lymphedema    * Hospital Course: The patient presented to the emergency room from outpatient dialysis unit yesterday with a complaint of dizziness. Please refer hospital admission H&P for further detail. Patient was found out to have leukocytosis. He was admitted here with a diagnosis of dizziness most likely due to vasovagal.  Telemetry did not show any arrhythmia. He did not have any more dizziness episode. He was started on empiric antibiotic. His leukocytosis got better. He did not have any fever. Antibiotic will be stopped. As per microbiology, they do not see any positive indicator on blood culture after 24 hours on this patient. I have also talked to nephrologist and they will follow blood culture report as an outpatient and if they are positive, patient will be continued on antibiotic during dialysis. Patient was advised to continue taking melatonin for sleep deprivation. He was advised to continue taking Eliquis and atenolol for atrial fibrillation.   He will be arranged to have a peritoneal dialysis as an outpatient per nephrologist.    * Procedures: None  * No surgery found *      Consults: Nephrology    Significant Diagnostic Studies: Normal CT head, normal chest x-ray, and pending echocardiogram report    Discharge Exam:  Please read my progress note from February 21, 2021 for further detail    * Discharge Condition: improved  * Disposition: Home    Discharge Medications:  Current Discharge Medication List      CONTINUE these medications which have CHANGED    Details   atenoloL (TENORMIN) 100 mg tablet Take 1 Tab by mouth daily. Qty: 90 Tab, Refills: 0    Associated Diagnoses: AAA (abdominal aortic aneurysm) without rupture ([facility] [address] Chronic atrial fibrillation ([facility] [address] Essential hypertension         CONTINUE these medications which have NOT CHANGED    Details   apixaban (ELIQUIS) 2.5 mg tablet Take 1 Tab by mouth two (2) times a day. Qty: 60 Tab, Refills: 0      polyethylene glycol (MIRALAX) 17 gram packet Take 1 Packet by mouth daily as needed for Constipation. Qty: 30 Packet, Refills: 0      gabapentin (NEURONTIN) 800 mg tablet take 2 tablets by mouth three times a day  Qty: 180 Tab, Refills: 5    Associated Diagnoses: Neuropathy      budesonide-formoterol (SYMBICORT) 160-4.5 mcg/actuation HFAA inhale 2 puffs by mouth twice a day  Qty: 1 Inhaler, Refills: 2      melatonin tab tablet Take  by mouth nightly. albuterol (PROAIR HFA) 90 mcg/actuation inhaler Take 2 Puffs by inhalation every four (4) hours as needed for Wheezing. Qty: 1 Inhaler, Refills: 1         STOP taking these medications       amitriptyline (ELAVIL) 50 mg tablet Comments:   Reason for Stopping:         dilTIAZem ER (CARDIZEM CD) 120 mg capsule Comments:   Reason for Stopping:         silver sulfADIAZINE (Silvadene) 1 % topical cream Comments:   Reason for Stopping:               * Follow-up Care/Patient Instructions:   Activity: Activity as tolerated  Diet: Cardiac Diet and Renal Diet  Wound Care: None needed    Follow-up Information     Follow up With Specialties Details Why Contact Info    Mateo Simpson PA-C Physician Assistant   [address] [facility]  [phone]          Follow-up Appointments   Procedures    FOLLOW UP VISIT Appointment in: Other ([address]) With primary care physician in 3 to 5 days With Dr. Ashly Fernandez next week     With primary care physician in 3 to 5 days  With Dr. Ashly Fernandez next week     Standing Status:   Standing     Number of Occurrences:   1     Order Specific Question:   Appointment in     Answer: Other (Specify)       Total time to take care of this patient was 35 minutes and more than 50% of time was spent counseling and coordinating care. Disclaimer: Sections of this note are dictated using utilizing voice recognition software, which may have resulted in some phonetic based errors in grammar and contents. Even though attempts were made to correct all the mistakes, some may have been missed, and remained in the body of the document. If questions arise, please contact our department.     Signed:  Eliezer Scheuermann, MD  2/21/2021  3:20 PM

## 2021-02-21 NOTE — PROGRESS NOTES
Reason for Readmission:    Leukocytosis [D72.829]  Dizziness [R42]  Dialysis patient ([facility] [address] [Z99.2]         RUR Score and Risk Level:      Low and 16     Level of Readmission:    1   (1-3)   (1 - First Readmission, 2- Second Readmission within 30 days or multiple admissions over previous 90 days, 3- Greater than two readmissions within 30 days or multiple admissions over previous 90 days)      Care Conference scheduled:   (consider for all patient's with readmission level of 2, should definitely be scheduled for all patients with readmission level of 3)       Resources/supports as identified by patient/family:  Spouse and daughter who is RN       Top Challenges facing patient (as identified by patient/family and CM): Finances/Medication cost?     Did not voice concern  Transportation      family  Support system or lack thereof? Supportive family  Living arrangements? 1 story with no staps with spouse   Self-care/ADLs/Cognition? Independent. Family helps if any needs        Current Advanced Directive/Advance Care Plan:             Plan for utilizing home health:   no. States doesn't need. Daughter is RN               Likelihood of additional readmission:                Transition of Care Plan:    Based on readmission, the patient's previous Plan of Care   has been evaluated and/or modified. The current Transition of Care Plan is:            Initial assessment completed with patient. Cognitive status of patient: oriented to time, place, person and situation. Face sheet information confirmed:  yes. The patient designates wife to participate in his discharge plan and to receive any needed information. This patient lives in a single family home with patient and wife. Patient is able to navigate steps as needed. Prior to hospitalization, patient was considered to be independent with ADLs/IADLS : yes .   Patient has a current ACP document on file: no  The patient and spouse will be available to transport patient home upon discharge. The patient already has none reported, and  medical equipment available in the home. Patient is currently active with home health. If active, agency name is [facility]. Patient has not stayed in a skilled nursing facility or rehab. Was  stay within last 60 days : no. This patient is on dialysis :yes    If yes, hemodialysis patient and receives treatment on Monday/Wednesday/Friday at [facility] [id number]  Chair time is early AM  6. Pt is transported to/from dialysis by wife. Currently, the discharge plan is Home. The patient states that he can obtain his medications from the pharmacy, and take his medications as directed. Patient's current insurance is [email or web address]. Care Management Interventions  PCP Verified by CM: Yes  Mode of Transport at Discharge: Self(wife or family member)  Current Support Network: Lives with Spouse, Own Home  Confirm Follow Up Transport: Family  The Plan for Transition of Care is Related to the Following Treatment Goals : home  Discharge Location  Discharge Placement: Home    Readmission Assessment  Number of days since last admission?: 8-30 days  Previous disposition: Home with [address] [place] [address] Rich Lomeli  Who is being interviewed?: Patient  What was the patient's/caregiver's perception as to why they think they needed to return back to the hospital?: Other (Comment)(states possible panic attack from port  not working in neck.  dizzy and heart racing)  Did you visit your Primary Care Physician after you left the hospital, before you returned this time?: Yes  Did you see a specialist, such as Cardiac, Pulmonary, Orthopedic Physician, etc. after you left the hospital?: Yes  Who advised the patient to return to the hospital?: Other (Comment)(dialysis center)  Does the patient report anything that got in the way of taking their medications?: No  In our efforts to provide the best possible care to you and others like you, can you think of anything that we could have done to help you after you left the hospital the first time, so that you might not have needed to return so soon?: Other (Comment)(pt states no extra needs)  Saad Edwards RN BSN  Outcomes Manager    Pager # [phone]

## 2021-02-21 NOTE — PROGRESS NOTES
Problem: Mobility Impaired (Adult and Pediatric)  Goal: *Acute Goals and Plan of Care (Insert Text)  Outcome: Resolved/Met     PHYSICAL THERAPY EVALUATION AND DISCHARGE    Patient: Army Alfonso (15 y.o. male)  Date: 2/21/2021  Primary Diagnosis: Leukocytosis [D72.829]  Dizziness [R42]  Dialysis patient ([facility] [address] [Z99.2]        Precautions: none       PLOF: Pt reporting he lives in 1 story house with 0STE with wife. He does not use AD, has been using dialysis center transport chair from car to dialysis. Daughter is in area to check up on them as needed. ASSESSMENT :  Pt cleared to participate in PT session, pt received semi-reclined in bed and agreeable to therapy session. Completing with OT to maximize safety and mobility. Based on the objective data described below, the patient presents with baseline functional mobility. Pt completing supine to sit with Carlos, sitting on EOB with good balance. Pt standing with Carlos, ambulating throughout room with Carlos x30 feet. Pt returning to bed with Carlos. Pt reporting he lives in 500 sq foot house and is able to ambulate to bedroom to bathroom and living room without support. Pt positioned for comfort and educated to call for assist before getting up, pt verbalized understanding. Pt left with all needs met and call bell in reach. RN notified of position and participation. Discussed use of toilet with supervision for safety while in hospital.      Patient does not require further skilled intervention at this level of care. PLAN :  Recommendations and Planned Interventions:   No formal PT needs identified at this time. Discharge Recommendations: Home Health safety check with continued assist from family as needed  Further Equipment Recommendations for Discharge: N/A     SUBJECTIVE:   Patient stated I have been doing so much better than last time I was here.     OBJECTIVE DATA SUMMARY:     Past Medical History:   Diagnosis Date    AAA (abdominal aortic aneurysm) ([facility] [address] Alcoholism ([facility] [address]     Atrial fibrillation (HCC)     Bradycardia     Chronic pain     Degenerative disc disease, cervical     Degenerative disc disease, lumbar     Hypertension     Hypokalemia     Pneumonia 2016    caused permanent L lung problems    Thyroid disease      Past Surgical History:   Procedure Laterality Date    HX ORTHOPAEDIC      neck fracture    HX ORTHOPAEDIC      lumbar fusion    IR INSERT TUNL CVC W/O PORT OVER 5 YR  1/19/2021    [place] ABDOMEN SURGERY PROC UNLISTED  10/08/2017    spleenectomy     Barriers to Learning/Limitations: None  Compensate with: N/A  Home Situation:   Home Situation  Home Environment: Private residence  # Steps to Enter: 0  Rails to Enter: No  One/Two Story Residence: One story  Living Alone: No  Support Systems: Spouse/Significant Other/Partner, Child(ange)  Patient Expects to be Discharged to[de-identified] Private residence  Current DME Used/Available at Home: [place] Fridge or Shower Type: Tub/Shower combination  Critical Behavior:  Neurologic State: Alert  Orientation Level: Oriented X4  Cognition: Follows commands  Safety/Judgement: Awareness of environment; Fall prevention  Psychosocial  Patient Behaviors: Calm; Cooperative  Purposeful Interaction: Yes  Pt Identified Daily Priority: Clinical issues (comment)  Caritas Process: Establish trust;Attend basic human needs;Create healing environment  Caring Interventions: Reassure; Therapeutic modalities  Reassure: Caring rounds; Therapeutic listening  Therapeutic Modalities: Intentional therapeutic touch    Strength:    Strength: Within functional limits    Tone & Sensation:   Tone: Normal  Sensation: Impaired    Range Of Motion:  AROM: Within functional limits    Posture:  Posture (WDL): Exceptions to WDL  Posture Assessment:  Forward head;Trunk flexion   Functional Mobility:  Bed Mobility:     Supine to Sit: Modified independent  Sit to Supine: Modified independent  Scooting: Modified independent  Transfers:  Sit to Stand: Modified independent  Stand to Sit: Modified independent    Balance:   Sitting: Intact  Standing: Intact    Ambulation/Gait Training:  Distance (ft): 25 Feet (ft)  Assistive Device: (none )  Ambulation - Level of Assistance: Supervision     Gait Description (WDL): Exceptions to WDL  Gait Abnormalities: Decreased step clearance  Right Side Weight Bearing: As tolerated  Left Side Weight Bearing: As tolerated  Base of Support: Widened     Speed/Alice: Shuffled  Step Length: Right shortened;Left shortened    Pain:  Pain level pre-treatment: 0/10   Pain level post-treatment: 0/10    Activity Tolerance:   Good activity tolerance  Please refer to the flowsheet for vital signs taken during this treatment. After treatment:   []         Patient left in no apparent distress sitting up in chair  [x]         Patient left in no apparent distress in bed  [x]         Call bell left within reach  [x]         Nursing notified  []         Caregiver present  []         Bed alarm activated  []         SCDs applied    COMMUNICATION/EDUCATION:   [x]         Role of Physical Therapy in the acute care setting. [x]         Fall prevention education was provided and the patient/caregiver indicated understanding. [x]         Patient/family have participated as able in goal setting and plan of care. []         Patient/family agree to work toward stated goals and plan of care. []         Patient understands intent and goals of therapy, but is neutral about his/her participation. []         Patient is unable to participate in goal setting/plan of care: ongoing with therapy staff.  []         Other:     Thank you for this referral.  Ander Vasquez, PT   Time Calculation: 24 mins      Eval Complexity: History: MEDIUM  Complexity : 1-2 comorbidities / personal factors will impact the outcome/ POC Exam:LOW Complexity : 1-2 Standardized tests and measures addressing body structure, function, activity limitation and / or participation in recreation Presentation: LOW Complexity : Stable, uncomplicated  Clinical Decision Making:Low Complexity low  Overall Complexity:LOW

## 2021-02-21 NOTE — PROGRESS NOTES
Patient discharged into care of wife. No complaints at this time. Provided patient with copy of AVS, questions encouraged. IV removed prior to discharge.

## 2021-02-21 NOTE — PROGRESS NOTES
Discharge order noted for today. Orders reviewed. No needs identified at this time.  remains available if needed. Pt to be transported home by his wife.     Donny Malone RN BSN  Care Manager  [phone]

## 2021-02-21 NOTE — MED STUDENT NOTES
*ATTENTION:  This note has been created by a medical student for educational purposes only. Please do not refer to the content of this note for clinical decision-making, billing, or other purposes. Please see attending physicians note to obtain clinical information on this patient. * Patient is a 55 yo male admitted yesterday for lightheadedness during dialysis. Patient is doing well today. He has not had a bowel movement yet but says it is normal for him to have one every couple of days. He ate fruit and drank ensure this morning. He denies any pain in chest, abdomen, or legs. Patient was having a echocardiogram while I was in the room. Objective:  
Pleasant  male in bed in no acute distress Vitals: T 99.1 P 61 RR 18 /77. O2 98% Labs: blood cultures negative for bacterial growth. Head ct clear. PE: irregularly irregular rythem and pulse due to Afib. Lungs CTA. Dialysis catheter does not show any erythema or evidence of infection. No swelling in legs bilaterally. Normoactive bowel sounds. No tenderness to palpation Assessment: resolved lightheadedness. Patient is candidate for peritoneal dialysis. Plan: discharge later today

## 2021-02-22 LAB
ECHO LV INTERNAL DIMENSION DIASTOLIC: 4.85 CM (ref 4.2–5.9)
ECHO LV INTERNAL DIMENSION SYSTOLIC: 3.78 CM
ECHO LV IVSD: 1.02 CM (ref 0.6–1)
ECHO LV MASS 2D: 173.1 G (ref 88–224)
ECHO LV MASS INDEX 2D: 76.9 G/M2 (ref 49–115)
ECHO LV POSTERIOR WALL DIASTOLIC: 0.98 CM (ref 0.6–1)
ECHO RV TAPSE: 1.67 CM (ref 1.5–2)
HBV SURFACE AB SER QL IA: NEGATIVE
HBV SURFACE AB SERPL IA-ACNC: 5.39 MIU/ML
HEP BS AB COMMENT,HBSAC: ABNORMAL

## 2021-02-26 LAB
BACTERIA SPEC CULT: NORMAL
BACTERIA SPEC CULT: NORMAL
SERVICE CMNT-IMP: NORMAL
SERVICE CMNT-IMP: NORMAL

## 2021-05-12 ENCOUNTER — HOSPITAL ENCOUNTER (EMERGENCY)
Age: 61
Discharge: HOME | End: 2021-05-12
Attending: EMERGENCY MEDICINE
Payer: COMMERCIAL

## 2021-05-12 VITALS
SYSTOLIC BLOOD PRESSURE: 115 MMHG | DIASTOLIC BLOOD PRESSURE: 68 MMHG | OXYGEN SATURATION: 98 % | HEART RATE: 78 BPM | RESPIRATION RATE: 13 BRPM

## 2021-05-12 DIAGNOSIS — Z99.2 ESRD ON DIALYSIS (HCC): ICD-10-CM

## 2021-05-12 DIAGNOSIS — N18.6 ESRD ON DIALYSIS (HCC): ICD-10-CM

## 2021-05-12 DIAGNOSIS — R55 VASOVAGAL SYNCOPE: Primary | ICD-10-CM

## 2021-05-12 DIAGNOSIS — K59.03 DRUG-INDUCED CONSTIPATION: ICD-10-CM

## 2021-05-12 LAB
ATRIAL RATE: 288 BPM
CALCULATED R AXIS, ECG10: 18 DEGREES
CALCULATED T AXIS, ECG11: 17 DEGREES
DIAGNOSIS, 93000: NORMAL
Q-T INTERVAL, ECG07: 396 MS
QRS DURATION, ECG06: 82 MS
QTC CALCULATION (BEZET), ECG08: 479 MS
VENTRICULAR RATE, ECG03: 88 BPM

## 2021-05-12 PROCEDURE — 99285 EMERGENCY DEPT VISIT HI MDM: CPT

## 2021-05-12 PROCEDURE — 93005 ELECTROCARDIOGRAM TRACING: CPT

## 2021-05-12 NOTE — ED TRIAGE NOTES
Pt arrived EMS from home for syncope and hypotension. Pt was on toilet and attempting to have a bowel movement.   Pt has not had a BM since Thursday after dialysis cath replacement and pain medications,  Hx of ESRD and dialysis and HTN

## 2021-05-12 NOTE — ED PROVIDER NOTES
EMERGENCY DEPARTMENT HISTORY AND PHYSICAL EXAM    5:37 PM rounding staffing challenges other complex patients in the ER. Patient seen at this time in room 17      Date: 5/12/2021  Patient Name: Fedreico Manuel    History of Presenting Illness     Chief Complaint   Patient presents with    Dizziness         History Provided By: patient    Additional History (Context): Federico Manuel is a 59 y.o. male presents with end-stage renal disease on dialysis recent placement of a new fistula in the left [facility], he has been on some narcotic pain medication which has been taking regularly now found finds himself constipated. He went to use the bathroom and was straining very hard and passed out. He did not fall and hit the floor. He woke up when EMS arrived and remembers the entire ride to the hospital.  Just a little bit of suprapubic area pain from the constipation. No shortness of breath or chest pain. Angela Louis PCP: Ana Ricks PA-C    Chief Complaint:   Duration:    Timing:    Location:   Quality:   Severity:   Modifying Factors:   Associated Symptoms:       Current Outpatient Medications   Medication Sig Dispense Refill    atenoloL (TENORMIN) 100 mg tablet Take 1 Tab by mouth daily. 90 Tab 0    apixaban (ELIQUIS) 2.5 mg tablet Take 1 Tab by mouth two (2) times a day. 60 Tab 0    polyethylene glycol (MIRALAX) 17 gram packet Take 1 Packet by mouth daily as needed for Constipation. 30 Packet 0    gabapentin (NEURONTIN) 800 mg tablet take 2 tablets by mouth three times a day (Patient taking differently: Take 1,200 mg by mouth two (2) times a day.) 180 Tab 5    budesonide-formoterol (SYMBICORT) 160-4.5 mcg/actuation HFAA inhale 2 puffs by mouth twice a day 1 Inhaler 2    melatonin tab tablet Take  by mouth nightly.  albuterol (PROAIR HFA) 90 mcg/actuation inhaler Take 2 Puffs by inhalation every four (4) hours as needed for Wheezing.  1 Inhaler 1       Past History     Past Medical History:  Past Medical History: Diagnosis Date    AAA (abdominal aortic aneurysm) (HCC)     Alcoholism (HCC)     Atrial fibrillation (HCC)     Bradycardia     Chronic pain     Degenerative disc disease, cervical     Degenerative disc disease, lumbar     Hypertension     Hypokalemia     Pneumonia 2016    caused permanent L lung problems    Thyroid disease        Past Surgical History:  Past Surgical History:   Procedure Laterality Date    HX ORTHOPAEDIC      neck fracture    HX ORTHOPAEDIC      lumbar fusion    IR INSERT TUNL CVC W/O PORT OVER 5 YR  2021    [place] ABDOMEN SURGERY PROC UNLISTED  10/08/2017    spleenectomy       Family History:  Family History   Problem Relation Age of Onset    Heart Disease Father     Heart Disease Brother         AAA-  in his 42's    Heart Disease Paternal Aunt     Heart Disease Paternal Uncle     Heart Disease Paternal Grandmother     Lupus Mother        Social History:  Social History     Tobacco Use    Smoking status: Former Smoker     Quit date: 2017     Years since quitting: 3.7    Smokeless tobacco: Former User   Substance Use Topics    Alcohol use: No     Comment: sober x 5 years    Drug use: No     Comment: former drug use       Allergies:  No Known Allergies      Review of Systems     Review of Systems   Constitutional: Negative for diaphoresis and fever. HENT: Negative for congestion and sore throat. Eyes: Negative for pain and itching. Respiratory: Negative for cough and shortness of breath. Cardiovascular: Negative for chest pain and palpitations. Gastrointestinal: Positive for abdominal pain. Negative for diarrhea. Endocrine: Negative for polydipsia and polyuria. Genitourinary: Negative for dysuria and hematuria. Musculoskeletal: Negative for arthralgias and myalgias. Skin: Negative for rash and wound. Neurological: Positive for syncope. Negative for seizures. Hematological: Does not bruise/bleed easily.    Psychiatric/Behavioral: Negative for agitation and hallucinations. Physical Exam       Patient Vitals for the past 12 hrs:   Temp Pulse Resp BP SpO2   05/12/21 1628 (P) 98.6 °F (37 °C) (P) 88 (P) 11 (P) 121/81 (P) 99 %       Physical Exam  Vitals signs and nursing note reviewed. Constitutional:       Appearance: He is well-developed. HENT:      Head: Normocephalic and atraumatic. Eyes:      General: No scleral icterus. Conjunctiva/sclera: Conjunctivae normal.   Neck:      Musculoskeletal: Normal range of motion and neck supple. Vascular: No JVD. Cardiovascular:      Rate and Rhythm: Normal rate and regular rhythm. Heart sounds: Normal heart sounds. Comments: 4 intact extremity pulses  Pulmonary:      Effort: Pulmonary effort is normal.      Breath sounds: Normal breath sounds. Abdominal:      Palpations: Abdomen is soft. There is no mass. Tenderness: There is no abdominal tenderness. Musculoskeletal: Normal range of motion. Comments: Left AC operative site, just scant hyperemia surrounding. No pain purulent discharge dressing clean dry and intact not consistent with cellulitis. Right chest dialysis catheter appears benign   Lymphadenopathy:      Cervical: No cervical adenopathy. Skin:     General: Skin is warm and dry. Neurological:      Mental Status: He is alert.            Diagnostic Study Results   Labs -  Recent Results (from the past 12 hour(s))   EKG, 12 LEAD, INITIAL    Collection Time: 05/12/21  4:32 PM   Result Value Ref Range    Ventricular Rate 88 BPM    Atrial Rate 288 BPM    QRS Duration 82 ms    Q-T Interval 396 ms    QTC Calculation (Bezet) 479 ms    Calculated R Axis 18 degrees    Calculated T Axis 17 degrees    Diagnosis       Atrial fibrillation with a competing junctional pacemaker  Abnormal ECG  When compared with ECG of 20-FEB-2021 12:25,  Non-specific change in ST segment in Lateral leads  Nonspecific T wave abnormality now evident in Inferior leads         Radiologic Studies -   No orders to display     No results found. Medications ordered:   Medications - No data to display      Medical Decision Making   Initial Medical Decision Making and DDx:  Entirely consistent with vasovagal syncope from straining due to constipation discussed use of scheduled MiraLAX. He will be discharged she is happy with this plan. Do not suspect alarming electrolyte abnormality or cardiogenic syncope. Vital signs are stable at this point. ED Course: Progress Notes, Reevaluation, and Consults:         I am the first provider for this patient. I reviewed the vital signs, available nursing notes, past medical history, past surgical history, family history and social history. Patient Vitals for the past 12 hrs:   Temp Pulse Resp BP SpO2   05/12/21 1628 (P) 98.6 °F (37 °C) (P) 88 (P) 11 (P) 121/81 (P) 99 %       Vital Signs-Reviewed the patient's vital signs. Pulse Oximetry Analysis, Cardiac Monitor, 12 lead ekg:      Interpreted by the EP. Records Reviewed: Nursing notes reviewed (Time of Review: 5:37 PM)    Procedures:   Critical Care Time:   Aspirin: (was aspirin given for stroke?)    Diagnosis     Clinical Impression:   1. Vasovagal syncope    2. Drug-induced constipation    3. ESRD on dialysis [facility])        Disposition: Discharged      Follow-up Information     Follow up With Specialties Details Why Contact Info    Magnus Avila PA-C Physician Assistant   [address] [facility]  [phone]             Patient's Medications   Start Taking    No medications on file   Continue Taking    ALBUTEROL (PROAIR HFA) 90 MCG/ACTUATION INHALER    Take 2 Puffs by inhalation every four (4) hours as needed for Wheezing. APIXABAN (ELIQUIS) 2.5 MG TABLET    Take 1 Tab by mouth two (2) times a day. ATENOLOL (TENORMIN) 100 MG TABLET    Take 1 Tab by mouth daily.     BUDESONIDE-FORMOTEROL (SYMBICORT) 160-4.5 MCG/ACTUATION HFAA    inhale 2 puffs by mouth twice a day GABAPENTIN (NEURONTIN) 800 MG TABLET    take 2 tablets by mouth three times a day    MELATONIN TAB TABLET    Take  by mouth nightly. POLYETHYLENE GLYCOL (MIRALAX) 17 GRAM PACKET    Take 1 Packet by mouth daily as needed for Constipation.    These Medications have changed    No medications on file   Stop Taking    No medications on file     _______________________________    Notes:    Sisi Aguilera MD using Dragon dictation      _______________________________

## 2021-05-12 NOTE — ED NOTES
Patient and family education on vagal maneuver. Pt discharge instructions reviewed with patient, patient denies questions and verbalizes understanding. IVs removed and all belongings with patient. Pt alert and oriented, no signs of distress. Pt assisted to wheelchair and transported to ED waiting room.

## 2021-08-03 PROBLEM — N17.9 ACUTE KIDNEY INJURY (HCC): Status: RESOLVED | Noted: 2021-01-14 | Resolved: 2021-08-03

## 2021-08-18 ENCOUNTER — HOSPITAL ENCOUNTER (OUTPATIENT)
Dept: INTERVENTIONAL RADIOLOGY/VASCULAR | Age: 61
Discharge: HOME | End: 2021-08-18
Attending: INTERNAL MEDICINE | Admitting: RADIOLOGY
Payer: COMMERCIAL

## 2021-08-18 VITALS
SYSTOLIC BLOOD PRESSURE: 160 MMHG | WEIGHT: 236 LBS | HEART RATE: 76 BPM | HEIGHT: 68 IN | RESPIRATION RATE: 16 BRPM | OXYGEN SATURATION: 98 % | DIASTOLIC BLOOD PRESSURE: 57 MMHG | BODY MASS INDEX: 35.77 KG/M2 | TEMPERATURE: 98.6 F

## 2021-08-18 DIAGNOSIS — N18.5 CHRONIC KIDNEY DISEASE, STAGE V (HCC): ICD-10-CM

## 2021-08-18 PROCEDURE — 36589 REMOVAL TUNNELED CV CATH: CPT

## 2021-08-18 RX ORDER — MIDAZOLAM HYDROCHLORIDE 1 MG/ML
.5-2 INJECTION, SOLUTION INTRAMUSCULAR; INTRAVENOUS
Status: DISCONTINUED | OUTPATIENT
Start: 2021-08-18 | End: 2021-08-18 | Stop reason: HOSPADM

## 2021-08-18 RX ORDER — FENTANYL CITRATE 50 UG/ML
12.5-5 INJECTION, SOLUTION INTRAMUSCULAR; INTRAVENOUS
Status: DISCONTINUED | OUTPATIENT
Start: 2021-08-18 | End: 2021-08-18 | Stop reason: HOSPADM

## 2021-08-18 NOTE — DISCHARGE INSTRUCTIONS
DISCHARGE SUMMARY from Nurse    PATIENT INSTRUCTIONS:    Report the following to your surgeon:  · Excessive pain, swelling, redness or odor of or around the surgical area  · Temperature over 100.5  · Nausea and vomiting lasting longer than 4 hours or if unable to take medications  · Any signs of decreased circulation or nerve impairment to extremity: change in color, persistent  numbness, tingling, coldness or increase pain  · Any questions    What to do at Home:  Recommended activity: Activity as tolerated. If you experience any of the following symptoms pain not relieved by over the counter pain medication, please follow up with your primary care provider. *  Please give a list of your current medications to your Primary Care Provider. *  Please update this list whenever your medications are discontinued, doses are      changed, or new medications (including over-the-counter products) are added. *  Please carry medication information at all times in case of emergency situations. These are general instructions for a healthy lifestyle:    No smoking/ No tobacco products/ Avoid exposure to second hand smoke  Surgeon General's Warning:  Quitting smoking now greatly reduces serious risk to your health. Obesity, smoking, and sedentary lifestyle greatly increases your risk for illness    A healthy diet, regular physical exercise & weight monitoring are important for maintaining a healthy lifestyle    You may be retaining fluid if you have a history of heart failure or if you experience any of the following symptoms:  Weight gain of 3 pounds or more overnight or 5 pounds in a week, increased swelling in our hands or feet or shortness of breath while lying flat in bed. Please call your doctor as soon as you notice any of these symptoms; do not wait until your next office visit. The discharge information has been reviewed with the patient. The patient verbalized understanding.   Discharge medications reviewed with the patient and appropriate educational materials and side effects teaching were provided.   ___________________________________________________________________________________________________________________________________

## 2021-08-18 NOTE — ROUTINE PROCESS
Patient arrived on unit via wheelchair, however he stated that he is able to walk a short distance. Patient complains of pain level 8 out of 10 lower back and neck. Patient is alert and oriented times 4. Patient was able to answer all questions without any problems and was hooked up to the monitor.

## 2021-08-18 NOTE — PROGRESS NOTES
1015 AVS Discharge instructions reviewed with patient and copy given to patient. All questions answered. Patient verbalized understanding to all discharge instructions. PIV removed. Procedural site within normal limits. No hematoma or bleeding noted from procedural and PIV site. No pain noted at discharge. Patient discharged with support person in stable condition. Escorted out to vehicle for transport home.

## 2021-08-18 NOTE — H&P
OUTPATIENT HISTORY AND PHYSICAL  Today 8/18/2021     Indication/Symptoms:   Opal Knutson is a 59 y.o. male with history of ESRD on dialysis now resolved no longer needing treatment here for [facility] removal.    Current Meds:    Prior to Admission medications    Medication Sig Start Date End Date Taking? Authorizing Provider   atenoloL (TENORMIN) 100 mg tablet Take 1 Tab by mouth daily. 2/21/21  Yes Amanda Morel MD   apixaban (ELIQUIS) 2.5 mg tablet Take 1 Tab by mouth two (2) times a day. 2/2/21  Yes Leticia Alford MD   gabapentin (NEURONTIN) 800 mg tablet take 2 tablets by mouth three times a day  Patient taking differently: Take 1,200 mg by mouth two (2) times a day. 3/17/20  Yes Fabiano Shah MD   budesonide-formoterol [facility]) 160-4.5 mcg/actuation HFAA inhale 2 puffs by mouth twice a day 11/20/19  Yes Sunday DUANE Villafana   polyethylene glycol (MIRALAX) 17 gram packet Take 1 Packet by mouth daily as needed for Constipation. Patient not taking: Reported on 8/18/2021 2/2/21   Leticia Alford MD   melatonin tab tablet Take  by mouth nightly. Patient not taking: Reported on 8/18/2021    Provider, Mary   albuterol (PROAIR HFA) 90 mcg/actuation inhaler Take 2 Puffs by inhalation every four (4) hours as needed for Wheezing.   Patient not taking: Reported on 8/18/2021 7/19/17 Sunday DUANE Villafana       Allergies:    No Known Allergies    Comorbid Conditions:    Past Medical History:   Diagnosis Date    AAA [facility]ab[facility]) ([facility] [address]     Alcoholism ([facility] [address]     Atrial fibrillation (HCC)     Bradycardia     Chronic pain     Degenerative disc disease, cervical     Degenerative disc disease, lumbar     Hypertension     Hypokalemia     Pneumonia 2016    caused permanent L lung problems    Thyroid disease           Past Surgical History:   Procedure Laterality Date    HX ORTHOPAEDIC      neck fracture    HX ORTHOPAEDIC      lumbar fusion    IR INSERT TUNL CVC W/O PORT OVER 5 YR 1/19/2021    [place] ABDOMEN SURGERY PROC UNLISTED  10/08/2017    spleenectomy     Data:    Visit Vitals  BP (!) 154/71 (BP 1 Location: Right upper arm, BP Patient Position: Supine)   Pulse 72   Temp 98.6 °F (37 °C)   Resp 17   Ht 5' 8\" (1.727 m)   Wt 107 kg (236 lb)   SpO2 97%   BMI 35.88 kg/m²   :  No results for input(s): PLT, PLTEXT in the last 72 hours. No lab exists for component:  HCT  No results for input(s): INR, APTT, INREXT in the last 72 hours. No lab exists for component: PT    The H & P and/or progress notes and any available imaging were reviewed. The risks, indications and possible alternatives to the procedure, including doing nothing, were discussed and informed consent was obtained. Physical Exam:      Mental status:   Alert and oriented. Heart:   Regular rate. Lungs:  Normal respiratory effort. The patient is an appropriate candidate to undergo the planned procedure.      MANISHA Mancia

## 2021-08-18 NOTE — PROCEDURES
RADIOLOGY POST PROCEDURE NOTE     August 18, 2021       10:02 AM     Preoperative Diagnosis:   History of ESRD - now resolved    Postoperative Diagnosis:  Same. :  MANISHA Berg    Assistant:  None. Type of Anesthesia: 1% plain lidocaine    Procedure/Description:  Image guided tunneled dialysis catheter removal    Findings:   Right chest dual lumen TDC removed with cuff intact. Skin without erythema or edema or exudate prior to removal. Images saved in PACS. Estimated blood Loss:  Minimal    Specimen Removed:   no    Blood transfusions:  None. Implants:  None.     Complications: None    Condition: Stable    Blood thinning medications: OK to resume in 24 hours unless otherwise indicated    Discharge Plan:  discharge home     [address] [place] [address], [place]

## 2021-12-28 NOTE — PROGRESS NOTES
Airway  Performed by: Juliet Sandoval CRNA  Authorized by: Benjy Aguirre MD     Final Airway Type:  Endotracheal airway  Final Endotracheal Airway*:  ETT  ETT Size (mm)*:  7.0  Cuff*:  Regular  Technique Used for Successful ETT Placement:  Direct laryngoscopy  Devices/Methods Used in Placement*:  Mask and Stylet  Intubation Procedure*:  Preoxygenation, ETCO2, Atraumatic, Dentition Unchanged and Pharynx Clear  Insertion Site:  Oral  Blade Type*:  MAC  Blade Size*:  3  Cuff Volume (mL):  8  Measured from*:  Lips  Secured at (cm)*:  21  Placement Verified by: auscultation, capnometry and equal breath sounds    Glottic View*:  2 - partial view of glottis  Attempts*:  1   Patient Identified, Procedure confirmed, Emergency equipment available and Safety protocols followed  Location:  OR  Urgency:  Elective  Difficult Airway: No    Indications for Airway Management:  Anesthesia  Sedation Level:  Anesthetized  Mask Difficulty Assessment:  1 - vent by mask  CRNA:  Juliet Sandoval CRNA  Start Time: 12/28/2021 11:58 AM   Easy mask, easy airway with Cricoid pressure.         HISTORY OF PRESENT ILLNESS  Palma Turner is a 59 y.o. male. HPI  Palma Turner is a 59 y.o. male who presents to the office today for hand and feet pain. He comes in today with c/o neuropathic pain in both feet and hands. The pain in his feet is described as burning, stinging, and has numbness. He feels like the ball of his foot is swollen. His hands are mostly tingling and numbness at the tips of the fingers. This has been progressively worsening for 6 years. He states he had dopplers to his lower extremities several years ago when he lived in [place] and was told his circulation was fine. He was placed on gabapentin for neuropathic pain. He is now only taking 400mg BID because his family was concerned that his seizures stemmed from gabapentin withdrawal during hospitalization. He says the gabapentin never really helped with his pain. He has a hx of surgeries to his low back and neck. 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 He is followed by Vascular for his AAA but he has not addressed his bilateral foot pain with them. He has also been seen by Neurology for seizures. Hx of Afib on pradaxa. He quit smoking cigarettes August 2017. Chief Complaint   Patient presents with    Hand Pain       Current Outpatient Prescriptions on File Prior to Visit   Medication Sig Dispense Refill    SYMBICORT 160-4.5 mcg/actuation HFAA inhale 2 puffs by mouth twice a day 1 Inhaler 5    dabigatran etexilate (PRADAXA) 150 mg capsule Take 1 Cap by mouth every twelve (12) hours. 180 Cap 3    dilTIAZem CD (CARTIA XT) 180 mg ER capsule Take 1 Cap by mouth two (2) times a day. 180 Cap 3    DULoxetine (CYMBALTA) 30 mg capsule Take 1 Cap by mouth two (2) times a day. 180 Cap 1    gabapentin (NEURONTIN) 400 mg capsule Take 1 Cap by mouth three (3) times daily.  (Patient taking differently: Take 400 mg by mouth two (2) times a day.) 270 Cap 1    atenolol (TENORMIN) 100 mg tablet Take 1 Tab by mouth daily. 90 Tab 1    cholecalciferol, vitamin D3, (VITAMIN D3) 2,000 unit tab Take  by mouth two (2) times a day.  MULTIVIT-MIN/FA/LYCOPEN/LUTEIN (CENTRUM SILVER MEN PO) Take 1 Tab by mouth daily.  potassium 99 mg tablet Take 99 mg by mouth daily.  fish oil-dha-epa 1,200-144-216 mg cap Take 1 Tab by mouth daily.  albuterol (PROAIR HFA) 90 mcg/actuation inhaler Take 2 Puffs by inhalation every four (4) hours as needed for Wheezing. 1 Inhaler 1    b complex vitamins tablet Take 1 Tab by mouth daily. No current facility-administered medications on file prior to visit. No Known Allergies  Past Medical History:   Diagnosis Date    AAA (abdominal aortic aneurysm) (HCC)     Alcoholism (HCC)     Atrial fibrillation (HCC)     Bradycardia     Chronic pain     Degenerative disc disease, cervical     Degenerative disc disease, lumbar     Hypertension     Hypokalemia     Pneumonia 2016    caused permanent L lung problems    Thyroid disease      History   Smoking Status    Former Smoker    Quit date: 2017   Smokeless Tobacco    Former User     History   Alcohol Use No     Comment: sober x 5 years     Family History   Problem Relation Age of Onset    Heart Disease Father     Heart Disease Brother      AAA-  in his 42's    Heart Disease Paternal Aunt     Heart Disease Paternal Uncle     Heart Disease Paternal Grandmother     Lupus Mother      Review of Systems   Cardiovascular: Negative for claudication and leg swelling. Musculoskeletal: Negative for falls. Skin: Negative for rash. Neurological: Positive for tingling and sensory change. Negative for seizures. Psychiatric/Behavioral: Negative for substance abuse and suicidal ideas.      Visit Vitals    BP (!) 134/97 (BP 1 Location: Right arm, BP Patient Position: Sitting)    Pulse 69    Temp 98 °F (36.7 °C) (Oral)    Resp 18    Ht 5' 10\" (1.778 m)    Wt 200 lb 9.6 oz (91 kg)    SpO2 98%    BMI 28.78 kg/m2     Physical Exam   Constitutional: He is oriented to person, place, and time. He appears well-developed and well-nourished. He appears distressed. Pain with walking   Cardiovascular: A regularly irregular rhythm present. Pulses:       Radial pulses are 2+ on the right side, and 2+ on the left side. Posterior tibial pulses are 1+ on the right side, and 1+ on the left side. Pulmonary/Chest: Effort normal. No respiratory distress. He has no wheezes. Musculoskeletal: He exhibits no edema. Neurological: He is alert and oriented to person, place, and time. Skin: Skin is intact. Blue-purple hue to both lower legs, no hair growth on lower half of calves. Cool to touch. Psychiatric: He has a normal mood and affect. His behavior is normal. Thought content normal.   Nursing note and vitals reviewed. ASSESSMENT and PLAN    ICD-10-CM ICD-9-CM    1. Neuropathy G62.9 355.9 CBC WITH AUTOMATED DIFF      METABOLIC PANEL, COMPREHENSIVE      MAGNESIUM      VITAMIN B12      HEMOGLOBIN A1C WITH EAG      TSH AND FREE T4      REFERRAL TO NEUROLOGY   2. Numbness of fingers of both hands R20.0 782.0 CBC WITH AUTOMATED DIFF      METABOLIC PANEL, COMPREHENSIVE      MAGNESIUM      VITAMIN B12      HEMOGLOBIN A1C WITH EAG      TSH AND FREE T4      REFERRAL TO NEUROLOGY   3. Burning sensation of feet R20.8 782.0 CBC WITH AUTOMATED DIFF      METABOLIC PANEL, COMPREHENSIVE      MAGNESIUM      VITAMIN B12      HEMOGLOBIN A1C WITH EAG      TSH AND FREE T4      REFERRAL TO NEUROLOGY   4. Hyperthyroidism E05.90 242.90 TSH AND FREE T4   5. History of abdominal aortic aneurysm (AAA) Z86.79 V12.59    6. S/P splenectomy Z90.81 V45.79    7. History of nicotine use Z87.891 V15.82       Will refer to Neurology for progressively worsening neuropathic pain. However, I would also like for him to follow up with Vascular to be evaluated for PAD. His wife and daughter all agree.    Will check labs today. Patient agrees with the plan and verbalizes understanding. Follow-up Disposition:  Return in about 6 weeks (around 7/10/2018) for neuropathy in hands and feet.     Radha Lucero PA-C  5/29/2018

## 2022-03-18 PROBLEM — G89.29 CHRONIC PAIN: Status: ACTIVE | Noted: 2017-05-11

## 2022-03-18 PROBLEM — I48.91 ATRIAL FIBRILLATION (HCC): Status: ACTIVE | Noted: 2017-05-11

## 2022-03-18 PROBLEM — Z82.69 FAMILY HISTORY OF SYSTEMIC LUPUS ERYTHEMATOSUS (SLE) IN MOTHER: Status: ACTIVE | Noted: 2017-05-11

## 2022-03-19 PROBLEM — I10 ESSENTIAL HYPERTENSION: Status: ACTIVE | Noted: 2017-05-11

## 2022-03-19 PROBLEM — Z86.79 HISTORY OF ABDOMINAL AORTIC ANEURYSM (AAA): Status: ACTIVE | Noted: 2017-05-11

## 2022-03-19 PROBLEM — D62 ANEMIA DUE TO BLOOD LOSS, ACUTE: Status: ACTIVE | Noted: 2017-10-27

## 2022-03-19 PROBLEM — N17.9 ARF (ACUTE RENAL FAILURE) (HCC): Status: ACTIVE | Noted: 2021-01-14

## 2022-03-19 PROBLEM — D72.829 LEUKOCYTOSIS: Status: ACTIVE | Noted: 2021-02-20

## 2022-03-19 PROBLEM — F32.A DEPRESSION: Status: ACTIVE | Noted: 2017-05-11

## 2022-03-19 PROBLEM — F17.200 NICOTINE USE DISORDER: Status: ACTIVE | Noted: 2017-05-11

## 2022-03-19 PROBLEM — F19.91 HISTORY OF DRUG USE: Status: ACTIVE | Noted: 2017-05-11

## 2022-03-19 PROBLEM — Z82.49 FH: CAD (CORONARY ARTERY DISEASE): Status: ACTIVE | Noted: 2017-05-11

## 2022-03-19 PROBLEM — Z90.81 S/P SPLENECTOMY: Status: ACTIVE | Noted: 2017-10-27

## 2022-03-19 PROBLEM — E05.90 HYPERTHYROIDISM: Status: ACTIVE | Noted: 2017-05-11

## 2022-03-19 PROBLEM — M15.9 OSTEOARTHRITIS OF MULTIPLE JOINTS: Status: ACTIVE | Noted: 2017-05-11

## 2022-03-19 PROBLEM — R42 DIZZINESS: Status: ACTIVE | Noted: 2021-02-20

## 2022-03-19 PROBLEM — G62.9 NEUROPATHY: Status: ACTIVE | Noted: 2018-06-01

## 2022-03-19 PROBLEM — R56.9 SEIZURE (HCC): Status: ACTIVE | Noted: 2017-10-27

## 2022-03-20 PROBLEM — I71.40 AAA (ABDOMINAL AORTIC ANEURYSM) WITHOUT RUPTURE (HCC): Status: ACTIVE | Noted: 2017-06-13

## 2022-03-20 PROBLEM — Z99.2 DIALYSIS PATIENT (HCC): Status: ACTIVE | Noted: 2021-02-20

## 2022-03-20 PROBLEM — F10.11 H/O ALCOHOL ABUSE: Status: ACTIVE | Noted: 2017-05-11

## 2022-03-20 PROBLEM — S36.09XA RUPTURED SPLEEN: Status: ACTIVE | Noted: 2017-10-08

## 2023-01-31 RX ORDER — CHOLECALCIFEROL (VITAMIN D3) 125 MCG
CAPSULE ORAL
COMMUNITY

## 2023-01-31 RX ORDER — BUDESONIDE AND FORMOTEROL FUMARATE DIHYDRATE 160; 4.5 UG/1; UG/1
AEROSOL RESPIRATORY (INHALATION)
COMMUNITY
Start: 2019-11-20

## 2023-01-31 RX ORDER — GABAPENTIN 800 MG/1
TABLET ORAL
COMMUNITY
Start: 2020-03-17

## 2023-01-31 RX ORDER — ALBUTEROL SULFATE 90 UG/1
2 AEROSOL, METERED RESPIRATORY (INHALATION) EVERY 4 HOURS PRN
COMMUNITY
Start: 2017-07-19

## 2023-01-31 RX ORDER — POLYETHYLENE GLYCOL 3350 17 G/17G
17 POWDER, FOR SOLUTION ORAL DAILY PRN
COMMUNITY
Start: 2021-02-02

## 2023-01-31 RX ORDER — ATENOLOL 100 MG/1
100 TABLET ORAL DAILY
COMMUNITY
Start: 2021-02-21

## 2024-02-16 NOTE — PROGRESS NOTES
Attempted to call report x2 with no answer.    Progress  Note       57y M with PMH HTN, chronic pain, admitted for severe renal failure  Subjective      Overnight event noted  Examined during HD          IMPRESSION:   Acute kidney injury ATN vs RPGN, per history , has been having gross hematuria over last several weeks, brief use of nsaids, was on diuretics at home-- negative ANCA panel, negative anti GBM, negative hep B, C, HIV, negative JULIO s/p kidney biopsy , severe ATN, IG A deposition but not clear suggestive of IG A nephropathy, not candidate for steroid give severity and h/o heavy proteinuria as well as higher risk for steroid related psychosis. Proteinuria, nephrotic range  Hematuria,   H/o nose bleeding episode   HTN  Lower extremities edema with cellulits, blister  Atrial fibrillation , on anti coagulation   H/o spleenectomy   H/o heavy use of gabapentin ( about 2400mg daily for last 2 months)   PLAN:   .okay to discharge from renal stand point, his outpatient HD has been arranged. On 2021, I saw and examined patient during hemodialysis treatment. The patient was receiving hemodialysis for treatment of  renal failure. I have also reviewed vital signs, intake and output, lab results and recent events, and agreed with today's dialysis order. HD rounding    Blood pressure (!) 118/94, pulse 78, temperature 98.1 °F (36.7 °C), temperature source Oral, resp. rate 18, height 5' 10\" (1.778 m), weight 121.6 kg (268 lb), SpO2 93 %.   Temp (24hrs), Av.3 °F (36.8 °C), Min:97.9 °F (36.6 °C), Max:98.9 °F (37.2 °C)      Blood Pressure: BP: (!) 118/94  Pulse: Pulse (Heart Rate): 78  Temp:  Temp: 98.1 °F (36.7 °C)    Artificial Kidney Dialyzer/Set Up Inspection: Revaclear   hours Duration of Treatment (hours): 3 hours   Heparin Bolus Heparin Bolus (units): 0 units  Blood flow rate Blood Flow Rate (ml/min): 350 ml/min   Dialysate rate     Arterial Access Pressure Arterial Access Pressure (mmHg): -150  Venous Return Pressure Venous Return Pressure (mmHg): 180  Ultrafiltration Rate Goal/Amount of Fluid to Remove (mL): 1500 mL  Fluid Removal Fluid Removed (mL): 2000  Net Fluid Removal NET Fluid Removed (mL): 1500 ml          Facility-Administered Medications: None       Current Facility-Administered Medications   Medication Dose Route Frequency    albuterol-ipratropium (DUO-NEB) 2.5 MG-0.5 MG/3 ML  3 mL Nebulization Q6H PRN    bisacodyL (DULCOLAX) tablet 10 mg  10 mg Oral DAILY PRN    polyethylene glycol (MIRALAX) packet 17 g  17 g Oral DAILY    apixaban (ELIQUIS) tablet 2.5 mg  2.5 mg Oral BID    iron sucrose (VENOFER) 100 mg in 0.9% sodium chloride 100 mL IVPB  100 mg IntraVENous Q24H    doxercalciferoL (HECTOROL) 4 mcg/2 mL injection 2 mcg  2 mcg IntraVENous DIALYSIS MON, WED & FRI    silver sulfADIAZINE (SILVADENE) 1 % topical cream   Topical DAILY    nicotine (NICODERM CQ) 14 mg/24 hr patch 1 Patch  1 Patch TransDERmal DAILY    0.9% sodium chloride infusion 250 mL  250 mL IntraVENous DIALYSIS PRN    heparin (porcine) 1,000 unit/mL injection 5,000 Units  5,000 Units InterCATHeter DIALYSIS PRN    dilTIAZem ER (CARDIZEM CD) capsule 120 mg  120 mg Oral Q12H    albumin human 25% (BUMINATE) solution 25 g  25 g IntraVENous DIALYSIS PRN    oxyCODONE-acetaminophen (PERCOCET) 5-325 mg per tablet 1 Tab  1 Tab Oral Q6H PRN    insulin lispro (HUMALOG) injection   SubCUTAneous AC&HS    glucose chewable tablet 16 g  4 Tab Oral PRN    glucagon (GLUCAGEN) injection 1 mg  1 mg IntraMUSCular PRN    dextrose (D50W) injection syrg 12.5-25 g  25-50 mL IntraVENous PRN    sodium chloride (NS) flush 5-40 mL  5-40 mL IntraVENous Q8H    sodium chloride (NS) flush 5-40 mL  5-40 mL IntraVENous Q8H    sodium chloride (NS) flush 5-40 mL  5-40 mL IntraVENous PRN    acetaminophen (TYLENOL) tablet 650 mg  650 mg Oral Q6H PRN    Or    acetaminophen (TYLENOL) suppository 650 mg  650 mg Rectal Q6H PRN    promethazine (PHENERGAN) tablet 12.5 mg  12.5 mg Oral Q6H PRN Or    ondansetron (ZOFRAN) injection 4 mg  4 mg IntraVENous Q6H PRN    [Held by provider] amitriptyline (ELAVIL) tablet 50 mg  50 mg Oral QHS    melatonin tablet 5 mg  5 mg Oral QHS       Review of Systems:     As above  Data Review:    Labs: Results:       Chemistry Recent Labs     01/31/21  1112   GLU 90      K 3.9      CO2 28   BUN 21*   CREA 7.77*   CA 8.7   AGAP 7   BUCR 3*      CBC w/Diff No results for input(s): WBC, RBC, HGB, HCT, PLT, GRANS, LYMPH, EOS, HGBEXT, HCTEXT, PLTEXT, HGBEXT, HCTEXT, PLTEXT in the last 72 hours. Coagulation No results for input(s): PTP, INR, APTT, INREXT, INREXT in the last 72 hours. Iron/Ferritin No results for input(s): IRON in the last 72 hours. No lab exists for component: TIBCCALC   BNP No results for input(s): BNPP in the last 72 hours. Cardiac Enzymes No results for input(s): CPK, CKND1, RUSSEL in the last 72 hours. No lab exists for component: CKRMB, TROIP   Liver Enzymes No results for input(s): TP, ALB, TBIL, AP in the last 72 hours.     No lab exists for component: SGOT, GPT, DBIL   Thyroid Studies Lab Results   Component Value Date/Time    TSH 1.94 01/14/2021 11:56 AM         EKG: atrial fibrilation     Physical Assessment:     Visit Vitals  BP (!) 118/94   Pulse 78   Temp 98.1 °F (36.7 °C) (Oral)   Resp 18   Ht 5' 10\" (1.778 m)   Wt 121.6 kg (268 lb)   SpO2 93%   BMI 38.45 kg/m²     Weight change:     Intake/Output Summary (Last 24 hours) at 2/1/2021 1625  Last data filed at 2/1/2021 1322  Gross per 24 hour   Intake 237 ml   Output 1500 ml   Net -1263 ml     Physical Exam:   General: comfortable, no acute distress   HEENT sclera anicteric, supple neck, no thyromegaly  CVS: S1S2 heard,  no rub  RS: + air entry b/l,   Abd: Soft, Non tender, Not distended,  Neuro: non focal, awake, alert , CN II-XII are grossly intact,  +tremor   Extrm: ++edema, no cyanosis, clubbing   Skin:  dry  Musculoskeletal: No gross joints or bone deformities Procedures/imaging: see electronic medical records for all procedures, Xrays and details which were not copied into this note but were reviewed prior to creation of Surendra Barnett MD  February 1, 2021  [facility]  Office [phone]

## 2025-02-06 NOTE — TELEPHONE ENCOUNTER
Wife called stating patient is in [facility]. His spleen ruptured and was removed in emergency surgery last night. He is currently on a ventilator. According to his wife, he will probably be there for the next 10 days. He will be following up with us once he is released. No

## (undated) DEVICE — RADIFOCUS GLIDEWIRE: Brand: GLIDEWIRE

## (undated) DEVICE — SYR 50ML LR LCK 1ML GRAD NSAF --

## (undated) DEVICE — PRESSURE MONITORING SET: Brand: TRUWAVE

## (undated) DEVICE — INTRODUCER SHTH 8FR CANN L11CM DIL TIP 35MM BLU TUNGSTEN

## (undated) DEVICE — (D)SYR 10ML 1/5ML GRAD NSAF -- PKGING CHANGE USE ITEM 338027

## (undated) DEVICE — INTENDED FOR TISSUE SEPARATION, AND OTHER PROCEDURES THAT REQUIRE A SHARP SURGICAL BLADE TO PUNCTURE OR CUT.: Brand: BARD-PARKER SAFETY BLADES SIZE 11, STERILE

## (undated) DEVICE — SYR POWER 150ML 8IN FILL TUBE --

## (undated) DEVICE — Device

## (undated) DEVICE — 3M(TM) MEDIPORE(TM) +PAD SOFT CLOTH ADHESIVE WOUND DRESSING 3569: Brand: 3M™ MEDIPORE™

## (undated) DEVICE — NEEDLE ANGIO 1 WALL ULT SMOOTH 19GAX7CM MERIT AVANCE

## (undated) DEVICE — BANDAGE COBAN 4 IN COMPR W4INXL5YD FOAM COHESIVE QUIK STK SELF ADH SFT

## (undated) DEVICE — INTENDED FOR TISSUE SEPARATION, AND OTHER PROCEDURES THAT REQUIRE A SHARP SURGICAL BLADE TO PUNCTURE OR CUT.: Brand: BARD-PARKER SAFETY BLADES SIZE 10, STERILE

## (undated) DEVICE — MAYO STAND COVER: Brand: CONVERTORS

## (undated) DEVICE — INTRODUCER SHTH 11FR CANN L23CM DIL TIP 45MM YEL W/O MINI

## (undated) DEVICE — TUBING PRSS 48IN 1200PSI CLR HI PRSS INJ EXCITE FLX

## (undated) DEVICE — CATHETERIZATION KIT AD 7 FRX16 CM CV ARROWG+ARD

## (undated) DEVICE — APPLIER CLP AUTO MED 9.75 IN TI SURGCLP SUPER INTLOK 20 DISP

## (undated) DEVICE — DRAPE XR C ARM 41X74IN LF --

## (undated) DEVICE — COVADERM: Brand: DEROYAL

## (undated) DEVICE — GUIDEWIRE VASC L180CM DIA0.035IN TIP L7CM PTFE S STL STR

## (undated) DEVICE — PREP SKN CHLRAPRP APL 26ML STR --

## (undated) DEVICE — 3 ML SYRINGE WITH HYPODERMIC SAFETY NEEDLE: Brand: MAGELLAN

## (undated) DEVICE — THREE-QUARTER SHEET: Brand: CONVERTORS

## (undated) DEVICE — INTENDED FOR TISSUE SEPARATION, AND OTHER PROCEDURES THAT REQUIRE A SHARP SURGICAL BLADE TO PUNCTURE OR CUT.: Brand: BARD-PARKER SAFETY BLADES SIZE 15, STERILE

## (undated) DEVICE — STENT GRAFT BLLN CATH 65CM --

## (undated) DEVICE — GUIDEWIRE VASC L180CM DIA0035IN L15CM STR TIP PTFE HEP S STL

## (undated) DEVICE — GLOVE SURG SZ 7.5 L11.73IN FNGR THK9.8MIL STRW LTX POLYMER

## (undated) DEVICE — SUTURE MCRYL SZ 4-0 L18IN ABSRB UD L19MM PS-2 3/8 CIR PRIM Y496G

## (undated) DEVICE — GLOVE SURG BIOGEL 8.0 STRL -- SKINSENSE

## (undated) DEVICE — DRAPE,EXTREMITY,89X128,STERILE: Brand: MEDLINE

## (undated) DEVICE — FLEX ADVANTAGE 3000CC: Brand: FLEX ADVANTAGE

## (undated) DEVICE — GAUZE,SPONGE,4"X4",16PLY,STRL,LF,10/TRAY: Brand: MEDLINE

## (undated) DEVICE — REM POLYHESIVE ADULT PATIENT RETURN ELECTRODE: Brand: VALLEYLAB

## (undated) DEVICE — CATHETER ANGIO 4FR L65CM 0.035IN VIS OMNI FLUSH-0 SFT TIP

## (undated) DEVICE — SHEATH INTRO 11FR L11CM GWIRE 0.035IN POLYUR RADPQ SIDE PRT

## (undated) DEVICE — Z DISCONTINUED PER MEDLINE SYRINGE ANGIO 150ML PWR INJ FAST LOAD J STRW TBNG SPIK

## (undated) DEVICE — 3M™ BAIR PAWS FLEX™ WARMING GOWN, STANDARD, 20 PER CASE 81003: Brand: BAIR PAWS™

## (undated) DEVICE — CATHETER ANGIO BER2 038 4 FRX65 CM TEMPO AQUA

## (undated) DEVICE — GDWIRE ANGIO DUO FLX 0.018X25 --

## (undated) DEVICE — CURITY NON-ADHERENT STRIPS: Brand: CURITY

## (undated) DEVICE — NEEDLE HYPO 18GA L1.5IN PNK S STL HUB POLYPR SHLD REG BVL

## (undated) DEVICE — TRAY PREP DRY W/ PREM GLV 2 APPL 6 SPNG 2 UNDPD 1 OVERWRAP

## (undated) DEVICE — ANGIO-SEAL VIP VASCULAR CLOSURE DEVICE: Brand: ANGIO-SEAL

## (undated) DEVICE — KIT CATH 7FR L16CM CTRL VEN POLYUR 3 LUMN PRSS INJ BLU

## (undated) DEVICE — SUT PROL 6-0 30IN C1 DA BLU --

## (undated) DEVICE — INTRODUCER SHTH 6FR CANN L11CM DIL TIP 35MM GRN TUNGSTEN

## (undated) DEVICE — PACK PROCEDURE SURG MAJ W/ BASIN LF

## (undated) DEVICE — TABLE COVER: Brand: CONVERTORS

## (undated) DEVICE — DRAPE,TOP,102X53,STERILE: Brand: MEDLINE

## (undated) DEVICE — CATHETER ART 20 GAX4 CM 22 GA RADIAL FEP

## (undated) DEVICE — SUTURE PERMA-HAND SZ 3-0 L24IN NONABSORBABLE BLK W/O NDL SA74H

## (undated) DEVICE — BANDAGE,GAUZE,BULKEE II,4.5"X4.1YD,STRL: Brand: MEDLINE

## (undated) DEVICE — INTRODUCER SHTH 11FR CANN L11CM DIL TIP 45MM YEL TUNGSTEN

## (undated) DEVICE — 3M™ DURAPORE™ SURGICAL TAPE 2 INCHES X 10YARDS (5.0CM X 9.1M) 6ROLLS/CARTON 10CARTONS/CASE 1538-2: Brand: 3M™ DURAPORE™

## (undated) DEVICE — TRAY CATH OD16FR SIL URIN M STATLOK STBL DEV SURSTP

## (undated) DEVICE — KIT CLN UP BON SECOURS MARYV

## (undated) DEVICE — SOLUTION IV 1000ML 0.9% SOD CHL

## (undated) DEVICE — SYR ART 700 CLEAR MARK 7 -- ARTERION

## (undated) DEVICE — MEDI-VAC NON-CONDUCTIVE SUCTION TUBING: Brand: CARDINAL HEALTH

## (undated) DEVICE — CATHETER OMNI FLSH SZ 4FR 70CM 0.35IN GWIRE W/ RADPQ MRK 20

## (undated) DEVICE — PERCLOSE PROGLIDE™ SUTURE-MEDIATED CLOSURE SYSTEM: Brand: PERCLOSE PROGLIDE™

## (undated) DEVICE — CATH BLLN STENT GRAF 12FRX46MM -- RELIANT

## (undated) DEVICE — INTRODUCER SHTH 12FR L28CM ID4.1MM HYDRPHLC DRYSEAL

## (undated) DEVICE — STOCKINETTE,IMPERVIOUS,12X48,STERILE: Brand: MEDLINE

## (undated) DEVICE — RADIFOCUS TORQUE DEVICE MULTI-TORQUE VISE: Brand: RADIFOCUS TORQUE DEVICE

## (undated) DEVICE — SUTURE MCRYL SZ 2-0 L36IN ABSRB UD L36MM CT-1 1/2 CIR Y945H

## (undated) DEVICE — SUTURE PROL SZ 5-0 L36IN NONABSORBABLE BLU L13MM C-1 3/8 8720H

## (undated) DEVICE — MICROPUNCTURE INTRODUCER SET SILHOUETTE TRANSITIONLESS WITH STAINLESS STEEL WIRE GUIDE: Brand: MICROPUNCTURE

## (undated) DEVICE — 3M™ IOBAN™ 2 ANTIMICROBIAL INCISE DRAPE 6650EZ: Brand: IOBAN™ 2

## (undated) DEVICE — Device: Brand: VISIONS PV .035 DIGITAL IVUS CATHETER

## (undated) DEVICE — COVER US PRB W15XL120CM W/ GEL RUBBERBAND TAPE STRP FLD GEN

## (undated) DEVICE — INFUSOR PRSS BG CUF 500ML -- MEDICHOICE

## (undated) DEVICE — SUTURE PERMA-HAND SZ 2-0 L24IN NONABSORBABLE BLK W/O NDL SA75H

## (undated) DEVICE — (D)PREP SKN CHLRAPRP APPL 26ML -- CONVERT TO ITEM 371833

## (undated) DEVICE — DRESSING,GAUZE,XEROFORM,CURAD,5"X9",ST: Brand: CURAD

## (undated) DEVICE — 3M™ MEDIPORE™ H SOFT CLOTH SURGICAL TAPE 2864, 4 INCH X 10 YARD (10CM X 9,14M), 12 ROLLS/CASE: Brand: 3M™ MEDIPORE™

## (undated) DEVICE — STERILE POLYISOPRENE POWDER-FREE SURGICAL GLOVES: Brand: PROTEXIS

## (undated) DEVICE — PAD,ABDOMINAL,8"X10",ST,LF: Brand: MEDLINE

## (undated) DEVICE — SOLUTION SCRB 4OZ 10% PVP I POVIDONE IOD TOP PAINT EXIDINE

## (undated) DEVICE — SUTURE MCRYL 3-0 L27IN ABSRB VLT SH L26MM 1/2 CIR Y316H